# Patient Record
Sex: FEMALE | Race: WHITE | NOT HISPANIC OR LATINO | Employment: FULL TIME | URBAN - METROPOLITAN AREA
[De-identification: names, ages, dates, MRNs, and addresses within clinical notes are randomized per-mention and may not be internally consistent; named-entity substitution may affect disease eponyms.]

---

## 2017-04-20 ENCOUNTER — HOSPITAL ENCOUNTER (OUTPATIENT)
Dept: OTHER | Facility: HOSPITAL | Age: 32
Setting detail: SPECIMEN
Discharge: HOME | End: 2017-04-20
Attending: UROLOGY | Admitting: UROLOGY

## 2017-04-20 LAB — SPECIMEN SOURCE: NORMAL

## 2018-08-27 ENCOUNTER — TELEPHONE (OUTPATIENT)
Dept: OBSTETRICS AND GYNECOLOGY | Facility: CLINIC | Age: 33
End: 2018-08-27

## 2018-08-27 NOTE — TELEPHONE ENCOUNTER
Patient hasn't been seen in a while but states her Mirena is due to be replaced 11/7/18.  She has [employer] insurance.  Patient didn't know if she had to wait until November or if she can go ahead and get it replaced?  Does she need an annual first?  Please advise.

## 2018-09-19 ENCOUNTER — OFFICE VISIT (OUTPATIENT)
Dept: OBSTETRICS AND GYNECOLOGY | Facility: CLINIC | Age: 33
End: 2018-09-19

## 2018-09-19 VITALS
HEIGHT: 63 IN | SYSTOLIC BLOOD PRESSURE: 125 MMHG | BODY MASS INDEX: 36.14 KG/M2 | HEART RATE: 78 BPM | WEIGHT: 204 LBS | DIASTOLIC BLOOD PRESSURE: 83 MMHG

## 2018-09-19 DIAGNOSIS — Z01.419 ENCOUNTER FOR GYNECOLOGICAL EXAMINATION WITHOUT ABNORMAL FINDING: Primary | ICD-10-CM

## 2018-09-19 DIAGNOSIS — Z30.431 IUD CHECK UP: ICD-10-CM

## 2018-09-19 PROCEDURE — 99385 PREV VISIT NEW AGE 18-39: CPT | Performed by: OBSTETRICS & GYNECOLOGY

## 2018-09-19 RX ORDER — AMLODIPINE BESYLATE 5 MG/1
5 TABLET ORAL DAILY
Refills: 5 | COMMUNITY
Start: 2018-08-23 | End: 2021-09-24 | Stop reason: HOSPADM

## 2018-09-19 NOTE — PROGRESS NOTES
"GYN Annual Exam     CC- Here for annual exam.     Ariadna Ambrocio is a 32 y.o. female who presents for annual well woman exam. Periods are absent due to Mirena. IUD is due to  in 2018. Pt would like to exchange for a new one.     OB History      Para Term  AB Living    3 3       3    SAB TAB Ectopic Molar Multiple Live Births              3          Current contraception: IUD  History of abnormal Pap smear: no  Family history of uterine, colon or ovarian cancer: no  History of abnormal mammogram: no  Family history of breast cancer: no  Last Pap : record not available.     Past Medical History:   Diagnosis Date   • Hypertension        Past Surgical History:   Procedure Laterality Date   • KIDNEY STONE SURGERY           Current Outpatient Prescriptions:   •  amLODIPine (NORVASC) 5 MG tablet, Take 5 mg by mouth Daily., Disp: , Rfl: 5    No Known Allergies    Social History   Substance Use Topics   • Smoking status: Never Smoker   • Smokeless tobacco: Never Used   • Alcohol use No       Family History   Problem Relation Age of Onset   • Breast cancer Neg Hx    • Ovarian cancer Neg Hx    • Uterine cancer Neg Hx    • Colon cancer Neg Hx    • Deep vein thrombosis Neg Hx    • Pulmonary embolism Neg Hx        Review of Systems   Constitutional: Negative for chills and fever.   Gastrointestinal: Negative for abdominal pain.   Genitourinary: Negative for dysuria, pelvic pain, vaginal bleeding and vaginal discharge.   All other systems reviewed and are negative.      /83   Pulse 78   Ht 160 cm (63\")   Wt 92.5 kg (204 lb)   Breastfeeding? No   BMI 36.14 kg/m²     Physical Exam   Constitutional: She is oriented to person, place, and time. She appears well-developed and well-nourished. No distress.   HENT:   Head: Normocephalic and atraumatic.   Eyes: Conjunctivae are normal. Right eye exhibits no discharge. Left eye exhibits no discharge.   Neck: Normal range of motion. Neck supple. No " thyromegaly present.   Cardiovascular: Normal rate, regular rhythm and normal heart sounds.    No murmur heard.  Pulmonary/Chest: Effort normal and breath sounds normal. No respiratory distress. Right breast exhibits no inverted nipple, no mass and no nipple discharge. Left breast exhibits no inverted nipple, no mass and no nipple discharge.   Abdominal: Soft. Bowel sounds are normal. She exhibits no distension. There is no tenderness.   Genitourinary: Vagina normal. Pelvic exam was performed with patient supine. There is no lesion or Bartholin's cyst on the right labia. There is no lesion or Bartholin's cyst on the left labia. Uterus is anteverted. Uterus is not deviated, enlarged, fixed or exhibiting a mass.   Cervix is parous. Cervix exhibits visible IUD strings. Right adnexum displays no mass, no tenderness and no fullness. Left adnexum displays no mass, no tenderness and no fullness. No bleeding in the vagina. No vaginal discharge found.   Musculoskeletal: Normal range of motion. She exhibits no edema.   Lymphadenopathy:     She has no cervical adenopathy.        Right: No inguinal adenopathy present.        Left: No inguinal adenopathy present.   Neurological: She is alert and oriented to person, place, and time.   Skin: Skin is warm and dry. No rash noted.   Psychiatric: She has a normal mood and affect. Her behavior is normal. Judgment and thought content normal.            Assessment     1) GYN annual well woman exam.   2) IUD   Due to change IUD out in next few months  Aware of pros and cons.      Plan     1) Breast Health - Clinical breast exam yearly, Self breast awareness monthly  2) Pap - updated today   3) Smoking status- non-smoker   4) Seat belts and sunscreen recommended  5) Follow up prn and one year.     Addy Felder MD   9/19/2018  2:15 PM

## 2018-09-21 LAB
CYTOLOGIST CVX/VAG CYTO: NORMAL
CYTOLOGY CVX/VAG DOC THIN PREP: NORMAL
DX ICD CODE: NORMAL
HIV 1 & 2 AB SER-IMP: NORMAL
HPV I/H RISK 1 DNA CVX QL PROBE+SIG AMP: NEGATIVE
OTHER STN SPEC: NORMAL
PATH REPORT.FINAL DX SPEC: NORMAL
STAT OF ADQ CVX/VAG CYTO-IMP: NORMAL

## 2018-10-10 ENCOUNTER — PROCEDURE VISIT (OUTPATIENT)
Dept: OBSTETRICS AND GYNECOLOGY | Facility: CLINIC | Age: 33
End: 2018-10-10

## 2018-10-10 VITALS
HEIGHT: 63 IN | BODY MASS INDEX: 35.79 KG/M2 | WEIGHT: 202 LBS | DIASTOLIC BLOOD PRESSURE: 89 MMHG | SYSTOLIC BLOOD PRESSURE: 128 MMHG | HEART RATE: 92 BPM

## 2018-10-10 DIAGNOSIS — Z30.432 ENCOUNTER FOR IUD REMOVAL: ICD-10-CM

## 2018-10-10 DIAGNOSIS — Z30.430 ENCOUNTER FOR IUD INSERTION: Primary | ICD-10-CM

## 2018-10-10 LAB
B-HCG UR QL: NEGATIVE
INTERNAL NEGATIVE CONTROL: NEGATIVE
INTERNAL POSITIVE CONTROL: POSITIVE
Lab: NORMAL

## 2018-10-10 PROCEDURE — 58300 INSERT INTRAUTERINE DEVICE: CPT | Performed by: OBSTETRICS & GYNECOLOGY

## 2018-10-10 PROCEDURE — 81025 URINE PREGNANCY TEST: CPT | Performed by: OBSTETRICS & GYNECOLOGY

## 2018-10-10 PROCEDURE — 58301 REMOVE INTRAUTERINE DEVICE: CPT | Performed by: OBSTETRICS & GYNECOLOGY

## 2018-10-10 RX ORDER — PHENTERMINE HYDROCHLORIDE 37.5 MG/1
TABLET ORAL
Refills: 2 | COMMUNITY
Start: 2018-09-21 | End: 2020-07-20

## 2018-10-10 NOTE — PROGRESS NOTES
IUD Removal Procedure Note    Type of IUD:  Mirena  Date of insertion:  5 yrs ago  Reason for removal:  Device expiration  Other relevant history/information:  none    Procedure Time Out Documentation      Procedure Details  IUD strings visible:  yes  Local anesthesia:  None  Tenaculum used:  None  Removal:  IUD strings grasped and IUD removed intact with gentle traction.  The patient tolerated the procedure well.    All appropriate instructions regarding removal were reviewed.    Tolerated well  No apparent complications  Post procedure diagnosis : IUD removal     Plans for contraception:  IUD    Other follow-up needed:  none    The patient was advised to call for any fever or for prolonged or severe pain or bleeding. She was advised to use NSAID as needed for mild to moderate pain.       Procedure: Mirena Intrauterine device insertion    Pre procedure indication 1) Desires Mirena  Post procedure indication 1) Desires Mirena     The risks, benefits, and alternatives to Mirena were explained at length with the patient. All her questions were answered and consents were signed. LOT# [id number], exp 04/2021. NDC# [id number].    The patient was placed in a dorsal lithotomy position on the examining table in [facility]. A bimanual exam confirmed the uterus was normal in size, anteverted. A warmed metal speculum was inserted into the vagina and the cervix was brought into view.    The cervix was prepped with Betadine. The anterior lip was grasped with a single-tooth tenaculum. The endometrial cavity was then sounded to 7.5 cm without use of a dilator. This sealed Mirena package was opened and the Mirena was removed in a sterile fashion.    The upper edge of the depth setting the flange was set at a uterine sound measured. The  was then carefully advanced to the cervical canal into the uterus to the level of the fundus. This was then backed off about 1.5-2 cm to allow sufficient space for the arms to open. The slider  was then retracted about 1 cm and deployed the device. The device was then gently advanced to the fundus. The Mirena was then released by pulling the slider down all the way. The  was removed carefully from the uterus. The threads were then cut leaving 2-3 cm visible outside of the cervix.  The single-tooth tenaculum was removed from the anterior lip. Good hemostasis was noted.     All other instruments were removed from the vagina.   There were no complications.  The patient tolerated the procedure well with a minimal amount of discomfort.    The patient was counseled about the need to return in 2 weeks for string check.     She was counseled about the need to use a backup method of contraception such as condoms until her post insertion exam was performed. The patient verbalized understanding that the Mirena will need to be removed/replaced after 5 years. The patient is counseled to contact us if she has any significant or increasing bleeding, pain, fever, chills, or other concerns. She is instructed to see a doctor right away if she believes that she may be pregnant at any time with the Mirena in place.    Addy Felder MD  10/10/2018  2:16 PM

## 2020-07-20 ENCOUNTER — APPOINTMENT (OUTPATIENT)
Dept: CT IMAGING | Facility: HOSPITAL | Age: 35
End: 2020-07-20

## 2020-07-20 ENCOUNTER — HOSPITAL ENCOUNTER (EMERGENCY)
Facility: HOSPITAL | Age: 35
Discharge: HOME | End: 2020-07-20
Admitting: EMERGENCY MEDICINE

## 2020-07-20 VITALS
TEMPERATURE: 98.4 F | SYSTOLIC BLOOD PRESSURE: 130 MMHG | BODY MASS INDEX: 37.7 KG/M2 | HEART RATE: 93 BPM | DIASTOLIC BLOOD PRESSURE: 92 MMHG | RESPIRATION RATE: 14 BRPM | OXYGEN SATURATION: 100 % | WEIGHT: 212.74 LBS | HEIGHT: 63 IN

## 2020-07-20 DIAGNOSIS — R19.7 DIARRHEA, UNSPECIFIED TYPE: Primary | ICD-10-CM

## 2020-07-20 DIAGNOSIS — R10.9 ABDOMINAL PAIN, UNSPECIFIED ABDOMINAL LOCATION: ICD-10-CM

## 2020-07-20 DIAGNOSIS — K92.1 MELENA: ICD-10-CM

## 2020-07-20 LAB
ALBUMIN SERPL-MCNC: 4.4 G/DL (ref 3.5–5.2)
ALBUMIN/GLOB SERPL: 1.6 G/DL
ALP SERPL-CCNC: 74 U/L (ref 39–117)
ALT SERPL W P-5'-P-CCNC: 19 U/L (ref 1–33)
AMYLASE SERPL-CCNC: 50 U/L (ref 28–100)
ANION GAP SERPL CALCULATED.3IONS-SCNC: 13 MMOL/L (ref 5–15)
AST SERPL-CCNC: 18 U/L (ref 1–32)
B-HCG UR QL: NEGATIVE
BASOPHILS # BLD AUTO: 0.1 10*3/MM3 (ref 0–0.2)
BASOPHILS NFR BLD AUTO: 0.8 % (ref 0–1.5)
BILIRUB SERPL-MCNC: 0.6 MG/DL (ref 0–1.2)
BUN SERPL-MCNC: 6 MG/DL (ref 6–20)
BUN SERPL-MCNC: ABNORMAL MG/DL
BUN/CREAT SERPL: ABNORMAL
CALCIUM SPEC-SCNC: 9.1 MG/DL (ref 8.6–10.5)
CHLORIDE SERPL-SCNC: 103 MMOL/L (ref 98–107)
CO2 SERPL-SCNC: 23 MMOL/L (ref 22–29)
CREAT SERPL-MCNC: 0.86 MG/DL (ref 0.57–1)
DEPRECATED RDW RBC AUTO: 43.8 FL (ref 37–54)
EOSINOPHIL # BLD AUTO: 0.4 10*3/MM3 (ref 0–0.4)
EOSINOPHIL NFR BLD AUTO: 2.3 % (ref 0.3–6.2)
ERYTHROCYTE [DISTWIDTH] IN BLOOD BY AUTOMATED COUNT: 13.7 % (ref 12.3–15.4)
GFR SERPL CREATININE-BSD FRML MDRD: 76 ML/MIN/1.73
GLOBULIN UR ELPH-MCNC: 2.8 GM/DL
GLUCOSE SERPL-MCNC: 119 MG/DL (ref 65–99)
HCT VFR BLD AUTO: 40.4 % (ref 34–46.6)
HGB BLD-MCNC: 13.4 G/DL (ref 12–15.9)
HOLD SPECIMEN: NORMAL
LIPASE SERPL-CCNC: 26 U/L (ref 13–60)
LYMPHOCYTES # BLD AUTO: 3 10*3/MM3 (ref 0.7–3.1)
LYMPHOCYTES NFR BLD AUTO: 18.6 % (ref 19.6–45.3)
MAGNESIUM SERPL-MCNC: 1.8 MG/DL (ref 1.6–2.6)
MCH RBC QN AUTO: 30 PG (ref 26.6–33)
MCHC RBC AUTO-ENTMCNC: 33.1 G/DL (ref 31.5–35.7)
MCV RBC AUTO: 90.5 FL (ref 79–97)
MONOCYTES # BLD AUTO: 0.9 10*3/MM3 (ref 0.1–0.9)
MONOCYTES NFR BLD AUTO: 5.8 % (ref 5–12)
NEUTROPHILS NFR BLD AUTO: 11.7 10*3/MM3 (ref 1.7–7)
NEUTROPHILS NFR BLD AUTO: 72.5 % (ref 42.7–76)
NRBC BLD AUTO-RTO: 0.1 /100 WBC (ref 0–0.2)
PLATELET # BLD AUTO: 317 10*3/MM3 (ref 140–450)
PMV BLD AUTO: 7.3 FL (ref 6–12)
POTASSIUM SERPL-SCNC: 3.8 MMOL/L (ref 3.5–5.2)
PROT SERPL-MCNC: 7.2 G/DL (ref 6–8.5)
RBC # BLD AUTO: 4.46 10*6/MM3 (ref 3.77–5.28)
SARS-COV-2 RNA PNL SPEC NAA+PROBE: NOT DETECTED
SODIUM SERPL-SCNC: 139 MMOL/L (ref 136–145)
WBC # BLD AUTO: 16.2 10*3/MM3 (ref 3.4–10.8)

## 2020-07-20 PROCEDURE — 81025 URINE PREGNANCY TEST: CPT | Performed by: NURSE PRACTITIONER

## 2020-07-20 PROCEDURE — 99284 EMERGENCY DEPT VISIT MOD MDM: CPT

## 2020-07-20 PROCEDURE — 83690 ASSAY OF LIPASE: CPT | Performed by: NURSE PRACTITIONER

## 2020-07-20 PROCEDURE — 0 IOPAMIDOL PER 1 ML: Performed by: NURSE PRACTITIONER

## 2020-07-20 PROCEDURE — 82150 ASSAY OF AMYLASE: CPT | Performed by: NURSE PRACTITIONER

## 2020-07-20 PROCEDURE — 74177 CT ABD & PELVIS W/CONTRAST: CPT

## 2020-07-20 PROCEDURE — 85025 COMPLETE CBC W/AUTO DIFF WBC: CPT | Performed by: NURSE PRACTITIONER

## 2020-07-20 PROCEDURE — 83735 ASSAY OF MAGNESIUM: CPT | Performed by: NURSE PRACTITIONER

## 2020-07-20 PROCEDURE — 80053 COMPREHEN METABOLIC PANEL: CPT | Performed by: NURSE PRACTITIONER

## 2020-07-20 PROCEDURE — 96374 THER/PROPH/DIAG INJ IV PUSH: CPT

## 2020-07-20 PROCEDURE — 87635 SARS-COV-2 COVID-19 AMP PRB: CPT | Performed by: NURSE PRACTITIONER

## 2020-07-20 RX ORDER — ONDANSETRON 4 MG/1
4 TABLET, ORALLY DISINTEGRATING ORAL EVERY 8 HOURS PRN
Qty: 20 TABLET | Refills: 0 | Status: SHIPPED | OUTPATIENT
Start: 2020-07-20 | End: 2021-07-15

## 2020-07-20 RX ORDER — LANSOPRAZOLE 15 MG/1
15 CAPSULE, DELAYED RELEASE ORAL DAILY
Qty: 30 CAPSULE | Refills: 0 | Status: SHIPPED | OUTPATIENT
Start: 2020-07-20 | End: 2021-07-15

## 2020-07-20 RX ORDER — PANTOPRAZOLE SODIUM 40 MG/10ML
40 INJECTION, POWDER, LYOPHILIZED, FOR SOLUTION INTRAVENOUS ONCE
Status: COMPLETED | OUTPATIENT
Start: 2020-07-20 | End: 2020-07-20

## 2020-07-20 RX ORDER — DICYCLOMINE HCL 20 MG
20 TABLET ORAL EVERY 6 HOURS
Qty: 20 TABLET | Refills: 0 | Status: SHIPPED | OUTPATIENT
Start: 2020-07-20 | End: 2021-07-15

## 2020-07-20 RX ORDER — SODIUM CHLORIDE 0.9 % (FLUSH) 0.9 %
10 SYRINGE (ML) INJECTION AS NEEDED
Status: DISCONTINUED | OUTPATIENT
Start: 2020-07-20 | End: 2020-07-20 | Stop reason: HOSPADM

## 2020-07-20 RX ORDER — SUCRALFATE 1 G/1
1 TABLET ORAL 4 TIMES DAILY
Qty: 120 TABLET | Refills: 0 | Status: SHIPPED | OUTPATIENT
Start: 2020-07-20 | End: 2020-08-19

## 2020-07-20 RX ADMIN — SODIUM CHLORIDE 1000 ML: 900 INJECTION, SOLUTION INTRAVENOUS at 13:50

## 2020-07-20 RX ADMIN — IOPAMIDOL 100 ML: 755 INJECTION, SOLUTION INTRAVENOUS at 15:03

## 2020-07-20 RX ADMIN — PANTOPRAZOLE SODIUM 40 MG: 40 INJECTION, POWDER, FOR SOLUTION INTRAVENOUS at 13:50

## 2020-07-20 NOTE — ED PROVIDER NOTES
Subjective   Chief complaint: Abdominal pain diarrhea      Context: Patient is a 34-year-old female comes in today complaining of abdominal pain and intermittent diarrhea beginning Saturday.  She also states she had a couple episodes of vomiting when the symptoms began.  Reports she has chronic constipation and will go approximately 1 month between bowel movements.  Reports she had a normal for her bowel movement on Friday and then began having abdominal cramping and burning on Saturday evening with multiple episodes of watery black stools.  States she had been taking Tylenol and ibuprofen as needed for pain with little relief.  Patient reports she did use a suppository on Saturday.  Denies fever cough rash recent illness or exposure.  Denies chest pain shortness of breath.  Denies dysuria or vaginal discharge or abnormal bleeding.  Denies history of alcohol abuse, or excessive NSAID intake.    Duration: 2 days    Timing: Intermittent    Severity: Mild    Associated symptoms:          PCP: Kenneth      LNMP: IUD implant            Review of Systems   Constitutional: Negative for fever.   HENT: Negative.    Respiratory: Negative.    Cardiovascular: Negative.    Gastrointestinal: Positive for abdominal pain, diarrhea and vomiting.   Genitourinary: Negative for dysuria, hematuria and vaginal bleeding.   Musculoskeletal: Negative.  Gait problem: .JULIAPHYSICALEXAM.   Skin: Negative.    Allergic/Immunologic: Negative for immunocompromised state.   Neurological: Negative for weakness.   Psychiatric/Behavioral: Negative.        Past Medical History:   Diagnosis Date   • Hypertension        No Known Allergies    Past Surgical History:   Procedure Laterality Date   • KIDNEY STONE SURGERY         Family History   Problem Relation Age of Onset   • Breast cancer Neg Hx    • Ovarian cancer Neg Hx    • Uterine cancer Neg Hx    • Colon cancer Neg Hx    • Deep vein thrombosis Neg Hx    • Pulmonary embolism Neg Hx        Social  History     Socioeconomic History   • Marital status:      Spouse name: Not on file   • Number of children: Not on file   • Years of education: Not on file   • Highest education level: Not on file   Tobacco Use   • Smoking status: Never Smoker   • Smokeless tobacco: Never Used   Substance and Sexual Activity   • Alcohol use: No   • Drug use: No   • Sexual activity: Yes     Partners: Male     Birth control/protection: IUD           Objective   Physical Exam  Vital signs and triage nurse note reviewed.   Constitutional: Awake, alert; well-developed and well-nourished. No acute distress is noted nontoxic in appearance.   HEENT: Normocephalic, atraumatic; pupils are PERRL with intact EOM; oropharynx is pink and moist without exudate or erythema.   Neck: Supple, full range of motion without pain; no cervical lymphadenopathy.   Cardiovascular: Tachycardic regular rate and rhythm, normal S1-S2 no murmur rub.   Pulmonary: Respiratory effort regular nonlabored, breath sounds clear to auscultation all fields.   Abdomen: Soft, tender bilateral upper quadrants nondistended with normoactive bowel sounds; no rebound or guarding.  Hemoccult positive   Musculoskeletal: Independent range of motion of all extremities with no palpable tenderness or edema.   Neuro: Alert oriented x3, speech is clear and appropriate, GCS 15   Skin:  Fleshtone warm, dry, intact; no erythematous or petechial rash or lesion     Procedures           ED Course      Labs Reviewed   COMPREHENSIVE METABOLIC PANEL - Abnormal; Notable for the following components:       Result Value    Glucose 119 (*)     All other components within normal limits    Narrative:     GFR Normal >60  Chronic Kidney Disease <60  Kidney Failure <15     CBC WITH AUTO DIFFERENTIAL - Abnormal; Notable for the following components:    WBC 16.20 (*)     Lymphocyte % 18.6 (*)     Neutrophils, Absolute 11.70 (*)     All other components within normal limits   COVID-19 [facility]  IN-HOUSE, NASAL SWAB NO TRANSPORT MEDIA - Normal    Narrative:     Fact sheet for providers: https://www.fda.gov/media/706680/download     Fact sheet for patients: https://www.fda.gov/media/802278/download   LIPASE - Normal   AMYLASE - Normal   MAGNESIUM - Normal   PREGNANCY, URINE - Normal   BUN - Normal   CBC AND DIFFERENTIAL    Narrative:     The following orders were created for panel order CBC & Differential.  Procedure                               Abnormality         Status                     ---------                               -----------         ------                     CBC Auto Differential[[id number]]        Abnormal            Final result                 Please view results for these tests on the individual orders.   EXTRA TUBES    Narrative:     The following orders were created for panel order Extra Tubes.  Procedure                               Abnormality         Status                     ---------                               -----------         ------                     Gold Top - SST[[id number]]                                   Final result                 Please view results for these tests on the individual orders.   GOLD TOP - SST     Medications   sodium chloride 0.9 % flush 10 mL (has no administration in time range)   pantoprazole (PROTONIX) injection 40 mg (40 mg Intravenous Given 7/20/20 1350)   sodium chloride 0.9 % bolus 1,000 mL ( Intravenous Currently Infusing 7/20/20 1533)   iopamidol (ISOVUE-370) 76 % injection 100 mL (100 mL Intravenous Given 7/20/20 1503)     Ct Abdomen Pelvis With Contrast    Result Date: 7/20/2020   1. Thickening and inflammatory change of the descending colon. Correlate for infectious or inflammatory colitis. 2. Nonobstructing bilateral renal stones. Small right renal cyst. 3. IUD.    Electronically Signed By-Dr. Aurelia Brandon MD On:7/20/2020 3:09 PM This report was finalized on [id number] by Dr. Aurelia Brandon MD.                                          MDM  Number of Diagnoses or Management Options  Abdominal pain, unspecified abdominal location:   Diarrhea, unspecified type:   Melena:   Diagnosis management comments:       Comorbidities:  has a past medical history of Hypertension.  Differentials: Gastroenteritis colitis not all inclusive of differentials considered  Radiology interpretation:  X-rays reviewed by me and interpreted by radiologist,   Ct Abdomen Pelvis With Contrast    Result Date: 7/20/2020   1. Thickening and inflammatory change of the descending colon. Correlate for infectious or inflammatory colitis. 2. Nonobstructing bilateral renal stones. Small right renal cyst. 3. IUD.    Electronically Signed By-Dr. Aurelia Brandon MD On:7/20/2020 3:09 PM This report was finalized on [id number] by Dr. Aurelia Brandon MD.    Lab interpretation:  Labs viewed by me significant for, g leukocytosis    Appropriate PPE worn during exam.    i discussed findings with patient who voices understanding of discharge instructions, signs and symptoms requiring return to ED; discharged improved and in stable condition with follow up for re-evaluation.  Patient will be discharged home with Carafate Prevacid Zofran and Bentyl.  She had an outpatient order done for a gastrointestinal PCR and sent home with collection items.       Amount and/or Complexity of Data Reviewed  Clinical lab tests: reviewed        Final diagnoses:   Diarrhea, unspecified type   Abdominal pain, unspecified abdominal location   Melena            Yocasta Tay, APRN  07/20/20 8701

## 2020-07-20 NOTE — DISCHARGE INSTRUCTIONS
Void any alcohol or NSAIDs.  Follow-up with gastroenterology.  Outpatient stool studies.  Medications as directed.  Return for any new or worsening symptoms.

## 2020-07-21 ENCOUNTER — PATIENT OUTREACH (OUTPATIENT)
Dept: CASE MANAGEMENT | Facility: OTHER | Age: 35
End: 2020-07-21

## 2020-07-21 NOTE — OUTREACH NOTE
ED Potential Covid Discharge Follow-up    Pt discharged from [facility] ED on 7/20/20, seen for abdominal pain, diarrhea, covid 19 testing performed. RN-ACM outreach call made to pt. Pt reports she has not received covid test results yet. [facility] Dept number provided to pt. Pt c/o abdominal soreness, states she is not having pain, reports diarrhea has resolved. Pt denies CP, fever, SOA, or cough. Reviewed ED AVS with pt. Education provided. Pt denies food, medication, or transportation insecurities. Pt declines RN-ACM assistance with scheduling PCP follow up appt, states she will call. Pt states she has follow up appt with GI this Friday, 7/24/20. [facility] 24 hour nurse line number provided to pt, pt declines covid hotline number. Advised pt to call with any needs. Follow up outreach as needed.      Brook Gregg RN  Ambulatory     7/21/2020, 15:19

## 2020-07-21 NOTE — OUTREACH NOTE
Care Coordination Note    Called pt's PCP office, notified staff pt was seen at [facility] ED on 7/20/20, pt was tested for covid 19, pt reports she will be following up with their office. Staff thanks RN-ACM for the information.     Brook Gregg RN  Ambulatory     7/21/2020, 15:28

## 2020-07-24 ENCOUNTER — LAB (OUTPATIENT)
Dept: LAB | Facility: HOSPITAL | Age: 35
End: 2020-07-24

## 2020-07-24 ENCOUNTER — OFFICE (AMBULATORY)
Dept: URBAN - METROPOLITAN AREA CLINIC 64 | Facility: CLINIC | Age: 35
End: 2020-07-24

## 2020-07-24 VITALS
HEIGHT: 63 IN | WEIGHT: 218 LBS | SYSTOLIC BLOOD PRESSURE: 106 MMHG | HEART RATE: 74 BPM | DIASTOLIC BLOOD PRESSURE: 66 MMHG

## 2020-07-24 DIAGNOSIS — R19.7 DIARRHEA, UNSPECIFIED TYPE: ICD-10-CM

## 2020-07-24 DIAGNOSIS — K59.00 CONSTIPATION, UNSPECIFIED: ICD-10-CM

## 2020-07-24 DIAGNOSIS — K62.5 HEMORRHAGE OF ANUS AND RECTUM: ICD-10-CM

## 2020-07-24 DIAGNOSIS — R93.3 ABNORMAL FINDINGS ON DIAGNOSTIC IMAGING OF OTHER PARTS OF DI: ICD-10-CM

## 2020-07-24 LAB
ADV 40+41 DNA STL QL NAA+NON-PROBE: NOT DETECTED
ASTRO TYP 1-8 RNA STL QL NAA+NON-PROBE: NOT DETECTED
C CAYETANENSIS DNA STL QL NAA+NON-PROBE: NOT DETECTED
CAMPY SP DNA.DIARRHEA STL QL NAA+PROBE: NOT DETECTED
CRYPTOSP STL CULT: NOT DETECTED
E COLI DNA SPEC QL NAA+PROBE: NOT DETECTED
E HISTOLYT AG STL-ACNC: NOT DETECTED
EAEC PAA PLAS AGGR+AATA ST NAA+NON-PRB: NOT DETECTED
EC STX1 + STX2 GENES STL NAA+PROBE: NOT DETECTED
EPEC EAE GENE STL QL NAA+NON-PROBE: NOT DETECTED
ETEC LTA+ST1A+ST1B TOX ST NAA+NON-PROBE: NOT DETECTED
G LAMBLIA DNA SPEC QL NAA+PROBE: NOT DETECTED
NOROVIRUS GI+II RNA STL QL NAA+NON-PROBE: NOT DETECTED
P SHIGELLOIDES DNA STL QL NAA+PROBE: NOT DETECTED
RV RNA STL NAA+PROBE: NOT DETECTED
SALMONELLA DNA SPEC QL NAA+PROBE: NOT DETECTED
SAPO I+II+IV+V RNA STL QL NAA+NON-PROBE: NOT DETECTED
SHIGELLA SP+EIEC IPAH STL QL NAA+PROBE: NOT DETECTED
V CHOLERAE DNA SPEC QL NAA+PROBE: NOT DETECTED
VIBRIO DNA SPEC NAA+PROBE: NOT DETECTED
YERSINIA STL CULT: NOT DETECTED

## 2020-07-24 PROCEDURE — 99203 OFFICE O/P NEW LOW 30 MIN: CPT | Performed by: NURSE PRACTITIONER

## 2020-07-24 PROCEDURE — 0097U HC BIOFIRE FILMARRAY GI PANEL: CPT

## 2020-07-24 RX ORDER — LINACLOTIDE 145 UG/1
145 CAPSULE, GELATIN COATED ORAL
Qty: 90 | Refills: 2 | Status: ACTIVE
Start: 2020-07-24

## 2020-08-04 ENCOUNTER — ON CAMPUS - OUTPATIENT (AMBULATORY)
Dept: URBAN - METROPOLITAN AREA HOSPITAL 2 | Facility: HOSPITAL | Age: 35
End: 2020-08-04
Payer: COMMERCIAL

## 2020-08-04 VITALS
HEART RATE: 96 BPM | SYSTOLIC BLOOD PRESSURE: 119 MMHG | SYSTOLIC BLOOD PRESSURE: 104 MMHG | HEIGHT: 63 IN | HEART RATE: 110 BPM | WEIGHT: 214 LBS | SYSTOLIC BLOOD PRESSURE: 117 MMHG | HEART RATE: 101 BPM | DIASTOLIC BLOOD PRESSURE: 77 MMHG | RESPIRATION RATE: 20 BRPM | OXYGEN SATURATION: 96 % | HEART RATE: 107 BPM | DIASTOLIC BLOOD PRESSURE: 75 MMHG | HEART RATE: 114 BPM | DIASTOLIC BLOOD PRESSURE: 92 MMHG | SYSTOLIC BLOOD PRESSURE: 123 MMHG | HEART RATE: 113 BPM | DIASTOLIC BLOOD PRESSURE: 64 MMHG | OXYGEN SATURATION: 99 % | TEMPERATURE: 98 F | DIASTOLIC BLOOD PRESSURE: 80 MMHG | OXYGEN SATURATION: 98 % | SYSTOLIC BLOOD PRESSURE: 128 MMHG | HEART RATE: 100 BPM | RESPIRATION RATE: 18 BRPM | SYSTOLIC BLOOD PRESSURE: 124 MMHG | DIASTOLIC BLOOD PRESSURE: 86 MMHG | SYSTOLIC BLOOD PRESSURE: 108 MMHG | RESPIRATION RATE: 16 BRPM | HEART RATE: 102 BPM | SYSTOLIC BLOOD PRESSURE: 144 MMHG

## 2020-08-04 DIAGNOSIS — K62.5 HEMORRHAGE OF ANUS AND RECTUM: ICD-10-CM

## 2020-08-04 DIAGNOSIS — K59.00 CONSTIPATION, UNSPECIFIED: ICD-10-CM

## 2020-08-04 DIAGNOSIS — K64.8 OTHER HEMORRHOIDS: ICD-10-CM

## 2020-08-04 DIAGNOSIS — R93.3 ABNORMAL FINDINGS ON DIAGNOSTIC IMAGING OF OTHER PARTS OF DI: ICD-10-CM

## 2020-08-04 PROCEDURE — 45378 DIAGNOSTIC COLONOSCOPY: CPT | Performed by: INTERNAL MEDICINE

## 2020-09-04 ENCOUNTER — OFFICE (AMBULATORY)
Dept: URBAN - METROPOLITAN AREA CLINIC 64 | Facility: CLINIC | Age: 35
End: 2020-09-04

## 2020-09-04 VITALS
WEIGHT: 218 LBS | SYSTOLIC BLOOD PRESSURE: 122 MMHG | HEIGHT: 63 IN | HEART RATE: 100 BPM | DIASTOLIC BLOOD PRESSURE: 71 MMHG

## 2020-09-04 DIAGNOSIS — R10.84 GENERALIZED ABDOMINAL PAIN: ICD-10-CM

## 2020-09-04 DIAGNOSIS — K59.00 CONSTIPATION, UNSPECIFIED: ICD-10-CM

## 2020-09-04 PROCEDURE — 99213 OFFICE O/P EST LOW 20 MIN: CPT | Performed by: NURSE PRACTITIONER

## 2020-11-15 PROCEDURE — 87086 URINE CULTURE/COLONY COUNT: CPT | Performed by: FAMILY MEDICINE

## 2020-12-29 ENCOUNTER — APPOINTMENT (OUTPATIENT)
Dept: CT IMAGING | Facility: HOSPITAL | Age: 35
End: 2020-12-29

## 2020-12-29 ENCOUNTER — HOSPITAL ENCOUNTER (EMERGENCY)
Facility: HOSPITAL | Age: 35
Discharge: HOME | End: 2020-12-29
Admitting: EMERGENCY MEDICINE

## 2020-12-29 VITALS
SYSTOLIC BLOOD PRESSURE: 133 MMHG | HEIGHT: 63 IN | RESPIRATION RATE: 16 BRPM | WEIGHT: 214.29 LBS | TEMPERATURE: 97.9 F | DIASTOLIC BLOOD PRESSURE: 84 MMHG | OXYGEN SATURATION: 99 % | BODY MASS INDEX: 37.97 KG/M2 | HEART RATE: 77 BPM

## 2020-12-29 DIAGNOSIS — R10.9 FLANK PAIN: Primary | ICD-10-CM

## 2020-12-29 DIAGNOSIS — N20.0 RENAL STONE: ICD-10-CM

## 2020-12-29 LAB
ALBUMIN SERPL-MCNC: 4.3 G/DL (ref 3.5–5.2)
ALBUMIN/GLOB SERPL: 2 G/DL
ALP SERPL-CCNC: 78 U/L (ref 39–117)
ALT SERPL W P-5'-P-CCNC: 23 U/L (ref 1–33)
ANION GAP SERPL CALCULATED.3IONS-SCNC: 10 MMOL/L (ref 5–15)
AST SERPL-CCNC: 21 U/L (ref 1–32)
B-HCG UR QL: NEGATIVE
BACTERIA UR QL AUTO: ABNORMAL /HPF
BASOPHILS # BLD AUTO: 0.1 10*3/MM3 (ref 0–0.2)
BASOPHILS NFR BLD AUTO: 0.8 % (ref 0–1.5)
BILIRUB SERPL-MCNC: 0.4 MG/DL (ref 0–1.2)
BILIRUB UR QL STRIP: NEGATIVE
BUN SERPL-MCNC: 7 MG/DL (ref 6–20)
BUN/CREAT SERPL: 9.7 (ref 7–25)
CALCIUM SPEC-SCNC: 9.3 MG/DL (ref 8.6–10.5)
CHLORIDE SERPL-SCNC: 107 MMOL/L (ref 98–107)
CLARITY UR: CLEAR
CO2 SERPL-SCNC: 23 MMOL/L (ref 22–29)
COLOR UR: ABNORMAL
CREAT SERPL-MCNC: 0.72 MG/DL (ref 0.57–1)
DEPRECATED RDW RBC AUTO: 40.7 FL (ref 37–54)
EOSINOPHIL # BLD AUTO: 0.2 10*3/MM3 (ref 0–0.4)
EOSINOPHIL NFR BLD AUTO: 1.6 % (ref 0.3–6.2)
ERYTHROCYTE [DISTWIDTH] IN BLOOD BY AUTOMATED COUNT: 13.1 % (ref 12.3–15.4)
GFR SERPL CREATININE-BSD FRML MDRD: 92 ML/MIN/1.73
GLOBULIN UR ELPH-MCNC: 2.1 GM/DL
GLUCOSE SERPL-MCNC: 123 MG/DL (ref 65–99)
GLUCOSE UR STRIP-MCNC: NEGATIVE MG/DL
HCT VFR BLD AUTO: 38.9 % (ref 34–46.6)
HGB BLD-MCNC: 13.3 G/DL (ref 12–15.9)
HGB UR QL STRIP.AUTO: ABNORMAL
HYALINE CASTS UR QL AUTO: ABNORMAL /LPF
KETONES UR QL STRIP: NEGATIVE
LEUKOCYTE ESTERASE UR QL STRIP.AUTO: ABNORMAL
LYMPHOCYTES # BLD AUTO: 2.4 10*3/MM3 (ref 0.7–3.1)
LYMPHOCYTES NFR BLD AUTO: 24.8 % (ref 19.6–45.3)
MCH RBC QN AUTO: 30.8 PG (ref 26.6–33)
MCHC RBC AUTO-ENTMCNC: 34.2 G/DL (ref 31.5–35.7)
MCV RBC AUTO: 90.1 FL (ref 79–97)
MONOCYTES # BLD AUTO: 0.8 10*3/MM3 (ref 0.1–0.9)
MONOCYTES NFR BLD AUTO: 8 % (ref 5–12)
NEUTROPHILS NFR BLD AUTO: 6.2 10*3/MM3 (ref 1.7–7)
NEUTROPHILS NFR BLD AUTO: 64.8 % (ref 42.7–76)
NITRITE UR QL STRIP: POSITIVE
NRBC BLD AUTO-RTO: 0 /100 WBC (ref 0–0.2)
PH UR STRIP.AUTO: 6 [PH] (ref 5–8)
PLATELET # BLD AUTO: 304 10*3/MM3 (ref 140–450)
PMV BLD AUTO: 7.7 FL (ref 6–12)
POTASSIUM SERPL-SCNC: 3.9 MMOL/L (ref 3.5–5.2)
PROT SERPL-MCNC: 6.4 G/DL (ref 6–8.5)
PROT UR QL STRIP: NEGATIVE
RBC # BLD AUTO: 4.32 10*6/MM3 (ref 3.77–5.28)
RBC # UR: ABNORMAL /HPF
REF LAB TEST METHOD: ABNORMAL
SODIUM SERPL-SCNC: 140 MMOL/L (ref 136–145)
SP GR UR STRIP: 1.01 (ref 1–1.03)
SQUAMOUS #/AREA URNS HPF: ABNORMAL /HPF
UROBILINOGEN UR QL STRIP: ABNORMAL
WBC # BLD AUTO: 9.5 10*3/MM3 (ref 3.4–10.8)
WBC UR QL AUTO: ABNORMAL /HPF

## 2020-12-29 PROCEDURE — 99283 EMERGENCY DEPT VISIT LOW MDM: CPT

## 2020-12-29 PROCEDURE — 80053 COMPREHEN METABOLIC PANEL: CPT | Performed by: PHYSICIAN ASSISTANT

## 2020-12-29 PROCEDURE — 81001 URINALYSIS AUTO W/SCOPE: CPT | Performed by: PHYSICIAN ASSISTANT

## 2020-12-29 PROCEDURE — 81025 URINE PREGNANCY TEST: CPT | Performed by: PHYSICIAN ASSISTANT

## 2020-12-29 PROCEDURE — 74176 CT ABD & PELVIS W/O CONTRAST: CPT

## 2020-12-29 PROCEDURE — 87086 URINE CULTURE/COLONY COUNT: CPT | Performed by: PHYSICIAN ASSISTANT

## 2020-12-29 PROCEDURE — 85025 COMPLETE CBC W/AUTO DIFF WBC: CPT | Performed by: PHYSICIAN ASSISTANT

## 2020-12-29 RX ORDER — SODIUM CHLORIDE 0.9 % (FLUSH) 0.9 %
10 SYRINGE (ML) INJECTION AS NEEDED
Status: DISCONTINUED | OUTPATIENT
Start: 2020-12-29 | End: 2020-12-29 | Stop reason: HOSPADM

## 2020-12-29 RX ADMIN — SODIUM CHLORIDE 1000 ML: 9 INJECTION, SOLUTION INTRAVENOUS at 15:20

## 2020-12-30 LAB — BACTERIA SPEC AEROBE CULT: NO GROWTH

## 2021-07-15 ENCOUNTER — HOSPITAL ENCOUNTER (OUTPATIENT)
Facility: HOSPITAL | Age: 36
Discharge: HOME | End: 2021-07-16
Attending: EMERGENCY MEDICINE | Admitting: UROLOGY

## 2021-07-15 ENCOUNTER — APPOINTMENT (OUTPATIENT)
Dept: CT IMAGING | Facility: HOSPITAL | Age: 36
End: 2021-07-15

## 2021-07-15 DIAGNOSIS — N20.1 URETEROLITHIASIS: Primary | ICD-10-CM

## 2021-07-15 DIAGNOSIS — N13.2 HYDRONEPHROSIS WITH URINARY OBSTRUCTION DUE TO URETERAL CALCULUS: ICD-10-CM

## 2021-07-15 DIAGNOSIS — N39.0 ACUTE URINARY TRACT INFECTION: ICD-10-CM

## 2021-07-15 LAB
ANION GAP SERPL CALCULATED.3IONS-SCNC: 10 MMOL/L (ref 5–15)
BACTERIA UR QL AUTO: ABNORMAL /HPF
BASOPHILS # BLD AUTO: 0.1 10*3/MM3 (ref 0–0.2)
BASOPHILS NFR BLD AUTO: 0.6 % (ref 0–1.5)
BILIRUB UR QL STRIP: NEGATIVE
BUN SERPL-MCNC: 14 MG/DL (ref 6–20)
BUN/CREAT SERPL: 12.7 (ref 7–25)
CALCIUM SPEC-SCNC: 9.3 MG/DL (ref 8.6–10.5)
CHLORIDE SERPL-SCNC: 102 MMOL/L (ref 98–107)
CLARITY UR: CLEAR
CO2 SERPL-SCNC: 25 MMOL/L (ref 22–29)
COLOR UR: YELLOW
CREAT SERPL-MCNC: 1.1 MG/DL (ref 0.57–1)
DEPRECATED RDW RBC AUTO: 41.1 FL (ref 37–54)
EOSINOPHIL # BLD AUTO: 0.2 10*3/MM3 (ref 0–0.4)
EOSINOPHIL NFR BLD AUTO: 1.3 % (ref 0.3–6.2)
ERYTHROCYTE [DISTWIDTH] IN BLOOD BY AUTOMATED COUNT: 13.2 % (ref 12.3–15.4)
GFR SERPL CREATININE-BSD FRML MDRD: 57 ML/MIN/1.73
GLUCOSE SERPL-MCNC: 101 MG/DL (ref 65–99)
GLUCOSE UR STRIP-MCNC: NEGATIVE MG/DL
HCT VFR BLD AUTO: 38.8 % (ref 34–46.6)
HGB BLD-MCNC: 12.8 G/DL (ref 12–15.9)
HGB UR QL STRIP.AUTO: ABNORMAL
HYALINE CASTS UR QL AUTO: ABNORMAL /LPF
KETONES UR QL STRIP: NEGATIVE
LEUKOCYTE ESTERASE UR QL STRIP.AUTO: ABNORMAL
LYMPHOCYTES # BLD AUTO: 3 10*3/MM3 (ref 0.7–3.1)
LYMPHOCYTES NFR BLD AUTO: 23.3 % (ref 19.6–45.3)
MCH RBC QN AUTO: 29.6 PG (ref 26.6–33)
MCHC RBC AUTO-ENTMCNC: 33.1 G/DL (ref 31.5–35.7)
MCV RBC AUTO: 89.2 FL (ref 79–97)
MONOCYTES # BLD AUTO: 1.2 10*3/MM3 (ref 0.1–0.9)
MONOCYTES NFR BLD AUTO: 9.2 % (ref 5–12)
NEUTROPHILS NFR BLD AUTO: 65.6 % (ref 42.7–76)
NEUTROPHILS NFR BLD AUTO: 8.3 10*3/MM3 (ref 1.7–7)
NITRITE UR QL STRIP: POSITIVE
NRBC BLD AUTO-RTO: 0 /100 WBC (ref 0–0.2)
PH UR STRIP.AUTO: 5.5 [PH] (ref 5–8)
PLATELET # BLD AUTO: 361 10*3/MM3 (ref 140–450)
PMV BLD AUTO: 7.3 FL (ref 6–12)
POTASSIUM SERPL-SCNC: 3.8 MMOL/L (ref 3.5–5.2)
PROT UR QL STRIP: NEGATIVE
RBC # BLD AUTO: 4.35 10*6/MM3 (ref 3.77–5.28)
RBC # UR: ABNORMAL /HPF
REF LAB TEST METHOD: ABNORMAL
SARS-COV-2 RNA PNL SPEC NAA+PROBE: NOT DETECTED
SODIUM SERPL-SCNC: 137 MMOL/L (ref 136–145)
SP GR UR STRIP: 1.01 (ref 1–1.03)
SQUAMOUS #/AREA URNS HPF: ABNORMAL /HPF
UROBILINOGEN UR QL STRIP: ABNORMAL
WBC # BLD AUTO: 12.7 10*3/MM3 (ref 3.4–10.8)
WBC UR QL AUTO: ABNORMAL /HPF

## 2021-07-15 PROCEDURE — 25010000002 ONDANSETRON PER 1 MG: Performed by: NURSE PRACTITIONER

## 2021-07-15 PROCEDURE — 74176 CT ABD & PELVIS W/O CONTRAST: CPT

## 2021-07-15 PROCEDURE — 96375 TX/PRO/DX INJ NEW DRUG ADDON: CPT

## 2021-07-15 PROCEDURE — 96374 THER/PROPH/DIAG INJ IV PUSH: CPT

## 2021-07-15 PROCEDURE — 87635 SARS-COV-2 COVID-19 AMP PRB: CPT | Performed by: EMERGENCY MEDICINE

## 2021-07-15 PROCEDURE — C9803 HOPD COVID-19 SPEC COLLECT: HCPCS

## 2021-07-15 PROCEDURE — 99284 EMERGENCY DEPT VISIT MOD MDM: CPT

## 2021-07-15 PROCEDURE — 96376 TX/PRO/DX INJ SAME DRUG ADON: CPT

## 2021-07-15 PROCEDURE — G0378 HOSPITAL OBSERVATION PER HR: HCPCS

## 2021-07-15 PROCEDURE — 25010000002 MORPHINE PER 10 MG: Performed by: NURSE PRACTITIONER

## 2021-07-15 PROCEDURE — 85025 COMPLETE CBC W/AUTO DIFF WBC: CPT | Performed by: NURSE PRACTITIONER

## 2021-07-15 PROCEDURE — 25010000002 CEFTRIAXONE PER 250 MG: Performed by: NURSE PRACTITIONER

## 2021-07-15 PROCEDURE — 81001 URINALYSIS AUTO W/SCOPE: CPT | Performed by: NURSE PRACTITIONER

## 2021-07-15 PROCEDURE — 80048 BASIC METABOLIC PNL TOTAL CA: CPT | Performed by: NURSE PRACTITIONER

## 2021-07-15 PROCEDURE — 96361 HYDRATE IV INFUSION ADD-ON: CPT

## 2021-07-15 PROCEDURE — 25010000002 KETOROLAC TROMETHAMINE PER 15 MG: Performed by: NURSE PRACTITIONER

## 2021-07-15 PROCEDURE — 81025 URINE PREGNANCY TEST: CPT | Performed by: UROLOGY

## 2021-07-15 PROCEDURE — 25010000002 ONDANSETRON PER 1 MG: Performed by: EMERGENCY MEDICINE

## 2021-07-15 PROCEDURE — 87040 BLOOD CULTURE FOR BACTERIA: CPT | Performed by: NURSE PRACTITIONER

## 2021-07-15 RX ORDER — IBUPROFEN 800 MG/1
800 TABLET ORAL EVERY 8 HOURS PRN
COMMUNITY
End: 2021-09-26

## 2021-07-15 RX ORDER — SODIUM CHLORIDE 9 MG/ML
75 INJECTION, SOLUTION INTRAVENOUS CONTINUOUS
Status: DISCONTINUED | OUTPATIENT
Start: 2021-07-15 | End: 2021-07-17 | Stop reason: HOSPADM

## 2021-07-15 RX ORDER — SODIUM CHLORIDE 0.9 % (FLUSH) 0.9 %
10 SYRINGE (ML) INJECTION AS NEEDED
Status: DISCONTINUED | OUTPATIENT
Start: 2021-07-15 | End: 2021-07-17 | Stop reason: HOSPADM

## 2021-07-15 RX ORDER — TAMSULOSIN HYDROCHLORIDE 0.4 MG/1
0.4 CAPSULE ORAL ONCE
Status: COMPLETED | OUTPATIENT
Start: 2021-07-15 | End: 2021-07-15

## 2021-07-15 RX ORDER — AMLODIPINE BESYLATE 5 MG/1
5 TABLET ORAL DAILY
Status: DISCONTINUED | OUTPATIENT
Start: 2021-07-16 | End: 2021-07-17 | Stop reason: HOSPADM

## 2021-07-15 RX ORDER — LANOLIN ALCOHOL/MO/W.PET/CERES
1000 CREAM (GRAM) TOPICAL DAILY
COMMUNITY

## 2021-07-15 RX ORDER — ACETAMINOPHEN 500 MG
1000 TABLET ORAL ONCE
Status: COMPLETED | OUTPATIENT
Start: 2021-07-15 | End: 2021-07-15

## 2021-07-15 RX ORDER — ACETAMINOPHEN 325 MG/1
650 TABLET ORAL EVERY 4 HOURS PRN
Status: DISCONTINUED | OUTPATIENT
Start: 2021-07-15 | End: 2021-07-17 | Stop reason: HOSPADM

## 2021-07-15 RX ORDER — CHOLECALCIFEROL (VITAMIN D3) 125 MCG
5 CAPSULE ORAL NIGHTLY PRN
Status: DISCONTINUED | OUTPATIENT
Start: 2021-07-15 | End: 2021-07-17 | Stop reason: HOSPADM

## 2021-07-15 RX ORDER — PANTOPRAZOLE SODIUM 40 MG/1
40 TABLET, DELAYED RELEASE ORAL
Status: DISCONTINUED | OUTPATIENT
Start: 2021-07-16 | End: 2021-07-17 | Stop reason: HOSPADM

## 2021-07-15 RX ORDER — HYDROCHLOROTHIAZIDE 12.5 MG/1
12.5 TABLET ORAL DAILY
COMMUNITY
End: 2021-09-24 | Stop reason: HOSPADM

## 2021-07-15 RX ORDER — MORPHINE SULFATE 4 MG/ML
4 INJECTION, SOLUTION INTRAMUSCULAR; INTRAVENOUS EVERY 4 HOURS PRN
Status: DISCONTINUED | OUTPATIENT
Start: 2021-07-15 | End: 2021-07-17 | Stop reason: HOSPADM

## 2021-07-15 RX ORDER — ONDANSETRON 2 MG/ML
4 INJECTION INTRAMUSCULAR; INTRAVENOUS EVERY 6 HOURS PRN
Status: DISCONTINUED | OUTPATIENT
Start: 2021-07-15 | End: 2021-07-17 | Stop reason: HOSPADM

## 2021-07-15 RX ORDER — KETOROLAC TROMETHAMINE 15 MG/ML
15 INJECTION, SOLUTION INTRAMUSCULAR; INTRAVENOUS ONCE
Status: COMPLETED | OUTPATIENT
Start: 2021-07-15 | End: 2021-07-15

## 2021-07-15 RX ORDER — ONDANSETRON 2 MG/ML
4 INJECTION INTRAMUSCULAR; INTRAVENOUS ONCE
Status: COMPLETED | OUTPATIENT
Start: 2021-07-15 | End: 2021-07-15

## 2021-07-15 RX ORDER — SODIUM CHLORIDE 0.9 % (FLUSH) 0.9 %
10 SYRINGE (ML) INJECTION EVERY 12 HOURS SCHEDULED
Status: DISCONTINUED | OUTPATIENT
Start: 2021-07-15 | End: 2021-07-17 | Stop reason: HOSPADM

## 2021-07-15 RX ADMIN — SODIUM CHLORIDE 1000 ML: 9 INJECTION, SOLUTION INTRAVENOUS at 15:50

## 2021-07-15 RX ADMIN — Medication 10 ML: at 21:36

## 2021-07-15 RX ADMIN — ONDANSETRON 4 MG: 2 INJECTION INTRAMUSCULAR; INTRAVENOUS at 21:36

## 2021-07-15 RX ADMIN — TAMSULOSIN HYDROCHLORIDE 0.4 MG: 0.4 CAPSULE ORAL at 18:39

## 2021-07-15 RX ADMIN — KETOROLAC TROMETHAMINE 15 MG: 15 INJECTION, SOLUTION INTRAMUSCULAR; INTRAVENOUS at 16:24

## 2021-07-15 RX ADMIN — ACETAMINOPHEN 1000 MG: 500 TABLET, FILM COATED ORAL at 16:24

## 2021-07-15 RX ADMIN — MORPHINE SULFATE 4 MG: 4 INJECTION INTRAVENOUS at 21:36

## 2021-07-15 RX ADMIN — Medication 10 ML: at 16:25

## 2021-07-15 RX ADMIN — ONDANSETRON 4 MG: 2 INJECTION INTRAMUSCULAR; INTRAVENOUS at 16:24

## 2021-07-15 RX ADMIN — SODIUM CHLORIDE 75 ML/HR: 9 INJECTION, SOLUTION INTRAVENOUS at 19:18

## 2021-07-15 RX ADMIN — CEFTRIAXONE 1 G: 10 INJECTION, POWDER, FOR SOLUTION INTRAVENOUS at 17:20

## 2021-07-15 NOTE — ED NOTES
Pt c/o of bilateral flank pain that started about a week ago, pt reports dysuria and frequency, pt also reports she had nausea and vomiting over the weekend and her symptoms gradually became better, pt reports the pain started again and she wanted to come before the pain became worse, pt reports hx of kidney stones, pt denies any fever or diarrhea     Emma Vo, RN  07/15/21 [id number]

## 2021-07-15 NOTE — ED PROVIDER NOTES
Subjective   Patient is a 35-year-old obese white female with history of hypertension and prior kidney stones who presents today with complaints of flank pain dysuria frequency urgency and feeling feverish.  She states she has had intermittent flank pain for the last week.  She states that over the last couple of days she started to feel feverish and having chills as well as dysuria frequency and urgency.  She does report some foul-smelling urine.  She reports nausea but denies any vomiting or diarrhea.  She denies any vaginal bleeding or discharge.  She states she is concerned that she may have another kidney stone.  Complains of left flank pain intermittently.  She denies any chest pain shortness of breath cough congestion or other complaint.          Review of Systems   Constitutional: Positive for chills and fever.   HENT: Negative for congestion.    Respiratory: Negative for cough and shortness of breath.    Cardiovascular: Negative for chest pain.   Gastrointestinal: Positive for nausea. Negative for abdominal pain, diarrhea and vomiting.   Genitourinary: Positive for dysuria, flank pain, frequency and urgency. Negative for decreased urine volume, difficulty urinating, hematuria, pelvic pain, vaginal bleeding and vaginal discharge.   Musculoskeletal: Negative for neck pain and neck stiffness.   Skin: Negative for rash.       Past Medical History:   Diagnosis Date   • Hypertension        No Known Allergies    Past Surgical History:   Procedure Laterality Date   • COLONOSCOPY     • KIDNEY STONE SURGERY         Family History   Problem Relation Age of Onset   • Breast cancer Neg Hx    • Ovarian cancer Neg Hx    • Uterine cancer Neg Hx    • Colon cancer Neg Hx    • Deep vein thrombosis Neg Hx    • Pulmonary embolism Neg Hx        Social History     Socioeconomic History   • Marital status:      Spouse name: Not on file   • Number of children: Not on file   • Years of education: Not on file   • Highest  education level: Not on file   Tobacco Use   • Smoking status: Never Smoker   • Smokeless tobacco: Never Used   Vaping Use   • Vaping Use: Never used   • Passive vaping exposure Yes   Substance and Sexual Activity   • Alcohol use: No   • Drug use: No   • Sexual activity: Yes     Partners: Male     Birth control/protection: I.U.D.           Objective   Physical Exam  Vital signs and triage nurse note reviewed.  Constitutional: Awake, alert; well-developed and well-nourished. No acute distress is noted.  Obese.  HEENT: Normocephalic, atraumatic; pupils are PERRL with intact EOM; oropharynx is pink and moist without exudate or erythema.  No drooling or pooling of oral secretions.  Neck: Supple, full range of motion without pain; no cervical lymphadenopathy. Normal phonation.  Cardiovascular: Mildly tachycardic rate and rhythm, normal S1-S2.  No murmur noted.  Pulmonary: Respiratory effort regular nonlabored, breath sounds clear to auscultation all fields.  Abdomen: Soft, nontender, nondistended with normoactive bowel sounds; no rebound or guarding.  Left CVAT.  Musculoskeletal: Independent range of motion of all extremities with no palpable tenderness or edema.  Neuro: Alert oriented x3, speech is clear and appropriate, GCS 15.    Skin: Flesh tone, warm, dry, intact; no erythematous or petechial rash or lesion.      Procedures           ED Course      Labs Reviewed   BASIC METABOLIC PANEL - Abnormal; Notable for the following components:       Result Value    Glucose 101 (*)     Creatinine 1.10 (*)     eGFR Non  Amer 57 (*)     All other components within normal limits    Narrative:     GFR Normal >60  Chronic Kidney Disease <60  Kidney Failure <15     URINALYSIS W/ CULTURE IF INDICATED - Abnormal; Notable for the following components:    Blood, UA Small (1+) (*)     Leuk Esterase, UA Trace (*)     Nitrite, UA Positive (*)     All other components within normal limits   CBC WITH AUTO DIFFERENTIAL - Abnormal;  Notable for the following components:    WBC 12.70 (*)     Neutrophils, Absolute 8.30 (*)     Monocytes, Absolute 1.20 (*)     All other components within normal limits   URINALYSIS, MICROSCOPIC ONLY - Abnormal; Notable for the following components:    RBC, UA 6-12 (*)     WBC, UA 3-5 (*)     All other components within normal limits   BLOOD CULTURE   BLOOD CULTURE   CBC AND DIFFERENTIAL    Narrative:     The following orders were created for panel order CBC & Differential.  Procedure                               Abnormality         Status                     ---------                               -----------         ------                     CBC Auto Differential[[id number]]        Abnormal            Final result                 Please view results for these tests on the individual orders.     CT Abdomen Pelvis Without Contrast    Result Date: 7/15/2021  1. Obstructing 5 mm calculus in proximal left ureter measuring 5 mm resulting in moderate left hydronephrosis. 2. Additional bilateral nonobstructing nephrolithiasis. 3. Small hiatal hernia.  Electronically Signed By-Schuyler Elena MD On:7/15/2021 5:43 PM This report was finalized on [id number] by  Schuyler Elena MD.    Medications   sodium chloride 0.9 % flush 10 mL (10 mL Intravenous Given 7/15/21 1625)   sodium chloride 0.9 % flush 10 mL (has no administration in time range)   sodium chloride 0.9 % flush 10 mL (has no administration in time range)   acetaminophen (TYLENOL) tablet 650 mg (has no administration in time range)   ondansetron (ZOFRAN) injection 4 mg (has no administration in time range)   melatonin tablet 5 mg (has no administration in time range)   sodium chloride 0.9 % infusion (has no administration in time range)   cefTRIAXone (ROCEPHIN) in SWFI 1 gram/10ml IV PUSH syringe (has no administration in time range)   Morphine sulfate (PF) injection 4 mg (has no administration in time range)   acetaminophen (TYLENOL) tablet 1,000 mg (1,000 mg Oral  Given 7/15/21 1624)   sodium chloride 0.9 % bolus 1,000 mL (0 mL Intravenous Stopped 7/15/21 1839)   ketorolac (TORADOL) injection 15 mg (15 mg Intravenous Given 7/15/21 1624)   ondansetron (ZOFRAN) injection 4 mg (4 mg Intravenous Given 7/15/21 1624)   cefTRIAXone (ROCEPHIN) in SWFI 1 gram/10ml IV PUSH syringe (1 g Intravenous Given 7/15/21 1720)   tamsulosin (FLOMAX) 24 hr capsule 0.4 mg (0.4 mg Oral Given 7/15/21 1839)                                          MDM  Number of Diagnoses or Management Options  Acute urinary tract infection  Hydronephrosis with urinary obstruction due to ureteral calculus  Ureterolithiasis  Diagnosis management comments: Comorbidities: Hypertension, kidney stones  Differentials: Kidney stone, pyelonephritis, UTI;this list is not all inclusive and does not constitute the entirety of considered causes  Discussion with provider:  Radiology interpretation: X-rays reviewed by me and interpreted by radiologist: As above   Lab interpretation: Labs viewed by me significant for: As above    Patient had IV established.  She had labs and CT obtained.  She was given a fluid bolus as well as Toradol and Zofran for pain and nausea.  She was also given Tylenol.    Work-up: CBC significant for WBC of 12.7, no bands.  Metabolic panel significant for creatinine 1.10.  Urinalysis shows small blood, trace leukocytes, positive nitrites, 6-12 RBCs, 3-5 WBCs.  Urine culture and blood culture sent to lab.  CT shows Obstructing 5 mm calculus in proximal left ureter measuring 5 mm resulting in moderate left hydronephrosis. Additional bilateral nonobstructing nephrolithiasis. Small hiatal hernia.    Patient was given Rocephin 1 g IV.  She was also given a dose of Flomax.    She is discussed with Dr. Lyles, on-call urologist, who recommends admission to the hospitalist service with possible intervention/stent placement tomorrow.    Patient will be admitted to the observation unit.    Diagnosis and treatment  plan discussed with patient.  Patient agreeable to plan.          Amount and/or Complexity of Data Reviewed  Clinical lab tests: reviewed and ordered  Tests in the radiology section of CPT®: reviewed and ordered    Patient Progress  Patient progress: stable      Final diagnoses:   Ureterolithiasis   Hydronephrosis with urinary obstruction due to ureteral calculus   Acute urinary tract infection       ED Disposition  ED Disposition     ED Disposition Condition Comment    Decision to Admit            No follow-up provider specified.       Medication List      No changes were made to your prescriptions during this visit.          Nichol Haney, APRN  07/15/21 1907

## 2021-07-16 ENCOUNTER — ANESTHESIA (OUTPATIENT)
Dept: PERIOP | Facility: HOSPITAL | Age: 36
End: 2021-07-16

## 2021-07-16 ENCOUNTER — ANESTHESIA EVENT (OUTPATIENT)
Dept: PERIOP | Facility: HOSPITAL | Age: 36
End: 2021-07-16

## 2021-07-16 VITALS
SYSTOLIC BLOOD PRESSURE: 104 MMHG | HEIGHT: 63 IN | TEMPERATURE: 98.3 F | BODY MASS INDEX: 39.65 KG/M2 | RESPIRATION RATE: 16 BRPM | WEIGHT: 223.77 LBS | HEART RATE: 114 BPM | OXYGEN SATURATION: 97 % | DIASTOLIC BLOOD PRESSURE: 66 MMHG

## 2021-07-16 LAB
ANION GAP SERPL CALCULATED.3IONS-SCNC: 9 MMOL/L (ref 5–15)
B-HCG UR QL: NEGATIVE
BASOPHILS # BLD AUTO: 0.1 10*3/MM3 (ref 0–0.2)
BASOPHILS NFR BLD AUTO: 0.7 % (ref 0–1.5)
BUN SERPL-MCNC: 15 MG/DL (ref 6–20)
BUN/CREAT SERPL: 15 (ref 7–25)
CALCIUM SPEC-SCNC: 8.4 MG/DL (ref 8.6–10.5)
CHLORIDE SERPL-SCNC: 106 MMOL/L (ref 98–107)
CO2 SERPL-SCNC: 23 MMOL/L (ref 22–29)
CREAT SERPL-MCNC: 1 MG/DL (ref 0.57–1)
DEPRECATED RDW RBC AUTO: 40.7 FL (ref 37–54)
EOSINOPHIL # BLD AUTO: 0.3 10*3/MM3 (ref 0–0.4)
EOSINOPHIL NFR BLD AUTO: 2.8 % (ref 0.3–6.2)
ERYTHROCYTE [DISTWIDTH] IN BLOOD BY AUTOMATED COUNT: 13 % (ref 12.3–15.4)
GFR SERPL CREATININE-BSD FRML MDRD: 63 ML/MIN/1.73
GLUCOSE SERPL-MCNC: 100 MG/DL (ref 65–99)
HCT VFR BLD AUTO: 33.8 % (ref 34–46.6)
HGB BLD-MCNC: 11.4 G/DL (ref 12–15.9)
LYMPHOCYTES # BLD AUTO: 4.1 10*3/MM3 (ref 0.7–3.1)
LYMPHOCYTES NFR BLD AUTO: 35.8 % (ref 19.6–45.3)
MCH RBC QN AUTO: 29.9 PG (ref 26.6–33)
MCHC RBC AUTO-ENTMCNC: 33.8 G/DL (ref 31.5–35.7)
MCV RBC AUTO: 88.3 FL (ref 79–97)
MONOCYTES # BLD AUTO: 0.8 10*3/MM3 (ref 0.1–0.9)
MONOCYTES NFR BLD AUTO: 7.4 % (ref 5–12)
NEUTROPHILS NFR BLD AUTO: 53.3 % (ref 42.7–76)
NEUTROPHILS NFR BLD AUTO: 6.1 10*3/MM3 (ref 1.7–7)
NRBC BLD AUTO-RTO: 0.1 /100 WBC (ref 0–0.2)
PLATELET # BLD AUTO: 306 10*3/MM3 (ref 140–450)
PMV BLD AUTO: 7.2 FL (ref 6–12)
POTASSIUM SERPL-SCNC: 3.8 MMOL/L (ref 3.5–5.2)
RBC # BLD AUTO: 3.82 10*6/MM3 (ref 3.77–5.28)
SODIUM SERPL-SCNC: 138 MMOL/L (ref 136–145)
WBC # BLD AUTO: 11.5 10*3/MM3 (ref 3.4–10.8)

## 2021-07-16 PROCEDURE — 96376 TX/PRO/DX INJ SAME DRUG ADON: CPT

## 2021-07-16 PROCEDURE — 25010000002 FENTANYL CITRATE (PF) 100 MCG/2ML SOLUTION: Performed by: NURSE ANESTHETIST, CERTIFIED REGISTERED

## 2021-07-16 PROCEDURE — G0378 HOSPITAL OBSERVATION PER HR: HCPCS

## 2021-07-16 PROCEDURE — 25010000002 KETOROLAC TROMETHAMINE PER 15 MG: Performed by: NURSE ANESTHETIST, CERTIFIED REGISTERED

## 2021-07-16 PROCEDURE — 25010000002 CEFTRIAXONE PER 250 MG: Performed by: EMERGENCY MEDICINE

## 2021-07-16 PROCEDURE — 25010000002 DEXAMETHASONE PER 1 MG: Performed by: NURSE ANESTHETIST, CERTIFIED REGISTERED

## 2021-07-16 PROCEDURE — 25010000002 MORPHINE PER 10 MG: Performed by: UROLOGY

## 2021-07-16 PROCEDURE — C2617 STENT, NON-COR, TEM W/O DEL: HCPCS | Performed by: UROLOGY

## 2021-07-16 PROCEDURE — 25010000002 CEFTRIAXONE PER 250 MG: Performed by: UROLOGY

## 2021-07-16 PROCEDURE — 0 IOHEXOL 300 MG/ML SOLUTION: Performed by: UROLOGY

## 2021-07-16 PROCEDURE — 85025 COMPLETE CBC W/AUTO DIFF WBC: CPT | Performed by: EMERGENCY MEDICINE

## 2021-07-16 PROCEDURE — 25010000002 ONDANSETRON PER 1 MG: Performed by: EMERGENCY MEDICINE

## 2021-07-16 PROCEDURE — 25010000002 PROPOFOL 10 MG/ML EMULSION: Performed by: NURSE ANESTHETIST, CERTIFIED REGISTERED

## 2021-07-16 PROCEDURE — C1758 CATHETER, URETERAL: HCPCS | Performed by: UROLOGY

## 2021-07-16 PROCEDURE — 25010000002 MIDAZOLAM PER 1 MG: Performed by: NURSE ANESTHETIST, CERTIFIED REGISTERED

## 2021-07-16 PROCEDURE — 80048 BASIC METABOLIC PNL TOTAL CA: CPT | Performed by: EMERGENCY MEDICINE

## 2021-07-16 PROCEDURE — 25010000002 MORPHINE PER 10 MG: Performed by: NURSE PRACTITIONER

## 2021-07-16 PROCEDURE — 25010000002 ONDANSETRON PER 1 MG: Performed by: NURSE ANESTHETIST, CERTIFIED REGISTERED

## 2021-07-16 PROCEDURE — 36415 COLL VENOUS BLD VENIPUNCTURE: CPT | Performed by: EMERGENCY MEDICINE

## 2021-07-16 DEVICE — URETERAL STENT
Type: IMPLANTABLE DEVICE | Site: URETER | Status: FUNCTIONAL
Brand: PERCUFLEX™ PLUS

## 2021-07-16 RX ORDER — TAMSULOSIN HYDROCHLORIDE 0.4 MG/1
1 CAPSULE ORAL DAILY
Qty: 30 CAPSULE | Refills: 0 | Status: SHIPPED | OUTPATIENT
Start: 2021-07-16 | End: 2021-09-26

## 2021-07-16 RX ORDER — ONDANSETRON 2 MG/ML
4 INJECTION INTRAMUSCULAR; INTRAVENOUS ONCE AS NEEDED
Status: COMPLETED | OUTPATIENT
Start: 2021-07-16 | End: 2021-07-16

## 2021-07-16 RX ORDER — DOCUSATE SODIUM 100 MG/1
100 CAPSULE, LIQUID FILLED ORAL 2 TIMES DAILY PRN
Qty: 30 CAPSULE | Refills: 1 | Status: SHIPPED | OUTPATIENT
Start: 2021-07-16 | End: 2021-09-26

## 2021-07-16 RX ORDER — PROPOFOL 10 MG/ML
VIAL (ML) INTRAVENOUS AS NEEDED
Status: DISCONTINUED | OUTPATIENT
Start: 2021-07-16 | End: 2021-07-16 | Stop reason: SURG

## 2021-07-16 RX ORDER — ESMOLOL HYDROCHLORIDE 10 MG/ML
INJECTION INTRAVENOUS AS NEEDED
Status: DISCONTINUED | OUTPATIENT
Start: 2021-07-16 | End: 2021-07-16 | Stop reason: SURG

## 2021-07-16 RX ORDER — SODIUM CHLORIDE 9 MG/ML
INJECTION, SOLUTION INTRAVENOUS CONTINUOUS PRN
Status: DISCONTINUED | OUTPATIENT
Start: 2021-07-16 | End: 2021-07-16 | Stop reason: SURG

## 2021-07-16 RX ORDER — ONDANSETRON 2 MG/ML
INJECTION INTRAMUSCULAR; INTRAVENOUS AS NEEDED
Status: DISCONTINUED | OUTPATIENT
Start: 2021-07-16 | End: 2021-07-16 | Stop reason: SURG

## 2021-07-16 RX ORDER — MIDAZOLAM HYDROCHLORIDE 1 MG/ML
INJECTION INTRAMUSCULAR; INTRAVENOUS AS NEEDED
Status: DISCONTINUED | OUTPATIENT
Start: 2021-07-16 | End: 2021-07-16 | Stop reason: SURG

## 2021-07-16 RX ORDER — CEFDINIR 300 MG/1
300 CAPSULE ORAL 2 TIMES DAILY
Qty: 14 CAPSULE | Refills: 0 | Status: SHIPPED | OUTPATIENT
Start: 2021-07-16 | End: 2021-07-23

## 2021-07-16 RX ORDER — EPHEDRINE SULFATE 50 MG/ML
INJECTION INTRAVENOUS AS NEEDED
Status: DISCONTINUED | OUTPATIENT
Start: 2021-07-16 | End: 2021-07-16 | Stop reason: SURG

## 2021-07-16 RX ORDER — LIDOCAINE HYDROCHLORIDE 20 MG/ML
INJECTION, SOLUTION EPIDURAL; INFILTRATION; INTRACAUDAL; PERINEURAL AS NEEDED
Status: DISCONTINUED | OUTPATIENT
Start: 2021-07-16 | End: 2021-07-16 | Stop reason: SURG

## 2021-07-16 RX ORDER — ACETAMINOPHEN 650 MG/1
650 SUPPOSITORY RECTAL ONCE AS NEEDED
Status: DISCONTINUED | OUTPATIENT
Start: 2021-07-16 | End: 2021-07-16 | Stop reason: HOSPADM

## 2021-07-16 RX ORDER — FENTANYL CITRATE 50 UG/ML
25 INJECTION, SOLUTION INTRAMUSCULAR; INTRAVENOUS
Status: DISCONTINUED | OUTPATIENT
Start: 2021-07-16 | End: 2021-07-16 | Stop reason: HOSPADM

## 2021-07-16 RX ORDER — PHENAZOPYRIDINE HYDROCHLORIDE 100 MG/1
100 TABLET, FILM COATED ORAL 3 TIMES DAILY PRN
Qty: 15 TABLET | Refills: 0 | Status: SHIPPED | OUTPATIENT
Start: 2021-07-16 | End: 2021-09-26

## 2021-07-16 RX ORDER — KETOROLAC TROMETHAMINE 30 MG/ML
INJECTION, SOLUTION INTRAMUSCULAR; INTRAVENOUS AS NEEDED
Status: DISCONTINUED | OUTPATIENT
Start: 2021-07-16 | End: 2021-07-16 | Stop reason: SURG

## 2021-07-16 RX ORDER — FENTANYL CITRATE 50 UG/ML
INJECTION, SOLUTION INTRAMUSCULAR; INTRAVENOUS AS NEEDED
Status: DISCONTINUED | OUTPATIENT
Start: 2021-07-16 | End: 2021-07-16 | Stop reason: SURG

## 2021-07-16 RX ORDER — ACETAMINOPHEN 325 MG/1
650 TABLET ORAL ONCE AS NEEDED
Status: DISCONTINUED | OUTPATIENT
Start: 2021-07-16 | End: 2021-07-16 | Stop reason: HOSPADM

## 2021-07-16 RX ORDER — OXYBUTYNIN CHLORIDE 5 MG/1
5 TABLET ORAL 3 TIMES DAILY PRN
Qty: 30 TABLET | Refills: 0 | Status: SHIPPED | OUTPATIENT
Start: 2021-07-16 | End: 2021-09-26

## 2021-07-16 RX ORDER — DEXAMETHASONE SODIUM PHOSPHATE 4 MG/ML
INJECTION, SOLUTION INTRA-ARTICULAR; INTRALESIONAL; INTRAMUSCULAR; INTRAVENOUS; SOFT TISSUE AS NEEDED
Status: DISCONTINUED | OUTPATIENT
Start: 2021-07-16 | End: 2021-07-16 | Stop reason: SURG

## 2021-07-16 RX ORDER — OXYCODONE HYDROCHLORIDE AND ACETAMINOPHEN 5; 325 MG/1; MG/1
1-2 TABLET ORAL EVERY 6 HOURS PRN
Qty: 20 TABLET | Refills: 0 | Status: SHIPPED | OUTPATIENT
Start: 2021-07-16 | End: 2021-09-26

## 2021-07-16 RX ORDER — PHENYLEPHRINE HCL IN 0.9% NACL 1 MG/10 ML
SYRINGE (ML) INTRAVENOUS AS NEEDED
Status: DISCONTINUED | OUTPATIENT
Start: 2021-07-16 | End: 2021-07-16 | Stop reason: SURG

## 2021-07-16 RX ADMIN — EPHEDRINE SULFATE 5 MG: 50 INJECTION INTRAVENOUS at 14:46

## 2021-07-16 RX ADMIN — MIDAZOLAM 2 MG: 1 INJECTION INTRAMUSCULAR; INTRAVENOUS at 14:24

## 2021-07-16 RX ADMIN — GLYCOPYRROLATE 0.2 MCG: 0.2 INJECTION, SOLUTION INTRAMUSCULAR; INTRAVITREAL at 14:36

## 2021-07-16 RX ADMIN — KETOROLAC TROMETHAMINE 30 MG: 30 INJECTION, SOLUTION INTRAMUSCULAR at 15:11

## 2021-07-16 RX ADMIN — MORPHINE SULFATE 4 MG: 4 INJECTION INTRAVENOUS at 16:40

## 2021-07-16 RX ADMIN — SODIUM CHLORIDE: 0.9 INJECTION, SOLUTION INTRAVENOUS at 14:20

## 2021-07-16 RX ADMIN — SODIUM CHLORIDE 75 ML/HR: 9 INJECTION, SOLUTION INTRAVENOUS at 16:41

## 2021-07-16 RX ADMIN — ONDANSETRON 4 MG: 2 INJECTION INTRAMUSCULAR; INTRAVENOUS at 16:07

## 2021-07-16 RX ADMIN — ESMOLOL HYDROCHLORIDE 20 MG: 10 INJECTION, SOLUTION INTRAVENOUS at 15:13

## 2021-07-16 RX ADMIN — FENTANYL CITRATE 50 MCG: 50 INJECTION, SOLUTION INTRAMUSCULAR; INTRAVENOUS at 14:26

## 2021-07-16 RX ADMIN — SODIUM CHLORIDE 75 ML/HR: 9 INJECTION, SOLUTION INTRAVENOUS at 09:30

## 2021-07-16 RX ADMIN — MORPHINE SULFATE 4 MG: 4 INJECTION INTRAVENOUS at 03:27

## 2021-07-16 RX ADMIN — MORPHINE SULFATE 4 MG: 4 INJECTION INTRAVENOUS at 20:30

## 2021-07-16 RX ADMIN — LIDOCAINE HYDROCHLORIDE 100 MG: 20 INJECTION, SOLUTION EPIDURAL; INFILTRATION; INTRACAUDAL; PERINEURAL at 14:26

## 2021-07-16 RX ADMIN — PROPOFOL 200 MG: 10 INJECTION, EMULSION INTRAVENOUS at 14:26

## 2021-07-16 RX ADMIN — Medication 100 MCG: at 14:35

## 2021-07-16 RX ADMIN — DEXAMETHASONE SODIUM PHOSPHATE 10 MG: 4 INJECTION, SOLUTION INTRAMUSCULAR; INTRAVENOUS at 14:32

## 2021-07-16 RX ADMIN — ONDANSETRON 4 MG: 2 INJECTION INTRAMUSCULAR; INTRAVENOUS at 03:27

## 2021-07-16 RX ADMIN — CEFTRIAXONE SODIUM 1 G: 1 INJECTION, POWDER, FOR SOLUTION INTRAMUSCULAR; INTRAVENOUS at 20:30

## 2021-07-16 RX ADMIN — ONDANSETRON 4 MG: 2 INJECTION INTRAMUSCULAR; INTRAVENOUS at 15:10

## 2021-07-16 RX ADMIN — CEFTRIAXONE 1 G: 10 INJECTION, POWDER, FOR SOLUTION INTRAVENOUS at 17:01

## 2021-07-16 RX ADMIN — AMLODIPINE BESYLATE 5 MG: 5 TABLET ORAL at 08:09

## 2021-07-16 RX ADMIN — PANTOPRAZOLE SODIUM 40 MG: 40 TABLET, DELAYED RELEASE ORAL at 08:09

## 2021-07-16 RX ADMIN — MORPHINE SULFATE 4 MG: 4 INJECTION INTRAVENOUS at 07:32

## 2021-07-16 RX ADMIN — FENTANYL CITRATE 50 MCG: 50 INJECTION, SOLUTION INTRAMUSCULAR; INTRAVENOUS at 14:56

## 2021-07-16 NOTE — DISCHARGE SUMMARY
[facility]    Marifer Cooper MD    CHIEF COMPLAINT:     Left flank and abdomen pain    HISTORY OF PRESENT ILLNESS:    Obtained from admitting provider note on 7/15/2021:  Patient is a 35-year-old obese white female with history of hypertension and prior kidney stones who presents today with complaints of flank pain dysuria frequency urgency and feeling feverish.  She states she has had intermittent flank pain for the last week.  She states that over the last couple of days she started to feel feverish and having chills as well as dysuria frequency and urgency.  She does report some foul-smelling urine.  She reports nausea but denies any vomiting or diarrhea.  She denies any vaginal bleeding or discharge.  She states she is concerned that she may have another kidney stone.  Complains of left flank pain intermittently.  She denies any chest pain shortness of breath cough congestion or other complaint.    7/16/2021: Patient confirms HPI noted above including 1 week history of left-sided flank pain which is noted as intermittent and with a waxing and waning intensity.  Some nausea with nonbloody vomiting as well as a subjective fever and chills is also reported.  Patient also notes some dysuria but denies any dyspnea, cough, chest pain, sensation of tachycardia or palpitations, syncope or near syncope.  Patient does not smoke or drink alcohol and reports she has a history of multiple kidney stones in the past.    Past Medical History:   Diagnosis Date   • Hypertension      Past Surgical History:   Procedure Laterality Date   • COLONOSCOPY     • KIDNEY STONE SURGERY       Family History   Problem Relation Age of Onset   • Breast cancer Neg Hx    • Ovarian cancer Neg Hx    • Uterine cancer Neg Hx    • Colon cancer Neg Hx    • Deep vein thrombosis Neg Hx    • Pulmonary embolism Neg Hx      Social History     Tobacco Use   • Smoking status: Never Smoker   • Smokeless tobacco: Never Used   Vaping  Use   • Vaping Use: Never used   • Passive vaping exposure Yes   Substance Use Topics   • Alcohol use: No   • Drug use: No     Medications Prior to Admission   Medication Sig Dispense Refill Last Dose   • amLODIPine (NORVASC) 5 MG tablet Take 5 mg by mouth Daily.  5 7/15/2021 at Unknown time   • hydroCHLOROthiazide (HYDRODIURIL) 12.5 MG tablet Take 12.5 mg by mouth Daily.   7/15/2021 at 0700   • ibuprofen (ADVIL,MOTRIN) 800 MG tablet Take 800 mg by mouth Every 8 (Eight) Hours As Needed for Mild Pain .   7/15/2021 at Unknown time   • sertraline (ZOLOFT) 50 MG tablet Take 50 mg by mouth Daily.   7/15/2021 at 0700   • vitamin B-12 (CYANOCOBALAMIN) 1000 MCG tablet Take 1,000 mcg by mouth Daily.   7/15/2021 at 0700     Allergies:  Patient has no known allergies.      There is no immunization history on file for this patient.        REVIEW OF SYSTEMS:    Review of Systems   Constitutional: Positive for chills and fever.   HENT: Negative.    Eyes: Negative.    Cardiovascular: Negative.    Respiratory: Negative.    Endocrine: Negative.    Skin: Negative.    Gastrointestinal: Positive for abdominal pain, nausea and vomiting. Negative for hematemesis, hematochezia and melena.   Genitourinary: Positive for dysuria and flank pain.   Neurological: Negative.    Psychiatric/Behavioral: Negative.        Vital Signs  Temp:  [97.9 °F (36.6 °C)-98.4 °F (36.9 °C)] 98.4 °F (36.9 °C)  Heart Rate:  [] 91  Resp:  [18-20] 18  BP: (126-152)/(75-89) 130/86          Physical Exam:  Physical Exam  Vitals reviewed.   Constitutional:       General: She is not in acute distress.     Appearance: Normal appearance. She is obese. She is not ill-appearing, toxic-appearing or diaphoretic.   HENT:      Head: Normocephalic and atraumatic.      Right Ear: External ear normal.      Left Ear: External ear normal.      Nose: Nose normal.      Mouth/Throat:      Mouth: Mucous membranes are moist.   Eyes:      Extraocular Movements: Extraocular movements  intact.   Cardiovascular:      Rate and Rhythm: Normal rate and regular rhythm.      Pulses: Normal pulses.      Heart sounds: Normal heart sounds.   Pulmonary:      Effort: Pulmonary effort is normal.      Breath sounds: Normal breath sounds.   Abdominal:      General: Bowel sounds are normal. There is no distension.      Tenderness: There is no abdominal tenderness. There is no right CVA tenderness or left CVA tenderness.   Musculoskeletal:         General: Normal range of motion.      Cervical back: Normal range of motion.   Skin:     General: Skin is warm and dry.      Capillary Refill: Capillary refill takes less than 2 seconds.   Neurological:      General: No focal deficit present.      Mental Status: She is alert and oriented to person, place, and time.   Psychiatric:         Mood and Affect: Mood normal.         Behavior: Behavior normal.         Thought Content: Thought content normal.         Judgment: Judgment normal.         Emotional Behavior:   Normal   Debilities:  None  Results Review:    I reviewed the patient's new clinical results.  Lab Results (most recent)     Procedure Component Value Units Date/Time    Pregnancy, Urine - Urine, Clean Catch [[id number]]  (Normal) Collected: 07/15/21 1615    Specimen: Urine, Clean Catch Updated: 07/16/21 0924     HCG, Urine QL Negative    Basic Metabolic Panel [[id number]]  (Abnormal) Collected: 07/16/21 0236    Specimen: Blood Updated: 07/16/21 0406     Glucose 100 mg/dL      BUN 15 mg/dL      Creatinine 1.00 mg/dL      Sodium 138 mmol/L      Potassium 3.8 mmol/L      Chloride 106 mmol/L      CO2 23.0 mmol/L      Calcium 8.4 mg/dL      eGFR Non African Amer 63 mL/min/1.73      BUN/Creatinine Ratio 15.0     Anion Gap 9.0 mmol/L     Narrative:      GFR Normal >60  Chronic Kidney Disease <60  Kidney Failure <15      CBC Auto Differential [[id number]]  (Abnormal) Collected: 07/16/21 0236    Specimen: Blood Updated: 07/16/21 0354     WBC 11.50 10*3/mm3      RBC 3.82  10*6/mm3      Hemoglobin 11.4 g/dL      Hematocrit 33.8 %      MCV 88.3 fL      MCH 29.9 pg      MCHC 33.8 g/dL      RDW 13.0 %      RDW-SD 40.7 fl      MPV 7.2 fL      Platelets 306 10*3/mm3      Neutrophil % 53.3 %      Lymphocyte % 35.8 %      Monocyte % 7.4 %      Eosinophil % 2.8 %      Basophil % 0.7 %      Neutrophils, Absolute 6.10 10*3/mm3      Lymphocytes, Absolute 4.10 10*3/mm3      Monocytes, Absolute 0.80 10*3/mm3      Eosinophils, Absolute 0.30 10*3/mm3      Basophils, Absolute 0.10 10*3/mm3      nRBC 0.1 /100 WBC     COVID PRE-OP / PRE-PROCEDURE SCREENING ORDER (NO ISOLATION) - Swab, Nasopharynx [[id number]]  (Normal) Collected: 07/15/21 2018    Specimen: Swab from Nasopharynx Updated: 07/15/21 2049    Narrative:      The following orders were created for panel order COVID PRE-OP / PRE-PROCEDURE SCREENING ORDER (NO ISOLATION) - Swab, Nasopharynx.  Procedure                               Abnormality         Status                     ---------                               -----------         ------                     COVID-19,CEPHEID,COR/WILLIAM...[[id number]]  Normal              Final result                 Please view results for these tests on the individual orders.    COVID-19,CEPHEID,COR/WILLIAM/PAD/JEFFY IN-HOUSE(OR EMERGENT/ADD-ON),NP SWAB IN TRANSPORT MEDIA 3-4 HR TAT, RT-PCR - Swab, Nasopharynx [[id number]]  (Normal) Collected: 07/15/21 2018    Specimen: Swab from Nasopharynx Updated: 07/15/21 2049     COVID19 Not Detected    Narrative:      Fact sheet for providers: https://www.fda.gov/media/503490/download     Fact sheet for patients: https://www.fda.gov/media/597455/download  Fact sheet for providers: https://www.fda.gov/media/153341/download    Fact sheet for patients: https://www.fda.gov/media/045309/download    Test performed by PCR.    Basic Metabolic Panel [[id number]]  (Abnormal) Collected: 07/15/21 1605    Specimen: Blood Updated: 07/15/21 1650     Glucose 101 mg/dL      BUN 14 mg/dL       Creatinine 1.10 mg/dL      Sodium 137 mmol/L      Potassium 3.8 mmol/L      Chloride 102 mmol/L      CO2 25.0 mmol/L      Calcium 9.3 mg/dL      eGFR Non African Amer 57 mL/min/1.73      BUN/Creatinine Ratio 12.7     Anion Gap 10.0 mmol/L     Narrative:      GFR Normal >60  Chronic Kidney Disease <60  Kidney Failure <15      Urinalysis With Culture If Indicated - Urine, Clean Catch [[id number]]  (Abnormal) Collected: 07/15/21 1615    Specimen: Urine, Clean Catch Updated: 07/15/21 1629     Color, UA Yellow     Appearance, UA Clear     pH, UA 5.5     Specific Gravity, UA 1.010     Glucose, UA Negative     Ketones, UA Negative     Bilirubin, UA Negative     Blood, UA Small (1+)     Protein, UA Negative     Leuk Esterase, UA Trace     Nitrite, UA Positive     Urobilinogen, UA 0.2 E.U./dL    Urinalysis, Microscopic Only - Urine, Clean Catch [[id number]]  (Abnormal) Collected: 07/15/21 1615    Specimen: Urine, Clean Catch Updated: 07/15/21 1629     RBC, UA 6-12 /HPF      WBC, UA 3-5 /HPF      Bacteria, UA None Seen /HPF      Squamous Epithelial Cells, UA 0-2 /HPF      Hyaline Casts, UA 0-2 /LPF      Methodology Automated Microscopy    CBC & Differential [[id number]]  (Abnormal) Collected: 07/15/21 1605    Specimen: Blood Updated: 07/15/21 1625    Narrative:      The following orders were created for panel order CBC & Differential.  Procedure                               Abnormality         Status                     ---------                               -----------         ------                     CBC Auto Differential[[id number]]        Abnormal            Final result                 Please view results for these tests on the individual orders.    CBC Auto Differential [[id number]]  (Abnormal) Collected: 07/15/21 1605    Specimen: Blood Updated: 07/15/21 1625     WBC 12.70 10*3/mm3      RBC 4.35 10*6/mm3      Hemoglobin 12.8 g/dL      Hematocrit 38.8 %      MCV 89.2 fL      MCH 29.6 pg      MCHC 33.1 g/dL      RDW 13.2  %      RDW-SD 41.1 fl      MPV 7.3 fL      Platelets 361 10*3/mm3      Neutrophil % 65.6 %      Lymphocyte % 23.3 %      Monocyte % 9.2 %      Eosinophil % 1.3 %      Basophil % 0.6 %      Neutrophils, Absolute 8.30 10*3/mm3      Lymphocytes, Absolute 3.00 10*3/mm3      Monocytes, Absolute 1.20 10*3/mm3      Eosinophils, Absolute 0.20 10*3/mm3      Basophils, Absolute 0.10 10*3/mm3      nRBC 0.0 /100 WBC     Blood Culture - Blood, Arm, Right [[id number]] Collected: 07/15/21 1605    Specimen: Blood from Arm, Right Updated: 07/15/21 1620    Blood Culture - Blood, Arm, Left [[id number]] Collected: 07/15/21 1614    Specimen: Blood from Arm, Left Updated: 07/15/21 1619          Imaging Results (Most Recent)     Procedure Component Value Units Date/Time    CT Abdomen Pelvis Without Contrast [[id number]] Collected: 07/15/21 1739     Updated: 07/15/21 1746    Narrative:         DATE OF EXAM:  7/15/2021 4:59 PM     PROCEDURE:  CT ABDOMEN PELVIS WO CONTRAST-     INDICATIONS:  flank pain     COMPARISON:  CT abdomen pelvis without contrast 12/29/2020     TECHNIQUE:  Routine transaxial slices were obtained through the abdomen and pelvis  without the administration of intravenous contrast. Reconstructed  coronal and sagittal images were also obtained. Automated exposure  control and iterative construction methods were used.     FINDINGS:  Lung bases clear. Heart size is normal. There is no pericardial effusion  or pleural effusion. Small hiatal hernia noted.     Lack of contrast limits assessment of abdominal organs and vasculature.  The liver and spleen are normal in size and contour. Gallbladder  present. No pericholecystic inflammation. Normal adrenal glands.  Pancreas without pancreatitis.      There is moderate left hydronephrosis secondary to a 5 mm calculus in  the proximal left ureter. Several other punctate nonobstructing  left-sided renal calculi are noted. There is no distal left ureteral  calculus. Urinary bladder is  thin walled. There is no obstructing  ureteral calculus on the right. There are multiple small nonobstructing  right-sided renal calculi largest at the right mid kidney measuring 6  mm. Parapelvic cyst at the upper pole right kidney. There is an IUD in  the uterus. No adnexal mass.     Negative for pneumoperitoneum. No bowel obstruction. No free fluid in  the abdomen or pelvis. Moderate amount stool in colon. Normal appendix.  Mild atherosclerotic calcifications involving the infrarenal aorta. No  aggressive osseous lesion or fracture.       Impression:      1. Obstructing 5 mm calculus in proximal left ureter measuring 5 mm  resulting in moderate left hydronephrosis.  2. Additional bilateral nonobstructing nephrolithiasis.  3. Small hiatal hernia.     Electronically Signed By-Schuyler Elena MD On:7/15/2021 5:43 PM  This report was finalized on [id number] by  Schuyler Elena MD.        reviewed    ECG/EMG Results (most recent)     None        not reviewed            Microbiology Results (last 10 days)     Procedure Component Value - Date/Time    COVID PRE-OP / PRE-PROCEDURE SCREENING ORDER (NO ISOLATION) - Swab, Nasopharynx [[id number]]  (Normal) Collected: 07/15/21 2018    Lab Status: Final result Specimen: Swab from Nasopharynx Updated: 07/15/21 2049    Narrative:      The following orders were created for panel order COVID PRE-OP / PRE-PROCEDURE SCREENING ORDER (NO ISOLATION) - Swab, Nasopharynx.  Procedure                               Abnormality         Status                     ---------                               -----------         ------                     COVID-19,CEPHEID,COR/WILLIAM...[[id number]]  Normal              Final result                 Please view results for these tests on the individual orders.    COVID-19,CEPHEID,COR/WILLIAM/PAD/JEFFY IN-HOUSE(OR EMERGENT/ADD-ON),NP SWAB IN TRANSPORT MEDIA 3-4 HR TAT, RT-PCR - Swab, Nasopharynx [[id number]]  (Normal) Collected: 07/15/21 2018    Lab Status: Final  result Specimen: Swab from Nasopharynx Updated: 07/15/21 2049     COVID19 Not Detected    Narrative:      Fact sheet for providers: https://www.fda.gov/media/129370/download     Fact sheet for patients: https://www.fda.gov/media/550333/download  Fact sheet for providers: https://www.fda.gov/media/893391/download    Fact sheet for patients: https://www.fda.gov/media/908890/download    Test performed by PCR.          Assessment/Plan     Ureterolithiasis        -CT of abdomen and pelvis shows an obstructing 5 mm calculus in the proximal left ureter measuring 5 mm resulting in moderate left hydronephrosis with additional bilateral nonobstructing nephrolithiasis present as well as a small hiatal hernia.  -WBCs 12.70 with an increased absolute neutrophil monocyte count noted on differential  -Rocephin started in the ED, continue  -N.p.o.  -Morphine as needed for pain  -Urology consulted who recommends cystoscopy with left ureteroscopic stone extraction and laser lithotripsy with stent placement which was performed on 7/16/2021 without complications and with minimal blood loss noted  -Cefdinir, Colace, oxybutynin, oxycodone, Pyridium and Flomax prescribed by urology at discharge  -Follow-up with urology in 1 to 2 weeks as instructed    I discussed the patients findings and my recommendations with patient and nursing staff.     Discharge Diagnosis:      Ureterolithiasis      Hospital Course  Patient is a 35 y.o. female presented with left-sided flank and abdominal pain and HPI noted above.  Patient was noted with a mild leukocytosis and CT of abdomen showed an obstructing 5 mm calculus in the left proximal ureter with moderate left hydronephrosis and some nonobstructing nephrolithiasis present bilaterally.  Patient was given antibiotics and analgesics and urology was consulted who recommended left ureteroscopic stone extraction and laser lithotripsy with stent placement which was performed on 7/16/2021 without complication.   Her testing/results and plan were discussed with patient along with concerning/alarm symptoms for which to call 911/return to the ED and she verbalizes her understanding and agreement.  She has been giving discharge instructions to follow-up with her PCP as well as urology.    Past Medical History:     Past Medical History:   Diagnosis Date   • Hypertension        Past Surgical History:     Past Surgical History:   Procedure Laterality Date   • COLONOSCOPY     • KIDNEY STONE SURGERY         Social History:   Social History     Socioeconomic History   • Marital status:      Spouse name: Not on file   • Number of children: Not on file   • Years of education: Not on file   • Highest education level: Not on file   Tobacco Use   • Smoking status: Never Smoker   • Smokeless tobacco: Never Used   Vaping Use   • Vaping Use: Never used   • Passive vaping exposure Yes   Substance and Sexual Activity   • Alcohol use: No   • Drug use: No   • Sexual activity: Yes     Partners: Male     Birth control/protection: I.U.D.       Procedures Performed    Procedure(s):  CYSTOSCOPY URETEROSCOPY RETROGRADE PYELOGRAM HOLMIUM LASER STENT INSERTION  -------------------       Consults:   Consults     Date and Time Order Name Status Description    7/15/2021  6:24 PM Urology (on-call MD unless specified) Completed           Condition on Discharge:     Stable    Discharge Disposition      Discharge Medications     Discharge Medications      ASK your doctor about these medications      Instructions Start Date   amLODIPine 5 MG tablet  Commonly known as: NORVASC   5 mg, Oral, Daily      hydroCHLOROthiazide 12.5 MG tablet  Commonly known as: HYDRODIURIL   12.5 mg, Oral, Daily      ibuprofen 800 MG tablet  Commonly known as: ADVIL,MOTRIN   800 mg, Oral, Every 8 Hours PRN      sertraline 50 MG tablet  Commonly known as: ZOLOFT   50 mg, Oral, Daily      vitamin B-12 1000 MCG tablet  Commonly known as: CYANOCOBALAMIN   1,000 mcg, Oral, Daily              Discharge Diet:     Activity at Discharge:     Follow-up Appointments  No future appointments.      Test Results Pending at Discharge  Pending Labs     Order Current Status    Blood Culture - Blood, Arm, Left In process    Blood Culture - Blood, Arm, Right In process           Risk for Readmission (LACE) Score: 1 (7/16/2021  6:01 AM)          Justin Masterson PA-C  07/16/21  09:53 EDT

## 2021-07-16 NOTE — CASE MANAGEMENT/SOCIAL WORK
Discharge Planning Assessment   [facility]     Patient Name: Ariadna Ambrocio  MRN: [id number]  Today's Date: 7/16/2021    Admit Date: 7/15/2021    Discharge Needs Assessment     Row Name 07/16/21 1427       Living Environment    Lives With  spouse;child(tyrone), dependent    Current Living Arrangements  home/apartment/condo    Primary Care Provided by  self    Able to Return to Prior Arrangements  yes       Resource/Environmental Concerns    Resource/Environmental Concerns  none    Transportation Concerns  car, none       Transition Planning    Patient/Family Anticipates Transition to  home with family    Patient/Family Anticipated Services at Transition  none    Transportation Anticipated  car, drives self;family or friend will provide Spouse will transport to home at [place]       Discharge Needs Assessment    Equipment Currently Used at Home  none    Concerns to be Addressed  no discharge needs identified    Anticipated Changes Related to Illness  none    Equipment Needed After Discharge  none        Discharge Plan     Row Name 07/16/21 9193       Plan    Plan  Routine d/c to home      Plan Comments  Spoke to patient in room with mask and goggles maintaining 6 ft for less than 15 min. Patient is I with ADL's and drives. She is able to afford meds and food .  PCP is Kenneth and pharmacy is [facility].[place] Barriers - Plan for Cystoscopy with stone extraction and stent placement.        Continued Care and Services - Admitted Since 7/15/2021          Demographic Summary     Row Name 07/16/21 1428       General Information    Admission Type  observation    Arrived From  emergency department    Referral Source  admission list    Reason for Consult  discharge planning    Preferred Language  English        Functional Status     Row Name 07/16/21 1424       Functional Status, IADL    Medications  independent    Meal Preparation  independent    Housekeeping  independent    Laundry  independent    Shopping  independent        Mental Status    General Appearance WDL  WDL       Mental Status Summary    Recent Changes in Mental Status/Cognitive Functioning  no changes        Deyanira Strong RN, CM  Office Phone [phone]  Cell [phone]

## 2021-07-16 NOTE — PLAN OF CARE
Goal Outcome Evaluation:  Plan of Care Reviewed With: patient        Progress: improving  Outcome Summary: PT SCHEDULED FOR CYSTOSCOPY AT 1400.  CONSENT SIGN. PATIENT EDUCATED ON PROCEDURE.  PAIN MANAGED

## 2021-07-16 NOTE — PLAN OF CARE
Problem: Adult Inpatient Plan of Care  Goal: Plan of Care Review  Outcome: Ongoing, Not Progressing  Flowsheets (Taken 7/16/2021 0400)  Progress: no change  Plan of Care Reviewed With: patient  Outcome Summary: New admit for a kidney stone, urology consulted, NPO now, VSS, non monitored, PRN pain and nausea meds, on continuous pulse ox due to PRN pain meds, will monitor  Goal: Patient-Specific Goal (Individualized)  Outcome: Ongoing, Not Progressing  Goal: Absence of Hospital-Acquired Illness or Injury  Outcome: Ongoing, Not Progressing  Intervention: Identify and Manage Fall Risk  Recent Flowsheet Documentation  Taken 7/16/2021 0327 by Lien Higgins, RN  Safety Promotion/Fall Prevention: safety round/check completed  Taken 7/16/2021 0100 by Lien Higgins, RN  Safety Promotion/Fall Prevention: safety round/check completed  Taken 7/15/2021 2307 by Lien Higgins RN  Safety Promotion/Fall Prevention: safety round/check completed  Goal: Optimal Comfort and Wellbeing  Outcome: Ongoing, Not Progressing  Goal: Readiness for Transition of Care  Outcome: Ongoing, Not Progressing     Problem: Pain Acute  Goal: Optimal Pain Control  Outcome: Ongoing, Not Progressing  Intervention: Develop Pain Management Plan  Recent Flowsheet Documentation  Taken 7/16/2021 0327 by Lien Higgins, RN  Pain Management Interventions: see MAR  Taken 7/15/2021 2136 by Lien Higgins, RN  Pain Management Interventions: see MAR     Problem: UTI (Urinary Tract Infection)  Goal: Improved Infection Symptoms  Outcome: Ongoing, Not Progressing   Goal Outcome Evaluation:  Plan of Care Reviewed With: patient        Progress: no change  Outcome Summary: New admit for a kidney stone, urology consulted, NPO now, VSS, non monitored, PRN pain and nausea meds, on continuous pulse ox due to PRN pain meds, will monitor

## 2021-07-16 NOTE — ANESTHESIA PREPROCEDURE EVALUATION
Anesthesia Evaluation     Patient summary reviewed and Nursing notes reviewed   NPO Solid Status: > 8 hours  NPO Liquid Status: > 8 hours           Airway   Mallampati: II  TM distance: >3 FB  Neck ROM: full  No difficulty expected  Dental - normal exam     Pulmonary    Cardiovascular     (+) hypertension,       Neuro/Psych  GI/Hepatic/Renal/Endo    (+) morbid obesity,      Musculoskeletal     Abdominal    Substance History      OB/GYN          Other                        Anesthesia Plan    ASA 3     general     intravenous induction     Anesthetic plan, all risks, benefits, and alternatives have been provided, discussed and informed consent has been obtained with: patient.    Plan discussed with CRNA and CAA.

## 2021-07-16 NOTE — CONSULTS
Urology Consult Note    Patient:Ariadna Ambrocio :1985  Room:[facility]  Admit Date7/15/2021  Age:35 y.o.     SEX:female     DOS:2021     MR:[id number]     Visit:[id number]       Attending: Derrek Muller MD  Referring Provider: Dr. Muller  Reason for Consultation: Left ureteral calculus    Patient Care Team:  Marifer Cooper MD as PCP - General (Internal Medicine)    Chief complaint left flank pain    Subjective .     History of present illness: 35-year-old woman with 1 week history of left flank pain.  Pain has been intermittent but at times is 8 or 9 out of 10.  Yesterday patient started feeling significant chills and subjective fever though she never took reading.  She was in bed all day and so she came to the emergency room.  Patient does have some associated nausea and vomiting intermittently.  She also has some mild dysuria and frequency.  Patient does have a history of kidney stones.  She passed one last December.  She states it has been 3 or 4 years since she has had to have one removed.  She has required procedures in the past.    CT scan reveals a 5 mm obstructing stone in the left proximal ureter.  There are also 2 or 3 smaller stones in each kidney.    Urinalysis does show a trace of leukocytes and positive nitrites.  There is 6-12 red cells and 3-5 white cells but no bacteria was seen.  Serum creatinine is 1.00.  White count is mildly elevated to 11.5.    Review of Systems  10 point review of systems were reviewed and are negative except for:  Constitution:  positive for See HPI    History  Past Medical History:   Diagnosis Date   • Hypertension      Past Surgical History:   Procedure Laterality Date   • COLONOSCOPY     • KIDNEY STONE SURGERY       Social History     Socioeconomic History   • Marital status:      Spouse name: Not on file   • Number of children: Not on file   • Years of education: Not on file   • Highest education level: Not on file   Tobacco Use   • Smoking status:  Never Smoker   • Smokeless tobacco: Never Used   Vaping Use   • Vaping Use: Never used   • Passive vaping exposure Yes   Substance and Sexual Activity   • Alcohol use: No   • Drug use: No   • Sexual activity: Yes     Partners: Male     Birth control/protection: I.U.D.     Family History   Problem Relation Age of Onset   • Breast cancer Neg Hx    • Ovarian cancer Neg Hx    • Uterine cancer Neg Hx    • Colon cancer Neg Hx    • Deep vein thrombosis Neg Hx    • Pulmonary embolism Neg Hx      Allergy  No Known Allergies  Prior to Admission medications    Medication Sig Start Date End Date Taking? Authorizing Provider   amLODIPine (NORVASC) 5 MG tablet Take 5 mg by mouth Daily. 18  Yes Ailyn Deluca MD   hydroCHLOROthiazide (HYDRODIURIL) 12.5 MG tablet Take 12.5 mg by mouth Daily.   Yes Ailyn Deluca MD   ibuprofen (ADVIL,MOTRIN) 800 MG tablet Take 800 mg by mouth Every 8 (Eight) Hours As Needed for Mild Pain .   Yes Ailyn Deluca MD   sertraline (ZOLOFT) 50 MG tablet Take 50 mg by mouth Daily.   Yes Ailyn Deluca MD   vitamin B-12 (CYANOCOBALAMIN) 1000 MCG tablet Take 1,000 mcg by mouth Daily.   Yes Ailyn Deluca MD         Objective     tMax 24 hours:  Temp (24hrs), Av.2 °F (36.8 °C), Min:97.9 °F (36.6 °C), Max:98.4 °F (36.9 °C)    Vital Sign Ranges:  Temp:  [97.9 °F (36.6 °C)-98.4 °F (36.9 °C)] 98.4 °F (36.9 °C)  Heart Rate:  [] 91  Resp:  [18-20] 18  BP: (126-152)/(75-89) 130/86  Intake and Output Last 3 Shifts:  I/O last 3 completed shifts:  In: 1000 [IV Piggyback:1000]  Out: 650 [Urine:650]      Physical Exam:   General Appearance: alert, appears stated age and cooperative  Head: normocephalic, without obvious abnormality and atraumatic  Eyes: lids and lashes normal, conjunctivae and sclerae normal, no icterus, no pallor and corneas clear  Lungs: respirations regular, respirations even and respirations unlabored  Heart: regular rhythm & normal rate  Abdomen:  soft non-tender, no guarding and no rebound tenderness  Extremities: moves extremities well, no edema, no cyanosis and no redness  Skin: no bleeding, bruising or rash  Neurologic: Mental Status orientated to person, place, time and situation    Results Review:     Lab Results (last 24 hours)     Procedure Component Value Units Date/Time    Basic Metabolic Panel [[id number]]  (Abnormal) Collected: 07/16/21 0236    Specimen: Blood Updated: 07/16/21 0406     Glucose 100 mg/dL      BUN 15 mg/dL      Creatinine 1.00 mg/dL      Sodium 138 mmol/L      Potassium 3.8 mmol/L      Chloride 106 mmol/L      CO2 23.0 mmol/L      Calcium 8.4 mg/dL      eGFR Non African Amer 63 mL/min/1.73      BUN/Creatinine Ratio 15.0     Anion Gap 9.0 mmol/L     Narrative:      GFR Normal >60  Chronic Kidney Disease <60  Kidney Failure <15      CBC Auto Differential [[id number]]  (Abnormal) Collected: 07/16/21 0236    Specimen: Blood Updated: 07/16/21 0354     WBC 11.50 10*3/mm3      RBC 3.82 10*6/mm3      Hemoglobin 11.4 g/dL      Hematocrit 33.8 %      MCV 88.3 fL      MCH 29.9 pg      MCHC 33.8 g/dL      RDW 13.0 %      RDW-SD 40.7 fl      MPV 7.2 fL      Platelets 306 10*3/mm3      Neutrophil % 53.3 %      Lymphocyte % 35.8 %      Monocyte % 7.4 %      Eosinophil % 2.8 %      Basophil % 0.7 %      Neutrophils, Absolute 6.10 10*3/mm3      Lymphocytes, Absolute 4.10 10*3/mm3      Monocytes, Absolute 0.80 10*3/mm3      Eosinophils, Absolute 0.30 10*3/mm3      Basophils, Absolute 0.10 10*3/mm3      nRBC 0.1 /100 WBC     COVID PRE-OP / PRE-PROCEDURE SCREENING ORDER (NO ISOLATION) - Swab, Nasopharynx [[id number]]  (Normal) Collected: 07/15/21 2018    Specimen: Swab from Nasopharynx Updated: 07/15/21 2049    Narrative:      The following orders were created for panel order COVID PRE-OP / PRE-PROCEDURE SCREENING ORDER (NO ISOLATION) - Swab, Nasopharynx.  Procedure                               Abnormality         Status                     ---------                                -----------         ------                     COVID-19,CEPHEID,COR/WILLIAM...[[id number]]  Normal              Final result                 Please view results for these tests on the individual orders.    COVID-19,CEPHEID,COR/WILLIAM/PAD/JEFFY IN-HOUSE(OR EMERGENT/ADD-ON),NP SWAB IN TRANSPORT MEDIA 3-4 HR TAT, RT-PCR - Swab, Nasopharynx [[id number]]  (Normal) Collected: 07/15/21 2018    Specimen: Swab from Nasopharynx Updated: 07/15/21 2049     COVID19 Not Detected    Narrative:      Fact sheet for providers: https://www.fda.gov/media/716308/download     Fact sheet for patients: https://www.fda.gov/media/600607/download  Fact sheet for providers: https://www.fda.gov/media/955446/download    Fact sheet for patients: https://www.fda.gov/media/758805/download    Test performed by PCR.    Basic Metabolic Panel [[id number]]  (Abnormal) Collected: 07/15/21 1605    Specimen: Blood Updated: 07/15/21 1650     Glucose 101 mg/dL      BUN 14 mg/dL      Creatinine 1.10 mg/dL      Sodium 137 mmol/L      Potassium 3.8 mmol/L      Chloride 102 mmol/L      CO2 25.0 mmol/L      Calcium 9.3 mg/dL      eGFR Non African Amer 57 mL/min/1.73      BUN/Creatinine Ratio 12.7     Anion Gap 10.0 mmol/L     Narrative:      GFR Normal >60  Chronic Kidney Disease <60  Kidney Failure <15      Urinalysis With Culture If Indicated - Urine, Clean Catch [[id number]]  (Abnormal) Collected: 07/15/21 1615    Specimen: Urine, Clean Catch Updated: 07/15/21 1629     Color, UA Yellow     Appearance, UA Clear     pH, UA 5.5     Specific Gravity, UA 1.010     Glucose, UA Negative     Ketones, UA Negative     Bilirubin, UA Negative     Blood, UA Small (1+)     Protein, UA Negative     Leuk Esterase, UA Trace     Nitrite, UA Positive     Urobilinogen, UA 0.2 E.U./dL    Urinalysis, Microscopic Only - Urine, Clean Catch [[id number]]  (Abnormal) Collected: 07/15/21 1615    Specimen: Urine, Clean Catch Updated: 07/15/21 1629     RBC, UA 6-12 /HPF       WBC, UA 3-5 /HPF      Bacteria, UA None Seen /HPF      Squamous Epithelial Cells, UA 0-2 /HPF      Hyaline Casts, UA 0-2 /LPF      Methodology Automated Microscopy    CBC & Differential [[id number]]  (Abnormal) Collected: 07/15/21 1605    Specimen: Blood Updated: 07/15/21 1625    Narrative:      The following orders were created for panel order CBC & Differential.  Procedure                               Abnormality         Status                     ---------                               -----------         ------                     CBC Auto Differential[[id number]]        Abnormal            Final result                 Please view results for these tests on the individual orders.    CBC Auto Differential [[id number]]  (Abnormal) Collected: 07/15/21 1605    Specimen: Blood Updated: 07/15/21 1625     WBC 12.70 10*3/mm3      RBC 4.35 10*6/mm3      Hemoglobin 12.8 g/dL      Hematocrit 38.8 %      MCV 89.2 fL      MCH 29.6 pg      MCHC 33.1 g/dL      RDW 13.2 %      RDW-SD 41.1 fl      MPV 7.3 fL      Platelets 361 10*3/mm3      Neutrophil % 65.6 %      Lymphocyte % 23.3 %      Monocyte % 9.2 %      Eosinophil % 1.3 %      Basophil % 0.6 %      Neutrophils, Absolute 8.30 10*3/mm3      Lymphocytes, Absolute 3.00 10*3/mm3      Monocytes, Absolute 1.20 10*3/mm3      Eosinophils, Absolute 0.20 10*3/mm3      Basophils, Absolute 0.10 10*3/mm3      nRBC 0.0 /100 WBC     Blood Culture - Blood, Arm, Right [[id number]] Collected: 07/15/21 1605    Specimen: Blood from Arm, Right Updated: 07/15/21 1620    Blood Culture - Blood, Arm, Left [[id number]] Collected: 07/15/21 1614    Specimen: Blood from Arm, Left Updated: 07/15/21 1619         No results found for: URINECX     Imaging Results (Last 7 Days)     Procedure Component Value Units Date/Time    CT Abdomen Pelvis Without Contrast [[id number]] Collected: 07/15/21 1739     Updated: 07/15/21 1746    Narrative:         DATE OF EXAM:  7/15/2021 4:59 PM     PROCEDURE:  CT ABDOMEN  PELVIS WO CONTRAST-     INDICATIONS:  flank pain     COMPARISON:  CT abdomen pelvis without contrast 12/29/2020     TECHNIQUE:  Routine transaxial slices were obtained through the abdomen and pelvis  without the administration of intravenous contrast. Reconstructed  coronal and sagittal images were also obtained. Automated exposure  control and iterative construction methods were used.     FINDINGS:  Lung bases clear. Heart size is normal. There is no pericardial effusion  or pleural effusion. Small hiatal hernia noted.     Lack of contrast limits assessment of abdominal organs and vasculature.  The liver and spleen are normal in size and contour. Gallbladder  present. No pericholecystic inflammation. Normal adrenal glands.  Pancreas without pancreatitis.      There is moderate left hydronephrosis secondary to a 5 mm calculus in  the proximal left ureter. Several other punctate nonobstructing  left-sided renal calculi are noted. There is no distal left ureteral  calculus. Urinary bladder is thin walled. There is no obstructing  ureteral calculus on the right. There are multiple small nonobstructing  right-sided renal calculi largest at the right mid kidney measuring 6  mm. Parapelvic cyst at the upper pole right kidney. There is an IUD in  the uterus. No adnexal mass.     Negative for pneumoperitoneum. No bowel obstruction. No free fluid in  the abdomen or pelvis. Moderate amount stool in colon. Normal appendix.  Mild atherosclerotic calcifications involving the infrarenal aorta. No  aggressive osseous lesion or fracture.       Impression:      1. Obstructing 5 mm calculus in proximal left ureter measuring 5 mm  resulting in moderate left hydronephrosis.  2. Additional bilateral nonobstructing nephrolithiasis.  3. Small hiatal hernia.     Electronically Signed By-Schuyler Elena MD On:7/15/2021 5:43 PM  This report was finalized on [id number] by  Schuyler Elena MD.          Inpatient Meds:   Scheduled  Meds:amLODIPine, 5 mg, Oral, Daily  cefTRIAXone, 1 g, Intravenous, Q24H  pantoprazole, 40 mg, Oral, QAM AC  sodium chloride, 10 mL, Intravenous, Q12H       Continuous Infusions:sodium chloride, 75 mL/hr, Last Rate: 75 mL/hr (07/16/21 0534)       PRN Meds:.•  acetaminophen  •  melatonin  •  Morphine  •  ondansetron  •  [COMPLETED] Insert peripheral IV **AND** sodium chloride  •  sodium chloride      Assessment/Plan     Active Problems:    Ureterolithiasis    Obstructing proximal left ureteral calculus  Smaller bilateral renal calculi  UTI    Plan  Discussed treatment options.  Patient wishes to proceed with cystoscopy, left ureteroscopic stone extraction, left ureteroscopic laser lithotripsy, placement of a left ureteral stent.  Risks, benefits, and alternatives were discussed with the patient and she wishes to proceed.      I discussed the patients findings and my recommendations with patient    Thank you for this  consult    Kannan Patel MD  07/16/21  07:35 EDT

## 2021-07-16 NOTE — OP NOTE
Urology Operative Note    7/16/2021    Ariadna Ambrocio  35 y.o.  1985  female  [id number]      Surgeon(s) and Role:  Carmelo Nguyễn MD - Primary     Pre-operative Diagnosis: 5 mm left proximal ureteral calculus, bilateral renal calculi    Post-operative Diagnosis: Same    Complications: None    Procedures:  1. Cystoscopy  2. Retrograde pyelogram  3. Left ureteroscopy  4. Laser Lithotripsy of left ureteral calculus  5. Basket-extraction of left renal stone   6. Left ureteral stent placement  7. Fluoroscopy with Interpretation     Indications   Ariadna Ambrocio is a 35 y.o. female who was found to have 5 mm obstructing left proximal ureteral calculus as well as bilateral renal calculi.  She has a long history of stones and was admitted to the hospital for pain control.    During the informed consent process, the procedure was discussed in detail including the risks of bleeding, infection, ureteral injury, failure to access the stone, need for staged procedure, retained stone fragments, and stent migration.     Findings   Left proximal stone impacted, lasered and dusted into tiny pieces.   Left nephroscopy was performed and small renal calculus was basket extracted    Fluoroscopy with interpretation: Left retrograde pyelogram showing moderate hydronephrosis with wire in the upper pole.  Stent placed with curl in the renal pelvis.    Description of procedure:  The patient was properly identified in the preoperative holding area and taken to the operating room where general anesthesia was induced. The patient was placed in the cystolithotomy position and prepped and draped in a sterile fashion. The patient was given antibiotics intravenously before the start of the surgery. After ensuring that all of the required equipment was ready and available a surgical timeout was performed.     A 21 Icelandic rigid cystoscope was inserted into the bladder under direct visualization.   A Sensor wire was passed up the ureter under  fluoroscopic guidance.  It was difficult to pass due to impacted stone.  Dual-lumen catheter was passed over the wire advanced to the mid ureter and retrograde pyelogram was performed.  Semirigid ureteroscope was passed into the ureter. The stone was visualized and impacted. The stone was lasered into tiny fragments on dusting settings.   Basket was used to clear any remaining fragments.   No more stones were seen within the ureter under direct visualization.   Flexible ureteroscope was then advanced alongside the wire up into the renal pelvis.  There was no significant stone burden, the largest stone was basket extracted.  The cystoscope was then replaced over the wire and a 6 Kinyarwanda x 26 cm stent was placed under direct and fluoroscopic visualization.  The bladder was then emptied and scope removed.    There were no apparent complications. The patient woke up in the operating room and was taken to the recovery room in stable condition.     I was present and scrubbed for the entire procedure.     Specimens: None, stone dusted    Estimated Blood Loss: minimal      Plan   -Follow up with Dr. Nguyễn 1-2 weeks for stent removal  -We will plan right ESWL for 6 mm right mid renal stone         Carmelo Nguyễn MD  [facility]  [facility], [address]  [place] [address] [phone]

## 2021-07-16 NOTE — ANESTHESIA PROCEDURE NOTES
Airway  Date/Time: 7/16/2021 2:27 PM  End Time:7/16/2021 2:27 PM  Airway not difficult    General Information and Staff    Anesthesiologist: Chris Jaimes MD  CRNA: Bijal Stevens CRNA    Indications and Patient Condition  Indications for airway management: airway protection    Preoxygenated: yes  MILS not maintained throughout  Mask difficulty assessment: 0 - not attempted    Final Airway Details  Final airway type: supraglottic airway      Successful airway: flexible  Size 4  Airway Seal Pressure (cm H2O): 8    Number of attempts at approach: 1  Assessment: lips, teeth, and gum same as pre-op

## 2021-07-17 NOTE — PLAN OF CARE
Goal Outcome Evaluation:              Outcome Summary: Pt had cysto with stent.  Will be discharged on PO pain meds and antibiotics. Vital signs stable

## 2021-07-19 NOTE — CASE MANAGEMENT/SOCIAL WORK
Case Management Discharge Note                Selected Continued Care - Discharged on 7/16/2021 Admission date: 7/15/2021 - Discharge disposition: Home or Self Care

## 2021-07-20 LAB
BACTERIA SPEC AEROBE CULT: NORMAL
BACTERIA SPEC AEROBE CULT: NORMAL

## 2021-07-26 ENCOUNTER — TRANSCRIBE ORDERS (OUTPATIENT)
Dept: ADMINISTRATIVE | Facility: HOSPITAL | Age: 36
End: 2021-07-26

## 2021-07-26 ENCOUNTER — HOSPITAL ENCOUNTER (OUTPATIENT)
Dept: CARDIOLOGY | Facility: HOSPITAL | Age: 36
Discharge: HOME | End: 2021-07-26
Admitting: ANESTHESIOLOGY

## 2021-07-26 DIAGNOSIS — N20.0 KIDNEY STONE: Primary | ICD-10-CM

## 2021-07-26 DIAGNOSIS — N20.0 KIDNEY STONE: ICD-10-CM

## 2021-07-26 PROCEDURE — 93010 ELECTROCARDIOGRAM REPORT: CPT | Performed by: INTERNAL MEDICINE

## 2021-07-26 PROCEDURE — 93005 ELECTROCARDIOGRAM TRACING: CPT | Performed by: ANESTHESIOLOGY

## 2021-08-04 LAB — QT INTERVAL: 373 MS

## 2021-09-23 ENCOUNTER — HOSPITAL ENCOUNTER (OUTPATIENT)
Facility: HOSPITAL | Age: 36
Setting detail: OBSERVATION
Discharge: HOME | End: 2021-09-24
Attending: EMERGENCY MEDICINE | Admitting: EMERGENCY MEDICINE

## 2021-09-23 ENCOUNTER — APPOINTMENT (OUTPATIENT)
Dept: MRI IMAGING | Facility: HOSPITAL | Age: 36
End: 2021-09-23

## 2021-09-23 ENCOUNTER — APPOINTMENT (OUTPATIENT)
Dept: CT IMAGING | Facility: HOSPITAL | Age: 36
End: 2021-09-23

## 2021-09-23 ENCOUNTER — APPOINTMENT (OUTPATIENT)
Dept: GENERAL RADIOLOGY | Facility: HOSPITAL | Age: 36
End: 2021-09-23

## 2021-09-23 DIAGNOSIS — R07.9 CHEST PAIN, UNSPECIFIED TYPE: Primary | ICD-10-CM

## 2021-09-23 DIAGNOSIS — R06.09 DYSPNEA ON EXERTION: ICD-10-CM

## 2021-09-23 DIAGNOSIS — R20.2 PARESTHESIA: ICD-10-CM

## 2021-09-23 LAB
ALBUMIN SERPL-MCNC: 4.8 G/DL (ref 3.5–5.2)
ALBUMIN/GLOB SERPL: 1.5 G/DL
ALP SERPL-CCNC: 93 U/L (ref 39–117)
ALT SERPL W P-5'-P-CCNC: 27 U/L (ref 1–33)
ANION GAP SERPL CALCULATED.3IONS-SCNC: 18 MMOL/L (ref 5–15)
APTT PPP: 25.9 SECONDS (ref 24–31)
AST SERPL-CCNC: 26 U/L (ref 1–32)
B PARAPERT DNA SPEC QL NAA+PROBE: NOT DETECTED
B PERT DNA SPEC QL NAA+PROBE: NOT DETECTED
B-HCG UR QL: NEGATIVE
BACTERIA UR QL AUTO: ABNORMAL /HPF
BASOPHILS # BLD AUTO: 0.1 10*3/MM3 (ref 0–0.2)
BASOPHILS NFR BLD AUTO: 1.2 % (ref 0–1.5)
BILIRUB SERPL-MCNC: 0.7 MG/DL (ref 0–1.2)
BILIRUB UR QL STRIP: NEGATIVE
BUN SERPL-MCNC: 10 MG/DL (ref 6–20)
BUN/CREAT SERPL: 9.8 (ref 7–25)
C PNEUM DNA NPH QL NAA+NON-PROBE: NOT DETECTED
CALCIUM SPEC-SCNC: 9.9 MG/DL (ref 8.6–10.5)
CHLORIDE SERPL-SCNC: 100 MMOL/L (ref 98–107)
CLARITY UR: CLEAR
CO2 SERPL-SCNC: 19 MMOL/L (ref 22–29)
COLOR UR: YELLOW
CREAT SERPL-MCNC: 1.02 MG/DL (ref 0.57–1)
D DIMER PPP FEU-MCNC: 0.23 MG/L (FEU) (ref 0–0.59)
DEPRECATED RDW RBC AUTO: 39.8 FL (ref 37–54)
EOSINOPHIL # BLD AUTO: 0.1 10*3/MM3 (ref 0–0.4)
EOSINOPHIL NFR BLD AUTO: 0.9 % (ref 0.3–6.2)
ERYTHROCYTE [DISTWIDTH] IN BLOOD BY AUTOMATED COUNT: 13.1 % (ref 12.3–15.4)
FLUAV SUBTYP SPEC NAA+PROBE: NOT DETECTED
FLUBV RNA ISLT QL NAA+PROBE: NOT DETECTED
GFR SERPL CREATININE-BSD FRML MDRD: 62 ML/MIN/1.73
GLOBULIN UR ELPH-MCNC: 3.3 GM/DL
GLUCOSE SERPL-MCNC: 102 MG/DL (ref 65–99)
GLUCOSE UR STRIP-MCNC: NEGATIVE MG/DL
HADV DNA SPEC NAA+PROBE: NOT DETECTED
HCOV 229E RNA SPEC QL NAA+PROBE: NOT DETECTED
HCOV HKU1 RNA SPEC QL NAA+PROBE: NOT DETECTED
HCOV NL63 RNA SPEC QL NAA+PROBE: NOT DETECTED
HCOV OC43 RNA SPEC QL NAA+PROBE: NOT DETECTED
HCT VFR BLD AUTO: 43.9 % (ref 34–46.6)
HGB BLD-MCNC: 15.5 G/DL (ref 12–15.9)
HGB UR QL STRIP.AUTO: ABNORMAL
HMPV RNA NPH QL NAA+NON-PROBE: NOT DETECTED
HOLD SPECIMEN: NORMAL
HPIV1 RNA SPEC QL NAA+PROBE: NOT DETECTED
HPIV2 RNA SPEC QL NAA+PROBE: NOT DETECTED
HPIV3 RNA NPH QL NAA+PROBE: NOT DETECTED
HPIV4 P GENE NPH QL NAA+PROBE: NOT DETECTED
HYALINE CASTS UR QL AUTO: ABNORMAL /LPF
INR PPP: 1.02 (ref 0.93–1.1)
KETONES UR QL STRIP: NEGATIVE
LEUKOCYTE ESTERASE UR QL STRIP.AUTO: ABNORMAL
LYMPHOCYTES # BLD AUTO: 3.2 10*3/MM3 (ref 0.7–3.1)
LYMPHOCYTES NFR BLD AUTO: 30.6 % (ref 19.6–45.3)
M PNEUMO IGG SER IA-ACNC: NOT DETECTED
MAGNESIUM SERPL-MCNC: 1.7 MG/DL (ref 1.6–2.6)
MCH RBC QN AUTO: 30.5 PG (ref 26.6–33)
MCHC RBC AUTO-ENTMCNC: 35.3 G/DL (ref 31.5–35.7)
MCV RBC AUTO: 86.4 FL (ref 79–97)
MONOCYTES # BLD AUTO: 0.9 10*3/MM3 (ref 0.1–0.9)
MONOCYTES NFR BLD AUTO: 8.8 % (ref 5–12)
NEUTROPHILS NFR BLD AUTO: 58.5 % (ref 42.7–76)
NEUTROPHILS NFR BLD AUTO: 6.1 10*3/MM3 (ref 1.7–7)
NITRITE UR QL STRIP: NEGATIVE
NRBC BLD AUTO-RTO: 0 /100 WBC (ref 0–0.2)
NT-PROBNP SERPL-MCNC: 8.5 PG/ML (ref 0–450)
PH UR STRIP.AUTO: 7 [PH] (ref 5–8)
PLATELET # BLD AUTO: 373 10*3/MM3 (ref 140–450)
PMV BLD AUTO: 7.6 FL (ref 6–12)
POTASSIUM SERPL-SCNC: 3.4 MMOL/L (ref 3.5–5.2)
PROT SERPL-MCNC: 8.1 G/DL (ref 6–8.5)
PROT UR QL STRIP: NEGATIVE
PROTHROMBIN TIME: 11.3 SECONDS (ref 9.6–11.7)
RBC # BLD AUTO: 5.08 10*6/MM3 (ref 3.77–5.28)
RBC # UR: ABNORMAL /HPF
REF LAB TEST METHOD: ABNORMAL
RHINOVIRUS RNA SPEC NAA+PROBE: NOT DETECTED
RSV RNA NPH QL NAA+NON-PROBE: NOT DETECTED
SARS-COV-2 RNA NPH QL NAA+NON-PROBE: NOT DETECTED
SODIUM SERPL-SCNC: 137 MMOL/L (ref 136–145)
SP GR UR STRIP: <=1.005 (ref 1–1.03)
SQUAMOUS #/AREA URNS HPF: ABNORMAL /HPF
TROPONIN T SERPL-MCNC: <0.01 NG/ML (ref 0–0.03)
TROPONIN T SERPL-MCNC: <0.01 NG/ML (ref 0–0.03)
UROBILINOGEN UR QL STRIP: ABNORMAL
WBC # BLD AUTO: 10.4 10*3/MM3 (ref 3.4–10.8)
WBC UR QL AUTO: ABNORMAL /HPF

## 2021-09-23 PROCEDURE — 84484 ASSAY OF TROPONIN QUANT: CPT | Performed by: EMERGENCY MEDICINE

## 2021-09-23 PROCEDURE — 94640 AIRWAY INHALATION TREATMENT: CPT

## 2021-09-23 PROCEDURE — 93005 ELECTROCARDIOGRAM TRACING: CPT | Performed by: EMERGENCY MEDICINE

## 2021-09-23 PROCEDURE — G0378 HOSPITAL OBSERVATION PER HR: HCPCS

## 2021-09-23 PROCEDURE — 85730 THROMBOPLASTIN TIME PARTIAL: CPT | Performed by: EMERGENCY MEDICINE

## 2021-09-23 PROCEDURE — 81025 URINE PREGNANCY TEST: CPT | Performed by: EMERGENCY MEDICINE

## 2021-09-23 PROCEDURE — 71045 X-RAY EXAM CHEST 1 VIEW: CPT

## 2021-09-23 PROCEDURE — 80053 COMPREHEN METABOLIC PANEL: CPT | Performed by: EMERGENCY MEDICINE

## 2021-09-23 PROCEDURE — 81001 URINALYSIS AUTO W/SCOPE: CPT | Performed by: EMERGENCY MEDICINE

## 2021-09-23 PROCEDURE — 83735 ASSAY OF MAGNESIUM: CPT | Performed by: EMERGENCY MEDICINE

## 2021-09-23 PROCEDURE — 0202U NFCT DS 22 TRGT SARS-COV-2: CPT | Performed by: EMERGENCY MEDICINE

## 2021-09-23 PROCEDURE — 70450 CT HEAD/BRAIN W/O DYE: CPT

## 2021-09-23 PROCEDURE — 85025 COMPLETE CBC W/AUTO DIFF WBC: CPT | Performed by: EMERGENCY MEDICINE

## 2021-09-23 PROCEDURE — 70551 MRI BRAIN STEM W/O DYE: CPT

## 2021-09-23 PROCEDURE — 94799 UNLISTED PULMONARY SVC/PX: CPT

## 2021-09-23 PROCEDURE — 63710000001 PREDNISONE PER 1 MG: Performed by: EMERGENCY MEDICINE

## 2021-09-23 PROCEDURE — 85379 FIBRIN DEGRADATION QUANT: CPT | Performed by: EMERGENCY MEDICINE

## 2021-09-23 PROCEDURE — 83880 ASSAY OF NATRIURETIC PEPTIDE: CPT | Performed by: EMERGENCY MEDICINE

## 2021-09-23 PROCEDURE — 85610 PROTHROMBIN TIME: CPT | Performed by: EMERGENCY MEDICINE

## 2021-09-23 PROCEDURE — 99284 EMERGENCY DEPT VISIT MOD MDM: CPT

## 2021-09-23 RX ORDER — CHOLECALCIFEROL (VITAMIN D3) 125 MCG
5 CAPSULE ORAL NIGHTLY PRN
Status: DISCONTINUED | OUTPATIENT
Start: 2021-09-23 | End: 2021-09-24 | Stop reason: HOSPADM

## 2021-09-23 RX ORDER — ONDANSETRON 2 MG/ML
4 INJECTION INTRAMUSCULAR; INTRAVENOUS EVERY 6 HOURS PRN
Status: DISCONTINUED | OUTPATIENT
Start: 2021-09-23 | End: 2021-09-24 | Stop reason: HOSPADM

## 2021-09-23 RX ORDER — SODIUM CHLORIDE 0.9 % (FLUSH) 0.9 %
10 SYRINGE (ML) INJECTION AS NEEDED
Status: DISCONTINUED | OUTPATIENT
Start: 2021-09-23 | End: 2021-09-24 | Stop reason: HOSPADM

## 2021-09-23 RX ORDER — PHENTERMINE HYDROCHLORIDE 37.5 MG/1
37.5 CAPSULE ORAL EVERY MORNING
COMMUNITY
End: 2021-09-24 | Stop reason: HOSPADM

## 2021-09-23 RX ORDER — IPRATROPIUM BROMIDE AND ALBUTEROL SULFATE 2.5; .5 MG/3ML; MG/3ML
3 SOLUTION RESPIRATORY (INHALATION) ONCE
Status: COMPLETED | OUTPATIENT
Start: 2021-09-23 | End: 2021-09-23

## 2021-09-23 RX ORDER — LORAZEPAM 0.5 MG/1
0.5 TABLET ORAL EVERY 6 HOURS PRN
Status: DISCONTINUED | OUTPATIENT
Start: 2021-09-23 | End: 2021-09-23

## 2021-09-23 RX ORDER — ACETAMINOPHEN 325 MG/1
650 TABLET ORAL EVERY 4 HOURS PRN
Status: DISCONTINUED | OUTPATIENT
Start: 2021-09-23 | End: 2021-09-24 | Stop reason: HOSPADM

## 2021-09-23 RX ORDER — SODIUM CHLORIDE 0.9 % (FLUSH) 0.9 %
10 SYRINGE (ML) INJECTION EVERY 12 HOURS SCHEDULED
Status: DISCONTINUED | OUTPATIENT
Start: 2021-09-23 | End: 2021-09-24 | Stop reason: HOSPADM

## 2021-09-23 RX ORDER — PREDNISONE 20 MG/1
40 TABLET ORAL ONCE
Status: COMPLETED | OUTPATIENT
Start: 2021-09-23 | End: 2021-09-23

## 2021-09-23 RX ORDER — ASPIRIN 325 MG
325 TABLET ORAL ONCE
Status: COMPLETED | OUTPATIENT
Start: 2021-09-23 | End: 2021-09-23

## 2021-09-23 RX ORDER — NITROGLYCERIN 0.4 MG/1
0.4 TABLET SUBLINGUAL
Status: DISCONTINUED | OUTPATIENT
Start: 2021-09-23 | End: 2021-09-24 | Stop reason: HOSPADM

## 2021-09-23 RX ORDER — LORAZEPAM 0.5 MG/1
0.5 TABLET ORAL ONCE
Status: COMPLETED | OUTPATIENT
Start: 2021-09-23 | End: 2021-09-23

## 2021-09-23 RX ADMIN — LORAZEPAM 0.5 MG: 0.5 TABLET ORAL at 14:21

## 2021-09-23 RX ADMIN — ASPIRIN 325 MG ORAL TABLET 325 MG: 325 PILL ORAL at 17:35

## 2021-09-23 RX ADMIN — LORAZEPAM 0.5 MG: 0.5 TABLET ORAL at 21:38

## 2021-09-23 RX ADMIN — Medication 10 ML: at 20:30

## 2021-09-23 RX ADMIN — IPRATROPIUM BROMIDE AND ALBUTEROL SULFATE 3 ML: 2.5; .5 SOLUTION RESPIRATORY (INHALATION) at 21:44

## 2021-09-23 RX ADMIN — ACETAMINOPHEN 650 MG: 325 TABLET, FILM COATED ORAL at 20:30

## 2021-09-23 RX ADMIN — Medication 5 MG: at 21:38

## 2021-09-23 RX ADMIN — PREDNISONE 40 MG: 20 TABLET ORAL at 21:38

## 2021-09-23 NOTE — ED PROVIDER NOTES
Subjective   Chief complaint: Patient is a pleasant 35-year-old female.  She has been under a lot of stress lately.  She takes sertraline for anxiety.  She states that she has had chest tightness and shortness of breath since Sunday.  She states that she has had numbness in her alternating arms but in both feet.  Today across the top of her lip and that her left eye started twitching.  But with this persistent tightness and shortness of breath she presented for evaluation.  She does have an IUD in good she has never had a blood clot before.  There is not an early onset heart disease in the family but there is heart disease present.  She has not had a stress test in the past.  The numbness has been there for 4 days in her feet.  Alternates in her hands and then across the lip today.  She is not sure if this could be anxiety or not.  She has not had cough or fever.  She has had 1 Covid vaccination.  She is due for her second on Saturday.    Context: As above    Duration: 4 days    Timing: Progressive    Severity: No severe pain.    Associated Symptoms: Negative except as noted above.  Appropriate PPE was used.        PCP:  LMP: IUD          Review of Systems   Constitutional: Negative for fever.   HENT: Negative.    Respiratory: Positive for chest tightness and shortness of breath.    Neurological: Positive for numbness.   All other systems reviewed and are negative.      Past Medical History:   Diagnosis Date   • Hypertension        No Known Allergies    Past Surgical History:   Procedure Laterality Date   • COLONOSCOPY     • CYSTOSCOPY, URETEROSCOPY, RETROGRADE PYELOGRAM, STENT INSERTION Left 7/16/2021    Procedure: CYSTOSCOPY URETEROSCOPY RETROGRADE PYELOGRAM HOLMIUM LASER BASKET STONE EXTRACTION STENT INSERTION;  Surgeon: Carmelo Nguyễn MD;  Location: [facility] MAIN OR;  Service: Urology;  Laterality: Left;   • KIDNEY STONE SURGERY         Family History   Problem Relation Age of Onset   • Breast cancer Neg Hx    •  Ovarian cancer Neg Hx    • Uterine cancer Neg Hx    • Colon cancer Neg Hx    • Deep vein thrombosis Neg Hx    • Pulmonary embolism Neg Hx        Social History     Socioeconomic History   • Marital status:      Spouse name: Not on file   • Number of children: Not on file   • Years of education: Not on file   • Highest education level: Not on file   Tobacco Use   • Smoking status: Never Smoker   • Smokeless tobacco: Never Used   Vaping Use   • Vaping Use: Never used   • Passive vaping exposure Yes   Substance and Sexual Activity   • Alcohol use: No   • Drug use: No   • Sexual activity: Yes     Partners: Male     Birth control/protection: I.U.D.           Objective   Physical Exam  Vitals and nursing note reviewed.   Constitutional:       Appearance: She is well-developed. She is obese.   HENT:      Head: Normocephalic and atraumatic.   Eyes:      Extraocular Movements: Extraocular movements intact.      Pupils: Pupils are equal, round, and reactive to light.   Cardiovascular:      Rate and Rhythm: Regular rhythm. Tachycardia present.   Pulmonary:      Effort: Pulmonary effort is normal.      Breath sounds: Normal breath sounds.   Abdominal:      Tenderness: There is no abdominal tenderness.   Musculoskeletal:      Cervical back: Neck supple.      Right lower leg: No edema.      Left lower leg: No edema.   Skin:     General: Skin is warm and dry.   Neurological:      General: No focal deficit present.      Mental Status: She is alert and oriented to person, place, and time.   Psychiatric:         Mood and Affect: Mood is anxious.         Behavior: Behavior normal.         Procedures           ED Course      Results for orders placed or performed during the hospital encounter of 09/23/21   Respiratory Panel PCR w/COVID-19(SARS-CoV-2) LEONARDO/ROSCOE/WILLIAM/PAD/COR/MAD/MISA In-House, NP Swab in UTM/VTM, 3-4 HR TAT - Swab, Nasopharynx    Specimen: Nasopharynx; Swab   Result Value Ref Range    ADENOVIRUS, PCR Not Detected Not  Detected    Coronavirus 229E Not Detected Not Detected    Coronavirus HKU1 Not Detected Not Detected    Coronavirus NL63 Not Detected Not Detected    Coronavirus OC43 Not Detected Not Detected    COVID19 Not Detected Not Detected - Ref. Range    Human Metapneumovirus Not Detected Not Detected    Human Rhinovirus/Enterovirus Not Detected Not Detected    Influenza A PCR Not Detected Not Detected    Influenza B PCR Not Detected Not Detected    Parainfluenza Virus 1 Not Detected Not Detected    Parainfluenza Virus 2 Not Detected Not Detected    Parainfluenza Virus 3 Not Detected Not Detected    Parainfluenza Virus 4 Not Detected Not Detected    RSV, PCR Not Detected Not Detected    Bordetella pertussis pcr Not Detected Not Detected    Bordetella parapertussis PCR Not Detected Not Detected    Chlamydophila pneumoniae PCR Not Detected Not Detected    Mycoplasma pneumo by PCR Not Detected Not Detected   Comprehensive Metabolic Panel    Specimen: Blood   Result Value Ref Range    Glucose 102 (H) 65 - 99 mg/dL    BUN 10 6 - 20 mg/dL    Creatinine 1.02 (H) 0.57 - 1.00 mg/dL    Sodium 137 136 - 145 mmol/L    Potassium 3.4 (L) 3.5 - 5.2 mmol/L    Chloride 100 98 - 107 mmol/L    CO2 19.0 (L) 22.0 - 29.0 mmol/L    Calcium 9.9 8.6 - 10.5 mg/dL    Total Protein 8.1 6.0 - 8.5 g/dL    Albumin 4.80 3.50 - 5.20 g/dL    ALT (SGPT) 27 1 - 33 U/L    AST (SGOT) 26 1 - 32 U/L    Alkaline Phosphatase 93 39 - 117 U/L    Total Bilirubin 0.7 0.0 - 1.2 mg/dL    eGFR Non African Amer 62 >60 mL/min/1.73    Globulin 3.3 gm/dL    A/G Ratio 1.5 g/dL    BUN/Creatinine Ratio 9.8 7.0 - 25.0    Anion Gap 18.0 (H) 5.0 - 15.0 mmol/L   Protime-INR    Specimen: Blood   Result Value Ref Range    Protime 11.3 9.6 - 11.7 Seconds    INR 1.02 0.93 - 1.10   aPTT    Specimen: Blood   Result Value Ref Range    PTT 25.9 24.0 - 31.0 seconds   Urinalysis With Microscopic If Indicated (No Culture) - Urine, Clean Catch    Specimen: Urine, Clean Catch   Result Value Ref  Range    Color, UA Yellow Yellow, Straw    Appearance, UA Clear Clear    pH, UA 7.0 5.0 - 8.0    Specific Gravity, UA <=1.005 1.005 - 1.030    Glucose, UA Negative Negative    Ketones, UA Negative Negative    Bilirubin, UA Negative Negative    Blood, UA Small (1+) (A) Negative    Protein, UA Negative Negative    Leuk Esterase, UA Trace (A) Negative    Nitrite, UA Negative Negative    Urobilinogen, UA 0.2 E.U./dL 0.2 - 1.0 E.U./dL   Pregnancy, Urine - Urine, Clean Catch    Specimen: Urine, Clean Catch   Result Value Ref Range    HCG, Urine QL Negative Negative   Troponin    Specimen: Blood   Result Value Ref Range    Troponin T <0.010 0.000 - 0.030 ng/mL   BNP    Specimen: Blood   Result Value Ref Range    proBNP 8.5 0.0 - 450.0 pg/mL   D-dimer, Quantitative    Specimen: Blood   Result Value Ref Range    D-Dimer, Quantitative 0.23 0.00 - 0.59 mg/L (FEU)   Magnesium    Specimen: Blood   Result Value Ref Range    Magnesium 1.7 1.6 - 2.6 mg/dL   CBC Auto Differential    Specimen: Blood   Result Value Ref Range    WBC 10.40 3.40 - 10.80 10*3/mm3    RBC 5.08 3.77 - 5.28 10*6/mm3    Hemoglobin 15.5 12.0 - 15.9 g/dL    Hematocrit 43.9 34.0 - 46.6 %    MCV 86.4 79.0 - 97.0 fL    MCH 30.5 26.6 - 33.0 pg    MCHC 35.3 31.5 - 35.7 g/dL    RDW 13.1 12.3 - 15.4 %    RDW-SD 39.8 37.0 - 54.0 fl    MPV 7.6 6.0 - 12.0 fL    Platelets 373 140 - 450 10*3/mm3    Neutrophil % 58.5 42.7 - 76.0 %    Lymphocyte % 30.6 19.6 - 45.3 %    Monocyte % 8.8 5.0 - 12.0 %    Eosinophil % 0.9 0.3 - 6.2 %    Basophil % 1.2 0.0 - 1.5 %    Neutrophils, Absolute 6.10 1.70 - 7.00 10*3/mm3    Lymphocytes, Absolute 3.20 (H) 0.70 - 3.10 10*3/mm3    Monocytes, Absolute 0.90 0.10 - 0.90 10*3/mm3    Eosinophils, Absolute 0.10 0.00 - 0.40 10*3/mm3    Basophils, Absolute 0.10 0.00 - 0.20 10*3/mm3    nRBC 0.0 0.0 - 0.2 /100 WBC   Urinalysis, Microscopic Only - Urine, Clean Catch    Specimen: Urine, Clean Catch   Result Value Ref Range    RBC, UA 0-2 (A) None Seen  /HPF    WBC, UA 6-12 (A) None Seen /HPF    Bacteria, UA None Seen None Seen /HPF    Squamous Epithelial Cells, UA 3-6 (A) None Seen, 0-2 /HPF    Hyaline Casts, UA 3-6 None Seen /LPF    Methodology Automated Microscopy    ECG 12 Lead   Result Value Ref Range    QT Interval 294 ms   Gold Top - SST   Result Value Ref Range    Extra Tube Hold for add-ons.           CT Head Without Contrast    Result Date: 9/23/2021  No acute intracranial abnormality. Brain MRI is more sensitive to evaluate for acute or subacute infarcts and to evaluate for intracranial metastatic disease.  Electronically Signed By-Melinda Lovett MD On:9/23/2021 4:26 PM This report was finalized on [id number] by  Melinda Lovett MD.    XR Chest 1 View    Result Date: 9/23/2021  No acute cardiopulmonary abnormality.  Electronically Signed By-Melinda Lovett MD On:9/23/2021 2:46 PM This report was finalized on [id number] by  Melinda Lovett MD.       EKG sinus tachycardia LVH with secondary repull abnormality rate of 115                             MDM  Number of Diagnoses or Management Options  Diagnosis management comments: Patient has EKG changes with secondary repull and LVH.  This new onset dyspnea and chest tightness.  Could be anxiety as she has areas of paresthesias as well.  She has both feet left face and midface and hands.  However she is persistently tachycardic.  Her D-dimer is negative.  Chest x-ray is clear.  I do not think PE.  However I do think she needs to be ruled out from a cardiac standpoint.  And potentially may end up involving anxiety.  She does not have focal exam.  No signs of facial weakness.  Her numbness is subjective.       Amount and/or Complexity of Data Reviewed  Clinical lab tests: reviewed  Tests in the radiology section of CPT®: reviewed  Tests in the medicine section of CPT®: reviewed  Discussion of test results with the performing providers: yes  Discuss the patient with other providers: yes  Independent visualization of  images, tracings, or specimens: yes    Patient Progress  Patient progress: stable      Final diagnoses:   None     Chest pain  Dyspnea  Paresthesias  ED Disposition  ED Disposition     None          No follow-up provider specified.       Medication List      No changes were made to your prescriptions during this visit.          Lito Gann DO  09/23/21 1701

## 2021-09-24 ENCOUNTER — APPOINTMENT (OUTPATIENT)
Dept: NUCLEAR MEDICINE | Facility: HOSPITAL | Age: 36
End: 2021-09-24

## 2021-09-24 VITALS
OXYGEN SATURATION: 99 % | SYSTOLIC BLOOD PRESSURE: 121 MMHG | RESPIRATION RATE: 14 BRPM | BODY MASS INDEX: 37.85 KG/M2 | HEART RATE: 82 BPM | WEIGHT: 213.63 LBS | HEIGHT: 63 IN | DIASTOLIC BLOOD PRESSURE: 73 MMHG | TEMPERATURE: 98.2 F

## 2021-09-24 LAB
ANION GAP SERPL CALCULATED.3IONS-SCNC: 16 MMOL/L (ref 5–15)
BASOPHILS # BLD AUTO: 0 10*3/MM3 (ref 0–0.2)
BASOPHILS NFR BLD AUTO: 0.2 % (ref 0–1.5)
BH CV REST NUCLEAR ISOTOPE DOSE: 7.8 MCI
BH CV STRESS BP STAGE 1: NORMAL
BH CV STRESS DOSE REGADENOSON STAGE 1: 0.4
BH CV STRESS DURATION MIN STAGE 1: 2
BH CV STRESS DURATION MIN STAGE 2: 1
BH CV STRESS DURATION SEC STAGE 1: 57
BH CV STRESS DURATION SEC STAGE 2: 0
BH CV STRESS GRADE STAGE 1: 10
BH CV STRESS HR STAGE 1: 145
BH CV STRESS METS STAGE 1: 5
BH CV STRESS NUCLEAR ISOTOPE DOSE: 21.9 MCI
BH CV STRESS PROTOCOL 1: NORMAL
BH CV STRESS PROTOCOL 2 BP STAGE 1: NORMAL
BH CV STRESS PROTOCOL 2 BP STAGE 2: NORMAL
BH CV STRESS PROTOCOL 2 BP STAGE 3: 139
BH CV STRESS PROTOCOL 2 BP STAGE 4: 145
BH CV STRESS PROTOCOL 2 COMMENTS STAGE 1: NORMAL
BH CV STRESS PROTOCOL 2 DOSE REGADENOSON STAGE 1: 0.4
BH CV STRESS PROTOCOL 2 DURATION MIN STAGE 1: 1
BH CV STRESS PROTOCOL 2 DURATION MIN STAGE 2: 1
BH CV STRESS PROTOCOL 2 DURATION MIN STAGE 3: 1
BH CV STRESS PROTOCOL 2 DURATION MIN STAGE 4: 1
BH CV STRESS PROTOCOL 2 DURATION SEC STAGE 2: 0
BH CV STRESS PROTOCOL 2 HR STAGE 1: 145
BH CV STRESS PROTOCOL 2 HR STAGE 2: 141
BH CV STRESS PROTOCOL 2 HR STAGE 3: NORMAL
BH CV STRESS PROTOCOL 2 HR STAGE 4: NORMAL
BH CV STRESS PROTOCOL 2 STAGE 1: 1
BH CV STRESS PROTOCOL 2 STAGE 2: 2
BH CV STRESS PROTOCOL 2 STAGE 3: 3
BH CV STRESS PROTOCOL 2 STAGE 4: 4
BH CV STRESS PROTOCOL 2: NORMAL
BH CV STRESS RECOVERY BP: NORMAL MMHG
BH CV STRESS RECOVERY HR: 130 BPM
BH CV STRESS SPEED STAGE 1: 1.7
BH CV STRESS STAGE 1: 1
BUN SERPL-MCNC: 15 MG/DL (ref 6–20)
BUN/CREAT SERPL: 19 (ref 7–25)
CALCIUM SPEC-SCNC: 9.6 MG/DL (ref 8.6–10.5)
CHLORIDE SERPL-SCNC: 102 MMOL/L (ref 98–107)
CO2 SERPL-SCNC: 19 MMOL/L (ref 22–29)
CREAT SERPL-MCNC: 0.79 MG/DL (ref 0.57–1)
DEPRECATED RDW RBC AUTO: 41.1 FL (ref 37–54)
EOSINOPHIL # BLD AUTO: 0 10*3/MM3 (ref 0–0.4)
EOSINOPHIL NFR BLD AUTO: 0.1 % (ref 0.3–6.2)
ERYTHROCYTE [DISTWIDTH] IN BLOOD BY AUTOMATED COUNT: 13.3 % (ref 12.3–15.4)
GFR SERPL CREATININE-BSD FRML MDRD: 83 ML/MIN/1.73
GLUCOSE SERPL-MCNC: 181 MG/DL (ref 65–99)
HCT VFR BLD AUTO: 41.1 % (ref 34–46.6)
HGB BLD-MCNC: 14.3 G/DL (ref 12–15.9)
LV EF NUC BP: 70 %
LYMPHOCYTES # BLD AUTO: 1 10*3/MM3 (ref 0.7–3.1)
LYMPHOCYTES NFR BLD AUTO: 7.6 % (ref 19.6–45.3)
MAGNESIUM SERPL-MCNC: 1.8 MG/DL (ref 1.6–2.6)
MAXIMAL PREDICTED HEART RATE: 185 BPM
MCH RBC QN AUTO: 30.8 PG (ref 26.6–33)
MCHC RBC AUTO-ENTMCNC: 34.7 G/DL (ref 31.5–35.7)
MCV RBC AUTO: 88.8 FL (ref 79–97)
MONOCYTES # BLD AUTO: 0.2 10*3/MM3 (ref 0.1–0.9)
MONOCYTES NFR BLD AUTO: 1.5 % (ref 5–12)
NEUTROPHILS NFR BLD AUTO: 12.5 10*3/MM3 (ref 1.7–7)
NEUTROPHILS NFR BLD AUTO: 90.6 % (ref 42.7–76)
NRBC BLD AUTO-RTO: 0 /100 WBC (ref 0–0.2)
PERCENT MAX PREDICTED HR: 90.27 %
PLATELET # BLD AUTO: 345 10*3/MM3 (ref 140–450)
PMV BLD AUTO: 8 FL (ref 6–12)
POTASSIUM SERPL-SCNC: 3.8 MMOL/L (ref 3.5–5.2)
RBC # BLD AUTO: 4.63 10*6/MM3 (ref 3.77–5.28)
SODIUM SERPL-SCNC: 137 MMOL/L (ref 136–145)
STRESS BASELINE BP: NORMAL MMHG
STRESS BASELINE HR: 150 BPM
STRESS PERCENT HR: 106 %
STRESS POST PEAK BP: NORMAL MMHG
STRESS POST PEAK HR: 167 BPM
STRESS TARGET HR: 157 BPM
TROPONIN T SERPL-MCNC: <0.01 NG/ML (ref 0–0.03)
TSH SERPL DL<=0.05 MIU/L-ACNC: 0.87 UIU/ML (ref 0.27–4.2)
WBC # BLD AUTO: 13.8 10*3/MM3 (ref 3.4–10.8)

## 2021-09-24 PROCEDURE — 93017 CV STRESS TEST TRACING ONLY: CPT

## 2021-09-24 PROCEDURE — 63710000001 PREDNISONE PER 1 MG: Performed by: PHYSICIAN ASSISTANT

## 2021-09-24 PROCEDURE — 99204 OFFICE O/P NEW MOD 45 MIN: CPT | Performed by: NURSE PRACTITIONER

## 2021-09-24 PROCEDURE — 85025 COMPLETE CBC W/AUTO DIFF WBC: CPT | Performed by: EMERGENCY MEDICINE

## 2021-09-24 PROCEDURE — G0378 HOSPITAL OBSERVATION PER HR: HCPCS

## 2021-09-24 PROCEDURE — 0 TECHNETIUM TETROFOSMIN KIT: Performed by: EMERGENCY MEDICINE

## 2021-09-24 PROCEDURE — 25010000002 REGADENOSON 0.4 MG/5ML SOLUTION: Performed by: EMERGENCY MEDICINE

## 2021-09-24 PROCEDURE — 93018 CV STRESS TEST I&R ONLY: CPT | Performed by: INTERNAL MEDICINE

## 2021-09-24 PROCEDURE — A9502 TC99M TETROFOSMIN: HCPCS | Performed by: EMERGENCY MEDICINE

## 2021-09-24 PROCEDURE — 78452 HT MUSCLE IMAGE SPECT MULT: CPT

## 2021-09-24 PROCEDURE — 80048 BASIC METABOLIC PNL TOTAL CA: CPT | Performed by: EMERGENCY MEDICINE

## 2021-09-24 PROCEDURE — 78452 HT MUSCLE IMAGE SPECT MULT: CPT | Performed by: INTERNAL MEDICINE

## 2021-09-24 PROCEDURE — 83735 ASSAY OF MAGNESIUM: CPT | Performed by: EMERGENCY MEDICINE

## 2021-09-24 PROCEDURE — 84484 ASSAY OF TROPONIN QUANT: CPT | Performed by: EMERGENCY MEDICINE

## 2021-09-24 PROCEDURE — 84443 ASSAY THYROID STIM HORMONE: CPT | Performed by: EMERGENCY MEDICINE

## 2021-09-24 PROCEDURE — 93016 CV STRESS TEST SUPVJ ONLY: CPT | Performed by: NURSE PRACTITIONER

## 2021-09-24 RX ORDER — PREDNISONE 20 MG/1
40 TABLET ORAL
Qty: 8 TABLET | Refills: 0 | Status: SHIPPED | OUTPATIENT
Start: 2021-09-25 | End: 2021-09-29

## 2021-09-24 RX ORDER — HYDROXYZINE HYDROCHLORIDE 25 MG/1
50 TABLET, FILM COATED ORAL 3 TIMES DAILY PRN
Status: DISCONTINUED | OUTPATIENT
Start: 2021-09-24 | End: 2021-09-24 | Stop reason: HOSPADM

## 2021-09-24 RX ORDER — AMLODIPINE BESYLATE 5 MG/1
5 TABLET ORAL DAILY
Status: DISCONTINUED | OUTPATIENT
Start: 2021-09-24 | End: 2021-09-24

## 2021-09-24 RX ORDER — METOPROLOL TARTRATE 50 MG/1
50 TABLET, FILM COATED ORAL EVERY 12 HOURS SCHEDULED
Status: DISCONTINUED | OUTPATIENT
Start: 2021-09-24 | End: 2021-09-24 | Stop reason: HOSPADM

## 2021-09-24 RX ORDER — ALBUTEROL SULFATE 2.5 MG/3ML
2.5 SOLUTION RESPIRATORY (INHALATION) EVERY 6 HOURS PRN
Status: DISCONTINUED | OUTPATIENT
Start: 2021-09-24 | End: 2021-09-24 | Stop reason: HOSPADM

## 2021-09-24 RX ORDER — HYDROXYZINE 50 MG/1
50 TABLET, FILM COATED ORAL 3 TIMES DAILY PRN
Qty: 30 TABLET | Refills: 0 | Status: SHIPPED | OUTPATIENT
Start: 2021-09-24 | End: 2021-10-04

## 2021-09-24 RX ORDER — LANOLIN ALCOHOL/MO/W.PET/CERES
1000 CREAM (GRAM) TOPICAL DAILY
Status: DISCONTINUED | OUTPATIENT
Start: 2021-09-24 | End: 2021-09-24 | Stop reason: HOSPADM

## 2021-09-24 RX ORDER — ALBUTEROL SULFATE 90 UG/1
2 AEROSOL, METERED RESPIRATORY (INHALATION) EVERY 4 HOURS PRN
Qty: 8.5 G | Refills: 0 | Status: SHIPPED | OUTPATIENT
Start: 2021-09-24

## 2021-09-24 RX ORDER — METOPROLOL TARTRATE 50 MG/1
50 TABLET, FILM COATED ORAL EVERY 12 HOURS SCHEDULED
Qty: 60 TABLET | Refills: 0 | Status: SHIPPED | OUTPATIENT
Start: 2021-09-24 | End: 2021-09-27 | Stop reason: HOSPADM

## 2021-09-24 RX ORDER — PREDNISONE 20 MG/1
40 TABLET ORAL
Status: DISCONTINUED | OUTPATIENT
Start: 2021-09-24 | End: 2021-09-24 | Stop reason: HOSPADM

## 2021-09-24 RX ADMIN — ACETAMINOPHEN 650 MG: 325 TABLET, FILM COATED ORAL at 11:52

## 2021-09-24 RX ADMIN — REGADENOSON 0.4 MG: 0.08 INJECTION, SOLUTION INTRAVENOUS at 09:15

## 2021-09-24 RX ADMIN — SERTRALINE 50 MG: 50 TABLET, FILM COATED ORAL at 10:13

## 2021-09-24 RX ADMIN — CYANOCOBALAMIN TAB 1000 MCG 1000 MCG: 1000 TAB at 10:13

## 2021-09-24 RX ADMIN — METOPROLOL TARTRATE 50 MG: 50 TABLET, FILM COATED ORAL at 10:13

## 2021-09-24 RX ADMIN — TETROFOSMIN 1 DOSE: 1.38 INJECTION, POWDER, LYOPHILIZED, FOR SOLUTION INTRAVENOUS at 09:15

## 2021-09-24 RX ADMIN — TETROFOSMIN 1 DOSE: 1.38 INJECTION, POWDER, LYOPHILIZED, FOR SOLUTION INTRAVENOUS at 06:45

## 2021-09-24 RX ADMIN — PREDNISONE 40 MG: 20 TABLET ORAL at 16:24

## 2021-09-24 NOTE — CONSULTS
[facility]: [facility]         CONSULT NOTE    Patient Identification:  Ariadna Ambrocio  35 y.o.  female  1985  [id number]            Requesting physician: Attending physician    Reason for Consultation: Dyspnea      History of Present Illness:    Ariadna Ambrocio is a 35-year-old female.  She dates that she has been under a lot of stress lately and takes sertraline for anxiety. Sshe has had chest tightness and shortness of breath since Sunday.  She states that she has had numbness in her alternating arms but in both feet.  With this persistent tightness and shortness of breath she presented for evaluation. There is not an early onset heart disease in the family but there is heart disease present.  The bilateral feet numbness has been present for 4 days, donating in her hands and then across the lip today.  She is not sure if this could be anxiety or not.  She has not had cough or fever.  She has had 1 Covid vaccination.  She is due for her second on Saturday.       Assessment:  Chest pain  Dyspnea    Hypertension    Recommendations:    Patient currently on room air    Patient scheduled for cardiac stress test, cardiology following    Bronchodilator, switch to inhaler at discharge  Prednisone  Aspirin    Patient would benefit from pulmonary function test and sleep apnea work-up as an outpatient.        Review of Sytems:  Review of Systems   Respiratory: Positive for cough and shortness of breath.        Past Medical History:  Past Medical History:   Diagnosis Date   • Anxiety    • Depression    • Hypertension    • Kidney stones        Past Surgical History:  Past Surgical History:   Procedure Laterality Date   • COLONOSCOPY     • CYSTOSCOPY, URETEROSCOPY, RETROGRADE PYELOGRAM, STENT INSERTION Left 7/16/2021    Procedure: CYSTOSCOPY URETEROSCOPY RETROGRADE PYELOGRAM HOLMIUM LASER BASKET STONE EXTRACTION STENT INSERTION;  Surgeon: Carmelo Nguyễn MD;  Location: [facility] MAIN OR;  Service: Urology;  Laterality:  Left;   • EAR TUBES     • KIDNEY STONE SURGERY          Home Meds:  Medications Prior to Admission   Medication Sig Dispense Refill Last Dose   • ibuprofen (ADVIL,MOTRIN) 800 MG tablet Take 800 mg by mouth Every 8 (Eight) Hours As Needed for Mild Pain .   Past Week at Unknown time   • phentermine 37.5 MG capsule Take 37.5 mg by mouth Every Morning.   9/23/2021 at 0900   • amLODIPine (NORVASC) 5 MG tablet Take 5 mg by mouth Daily.  5 9/23/2021 at 0900   • docusate sodium (Colace) 100 MG capsule Take 1 capsule by mouth 2 (Two) Times a Day As Needed (post op and while taking narcotics). 30 capsule 1 9/22/2021   • hydroCHLOROthiazide (HYDRODIURIL) 12.5 MG tablet Take 12.5 mg by mouth Daily.   9/23/2021 at 0900   • oxybutynin (DITROPAN) 5 MG tablet Take 1 tablet by mouth 3 (Three) Times a Day As Needed (bladder spasms or urinary frequency). 30 tablet 0    • oxyCODONE-acetaminophen (PERCOCET) 5-325 MG per tablet Take 1-2 tablets by mouth Every 6 (Six) Hours As Needed for Severe Pain . 20 tablet 0    • phenazopyridine (Pyridium) 100 MG tablet Take 1 tablet by mouth 3 (Three) Times a Day As Needed (dysuria). 15 tablet 0    • sertraline (ZOLOFT) 50 MG tablet Take 50 mg by mouth Daily.   9/23/2021 at 0900   • tamsulosin (FLOMAX) 0.4 MG capsule 24 hr capsule Take 1 capsule by mouth Daily. 30 capsule 0    • vitamin B-12 (CYANOCOBALAMIN) 1000 MCG tablet Take 1,000 mcg by mouth Daily.   9/23/2021 at 0900       Allergies:  No Known Allergies    Social History:   Social History     Socioeconomic History   • Marital status:      Spouse name: Not on file   • Number of children: Not on file   • Years of education: Not on file   • Highest education level: Not on file   Tobacco Use   • Smoking status: Never Smoker   • Smokeless tobacco: Never Used   Vaping Use   • Vaping Use: Never used   • Passive vaping exposure Yes   Substance and Sexual Activity   • Alcohol use: No   • Drug use: Never   • Sexual activity: Yes     Partners:  "Male     Birth control/protection: I.U.D.       Family History:  Family History   Problem Relation Age of Onset   • Breast cancer Neg Hx    • Ovarian cancer Neg Hx    • Uterine cancer Neg Hx    • Colon cancer Neg Hx    • Deep vein thrombosis Neg Hx    • Pulmonary embolism Neg Hx        Physical Exam:  /74 (BP Location: Right arm, Patient Position: Lying)   Pulse 115   Temp 97.8 °F (36.6 °C) (Oral)   Resp 18   Ht 160 cm (63\")   Wt 96.9 kg (213 lb 10 oz)   SpO2 98%   BMI 37.84 kg/m²  Body mass index is 37.84 kg/m². 98% 96.9 kg (213 lb 10 oz)  Physical Exam  Vitals reviewed.   Pulmonary:      Breath sounds: Rhonchi present.   Skin:     General: Skin is warm and dry.   Neurological:      Mental Status: She is alert and oriented to person, place, and time.         LABS:  Lab Results   Component Value Date    CALCIUM 9.9 09/23/2021     Results from last 7 days   Lab Units 09/23/21  1428   MAGNESIUM mg/dL 1.7   SODIUM mmol/L 137   POTASSIUM mmol/L 3.4*   CHLORIDE mmol/L 100   CO2 mmol/L 19.0*   BUN mg/dL 10   CREATININE mg/dL 1.02*   GLUCOSE mg/dL 102*   CALCIUM mg/dL 9.9   WBC 10*3/mm3 10.40   HEMOGLOBIN g/dL 15.5   PLATELETS 10*3/mm3 373   ALT (SGPT) U/L 27   AST (SGOT) U/L 26   PROBNP pg/mL 8.5     Lab Results   Component Value Date    TROPONINT <0.010 09/23/2021     Results from last 7 days   Lab Units 09/23/21  2213 09/23/21  1428   TROPONIN T ng/mL <0.010 <0.010                 Results from last 7 days   Lab Units 09/23/21  1429   ADENOVIRUS DETECTION BY PCR  Not Detected   CORONAVIRUS 229E  Not Detected   CORONAVIRUS HKU1  Not Detected   CORONAVIRUS NL63  Not Detected   CORONAVIRUS OC43  Not Detected   HUMAN METAPNEUMOVIRUS  Not Detected   HUMAN RHINOVIRUS/ENTEROVIRUS  Not Detected   INFLUENZA B PCR  Not Detected   PARAINFLUENZA 1  Not Detected   PARAINFLUENZA VIRUS 2  Not Detected   PARAINFLUENZA VIRUS 3  Not Detected   PARAINFLUENZA VIRUS 4  Not Detected   BORDETELLA PERTUSSIS PCR  Not Detected "   CHLAMYDOPHILA PNEUMONIAE PCR  Not Detected   MYCOPLAMA PNEUMO PCR  Not Detected   INFLUENZA A PCR  Not Detected   RSV, PCR  Not Detected     Results from last 7 days   Lab Units 09/23/21  1428   INR  1.02         Lab Results   Component Value Date    TSH 1.500 04/22/2021     Estimated Creatinine Clearance: 85.3 mL/min (A) (by C-G formula based on SCr of 1.02 mg/dL (H)).  Results from last 7 days   Lab Units 09/23/21  1429   NITRITE UA  Negative   WBC UA /HPF 6-12*   BACTERIA UA /HPF None Seen   SQUAM EPITHEL UA /HPF 3-6*        Imaging:  Imaging Results (Last 24 Hours)     Procedure Component Value Units Date/Time    MRI Brain Without Contrast [[id number]] Collected: 09/23/21 1848     Updated: 09/23/21 1853    Narrative:         DATE OF EXAM:   9/23/2021 6:20 PM     PROCEDURE:   MRI BRAIN WO CONTRAST-     INDICATIONS:   numbness; R07.9-Chest hypertension, headache, numbness, numbness in body  pain, unspecified; R06.00-Dyspnea, unspecified; R20.2-Paresthesia of  skin     COMPARISON:  CT head 09/23/2021     TECHNIQUE:   Routine magnetic resonance imaging of the brain was performed without  administration of contrast.     FINDINGS:   Signal from the brain appears normal. There is no diffusion restriction.  The ventricles and cisterns are unremarkable. There is no evidence of  extra-axial pathology or mass effect. There is no gross orbital  abnormality. Major vessels show flow void. Pituitary appears normal in  size. There is no susceptibility artifact. There is no tonsillar  herniation. There is no evidence of intracranial hemorrhage. No sinus  fluid is seen.        Impression:         1. Normal MRI of the brain.     Electronically Signed By-Jasmine Avendano MD On:9/23/2021 6:51 PM  This report was finalized on [id number] by  Jasmine Avendano MD.    CT Head Without Contrast [[id number]] Collected: 09/23/21 1625     Updated: 09/23/21 1628    Narrative:      CT HEAD WO CONTRAST-     Date of Exam: 9/23/2021 4:21 PM      Indication: numbness.     Comparison: None available.     Technique:  Without contrast, contiguous axial CT images of the head  were obtained from skull base to vertex.  Coronal and sagittal  reconstructions were performed.  Automated exposure control and  iterative reconstruction methods were used.     FINDINGS  No evidence of intracranial hemorrhage, mass or midline shift. Sulci and  ventricles are symmetric. Brain volume is appropriate for patient's age.  The gray-white matter differentiation is intact. The mastoid air cells  and paranasal sinuses are well aerated. Globes and extraocular muscles  are normal. No displaced or depressed skull fractures. No suspicious  lytic or sclerotic osseous lesions.       Impression:      No acute intracranial abnormality.  Brain MRI is more sensitive to evaluate for acute or subacute infarcts  and to evaluate for intracranial metastatic disease.     Electronically Signed By-Melinda Lovett MD On:9/23/2021 4:26 PM  This report was finalized on [id number] by  Melinda Lovett MD.    XR Chest 1 View [[id number]] Collected: 09/23/21 1446     Updated: 09/23/21 1448    Narrative:      DATE OF EXAM:  9/23/2021 2:37 PM     PROCEDURE:  XR CHEST 1 VW-     INDICATIONS:  soa  hypertension.     COMPARISON:  No Comparisons Available     TECHNIQUE:   Single radiographic view of the chest was obtained.     FINDINGS:  Lungs are normally expanded. Heart size is normal. No pneumothorax or  pleural effusion or focal pulmonary parenchymal opacity. Pulmonary  vessels are distinct. Bones and soft tissues are normal.       Impression:      No acute cardiopulmonary abnormality.     Electronically Signed By-Melinda Lovett MD On:9/23/2021 2:46 PM  This report was finalized on [id number] by  Melinda Lovett MD.            Current Meds:   SCHEDULE  sodium chloride, 10 mL, Intravenous, Q12H      Infusions     PRNs  •  acetaminophen  •  melatonin  •  nitroglycerin  •  ondansetron  •  [COMPLETED] Insert peripheral  IV **AND** sodium chloride  •  sodium chloride        SAM Mckee  9/24/2021  06:04 EDT    Reviewed the patient's new clinical results    Electronically signed by SAM Mckee

## 2021-09-24 NOTE — PLAN OF CARE
Goal Outcome Evaluation:  Plan of Care Reviewed With: patient        Progress: improving  Outcome Summary: Pt had myoview performed today. VSS. Pt is resting in bed with call light in reach. Will continue to monitor.

## 2021-09-24 NOTE — DISCHARGE SUMMARY
[facility]    Marifer Cooper MD    CHIEF COMPLAINT:     Chest pain, tachycardia and paresthesias    HISTORY OF PRESENT ILLNESS:    Obtained from admitting provider HPI on 9/23/2021:  Patient is a pleasant 35-year-old female.  She has been under a lot of stress lately.  She takes sertraline for anxiety.  She states that she has had chest tightness and shortness of breath since Sunday.  She states that she has had numbness in her alternating arms but in both feet.  Today across the top of her lip and that her left eye started twitching.  But with this persistent tightness and shortness of breath she presented for evaluation.  She does have an IUD in good she has never had a blood clot before.  There is not an early onset heart disease in the family but there is heart disease present.  She has not had a stress test in the past.  The numbness has been there for 4 days in her feet.  Alternates in her hands and then across the lip today.  She is not sure if this could be anxiety or not.  She has not had cough or fever.  She has had 1 Covid vaccination.  She is due for her second on Saturday.    9/24/2021: Patient emergency HPI including dyspnea, chest tightness, peripheral paresthesias and increased anxiety which have been present since 9/19/2021.  Pain in the chest as described as a heaviness and patient notes it was intermittent until 9/23/2021 when her symptoms became constant.  Some sensation of tachycardia and palpitations have been present as well as nausea without vomiting.  She notes that since her presentation she feels her symptoms have improved following administration of breathing treatments.      Past Medical History:   Diagnosis Date   • Anxiety    • Depression    • Hypertension    • Kidney stones      Past Surgical History:   Procedure Laterality Date   • COLONOSCOPY     • CYSTOSCOPY, URETEROSCOPY, RETROGRADE PYELOGRAM, STENT INSERTION Left 7/16/2021    Procedure: CYSTOSCOPY  URETEROSCOPY RETROGRADE PYELOGRAM HOLMIUM LASER BASKET STONE EXTRACTION STENT INSERTION;  Surgeon: Carmelo Nguyễn MD;  Location: [facility] MAIN OR;  Service: Urology;  Laterality: Left;   • EAR TUBES     • KIDNEY STONE SURGERY       Family History   Problem Relation Age of Onset   • Breast cancer Neg Hx    • Ovarian cancer Neg Hx    • Uterine cancer Neg Hx    • Colon cancer Neg Hx    • Deep vein thrombosis Neg Hx    • Pulmonary embolism Neg Hx      Social History     Tobacco Use   • Smoking status: Never Smoker   • Smokeless tobacco: Never Used   Vaping Use   • Vaping Use: Never used   • Passive vaping exposure Yes   Substance Use Topics   • Alcohol use: No   • Drug use: Never     Medications Prior to Admission   Medication Sig Dispense Refill Last Dose   • ibuprofen (ADVIL,MOTRIN) 800 MG tablet Take 800 mg by mouth Every 8 (Eight) Hours As Needed for Mild Pain .   Past Week at Unknown time   • phentermine 37.5 MG capsule Take 37.5 mg by mouth Every Morning.   9/23/2021 at 0900   • amLODIPine (NORVASC) 5 MG tablet Take 5 mg by mouth Daily.  5 9/23/2021 at 0900   • docusate sodium (Colace) 100 MG capsule Take 1 capsule by mouth 2 (Two) Times a Day As Needed (post op and while taking narcotics). 30 capsule 1 9/22/2021   • hydroCHLOROthiazide (HYDRODIURIL) 12.5 MG tablet Take 12.5 mg by mouth Daily.   9/23/2021 at 0900   • oxybutynin (DITROPAN) 5 MG tablet Take 1 tablet by mouth 3 (Three) Times a Day As Needed (bladder spasms or urinary frequency). 30 tablet 0    • oxyCODONE-acetaminophen (PERCOCET) 5-325 MG per tablet Take 1-2 tablets by mouth Every 6 (Six) Hours As Needed for Severe Pain . 20 tablet 0    • phenazopyridine (Pyridium) 100 MG tablet Take 1 tablet by mouth 3 (Three) Times a Day As Needed (dysuria). 15 tablet 0    • sertraline (ZOLOFT) 50 MG tablet Take 50 mg by mouth Daily.   9/23/2021 at 0900   • tamsulosin (FLOMAX) 0.4 MG capsule 24 hr capsule Take 1 capsule by mouth Daily. 30 capsule 0    • vitamin B-12  (CYANOCOBALAMIN) 1000 MCG tablet Take 1,000 mcg by mouth Daily.   9/23/2021 at 0900     Allergies:  Patient has no known allergies.      There is no immunization history on file for this patient.        REVIEW OF SYSTEMS:    Review of Systems   Constitutional: Negative.   HENT: Negative.    Eyes: Negative.    Cardiovascular: Positive for chest pain and palpitations. Negative for leg swelling, near-syncope and syncope.   Respiratory: Positive for shortness of breath. Negative for cough and sputum production.    Endocrine: Negative.    Skin: Negative.    Musculoskeletal: Negative.    Gastrointestinal: Positive for nausea. Negative for abdominal pain, constipation, diarrhea, hematemesis, hematochezia, melena and vomiting.   Genitourinary: Negative.    Neurological: Positive for paresthesias.   Psychiatric/Behavioral: Negative.        Vital Signs  Temp:  [97.6 °F (36.4 °C)-98 °F (36.7 °C)] 97.8 °F (36.6 °C)  Heart Rate:  [] 115  Resp:  [16-20] 18  BP: (107-138)/(71-90) 107/74          Physical Exam:  Physical Exam  Vitals reviewed.   Constitutional:       General: She is not in acute distress.     Appearance: Normal appearance. She is obese. She is not ill-appearing, toxic-appearing or diaphoretic.   HENT:      Head: Normocephalic and atraumatic.      Right Ear: External ear normal.      Left Ear: External ear normal.      Nose: Nose normal.      Mouth/Throat:      Mouth: Mucous membranes are moist.   Eyes:      Extraocular Movements: Extraocular movements intact.   Cardiovascular:      Rate and Rhythm: Regular rhythm. Tachycardia present.      Pulses: Normal pulses.      Heart sounds: Normal heart sounds.   Pulmonary:      Effort: Pulmonary effort is normal.      Breath sounds: Normal breath sounds.   Abdominal:      General: Bowel sounds are normal. There is no distension.      Tenderness: There is no abdominal tenderness.   Musculoskeletal:         General: Normal range of motion.      Cervical back: Normal  range of motion.   Skin:     General: Skin is warm and dry.      Capillary Refill: Capillary refill takes less than 2 seconds.   Neurological:      General: No focal deficit present.      Mental Status: She is alert and oriented to person, place, and time.   Psychiatric:         Mood and Affect: Mood normal.         Behavior: Behavior normal.         Thought Content: Thought content normal.         Judgment: Judgment normal.         Emotional Behavior:   Anxious   Debilities:  None  Results Review:    I reviewed the patient's new clinical results.  Lab Results (most recent)     Procedure Component Value Units Date/Time    Basic Metabolic Panel [[id number]]  (Abnormal) Collected: 09/24/21 0634    Specimen: Blood Updated: 09/24/21 0738     Glucose 181 mg/dL      BUN 15 mg/dL      Creatinine 0.79 mg/dL      Sodium 137 mmol/L      Potassium 3.8 mmol/L      Chloride 102 mmol/L      CO2 19.0 mmol/L      Calcium 9.6 mg/dL      eGFR Non African Amer 83 mL/min/1.73      BUN/Creatinine Ratio 19.0     Anion Gap 16.0 mmol/L     Narrative:      GFR Normal >60  Chronic Kidney Disease <60  Kidney Failure <15      Magnesium [[id number]]  (Normal) Collected: 09/24/21 0634    Specimen: Blood Updated: 09/24/21 0738     Magnesium 1.8 mg/dL     TSH [[id number]]  (Normal) Collected: 09/24/21 0634    Specimen: Blood Updated: 09/24/21 0735     TSH 0.868 uIU/mL     Troponin [[id number]]  (Normal) Collected: 09/24/21 0634    Specimen: Blood Updated: 09/24/21 0735     Troponin T <0.010 ng/mL     Narrative:      Troponin T Reference Range:  <= 0.03 ng/mL-   Negative for AMI  >0.03 ng/mL-     Abnormal for myocardial necrosis.  Clinicians would have to utilize clinical acumen, EKG, Troponin and serial changes to determine if it is an Acute Myocardial Infarction or myocardial injury due to an underlying chronic condition.       Results may be falsely decreased if patient taking Biotin.      CBC Auto Differential [[id number]]  (Abnormal) Collected:  09/24/21 0634    Specimen: Blood Updated: 09/24/21 0712     WBC 13.80 10*3/mm3      RBC 4.63 10*6/mm3      Hemoglobin 14.3 g/dL      Hematocrit 41.1 %      MCV 88.8 fL      MCH 30.8 pg      MCHC 34.7 g/dL      RDW 13.3 %      RDW-SD 41.1 fl      MPV 8.0 fL      Platelets 345 10*3/mm3      Neutrophil % 90.6 %      Lymphocyte % 7.6 %      Monocyte % 1.5 %      Eosinophil % 0.1 %      Basophil % 0.2 %      Neutrophils, Absolute 12.50 10*3/mm3      Lymphocytes, Absolute 1.00 10*3/mm3      Monocytes, Absolute 0.20 10*3/mm3      Eosinophils, Absolute 0.00 10*3/mm3      Basophils, Absolute 0.00 10*3/mm3      nRBC 0.0 /100 WBC     Troponin [[id number]]  (Normal) Collected: 09/23/21 2213    Specimen: Blood Updated: 09/23/21 2316     Troponin T <0.010 ng/mL     Narrative:      Troponin T Reference Range:  <= 0.03 ng/mL-   Negative for AMI  >0.03 ng/mL-     Abnormal for myocardial necrosis.  Clinicians would have to utilize clinical acumen, EKG, Troponin and serial changes to determine if it is an Acute Myocardial Infarction or myocardial injury due to an underlying chronic condition.       Results may be falsely decreased if patient taking Biotin.      Extra Tubes [[id number]] Collected: 09/23/21 1428    Specimen: Blood, Venous Line Updated: 09/23/21 1530    Narrative:      The following orders were created for panel order Extra Tubes.  Procedure                               Abnormality         Status                     ---------                               -----------         ------                     Gold Top - SST[[id number]]                                   Final result                 Please view results for these tests on the individual orders.    Gold Top - SST [[id number]] Collected: 09/23/21 1428    Specimen: Blood Updated: 09/23/21 1530     Extra Tube Hold for add-ons.     Comment: Auto resulted.       Respiratory Panel PCR w/COVID-19(SARS-CoV-2) [facility]O/[facility]COE/WILLIAM/[facility]/MAD/MISA In-House, NP Swab in UTM/VTM, 3-4 HR TAT  - Swab, Nasopharynx [[id number]]  (Normal) Collected: 09/23/21 1429    Specimen: Swab from Nasopharynx Updated: 09/23/21 1525     ADENOVIRUS, PCR Not Detected     Coronavirus 229E Not Detected     Coronavirus HKU1 Not Detected     Coronavirus NL63 Not Detected     Coronavirus OC43 Not Detected     COVID19 Not Detected     Human Metapneumovirus Not Detected     Human Rhinovirus/Enterovirus Not Detected     Influenza A PCR Not Detected     Influenza B PCR Not Detected     Parainfluenza Virus 1 Not Detected     Parainfluenza Virus 2 Not Detected     Parainfluenza Virus 3 Not Detected     Parainfluenza Virus 4 Not Detected     RSV, PCR Not Detected     Bordetella pertussis pcr Not Detected     Bordetella parapertussis PCR Not Detected     Chlamydophila pneumoniae PCR Not Detected     Mycoplasma pneumo by PCR Not Detected    Narrative:      In the setting of a positive respiratory panel with a viral infection PLUS a negative procalcitonin without other underlying concern for bacterial infection, consider observing off antibiotics or discontinuation of antibiotics and continue supportive care. If the respiratory panel is positive for atypical bacterial infection (Bordetella pertussis, Chlamydophila pneumoniae, or Mycoplasma pneumoniae), consider antibiotic de-escalation to target atypical bacterial infection.    Comprehensive Metabolic Panel [[id number]]  (Abnormal) Collected: 09/23/21 1428    Specimen: Blood Updated: 09/23/21 1524     Glucose 102 mg/dL      BUN 10 mg/dL      Creatinine 1.02 mg/dL      Sodium 137 mmol/L      Potassium 3.4 mmol/L      Chloride 100 mmol/L      CO2 19.0 mmol/L      Calcium 9.9 mg/dL      Total Protein 8.1 g/dL      Albumin 4.80 g/dL      ALT (SGPT) 27 U/L      AST (SGOT) 26 U/L      Alkaline Phosphatase 93 U/L      Total Bilirubin 0.7 mg/dL      eGFR Non African Amer 62 mL/min/1.73      Globulin 3.3 gm/dL      A/G Ratio 1.5 g/dL      BUN/Creatinine Ratio 9.8     Anion Gap 18.0 mmol/L      Narrative:      GFR Normal >60  Chronic Kidney Disease <60  Kidney Failure <15      Urinalysis, Microscopic Only - Urine, Clean Catch [[id number]]  (Abnormal) Collected: 09/23/21 1429    Specimen: Urine, Clean Catch Updated: 09/23/21 1507     RBC, UA 0-2 /HPF      WBC, UA 6-12 /HPF      Bacteria, UA None Seen /HPF      Squamous Epithelial Cells, UA 3-6 /HPF      Hyaline Casts, UA 3-6 /LPF      Methodology Automated Microscopy    Urinalysis With Microscopic If Indicated (No Culture) - Urine, Clean Catch [[id number]]  (Abnormal) Collected: 09/23/21 1429    Specimen: Urine, Clean Catch Updated: 09/23/21 1507     Color, UA Yellow     Appearance, UA Clear     pH, UA 7.0     Specific Gravity, UA <=1.005     Glucose, UA Negative     Ketones, UA Negative     Bilirubin, UA Negative     Blood, UA Small (1+)     Protein, UA Negative     Leuk Esterase, UA Trace     Nitrite, UA Negative     Urobilinogen, UA 0.2 E.U./dL    Magnesium [[id number]]  (Normal) Collected: 09/23/21 1428    Specimen: Blood Updated: 09/23/21 1506     Magnesium 1.7 mg/dL     BNP [[id number]]  (Normal) Collected: 09/23/21 1428    Specimen: Blood Updated: 09/23/21 1505     proBNP 8.5 pg/mL     Narrative:      Among patients with dyspnea, NT-proBNP is highly sensitive for the detection of acute congestive heart failure. In addition NT-proBNP of <300 pg/ml effectively rules out acute congestive heart failure with 99% negative predictive value.    Results may be falsely decreased if patient taking Biotin.      Protime-INR [[id number]]  (Normal) Collected: 09/23/21 1428    Specimen: Blood Updated: 09/23/21 1447     Protime 11.3 Seconds      INR 1.02    aPTT [[id number]]  (Normal) Collected: 09/23/21 1428    Specimen: Blood Updated: 09/23/21 1447     PTT 25.9 seconds     D-dimer, Quantitative [[id number]]  (Normal) Collected: 09/23/21 1428    Specimen: Blood Updated: 09/23/21 1447     D-Dimer, Quantitative 0.23 mg/L (FEU)     Narrative:      Reference  Range  --------------------------------------------------------------------     < 0.50   Negative Predictive Value  0.50-0.59   Indeterminate    >= 0.60   Probable VTE             A very low percentage of patients with DVT may yield D-Dimer results   below the cut-off of 0.50 mg/L FEU.  This is known to be more   prevalent in patients with distal DVT.             Results of this test should always be interpreted in conjunction with   the patient's medical history, clinical presentation and other   findings.  Clinical diagnosis should not be based on the result of   INNOVANCE D-Dimer alone.    Pregnancy, Urine - Urine, Clean Catch [[id number]]  (Normal) Collected: 09/23/21 1429    Specimen: Urine, Clean Catch Updated: 09/23/21 1441     HCG, Urine QL Negative    CBC & Differential [[id number]]  (Abnormal) Collected: 09/23/21 1428    Specimen: Blood Updated: 09/23/21 1436    Narrative:      The following orders were created for panel order CBC & Differential.  Procedure                               Abnormality         Status                     ---------                               -----------         ------                     CBC Auto Differential[[id number]]        Abnormal            Final result                 Please view results for these tests on the individual orders.    CBC Auto Differential [[id number]]  (Abnormal) Collected: 09/23/21 1428    Specimen: Blood Updated: 09/23/21 1436     WBC 10.40 10*3/mm3      RBC 5.08 10*6/mm3      Hemoglobin 15.5 g/dL      Hematocrit 43.9 %      MCV 86.4 fL      MCH 30.5 pg      MCHC 35.3 g/dL      RDW 13.1 %      RDW-SD 39.8 fl      MPV 7.6 fL      Platelets 373 10*3/mm3      Neutrophil % 58.5 %      Lymphocyte % 30.6 %      Monocyte % 8.8 %      Eosinophil % 0.9 %      Basophil % 1.2 %      Neutrophils, Absolute 6.10 10*3/mm3      Lymphocytes, Absolute 3.20 10*3/mm3      Monocytes, Absolute 0.90 10*3/mm3      Eosinophils, Absolute 0.10 10*3/mm3      Basophils, Absolute 0.10  10*3/mm3      nRBC 0.0 /100 WBC           Imaging Results (Most Recent)     Procedure Component Value Units Date/Time    MRI Brain Without Contrast [[id number]] Collected: 09/23/21 1848     Updated: 09/23/21 1853    Narrative:         DATE OF EXAM:   9/23/2021 6:20 PM     PROCEDURE:   MRI BRAIN WO CONTRAST-     INDICATIONS:   numbness; R07.9-Chest hypertension, headache, numbness, numbness in body  pain, unspecified; R06.00-Dyspnea, unspecified; R20.2-Paresthesia of  skin     COMPARISON:  CT head 09/23/2021     TECHNIQUE:   Routine magnetic resonance imaging of the brain was performed without  administration of contrast.     FINDINGS:   Signal from the brain appears normal. There is no diffusion restriction.  The ventricles and cisterns are unremarkable. There is no evidence of  extra-axial pathology or mass effect. There is no gross orbital  abnormality. Major vessels show flow void. Pituitary appears normal in  size. There is no susceptibility artifact. There is no tonsillar  herniation. There is no evidence of intracranial hemorrhage. No sinus  fluid is seen.        Impression:         1. Normal MRI of the brain.     Electronically Signed By-Jasmine Avendano MD On:9/23/2021 6:51 PM  This report was finalized on [id number] by  Jasmine Avendano MD.    CT Head Without Contrast [[id number]] Collected: 09/23/21 1625     Updated: 09/23/21 1628    Narrative:      CT HEAD WO CONTRAST-     Date of Exam: 9/23/2021 4:21 PM     Indication: numbness.     Comparison: None available.     Technique:  Without contrast, contiguous axial CT images of the head  were obtained from skull base to vertex.  Coronal and sagittal  reconstructions were performed.  Automated exposure control and  iterative reconstruction methods were used.     FINDINGS  No evidence of intracranial hemorrhage, mass or midline shift. Sulci and  ventricles are symmetric. Brain volume is appropriate for patient's age.  The gray-white matter differentiation is  intact. The mastoid air cells  and paranasal sinuses are well aerated. Globes and extraocular muscles  are normal. No displaced or depressed skull fractures. No suspicious  lytic or sclerotic osseous lesions.       Impression:      No acute intracranial abnormality.  Brain MRI is more sensitive to evaluate for acute or subacute infarcts  and to evaluate for intracranial metastatic disease.     Electronically Signed By-Melinda Lovett MD On:9/23/2021 4:26 PM  This report was finalized on [id number] by  Melinda Lovett MD.    XR Chest 1 View [[id number]] Collected: 09/23/21 1446     Updated: 09/23/21 1448    Narrative:      DATE OF EXAM:  9/23/2021 2:37 PM     PROCEDURE:  XR CHEST 1 VW-     INDICATIONS:  soa  hypertension.     COMPARISON:  No Comparisons Available     TECHNIQUE:   Single radiographic view of the chest was obtained.     FINDINGS:  Lungs are normally expanded. Heart size is normal. No pneumothorax or  pleural effusion or focal pulmonary parenchymal opacity. Pulmonary  vessels are distinct. Bones and soft tissues are normal.       Impression:      No acute cardiopulmonary abnormality.     Electronically Signed By-Melinda Lovett MD On:9/23/2021 2:46 PM  This report was finalized on [id number] by  Melinda Lovett MD.        reviewed    ECG/EMG Results (most recent)     Procedure Component Value Units Date/Time    ECG 12 Lead [[id number]] Collected: 09/23/21 1417     Updated: 09/23/21 1418     QT Interval 294 ms     Narrative:      HEART RATE= 115  bpm  RR Interval= 520  ms  WI Interval= 122  ms  P Horizontal Axis= 7  deg  P Front Axis= 40  deg  QRSD Interval= 88  ms  QT Interval= 294  ms  QRS Axis= -10  deg  T Wave Axis= 167  deg  - ABNORMAL ECG -  Sinus tachycardia  Probable LVH with secondary repol abnrm  Electronically Signed By:   Date and Time of Study: 2021-09-23 14:17:33        reviewed            Microbiology Results (last 10 days)     Procedure Component Value - Date/Time    Respiratory Panel PCR  w/COVID-19(SARS-CoV-2) [facility]/ROSCOE/WILLIAM/PAD/[facility]AD/[facility]ISA In-House, NP Swab in UTM/VTM, 3-4 HR TAT - Swab, Nasopharynx [[id number]]  (Normal) Collected: 09/23/21 1429    Lab Status: Final result Specimen: Swab from Nasopharynx Updated: 09/23/21 152     ADENOVIRUS, PCR Not Detected     Coronavirus 229E Not Detected     Coronavirus HKU1 Not Detected     Coronavirus NL63 Not Detected     Coronavirus OC43 Not Detected     COVID19 Not Detected     Human Metapneumovirus Not Detected     Human Rhinovirus/Enterovirus Not Detected     Influenza A PCR Not Detected     Influenza B PCR Not Detected     Parainfluenza Virus 1 Not Detected     Parainfluenza Virus 2 Not Detected     Parainfluenza Virus 3 Not Detected     Parainfluenza Virus 4 Not Detected     RSV, PCR Not Detected     Bordetella pertussis pcr Not Detected     Bordetella parapertussis PCR Not Detected     Chlamydophila pneumoniae PCR Not Detected     Mycoplasma pneumo by PCR Not Detected    Narrative:      In the setting of a positive respiratory panel with a viral infection PLUS a negative procalcitonin without other underlying concern for bacterial infection, consider observing off antibiotics or discontinuation of antibiotics and continue supportive care. If the respiratory panel is positive for atypical bacterial infection (Bordetella pertussis, Chlamydophila pneumoniae, or Mycoplasma pneumoniae), consider antibiotic de-escalation to target atypical bacterial infection.          Assessment/Plan     Chest pain    Dyspnea on exertion    Paresthesia       Chest pain with ARMENDARIZ and paresthesias  -Serial troponins less than 0.010  -TSH: 0.868  -Chest x-ray shows no acute cardiopulmonary abnormalities  -CT head and MRI of brain show no acute abnormalities  -Continue telemetry  -Stress test reported as low risk  -Cardiology recommending echocardiography but note this can be performed on an outpatient basis  -Pulmonology recommending short course of prednisone and bronchodilator  therapy at discharge  -Hold phentermine  -Anxiety addressed as below    Leukocytosis  -Mild leukocytosis with WBCs of 13.80 with an increased absolute neutrophil count noted and no obvious signs of infection  -Patient did receive 1 dose of prednisone in the ED  -Monitor while admitted    Hypertension  -Well controlled with most recent blood pressure of 115/71  -Continue amlodipine and hydrochlorothiazide  -Monitor while admitted    Anxiety/depression  -Continue Zoloft  -As needed Atarax    Chronic pain  -Oxycodone    Obesity (BMI: 37.84)  -Encourage diet lifestyle modifications      I discussed the patients findings and my recommendations with patient and nursing staff.     Discharge Diagnosis:      Chest pain    Dyspnea on exertion    Paresthesia      Hospital Course  Patient is a 35 y.o. female presented with chest pain, dyspnea and paresthesias in HPI noted above.  Serial troponins remain less than 0.010.  TSH was assessed and found to be 0.868.  Chest x-ray, CT of head and MRI of brain all obtained without significant findings noted.  She was maintained on telemetry without any significant events reported.  Heart rate (which is ranged as high as 168) has improved somewhat to the most recent finding of 115 bpm.  Patient also reports that she feels some relief of symptoms following administration of breathing treatments.  Stress testing was performed on 9/24/2021 and noted as low risk by interpreting provider.  Cardiology was consulted who recommended further work-up including echocardiogram be performed on an outpatient basis.  Pulmonology was consulted who recommended prednisone as well as initiation of bronchodilator therapy which will be prescribed at discharge.  At this time patient felt to be in good condition for discharge.  Her phentermine will be discontinued.  She is instructed to follow-up closely with her primary care provider as well as cardiology and pulmonology as instructed.  Her testing/results  and plan were discussed with patient along with concerning/alarm symptoms for which to call 911/return to the ED..  All questions were answered and she verbalizes her understanding and agreement.    Past Medical History:     Past Medical History:   Diagnosis Date   • Anxiety    • Depression    • Hypertension    • Kidney stones        Past Surgical History:     Past Surgical History:   Procedure Laterality Date   • COLONOSCOPY     • CYSTOSCOPY, URETEROSCOPY, RETROGRADE PYELOGRAM, STENT INSERTION Left 7/16/2021    Procedure: CYSTOSCOPY URETEROSCOPY RETROGRADE PYELOGRAM HOLMIUM LASER BASKET STONE EXTRACTION STENT INSERTION;  Surgeon: Carmelo Nguyễn MD;  Location: [facility] OR;  Service: Urology;  Laterality: Left;   • EAR TUBES     • KIDNEY STONE SURGERY         Social History:   Social History     Socioeconomic History   • Marital status:      Spouse name: Not on file   • Number of children: Not on file   • Years of education: Not on file   • Highest education level: Not on file   Tobacco Use   • Smoking status: Never Smoker   • Smokeless tobacco: Never Used   Vaping Use   • Vaping Use: Never used   • Passive vaping exposure Yes   Substance and Sexual Activity   • Alcohol use: No   • Drug use: Never   • Sexual activity: Yes     Partners: Male     Birth control/protection: I.U.D.       Procedures Performed         Consults:   Consults     Date and Time Order Name Status Description    9/23/2021  7:34 PM Inpatient Pulmonology Consult      9/23/2021  7:34 PM Inpatient Cardiology Consult      9/23/2021  4:58 PM Hospitalist (on-call MD unless specified) Completed           Condition on Discharge:     Stable    Discharge Disposition      Discharge Medications     Discharge Medications      ASK your doctor about these medications      Instructions Start Date   amLODIPine 5 MG tablet  Commonly known as: NORVASC   5 mg, Oral, Daily      docusate sodium 100 MG capsule  Commonly known as: Colace   100 mg, Oral, 2 Times  Daily PRN      hydroCHLOROthiazide 12.5 MG tablet  Commonly known as: HYDRODIURIL   12.5 mg, Oral, Daily      ibuprofen 800 MG tablet  Commonly known as: ADVIL,MOTRIN   800 mg, Oral, Every 8 Hours PRN      oxybutynin 5 MG tablet  Commonly known as: DITROPAN   5 mg, Oral, 3 Times Daily PRN      oxyCODONE-acetaminophen 5-325 MG per tablet  Commonly known as: PERCOCET   1-2 tablets, Oral, Every 6 Hours PRN      phenazopyridine 100 MG tablet  Commonly known as: Pyridium   100 mg, Oral, 3 Times Daily PRN      phentermine 37.5 MG capsule   37.5 mg, Oral, Every Morning      sertraline 50 MG tablet  Commonly known as: ZOLOFT   50 mg, Oral, Daily      tamsulosin 0.4 MG capsule 24 hr capsule  Commonly known as: FLOMAX   0.4 mg, Oral, Daily      vitamin B-12 1000 MCG tablet  Commonly known as: CYANOCOBALAMIN   1,000 mcg, Oral, Daily             Discharge Diet:     Activity at Discharge:     Follow-up Appointments  Future Appointments   Date Time Provider Department Center   9/24/2021 10:10 AM [facility]         Test Results Pending at Discharge       Risk for Readmission (LACE) Score: 2 (9/24/2021  6:01 AM)          Justin Masterson PA-C  09/24/21  09:18 EDT

## 2021-09-24 NOTE — PLAN OF CARE
Problem: Adult Inpatient Plan of Care  Goal: Plan of Care Review  Outcome: Ongoing, Not Progressing  Flowsheets (Taken 9/24/2021 0502)  Progress: no change  Plan of Care Reviewed With: patient  Outcome Summary: new admit, will have a cardiology and pulmonology consult, VSS except for getting ST as high as the 140s but has now come back down to the 90s-100s range. Pt very anxious at the start of the shift, better after PRN ativan, pt also said she has taken steroids in the past and it has helped-Dr. Gann ordered one time dose of ativan/prednisone/duo-neb. ST/ST BBB/PVCs on the monitor, will monitor  Goal: Patient-Specific Goal (Individualized)  Outcome: Ongoing, Not Progressing  Goal: Absence of Hospital-Acquired Illness or Injury  Outcome: Ongoing, Not Progressing  Intervention: Identify and Manage Fall Risk  Recent Flowsheet Documentation  Taken 9/24/2021 0502 by Lien Higgins, RN  Safety Promotion/Fall Prevention: safety round/check completed  Taken 9/24/2021 0300 by Lien Higgins RN  Safety Promotion/Fall Prevention: safety round/check completed  Taken 9/24/2021 0100 by Lien Higgins RN  Safety Promotion/Fall Prevention: safety round/check completed  Taken 9/23/2021 2300 by Lien Higgins, RN  Safety Promotion/Fall Prevention: safety round/check completed  Taken 9/23/2021 2130 by Lien Higgins RN  Safety Promotion/Fall Prevention: safety round/check completed  Taken 9/23/2021 1920 by Lien Higgins, RN  Safety Promotion/Fall Prevention:   safety round/check completed   assistive device/personal items within reach   clutter free environment maintained   lighting adjusted   nonskid shoes/slippers when out of bed   room organization consistent  Intervention: Prevent Skin Injury  Recent Flowsheet Documentation  Taken 9/23/2021 1920 by Lien Higgins, RN  Body Position:   supine   sitting up in bed   position changed independently  Goal: Optimal Comfort and Wellbeing  Outcome: Ongoing, Not  Progressing  Intervention: Provide Person-Centered Care  Recent Flowsheet Documentation  Taken 9/23/2021 1920 by Lien Higgins RN  Trust Relationship/Rapport:   care explained   choices provided   emotional support provided   empathic listening provided   questions answered   questions encouraged   reassurance provided   thoughts/feelings acknowledged  Goal: Readiness for Transition of Care  Outcome: Ongoing, Not Progressing  Intervention: Mutually Develop Transition Plan  Recent Flowsheet Documentation  Taken 9/23/2021 2007 by Lien Higgins RN  Equipment Currently Used at Home: none  Transportation Anticipated: family or friend will provide  Patient/Family Anticipated Services at Transition: none  Patient/Family Anticipates Transition to: home with family     Problem: Hypertension Comorbidity  Goal: Blood Pressure in Desired Range  Outcome: Ongoing, Not Progressing  Intervention: Maintain Hypertension-Management Strategies  Recent Flowsheet Documentation  Taken 9/23/2021 1920 by Lien Higgins RN  Medication Review/Management: medications reviewed     Problem: Chest Pain  Goal: Resolution of Chest Pain Symptoms  Outcome: Ongoing, Not Progressing     Problem: Breathing Pattern Ineffective  Goal: Effective Breathing Pattern  Outcome: Ongoing, Not Progressing  Intervention: Promote Improved Breathing Pattern  Recent Flowsheet Documentation  Taken 9/23/2021 1920 by Lien Higgins RN  Supportive Measures:   active listening utilized   decision-making supported   positive reinforcement provided   relaxation techniques promoted   self-care encouraged   verbalization of feelings encouraged  Head of Bed (HOB): HOB at 30-45 degrees  Airway/Ventilation Management: airway patency maintained  Breathing Techniques/Airway Clearance: deep/controlled cough encouraged   Goal Outcome Evaluation:  Plan of Care Reviewed With: patient        Progress: no change  Outcome Summary: new admit, will have a cardiology and  pulmonology consult, VSS except for getting ST as high as the 140s but has now come back down to the 90s-100s range. Pt very anxious at the start of the shift, better after PRN ativan, pt also said she has taken steroids in the past and it has helped-Dr. Gann ordered one time dose of ativan/prednisone/duo-neb. ST/ST BBB/PVCs on the monitor, will monitor

## 2021-09-24 NOTE — CONSULTS
[facility]   CONSULT NOTE    Referring Provider: Dr. Gann, ED   Reason for Consultation: Chest pain, tachycardia     Patient Care Team:  Marifer Cooper MD as PCP - General (Internal Medicine)    Chief complaint - Chest pain, palpitations         History of present illness:  Ariadna Ambrocio is a 35 y.o. female with past medical history of  Anxiety, depression hypertension  presented to the ED with palpitations and chest pain with associated shortness of breath.  Patient's EKG in the ED showed sinus tachycardia with LVH.  Patient was seen during stress myoview in which her resting heart rate was 130s and 160s with Lexiscan with EKG changes showing ST depression.    Patient denies any symptoms during this time except anxiety.      Review of Systems   Constitutional: Positive for malaise/fatigue. Negative for fever.   Cardiovascular: Positive for chest pain, irregular heartbeat and palpitations. Negative for leg swelling and near-syncope.   Respiratory: Positive for shortness of breath. Negative for cough.    Gastrointestinal: Negative for nausea and vomiting.   Neurological: Positive for dizziness, headaches, light-headedness and paresthesias.   Psychiatric/Behavioral: The patient is nervous/anxious.    All other systems reviewed and are negative.      History  Past Medical History:   Diagnosis Date    Anxiety     Depression     Hypertension     Kidney stones        Past Surgical History:   Procedure Laterality Date    COLONOSCOPY      CYSTOSCOPY, URETEROSCOPY, RETROGRADE PYELOGRAM, STENT INSERTION Left 7/16/2021    Procedure: CYSTOSCOPY URETEROSCOPY RETROGRADE PYELOGRAM HOLMIUM LASER BASKET STONE EXTRACTION STENT INSERTION;  Surgeon: Carmelo Nguyễn MD;  Location: [facility] MAIN OR;  Service: Urology;  Laterality: Left;    EAR TUBES      KIDNEY STONE SURGERY         Family History   Problem Relation Age of Onset    Breast cancer Neg Hx     Ovarian cancer Neg Hx     Uterine cancer  Neg Hx     Colon cancer Neg Hx     Deep vein thrombosis Neg Hx     Pulmonary embolism Neg Hx        Social History     Tobacco Use    Smoking status: Never Smoker    Smokeless tobacco: Never Used   Vaping Use    Vaping Use: Never used    Passive vaping exposure Yes   Substance Use Topics    Alcohol use: No    Drug use: Never        Medications Prior to Admission   Medication Sig Dispense Refill Last Dose    ibuprofen (ADVIL,MOTRIN) 800 MG tablet Take 800 mg by mouth Every 8 (Eight) Hours As Needed for Mild Pain .   Past Week at Unknown time    phentermine 37.5 MG capsule Take 37.5 mg by mouth Every Morning.   9/23/2021 at 0900    amLODIPine (NORVASC) 5 MG tablet Take 5 mg by mouth Daily.  5 9/23/2021 at 0900    docusate sodium (Colace) 100 MG capsule Take 1 capsule by mouth 2 (Two) Times a Day As Needed (post op and while taking narcotics). 30 capsule 1 9/22/2021    hydroCHLOROthiazide (HYDRODIURIL) 12.5 MG tablet Take 12.5 mg by mouth Daily.   9/23/2021 at 0900    oxybutynin (DITROPAN) 5 MG tablet Take 1 tablet by mouth 3 (Three) Times a Day As Needed (bladder spasms or urinary frequency). 30 tablet 0     oxyCODONE-acetaminophen (PERCOCET) 5-325 MG per tablet Take 1-2 tablets by mouth Every 6 (Six) Hours As Needed for Severe Pain . 20 tablet 0     phenazopyridine (Pyridium) 100 MG tablet Take 1 tablet by mouth 3 (Three) Times a Day As Needed (dysuria). 15 tablet 0     sertraline (ZOLOFT) 50 MG tablet Take 50 mg by mouth Daily.   9/23/2021 at 0900    tamsulosin (FLOMAX) 0.4 MG capsule 24 hr capsule Take 1 capsule by mouth Daily. 30 capsule 0     vitamin B-12 (CYANOCOBALAMIN) 1000 MCG tablet Take 1,000 mcg by mouth Daily.   9/23/2021 at 0900         Patient has no known allergies.    Scheduled Meds:metoprolol tartrate, 50 mg, Oral, Q12H  sertraline, 50 mg, Oral, Daily  sodium chloride, 10 mL, Intravenous, Q12H  vitamin B-12, 1,000 mcg, Oral, Daily      Continuous Infusions:   PRN Meds:.  acetaminophen     "hydrOXYzine    melatonin    nitroglycerin    ondansetron    [COMPLETED] Insert peripheral IV **AND** sodium chloride    sodium chloride        VITAL SIGNS  Vitals:    09/24/21 0547 09/24/21 0600 09/24/21 1032 09/24/21 1409   BP:  107/74 127/79 115/71   BP Location:  Right arm Right arm Right arm   Patient Position:  Lying Lying Lying   Pulse: 115 115 (!) 121 84   Resp:  18 18 16   Temp:  97.8 °F (36.6 °C) 98.1 °F (36.7 °C) 98.1 °F (36.7 °C)   TempSrc:  Oral Oral Oral   SpO2:  98% 98% 99%   Weight:       Height:           Flowsheet Rows        First Filed Value   Admission Height  160 cm (63\") Documented at 09/23/2021 1340   Admission Weight  97.4 kg (214 lb 11.7 oz) Documented at 09/23/2021 1340             TELEMETRY: sinus tachycardia     Physical Exam:  Vitals and nursing note reviewed.   Constitutional:       Appearance: Healthy appearance. Not in distress.   Neck:      Vascular: No JVR. JVD normal.   Pulmonary:      Effort: Pulmonary effort is normal.      Breath sounds: Normal breath sounds. No wheezing. No rhonchi. No rales.   Chest:      Chest wall: Not tender to palpatation.   Cardiovascular:      PMI at left midclavicular line. Tachycardia present. Regular rhythm. Normal S1. Normal S2.      Murmurs: There is no murmur.      No gallop. No click. No rub.   Pulses:     Intact distal pulses.   Edema:     Peripheral edema absent.   Abdominal:      General: Bowel sounds are normal.      Palpations: Abdomen is soft.      Tenderness: There is no abdominal tenderness.   Musculoskeletal: Normal range of motion.         General: No tenderness. Skin:     General: Skin is warm and dry.   Neurological:      General: No focal deficit present.      Mental Status: Alert and oriented to person, place and time.                  LAB RESULTS (LAST 7 DAYS)    CBC  Results from last 7 days   Lab Units 09/24/21  0634 09/23/21  1428   WBC 10*3/mm3 13.80* 10.40   RBC 10*6/mm3 4.63 5.08   HEMOGLOBIN g/dL 14.3 15.5   HEMATOCRIT % 41.1 " 43.9   MCV fL 88.8 86.4   PLATELETS 10*3/mm3 345 373       BMP  Results from last 7 days   Lab Units 09/24/21  0634 09/23/21  1428   SODIUM mmol/L 137 137   POTASSIUM mmol/L 3.8 3.4*   CHLORIDE mmol/L 102 100   CO2 mmol/L 19.0* 19.0*   BUN mg/dL 15 10   CREATININE mg/dL 0.79 1.02*   GLUCOSE mg/dL 181* 102*   MAGNESIUM mg/dL 1.8 1.7       CMP   Results from last 7 days   Lab Units 09/24/21  0634 09/23/21  1428   SODIUM mmol/L 137 137   POTASSIUM mmol/L 3.8 3.4*   CHLORIDE mmol/L 102 100   CO2 mmol/L 19.0* 19.0*   BUN mg/dL 15 10   CREATININE mg/dL 0.79 1.02*   GLUCOSE mg/dL 181* 102*   ALBUMIN g/dL  --  4.80   BILIRUBIN mg/dL  --  0.7   ALK PHOS U/L  --  93   AST (SGOT) U/L  --  26   ALT (SGPT) U/L  --  27         BNP        TROPONIN  Results from last 7 days   Lab Units 09/24/21  0634   TROPONIN T ng/mL <0.010       CoAg  Results from last 7 days   Lab Units 09/23/21  1428   INR  1.02   APTT seconds 25.9       Creatinine Clearance  Estimated Creatinine Clearance: 110.2 mL/min (by C-G formula based on SCr of 0.79 mg/dL).    ABG        Radiology  CT Head Without Contrast    Result Date: 9/23/2021  No acute intracranial abnormality. Brain MRI is more sensitive to evaluate for acute or subacute infarcts and to evaluate for intracranial metastatic disease.  Electronically Signed By-Melinda Lovett MD On:9/23/2021 4:26 PM This report was finalized on [id number] by  Melinda Lovett MD.    MRI Brain Without Contrast    Result Date: 9/23/2021   1. Normal MRI of the brain.  Electronically Signed By-Jasmine Avendano MD On:9/23/2021 6:51 PM This report was finalized on [id number] by  Jasmine Avendano MD.    XR Chest 1 View    Result Date: 9/23/2021  No acute cardiopulmonary abnormality.  Electronically Signed By-Melinda Lovett MD On:9/23/2021 2:46 PM This report was finalized on [id number] by  Melinda Lovett MD.          EKG          I personally viewed and interpreted the patient's EKG/Telemetry data:    ECHOCARDIOGRAM:         STRESS  MYOVIEW:    CARDIAC CATHETERIZATION:    OTHER:         Assessment/Plan       Chest pain    Dyspnea on exertion    Paresthesia  Palpitations  Tachycardia   Obesity- bmi 37    PLAN:  Patient presented with chest pain, shortness of breath palpitations   Patient is hypertensive --- on amlodipine and HCTZ at home   EKG showed sinus tachycardia LVH  Stress myoview EKG with changes  SPECT images pending   Stop home amlodipine and HCTZ  Started metoprolol 50mg BID  Get echocardiogram to assess LV Function and valve abnormalities       Review of medications shows patient is on phentermine at home---- would not recommend restarting this on discharge  Discussed with patient to decrease caffeine intake         I discussed the patients findings and my recommendations with patient and bedside nurse     SAM Knox  09/24/21  15:41 EDT   Electronically signed by SAM Knox, 09/24/21, 3:41 PM EDT.

## 2021-09-24 NOTE — H&P
[facility] H&P    Patient Name: Ariadna Ambrocio  : 1985  MRN: [id number]  Primary Care Physician: Marifer Cooper MD  Date of admission: 2021     Patient Care Team:  Marifer Cooper MD as PCP - General (Internal Medicine)          Subjective   History Present Illness     Chief Complaint:   Chief Complaint   Patient presents with   • Shortness of Breath     soa, feels like a weight on her chest for 3-4 days.     Obtained from admitting provider HPI on 2021:  Patient is a pleasant 35-year-old female.  She has been under a lot of stress lately.  She takes sertraline for anxiety.  She states that she has had chest tightness and shortness of breath since .  She states that she has had numbness in her alternating arms but in both feet.  Today across the top of her lip and that her left eye started twitching.  But with this persistent tightness and shortness of breath she presented for evaluation.  She does have an IUD in good she has never had a blood clot before.  There is not an early onset heart disease in the family but there is heart disease present.  She has not had a stress test in the past.  The numbness has been there for 4 days in her feet.  Alternates in her hands and then across the lip today.  She is not sure if this could be anxiety or not.  She has not had cough or fever.  She has had 1 Covid vaccination.  She is due for her second on Saturday.     2021: Patient emergency HPI including dyspnea, chest tightness, peripheral paresthesias and increased anxiety which have been present since 2021.  Pain in the chest as described as a heaviness and patient notes it was intermittent until 2021 when her symptoms became constant.  Some sensation of tachycardia and palpitations have been present as well as nausea without vomiting.  She notes that since her presentation she feels her symptoms have improved following administration of breathing  treatments.      History of Present Illness    ROS   Review of Systems   Constitutional: Negative.   HENT: Negative.    Eyes: Negative.    Cardiovascular: Positive for chest pain and palpitations. Negative for leg swelling, near-syncope and syncope.   Respiratory: Positive for shortness of breath. Negative for cough and sputum production.    Endocrine: Negative.    Skin: Negative.    Musculoskeletal: Negative.    Gastrointestinal: Positive for nausea. Negative for abdominal pain, constipation, diarrhea, hematemesis, hematochezia, melena and vomiting.   Genitourinary: Negative.    Neurological: Positive for paresthesias.   Psychiatric/Behavioral: Negative.          Personal History     Past Medical History:   Past Medical History:   Diagnosis Date   • Anxiety    • Depression    • Hypertension    • Kidney stones        Surgical History:      Past Surgical History:   Procedure Laterality Date   • COLONOSCOPY     • CYSTOSCOPY, URETEROSCOPY, RETROGRADE PYELOGRAM, STENT INSERTION Left 7/16/2021    Procedure: CYSTOSCOPY URETEROSCOPY RETROGRADE PYELOGRAM HOLMIUM LASER BASKET STONE EXTRACTION STENT INSERTION;  Surgeon: Carmelo Nguyễn MD;  Location: [facility];  Service: Urology;  Laterality: Left;   • EAR TUBES     • KIDNEY STONE SURGERY             Family History: family history is not on file. Otherwise pertinent FHx was reviewed and unremarkable.     Social History:  reports that she has never smoked. She has never used smokeless tobacco. She reports that she does not drink alcohol and does not use drugs.      Medications:  Prior to Admission medications    Medication Sig Start Date End Date Taking? Authorizing Provider   ibuprofen (ADVIL,MOTRIN) 800 MG tablet Take 800 mg by mouth Every 8 (Eight) Hours As Needed for Mild Pain .   Yes Ailyn Deluca MD   phentermine 37.5 MG capsule Take 37.5 mg by mouth Every Morning.   Yes ProviderAilyn MD   amLODIPine (NORVASC) 5 MG tablet Take 5 mg by mouth Daily.  8/23/18   Ailyn Deluca MD   docusate sodium (Colace) 100 MG capsule Take 1 capsule by mouth 2 (Two) Times a Day As Needed (post op and while taking narcotics). 7/16/21   Carmelo Nguyễn MD   hydroCHLOROthiazide (HYDRODIURIL) 12.5 MG tablet Take 12.5 mg by mouth Daily.    Ailyn Deluca MD   oxybutynin (DITROPAN) 5 MG tablet Take 1 tablet by mouth 3 (Three) Times a Day As Needed (bladder spasms or urinary frequency). 7/16/21   Carmelo Nguyễn MD   oxyCODONE-acetaminophen (PERCOCET) 5-325 MG per tablet Take 1-2 tablets by mouth Every 6 (Six) Hours As Needed for Severe Pain . 7/16/21   Carmelo Nguyễn MD   phenazopyridine (Pyridium) 100 MG tablet Take 1 tablet by mouth 3 (Three) Times a Day As Needed (dysuria). 7/16/21   Carmelo Nguyễn MD   sertraline (ZOLOFT) 50 MG tablet Take 50 mg by mouth Daily.    Ailyn Deluca MD   tamsulosin (FLOMAX) 0.4 MG capsule 24 hr capsule Take 1 capsule by mouth Daily. 7/16/21   Carmelo Nguyễn MD   vitamin B-12 (CYANOCOBALAMIN) 1000 MCG tablet Take 1,000 mcg by mouth Daily.    Ailyn Deluca MD       Allergies:  No Known Allergies    Objective   Objective     Vital Signs  Temp:  [97.8 °F (36.6 °C)-98.1 °F (36.7 °C)] 98.1 °F (36.7 °C)  Heart Rate:  [] 84  Resp:  [16-20] 16  BP: (107-127)/(71-90) 115/71  SpO2:  [98 %-100 %] 99 %  on   ;   Device (Oxygen Therapy): room air  Body mass index is 37.84 kg/m².    Physical Exam  Physical Exam  Vitals reviewed.   Constitutional:       General: She is not in acute distress.     Appearance: Normal appearance. She is obese. She is not ill-appearing, toxic-appearing or diaphoretic.   HENT:      Head: Normocephalic and atraumatic.      Right Ear: External ear normal.      Left Ear: External ear normal.      Nose: Nose normal.      Mouth/Throat:      Mouth: Mucous membranes are moist.   Eyes:      Extraocular Movements: Extraocular movements intact.   Cardiovascular:      Rate and Rhythm: Regular rhythm. Tachycardia  present.      Pulses: Normal pulses.      Heart sounds: Normal heart sounds.   Pulmonary:      Effort: Pulmonary effort is normal.      Breath sounds: Normal breath sounds.   Abdominal:      General: Bowel sounds are normal. There is no distension.      Tenderness: There is no abdominal tenderness.   Musculoskeletal:         General: Normal range of motion.      Cervical back: Normal range of motion.   Skin:     General: Skin is warm and dry.      Capillary Refill: Capillary refill takes less than 2 seconds.   Neurological:      General: No focal deficit present.      Mental Status: She is alert and oriented to person, place, and time.   Psychiatric:         Mood and Affect: Mood normal.         Behavior: Behavior normal.         Thought Content: Thought content normal.         Judgment: Judgment normal.     Results Review:  I have personally reviewed most recent cardiac tracings, lab results and radiology images and interpretations and agree with findings, most notably: Troponin, CBC, CMP, TSH, magnesium, chest x-ray, CT of head, MRI brain.    Results from last 7 days   Lab Units 09/24/21  0634 09/23/21  1428 09/23/21  1428   WBC 10*3/mm3 13.80*   < > 10.40   HEMOGLOBIN g/dL 14.3   < > 15.5   HEMATOCRIT % 41.1   < > 43.9   PLATELETS 10*3/mm3 345   < > 373   INR   --   --  1.02    < > = values in this interval not displayed.     Results from last 7 days   Lab Units 09/24/21  0634 09/23/21  2213 09/23/21  1428 09/23/21  1428   SODIUM mmol/L 137  --    < > 137   POTASSIUM mmol/L 3.8  --    < > 3.4*   CHLORIDE mmol/L 102  --    < > 100   CO2 mmol/L 19.0*  --    < > 19.0*   BUN mg/dL 15  --    < > 10   CREATININE mg/dL 0.79  --    < > 1.02*   GLUCOSE mg/dL 181*  --    < > 102*   CALCIUM mg/dL 9.6  --    < > 9.9   ALT (SGPT) U/L  --   --   --  27   AST (SGOT) U/L  --   --   --  26   TROPONIN T ng/mL <0.010 <0.010  --  <0.010   PROBNP pg/mL  --   --   --  8.5    < > = values in this interval not displayed.     Estimated  Creatinine Clearance: 110.2 mL/min (by C-G formula based on SCr of 0.79 mg/dL).  Brief Urine Lab Results  (Last result in the past 365 days)      Color   Clarity   Blood   Leuk Est   Nitrite   Protein   CREAT   Urine HCG        09/23/21 1429 Yellow Clear Small (1+) Trace Negative Negative         09/23/21 1429               Negative           Microbiology Results (last 10 days)     Procedure Component Value - Date/Time    Respiratory Panel PCR w/COVID-19(SARS-CoV-2) LEONARDO/ROSCOE/WILLIAM/PAD/[facility]/MAD/MISA In-House, NP Swab in UTM/VTM, 3-4 HR TAT - Swab, Nasopharynx [[id number]]  (Normal) Collected: 09/23/21 1429    Lab Status: Final result Specimen: Swab from Nasopharynx Updated: 09/23/21 1525     ADENOVIRUS, PCR Not Detected     Coronavirus 229E Not Detected     Coronavirus HKU1 Not Detected     Coronavirus NL63 Not Detected     Coronavirus OC43 Not Detected     COVID19 Not Detected     Human Metapneumovirus Not Detected     Human Rhinovirus/Enterovirus Not Detected     Influenza A PCR Not Detected     Influenza B PCR Not Detected     Parainfluenza Virus 1 Not Detected     Parainfluenza Virus 2 Not Detected     Parainfluenza Virus 3 Not Detected     Parainfluenza Virus 4 Not Detected     RSV, PCR Not Detected     Bordetella pertussis pcr Not Detected     Bordetella parapertussis PCR Not Detected     Chlamydophila pneumoniae PCR Not Detected     Mycoplasma pneumo by PCR Not Detected    Narrative:      In the setting of a positive respiratory panel with a viral infection PLUS a negative procalcitonin without other underlying concern for bacterial infection, consider observing off antibiotics or discontinuation of antibiotics and continue supportive care. If the respiratory panel is positive for atypical bacterial infection (Bordetella pertussis, Chlamydophila pneumoniae, or Mycoplasma pneumoniae), consider antibiotic de-escalation to target atypical bacterial infection.          ECG/EMG Results (most recent)     Procedure  Component Value Units Date/Time    ECG 12 Lead [[id number]] Collected: 09/23/21 1417     Updated: 09/23/21 1418     QT Interval 294 ms     Narrative:      HEART RATE= 115  bpm  RR Interval= 520  ms  WA Interval= 122  ms  P Horizontal Axis= 7  deg  P Front Axis= 40  deg  QRSD Interval= 88  ms  QT Interval= 294  ms  QRS Axis= -10  deg  T Wave Axis= 167  deg  - ABNORMAL ECG -  Sinus tachycardia  Probable LVH with secondary repol abnrm  Electronically Signed By:   Date and Time of Study: 2021-09-23 14:17:33                  CT Head Without Contrast    Result Date: 9/23/2021  No acute intracranial abnormality. Brain MRI is more sensitive to evaluate for acute or subacute infarcts and to evaluate for intracranial metastatic disease.  Electronically Signed By-Melinda Lovett MD On:9/23/2021 4:26 PM This report was finalized on [id number] by  Melinda Lovett MD.    MRI Brain Without Contrast    Result Date: 9/23/2021   1. Normal MRI of the brain.  Electronically Signed By-Jasmine Avendano MD On:9/23/2021 6:51 PM This report was finalized on [id number] by  Jasmine Avendano MD.    XR Chest 1 View    Result Date: 9/23/2021  No acute cardiopulmonary abnormality.  Electronically Signed By-Melinda Lovett MD On:9/23/2021 2:46 PM This report was finalized on [id number] by  Melinda Lovett MD.        Estimated Creatinine Clearance: 110.2 mL/min (by C-G formula based on SCr of 0.79 mg/dL).    Assessment/Plan   Assessment/Plan       Active Hospital Problems    Diagnosis  POA   • Chest pain [R07.9]  Yes   • Dyspnea on exertion [R06.00]  Yes   • Paresthesia [R20.2]  Yes      Resolved Hospital Problems   No resolved problems to display.       Chest pain with ARMENDARIZ and paresthesias  -Serial troponins less than 0.010  -TSH: 0.868  -Chest x-ray shows no acute cardiopulmonary abnormalities  -CT head and MRI of brain show no acute abnormalities  -Continue telemetry  -Stress test reported as low risk study  -Cardiology consulted who initiated Lopressor  and ordered echocardiography for a.m.  -Pulmonology recommended continuing prednisone therapy as well as bronchodilator which will be prescribed as an inhaler at discharge  -Hold phentermine  -Anxiety addressed as below     Leukocytosis  -Mild leukocytosis with WBCs of 13.80 with an increased absolute neutrophil count noted and no obvious signs of infection  -Patient did receive 1 dose of prednisone in the ED  -Monitor while admitted     Hypertension  -Well controlled with most recent blood pressure of 115/71  -Continue amlodipine and hydrochlorothiazide  -Monitor while admitted     Anxiety/depression  -Continue Zoloft  -As needed Atarax     Chronic pain  -Oxycodone     Obesity (BMI: 37.84)  -Encourage diet lifestyle modifications          VTE Prophylaxis -   Mechanical Order History:      Ordered        09/23/21 1934  Place Sequential Compression Device  Once         09/23/21 1934  Maintain Sequential Compression Device  Continuous                 Pharmalogical Order History:     None          CODE STATUS:    Code Status and Medical Interventions:   Ordered at: 09/24/21 0932     Code Status:    CPR     Medical Interventions (Level of Support Prior to Arrest):    Full       This patient has been examined wearing personal protective equipment.     I discussed the patient's findings and my recommendations with patient and nursing staff.      Signature:Electronically signed by Justin Masterson PA-C, 09/24/21, 4:10 PM EDT.

## 2021-09-25 ENCOUNTER — HOSPITAL ENCOUNTER (OUTPATIENT)
Facility: HOSPITAL | Age: 36
Setting detail: OBSERVATION
Discharge: HOME | End: 2021-09-27
Attending: EMERGENCY MEDICINE | Admitting: EMERGENCY MEDICINE

## 2021-09-25 ENCOUNTER — APPOINTMENT (OUTPATIENT)
Dept: CT IMAGING | Facility: HOSPITAL | Age: 36
End: 2021-09-25

## 2021-09-25 DIAGNOSIS — R06.02 SHORTNESS OF BREATH: Primary | ICD-10-CM

## 2021-09-25 LAB
ANION GAP SERPL CALCULATED.3IONS-SCNC: 16 MMOL/L (ref 5–15)
BASOPHILS # BLD AUTO: 0.1 10*3/MM3 (ref 0–0.2)
BASOPHILS NFR BLD AUTO: 0.5 % (ref 0–1.5)
BILIRUB UR QL STRIP: NEGATIVE
BUN SERPL-MCNC: 19 MG/DL (ref 6–20)
BUN/CREAT SERPL: 25 (ref 7–25)
CALCIUM SPEC-SCNC: 9.5 MG/DL (ref 8.6–10.5)
CHLORIDE SERPL-SCNC: 104 MMOL/L (ref 98–107)
CLARITY UR: CLEAR
CO2 SERPL-SCNC: 18 MMOL/L (ref 22–29)
COLOR UR: YELLOW
CREAT SERPL-MCNC: 0.76 MG/DL (ref 0.57–1)
DEPRECATED RDW RBC AUTO: 40.7 FL (ref 37–54)
EOSINOPHIL # BLD AUTO: 0.1 10*3/MM3 (ref 0–0.4)
EOSINOPHIL NFR BLD AUTO: 0.4 % (ref 0.3–6.2)
ERYTHROCYTE [DISTWIDTH] IN BLOOD BY AUTOMATED COUNT: 13.2 % (ref 12.3–15.4)
GFR SERPL CREATININE-BSD FRML MDRD: 87 ML/MIN/1.73
GLUCOSE SERPL-MCNC: 124 MG/DL (ref 65–99)
GLUCOSE UR STRIP-MCNC: NEGATIVE MG/DL
HCT VFR BLD AUTO: 39 % (ref 34–46.6)
HGB BLD-MCNC: 13.4 G/DL (ref 12–15.9)
HGB UR QL STRIP.AUTO: ABNORMAL
HOLD SPECIMEN: NORMAL
KETONES UR QL STRIP: NEGATIVE
LEUKOCYTE ESTERASE UR QL STRIP.AUTO: NEGATIVE
LYMPHOCYTES # BLD AUTO: 2.4 10*3/MM3 (ref 0.7–3.1)
LYMPHOCYTES NFR BLD AUTO: 12.4 % (ref 19.6–45.3)
MCH RBC QN AUTO: 30.1 PG (ref 26.6–33)
MCHC RBC AUTO-ENTMCNC: 34.3 G/DL (ref 31.5–35.7)
MCV RBC AUTO: 87.7 FL (ref 79–97)
MONOCYTES # BLD AUTO: 0.9 10*3/MM3 (ref 0.1–0.9)
MONOCYTES NFR BLD AUTO: 4.8 % (ref 5–12)
NEUTROPHILS NFR BLD AUTO: 15.8 10*3/MM3 (ref 1.7–7)
NEUTROPHILS NFR BLD AUTO: 81.9 % (ref 42.7–76)
NITRITE UR QL STRIP: NEGATIVE
NRBC BLD AUTO-RTO: 0.1 /100 WBC (ref 0–0.2)
NT-PROBNP SERPL-MCNC: 47.3 PG/ML (ref 0–450)
PH UR STRIP.AUTO: 6 [PH] (ref 5–8)
PLATELET # BLD AUTO: 355 10*3/MM3 (ref 140–450)
PMV BLD AUTO: 8.3 FL (ref 6–12)
POTASSIUM SERPL-SCNC: 3.9 MMOL/L (ref 3.5–5.2)
PROT UR QL STRIP: NEGATIVE
QT INTERVAL: 294 MS
RBC # BLD AUTO: 4.45 10*6/MM3 (ref 3.77–5.28)
SODIUM SERPL-SCNC: 138 MMOL/L (ref 136–145)
SP GR UR STRIP: 1.03 (ref 1–1.03)
TROPONIN T SERPL-MCNC: <0.01 NG/ML (ref 0–0.03)
UROBILINOGEN UR QL STRIP: ABNORMAL
WBC # BLD AUTO: 19.3 10*3/MM3 (ref 3.4–10.8)
WHOLE BLOOD HOLD SPECIMEN: NORMAL

## 2021-09-25 PROCEDURE — 93005 ELECTROCARDIOGRAM TRACING: CPT

## 2021-09-25 PROCEDURE — 71250 CT THORAX DX C-: CPT

## 2021-09-25 PROCEDURE — 94799 UNLISTED PULMONARY SVC/PX: CPT

## 2021-09-25 PROCEDURE — 99284 EMERGENCY DEPT VISIT MOD MDM: CPT

## 2021-09-25 PROCEDURE — 83880 ASSAY OF NATRIURETIC PEPTIDE: CPT | Performed by: PHYSICIAN ASSISTANT

## 2021-09-25 PROCEDURE — 94642 AEROSOL INHALATION TREATMENT: CPT

## 2021-09-25 PROCEDURE — 81001 URINALYSIS AUTO W/SCOPE: CPT | Performed by: PHYSICIAN ASSISTANT

## 2021-09-25 PROCEDURE — 85025 COMPLETE CBC W/AUTO DIFF WBC: CPT | Performed by: PHYSICIAN ASSISTANT

## 2021-09-25 PROCEDURE — 93005 ELECTROCARDIOGRAM TRACING: CPT | Performed by: EMERGENCY MEDICINE

## 2021-09-25 PROCEDURE — 84484 ASSAY OF TROPONIN QUANT: CPT | Performed by: PHYSICIAN ASSISTANT

## 2021-09-25 PROCEDURE — 80048 BASIC METABOLIC PNL TOTAL CA: CPT | Performed by: PHYSICIAN ASSISTANT

## 2021-09-25 RX ORDER — IPRATROPIUM BROMIDE AND ALBUTEROL SULFATE 2.5; .5 MG/3ML; MG/3ML
3 SOLUTION RESPIRATORY (INHALATION) ONCE
Status: COMPLETED | OUTPATIENT
Start: 2021-09-25 | End: 2021-09-25

## 2021-09-25 RX ADMIN — SODIUM CHLORIDE 500 ML: 9 INJECTION, SOLUTION INTRAVENOUS at 22:46

## 2021-09-25 RX ADMIN — IPRATROPIUM BROMIDE AND ALBUTEROL SULFATE 3 ML: 2.5; .5 SOLUTION RESPIRATORY (INHALATION) at 23:00

## 2021-09-26 ENCOUNTER — APPOINTMENT (OUTPATIENT)
Dept: CARDIOLOGY | Facility: HOSPITAL | Age: 36
End: 2021-09-26

## 2021-09-26 PROBLEM — R06.00 DYSPNEA: Status: ACTIVE | Noted: 2021-09-26

## 2021-09-26 LAB
BACTERIA UR QL AUTO: ABNORMAL /HPF
BACTERIA UR QL AUTO: ABNORMAL /HPF
BH CV ECHO MEAS - AO MAX PG (FULL): 2.9 MMHG
BH CV ECHO MEAS - AO MAX PG: 6.2 MMHG
BH CV ECHO MEAS - AO MEAN PG (FULL): 1.6 MMHG
BH CV ECHO MEAS - AO MEAN PG: 3.5 MMHG
BH CV ECHO MEAS - AO ROOT AREA (BSA CORRECTED): 1.7
BH CV ECHO MEAS - AO ROOT AREA: 9 CM^2
BH CV ECHO MEAS - AO ROOT DIAM: 3.4 CM
BH CV ECHO MEAS - AO V2 MAX: 124.6 CM/SEC
BH CV ECHO MEAS - AO V2 MEAN: 88.5 CM/SEC
BH CV ECHO MEAS - AO V2 VTI: 30 CM
BH CV ECHO MEAS - AORTIC HR: 46.4 BPM
BH CV ECHO MEAS - AORTIC R-R: 1.3 SEC
BH CV ECHO MEAS - AVA(I,A): 2.9 CM^2
BH CV ECHO MEAS - AVA(I,D): 2.9 CM^2
BH CV ECHO MEAS - AVA(V,A): 2.9 CM^2
BH CV ECHO MEAS - AVA(V,D): 2.9 CM^2
BH CV ECHO MEAS - BSA(HAYCOCK): 2.1 M^2
BH CV ECHO MEAS - BSA: 2 M^2
BH CV ECHO MEAS - BZI_BMI: 37.7 KILOGRAMS/M^2
BH CV ECHO MEAS - BZI_METRIC_HEIGHT: 160 CM
BH CV ECHO MEAS - BZI_METRIC_WEIGHT: 96.6 KG
BH CV ECHO MEAS - CI(AO): 6.3 L/MIN/M^2
BH CV ECHO MEAS - CI(LVOT): 2 L/MIN/M^2
BH CV ECHO MEAS - CO(AO): 12.5 L/MIN
BH CV ECHO MEAS - CO(LVOT): 4 L/MIN
BH CV ECHO MEAS - EDV(CUBED): 130.5 ML
BH CV ECHO MEAS - EDV(MOD-SP4): 110.1 ML
BH CV ECHO MEAS - EDV(TEICH): 122.3 ML
BH CV ECHO MEAS - EF(CUBED): 64.8 %
BH CV ECHO MEAS - EF(MOD-SP4): 50.1 %
BH CV ECHO MEAS - EF(TEICH): 56 %
BH CV ECHO MEAS - ESV(CUBED): 45.9 ML
BH CV ECHO MEAS - ESV(MOD-SP4): 54.9 ML
BH CV ECHO MEAS - ESV(TEICH): 53.8 ML
BH CV ECHO MEAS - FS: 29.4 %
BH CV ECHO MEAS - IVS/LVPW: 0.92
BH CV ECHO MEAS - IVSD: 1 CM
BH CV ECHO MEAS - LA DIMENSION(2D): 3.7 CM
BH CV ECHO MEAS - LV DIASTOLIC VOL/BSA (35-75): 55.4 ML/M^2
BH CV ECHO MEAS - LV MASS(C)D: 200.9 GRAMS
BH CV ECHO MEAS - LV MASS(C)DI: 101.2 GRAMS/M^2
BH CV ECHO MEAS - LV MAX PG: 3.3 MMHG
BH CV ECHO MEAS - LV MEAN PG: 1.8 MMHG
BH CV ECHO MEAS - LV SYSTOLIC VOL/BSA (12-30): 27.7 ML/M^2
BH CV ECHO MEAS - LV V1 MAX: 91.1 CM/SEC
BH CV ECHO MEAS - LV V1 MEAN: 63.7 CM/SEC
BH CV ECHO MEAS - LV V1 VTI: 21.6 CM
BH CV ECHO MEAS - LVIDD: 5.1 CM
BH CV ECHO MEAS - LVIDS: 3.6 CM
BH CV ECHO MEAS - LVOT AREA: 4 CM^2
BH CV ECHO MEAS - LVOT DIAM: 2.3 CM
BH CV ECHO MEAS - LVPWD: 1.1 CM
BH CV ECHO MEAS - MV A MAX VEL: 50.4 CM/SEC
BH CV ECHO MEAS - MV DEC SLOPE: 498.9 CM/SEC^2
BH CV ECHO MEAS - MV DEC TIME: 0.24 SEC
BH CV ECHO MEAS - MV E MAX VEL: 118.4 CM/SEC
BH CV ECHO MEAS - MV E/A: 2.3
BH CV ECHO MEAS - MV MAX PG: 6.3 MMHG
BH CV ECHO MEAS - MV MEAN PG: 1.3 MMHG
BH CV ECHO MEAS - MV V2 MAX: 125.8 CM/SEC
BH CV ECHO MEAS - MV V2 MEAN: 45.3 CM/SEC
BH CV ECHO MEAS - MV V2 VTI: 36.1 CM
BH CV ECHO MEAS - MVA(VTI): 2.4 CM^2
BH CV ECHO MEAS - PA MAX PG (FULL): 1.6 MMHG
BH CV ECHO MEAS - PA MAX PG: 3.3 MMHG
BH CV ECHO MEAS - PA V2 MAX: 91.4 CM/SEC
BH CV ECHO MEAS - PULM A REVS DUR: 0.06 SEC
BH CV ECHO MEAS - PULM A REVS VEL: 20.1 CM/SEC
BH CV ECHO MEAS - PULM DIAS VEL: 54.8 CM/SEC
BH CV ECHO MEAS - PULM S/D: 0.98
BH CV ECHO MEAS - PULM SYS VEL: 53.8 CM/SEC
BH CV ECHO MEAS - RAP SYSTOLE: 3 MMHG
BH CV ECHO MEAS - RV MAX PG: 1.8 MMHG
BH CV ECHO MEAS - RV MEAN PG: 1 MMHG
BH CV ECHO MEAS - RV V1 MAX: 66.5 CM/SEC
BH CV ECHO MEAS - RV V1 MEAN: 48.1 CM/SEC
BH CV ECHO MEAS - RV V1 VTI: 17.9 CM
BH CV ECHO MEAS - RVDD: 3 CM
BH CV ECHO MEAS - RVSP: 22.3 MMHG
BH CV ECHO MEAS - SI(AO): 135.2 ML/M^2
BH CV ECHO MEAS - SI(CUBED): 42.6 ML/M^2
BH CV ECHO MEAS - SI(LVOT): 43.5 ML/M^2
BH CV ECHO MEAS - SI(MOD-SP4): 27.8 ML/M^2
BH CV ECHO MEAS - SI(TEICH): 34.5 ML/M^2
BH CV ECHO MEAS - SV(AO): 268.6 ML
BH CV ECHO MEAS - SV(CUBED): 84.6 ML
BH CV ECHO MEAS - SV(LVOT): 86.4 ML
BH CV ECHO MEAS - SV(MOD-SP4): 55.2 ML
BH CV ECHO MEAS - SV(TEICH): 68.5 ML
BH CV ECHO MEAS - TR MAX VEL: 219.4 CM/SEC
BILIRUB UR QL STRIP: NEGATIVE
CLARITY UR: CLEAR
COLOR UR: YELLOW
GLUCOSE UR STRIP-MCNC: NEGATIVE MG/DL
HGB UR QL STRIP.AUTO: ABNORMAL
HYALINE CASTS UR QL AUTO: ABNORMAL /LPF
HYALINE CASTS UR QL AUTO: ABNORMAL /LPF
KETONES UR QL STRIP: NEGATIVE
LEUKOCYTE ESTERASE UR QL STRIP.AUTO: NEGATIVE
MUCOUS THREADS URNS QL MICRO: ABNORMAL /HPF
NITRITE UR QL STRIP: NEGATIVE
PH UR STRIP.AUTO: 6 [PH] (ref 5–8)
PROCALCITONIN SERPL-MCNC: 0.05 NG/ML (ref 0–0.25)
PROT UR QL STRIP: NEGATIVE
QT INTERVAL: 371 MS
RBC # UR: ABNORMAL /HPF
RBC # UR: ABNORMAL /HPF
REF LAB TEST METHOD: ABNORMAL
REF LAB TEST METHOD: ABNORMAL
SARS-COV-2 RNA PNL SPEC NAA+PROBE: NOT DETECTED
SP GR UR STRIP: 1.03 (ref 1–1.03)
SQUAMOUS #/AREA URNS HPF: ABNORMAL /HPF
SQUAMOUS #/AREA URNS HPF: ABNORMAL /HPF
TROPONIN T SERPL-MCNC: <0.01 NG/ML (ref 0–0.03)
UROBILINOGEN UR QL STRIP: ABNORMAL
WBC UR QL AUTO: ABNORMAL /HPF
WBC UR QL AUTO: ABNORMAL /HPF

## 2021-09-26 PROCEDURE — G0378 HOSPITAL OBSERVATION PER HR: HCPCS

## 2021-09-26 PROCEDURE — 99213 OFFICE O/P EST LOW 20 MIN: CPT | Performed by: INTERNAL MEDICINE

## 2021-09-26 PROCEDURE — 96365 THER/PROPH/DIAG IV INF INIT: CPT

## 2021-09-26 PROCEDURE — 25010000002 CEFTRIAXONE PER 250 MG: Performed by: PHYSICIAN ASSISTANT

## 2021-09-26 PROCEDURE — 84145 PROCALCITONIN (PCT): CPT | Performed by: PHYSICIAN ASSISTANT

## 2021-09-26 PROCEDURE — 81001 URINALYSIS AUTO W/SCOPE: CPT | Performed by: PHYSICIAN ASSISTANT

## 2021-09-26 PROCEDURE — 87635 SARS-COV-2 COVID-19 AMP PRB: CPT | Performed by: EMERGENCY MEDICINE

## 2021-09-26 PROCEDURE — C9803 HOPD COVID-19 SPEC COLLECT: HCPCS

## 2021-09-26 PROCEDURE — 84484 ASSAY OF TROPONIN QUANT: CPT | Performed by: EMERGENCY MEDICINE

## 2021-09-26 PROCEDURE — 93306 TTE W/DOPPLER COMPLETE: CPT

## 2021-09-26 PROCEDURE — 93306 TTE W/DOPPLER COMPLETE: CPT | Performed by: INTERNAL MEDICINE

## 2021-09-26 RX ORDER — LANOLIN ALCOHOL/MO/W.PET/CERES
1000 CREAM (GRAM) TOPICAL DAILY
Status: DISCONTINUED | OUTPATIENT
Start: 2021-09-26 | End: 2021-09-27 | Stop reason: HOSPADM

## 2021-09-26 RX ORDER — SODIUM CHLORIDE 0.9 % (FLUSH) 0.9 %
10 SYRINGE (ML) INJECTION AS NEEDED
Status: DISCONTINUED | OUTPATIENT
Start: 2021-09-26 | End: 2021-09-27 | Stop reason: HOSPADM

## 2021-09-26 RX ORDER — HYDROXYZINE HYDROCHLORIDE 25 MG/1
50 TABLET, FILM COATED ORAL 3 TIMES DAILY PRN
Status: DISCONTINUED | OUTPATIENT
Start: 2021-09-26 | End: 2021-09-27 | Stop reason: HOSPADM

## 2021-09-26 RX ORDER — NITROGLYCERIN 0.4 MG/1
0.4 TABLET SUBLINGUAL
Status: DISCONTINUED | OUTPATIENT
Start: 2021-09-26 | End: 2021-09-27 | Stop reason: HOSPADM

## 2021-09-26 RX ORDER — SODIUM CHLORIDE 0.9 % (FLUSH) 0.9 %
10 SYRINGE (ML) INJECTION EVERY 12 HOURS SCHEDULED
Status: DISCONTINUED | OUTPATIENT
Start: 2021-09-26 | End: 2021-09-27 | Stop reason: HOSPADM

## 2021-09-26 RX ADMIN — SERTRALINE 50 MG: 50 TABLET, FILM COATED ORAL at 17:52

## 2021-09-26 RX ADMIN — CEFTRIAXONE SODIUM 1 G: 1 INJECTION, POWDER, FOR SOLUTION INTRAMUSCULAR; INTRAVENOUS at 17:52

## 2021-09-26 RX ADMIN — CYANOCOBALAMIN TAB 1000 MCG 1000 MCG: 1000 TAB at 17:52

## 2021-09-26 RX ADMIN — Medication 10 ML: at 08:50

## 2021-09-26 NOTE — H&P
"[facility] H&P    Patient Name: Ariadna Ambrocio  : 1985  MRN: [id number]  Primary Care Physician: Marifer Cooper MD  Date of admission: 2021     Patient Care Team:  Marifer Cooper MD as PCP - General (Internal Medicine)          Subjective   History Present Illness     Chief Complaint:   Chief Complaint   Patient presents with   • Chest Pain     Obtained from ED provider note on 2021:  Patient is a 35-year-old female presents with worsening dyspnea.  Patient was just discharged yesterday for similar.  She had fairly thorough work-up but reports that she feels like she cannot catch her breath.  No fevers chills nausea or vomiting.  Also concern for bilateral flank pain and history of kidney stones and hematuria.  She reports she is not been the same since she had a stent placement and surgery this summer.     2021 (post observation admission): Patient Barriga HPI noted above including a sudden onset of dyspnea that occurred at approximately 1700 hrs. on 2021 described as \"just needing to take deep breaths\".  She reports she was with her children at a restaurant but denies any obvious stress or anxiety at the time of onset.  She did note some peripheral paresthesias similar to her previous incidents at the time of this event.  Some nausea without vomiting was also present.  Patient is still a somewhat vague historian and has concerns about whether her symptoms may be related to allergies or asthma.  She does report a history of anxiety and notes that she takes sertraline which she has been prescribed for approximately the past 4 months.  She also confirms compliance with her recently prescribed medications including metoprolol, prednisone and Atarax stating she did take an Atarax after her symptoms began without significant relief noted.     Following initial interview patient seemed generally asymptomatic.  Echocardiogram was ordered and during this testing the technician " reported to staff that patient's heart rate had dropped to 33.  I evaluated patient following this report and she denied any obvious symptoms associated with this change in heart rate.  Monitor tech note patient was sinus bradycardia on the monitor but heart rate had returned to the mid to high 50s      History of Present Illness    ROS   Review of Systems   Constitutional: Negative.   HENT: Negative.    Eyes: Negative.    Cardiovascular: Negative.    Respiratory: Positive for shortness of breath. Negative for cough and sputum production.    Endocrine: Negative.    Skin: Negative.    Musculoskeletal: Negative.    Gastrointestinal: Negative.    Genitourinary: Negative.    Neurological: Positive for paresthesias.   Psychiatric/Behavioral: Negative        Personal History     Past Medical History:   Past Medical History:   Diagnosis Date   • Anxiety    • Depression    • Hypertension    • Kidney stones        Surgical History:      Past Surgical History:   Procedure Laterality Date   • COLONOSCOPY     • CYSTOSCOPY, URETEROSCOPY, RETROGRADE PYELOGRAM, STENT INSERTION Left 7/16/2021    Procedure: CYSTOSCOPY URETEROSCOPY RETROGRADE PYELOGRAM HOLMIUM LASER BASKET STONE EXTRACTION STENT INSERTION;  Surgeon: Carmelo Nguyễn MD;  Location: [facility];  Service: Urology;  Laterality: Left;   • EAR TUBES     • KIDNEY STONE SURGERY             Family History: family history is not on file. Otherwise pertinent FHx was reviewed and unremarkable.     Social History:  reports that she has never smoked. She has never used smokeless tobacco. She reports that she does not drink alcohol and does not use drugs.      Medications:  Prior to Admission medications    Medication Sig Start Date End Date Taking? Authorizing Provider   albuterol sulfate  (90 Base) MCG/ACT inhaler Inhale 2 puffs Every 4 (Four) Hours As Needed for Wheezing. 9/24/21  Yes Justin Masterson PA-C   hydrOXYzine (ATARAX) 50 MG tablet Take 1 tablet by mouth 3  (Three) Times a Day As Needed for Anxiety for up to 10 days. 9/24/21 10/4/21 Yes Justin Masterson PA-C   metoprolol tartrate (LOPRESSOR) 50 MG tablet Take 1 tablet by mouth Every 12 (Twelve) Hours for 30 days. 9/24/21 10/24/21 Yes Justin Masterson PA-C   predniSONE (DELTASONE) 20 MG tablet Take 2 tablets by mouth Daily With Breakfast for 4 days. 9/25/21 9/29/21 Yes Justin Masterson PA-C   sertraline (ZOLOFT) 50 MG tablet Take 50 mg by mouth Daily.   Yes ProviderAilyn MD   vitamin B-12 (CYANOCOBALAMIN) 1000 MCG tablet Take 1,000 mcg by mouth Daily.   Yes ProviderAilyn MD   docusate sodium (Colace) 100 MG capsule Take 1 capsule by mouth 2 (Two) Times a Day As Needed (post op and while taking narcotics). 7/16/21 9/26/21  Carmelo Nguyễn MD   ibuprofen (ADVIL,MOTRIN) 800 MG tablet Take 800 mg by mouth Every 8 (Eight) Hours As Needed for Mild Pain .  9/26/21  ProviderAilyn MD   oxybutynin (DITROPAN) 5 MG tablet Take 1 tablet by mouth 3 (Three) Times a Day As Needed (bladder spasms or urinary frequency). 7/16/21 9/26/21  Carmelo Nguyễn MD   oxyCODONE-acetaminophen (PERCOCET) 5-325 MG per tablet Take 1-2 tablets by mouth Every 6 (Six) Hours As Needed for Severe Pain . 7/16/21 9/26/21  Carmelo Nguyễn MD   phenazopyridine (Pyridium) 100 MG tablet Take 1 tablet by mouth 3 (Three) Times a Day As Needed (dysuria). 7/16/21 9/26/21  Carmelo Nguyễn MD   tamsulosin (FLOMAX) 0.4 MG capsule 24 hr capsule Take 1 capsule by mouth Daily. 7/16/21 9/26/21  Carmelo Nguyễn MD       Allergies:  No Known Allergies    Objective   Objective     Vital Signs  Temp:  [97.6 °F (36.4 °C)-98.9 °F (37.2 °C)] 97.9 °F (36.6 °C)  Heart Rate:  [51-79] 60  Resp:  [14-20] 16  BP: (100-128)/(62-85) 110/74  SpO2:  [96 %-100 %] 96 %  on   ;   Device (Oxygen Therapy): room air  Body mass index is 37.73 kg/m².    Physical Exam  Physical Exam  Vitals reviewed.   Constitutional:       General: She is not in acute distress.      Appearance: Normal appearance. She is obese. She is not ill-appearing, toxic-appearing or diaphoretic.   HENT:      Head: Normocephalic and atraumatic.      Right Ear: External ear normal.      Left Ear: External ear normal.      Nose: Nose normal.      Mouth/Throat:      Mouth: Mucous membranes are moist.   Eyes:      Extraocular Movements: Extraocular movements intact.   Cardiovascular:      Rate and Rhythm: Regular rhythm. Bradycardia present.      Pulses: Normal pulses.      Heart sounds: Normal heart sounds.   Pulmonary:      Effort: Pulmonary effort is normal.      Breath sounds: Normal breath sounds.   Abdominal:      General: Bowel sounds are normal. There is no distension.      Tenderness: There is no abdominal tenderness.   Musculoskeletal:         General: Normal range of motion.      Cervical back: Normal range of motion.   Skin:     General: Skin is warm and dry.      Capillary Refill: Capillary refill takes less than 2 seconds.   Neurological:      General: No focal deficit present.      Mental Status: She is alert and oriented to person, place, and time.   Psychiatric:         Mood and Affect: Mood normal.         Behavior: Behavior normal.         Thought Content: Thought content normal.         Judgment: Judgment normal.     Results Review:  I have personally reviewed most recent cardiac tracings, lab results and radiology images and interpretations and agree with findings, most notably: Troponin, CBC, CMP, chest x-ray, EKG, recent stress test, echocardiogram and UA.    Results from last 7 days   Lab Units 09/25/21 2241 09/24/21  0634 09/23/21  1428   WBC 10*3/mm3 19.30*   < > 10.40   HEMOGLOBIN g/dL 13.4   < > 15.5   HEMATOCRIT % 39.0   < > 43.9   PLATELETS 10*3/mm3 355   < > 373   INR   --   --  1.02    < > = values in this interval not displayed.     Results from last 7 days   Lab Units 09/26/21  0531 09/25/21 2241 09/24/21  0634 09/23/21  2213 09/23/21  1428 09/23/21  1428   SODIUM mmol/L  --   138 137  --    < > 137   POTASSIUM mmol/L  --  3.9 3.8  --    < > 3.4*   CHLORIDE mmol/L  --  104 102  --    < > 100   CO2 mmol/L  --  18.0* 19.0*  --    < > 19.0*   BUN mg/dL  --  19 15  --    < > 10   CREATININE mg/dL  --  0.76 0.79  --    < > 1.02*   GLUCOSE mg/dL  --  124* 181*  --    < > 102*   CALCIUM mg/dL  --  9.5 9.6  --    < > 9.9   ALT (SGPT) U/L  --   --   --   --   --  27   AST (SGOT) U/L  --   --   --   --   --  26   TROPONIN T ng/mL <0.010 <0.010 <0.010   < >  --  <0.010   PROBNP pg/mL  --  47.3  --   --    < > 8.5   PROCALCITONIN ng/mL 0.05  --   --   --   --   --     < > = values in this interval not displayed.     Estimated Creatinine Clearance: 114.3 mL/min (by C-G formula based on SCr of 0.76 mg/dL).  Brief Urine Lab Results  (Last result in the past 365 days)      Color   Clarity   Blood   Leuk Est   Nitrite   Protein   CREAT   Urine HCG        09/26/21 1417 Yellow Clear Trace Negative Negative Negative               Microbiology Results (last 10 days)     Procedure Component Value - Date/Time    COVID PRE-OP / PRE-PROCEDURE SCREENING ORDER (NO ISOLATION) - Swab, Nasopharynx [[id number]]  (Normal) Collected: 09/26/21 0049    Lab Status: Final result Specimen: Swab from Nasopharynx Updated: 09/26/21 0114    Narrative:      The following orders were created for panel order COVID PRE-OP / PRE-PROCEDURE SCREENING ORDER (NO ISOLATION) - Swab, Nasopharynx.  Procedure                               Abnormality         Status                     ---------                               -----------         ------                     COVID-19,CEPHEID/EVI/BD...[[id number]]  Normal              Final result                 Please view results for these tests on the individual orders.    COVID-19,CEPHEID/EVI/BDMAX,COR/WILLIAM/PAD/JEFFY IN-HOUSE(OR EMERGENT/ADD-ON),NP SWAB IN TRANSPORT MEDIA 3-4 HR TAT, RT-PCR - Swab, Nasopharynx [[id number]]  (Normal) Collected: 09/26/21 0049    Lab Status: Final result Specimen:  Swab from Nasopharynx Updated: 09/26/21 0114     COVID19 Not Detected    Narrative:      Fact sheet for providers: https://www.fda.gov/media/537638/download     Fact sheet for patients: https://www.fda.gov/media/977834/download  Fact sheet for providers: https://www.fda.gov/media/969490/download    Fact sheet for patients: https://www.fda.gov/media/130818/download    Test performed by PCR.    Respiratory Panel PCR w/COVID-19(SARS-CoV-2) LEONARDO/ROSCOE/WILLIAM/PAD/COR/MAD/MISA In-House, NP Swab in UTM/VTM, 3-4 HR TAT - Swab, Nasopharynx [[id number]]  (Normal) Collected: 09/23/21 1429    Lab Status: Final result Specimen: Swab from Nasopharynx Updated: 09/23/21 1525     ADENOVIRUS, PCR Not Detected     Coronavirus 229E Not Detected     Coronavirus HKU1 Not Detected     Coronavirus NL63 Not Detected     Coronavirus OC43 Not Detected     COVID19 Not Detected     Human Metapneumovirus Not Detected     Human Rhinovirus/Enterovirus Not Detected     Influenza A PCR Not Detected     Influenza B PCR Not Detected     Parainfluenza Virus 1 Not Detected     Parainfluenza Virus 2 Not Detected     Parainfluenza Virus 3 Not Detected     Parainfluenza Virus 4 Not Detected     RSV, PCR Not Detected     Bordetella pertussis pcr Not Detected     Bordetella parapertussis PCR Not Detected     Chlamydophila pneumoniae PCR Not Detected     Mycoplasma pneumo by PCR Not Detected    Narrative:      In the setting of a positive respiratory panel with a viral infection PLUS a negative procalcitonin without other underlying concern for bacterial infection, consider observing off antibiotics or discontinuation of antibiotics and continue supportive care. If the respiratory panel is positive for atypical bacterial infection (Bordetella pertussis, Chlamydophila pneumoniae, or Mycoplasma pneumoniae), consider antibiotic de-escalation to target atypical bacterial infection.          ECG/EMG Results (most recent)     Procedure Component Value Units Date/Time     ECG 12 Lead [[id number]] Collected: 09/25/21 2100     Updated: 09/26/21 0627     QT Interval 371 ms     Narrative:      HEART RATE= 65  bpm  RR Interval= 924  ms  IA Interval= 114  ms  P Horizontal Axis= 1  deg  P Front Axis= 42  deg  QRSD Interval= 95  ms  QT Interval= 371  ms  QRS Axis= 2  deg  T Wave Axis= 37  deg  - NORMAL ECG -  Sinus rhythm  When compared with ECG of 23-Sep-2021 14:17:33,  Significant rate decrease  Significant repolarization change  Significant axis, voltage or hypertrophy change  Electronically Signed By: Lito Gann (WILLIAM) 26-Sep-2021 06:26:55  Date and Time of Study: 2021-09-25 21:00:53    Adult Transthoracic Echo Complete w/ Color, Spectral and Contrast if necessary per protocol [[id number]] Collected: 09/26/21 0801     Updated: 09/26/21 1527     BSA 2.0 m^2      RVIDd 3.0 cm      IVSd 1.0 cm      LVIDd 5.1 cm      LVIDs 3.6 cm      LVPWd 1.1 cm      IVS/LVPW 0.92     FS 29.4 %      EDV(Teich) 122.3 ml      ESV(Teich) 53.8 ml      EF(Teich) 56.0 %      EDV(cubed) 130.5 ml      ESV(cubed) 45.9 ml      EF(cubed) 64.8 %      LV mass(C)d 200.9 grams      LV mass(C)dI 101.2 grams/m^2      SV(Teich) 68.5 ml      SI(Teich) 34.5 ml/m^2      SV(cubed) 84.6 ml      SI(cubed) 42.6 ml/m^2      Ao root diam 3.4 cm      Ao root area 9.0 cm^2      LVOT diam 2.3 cm      LVOT area 4.0 cm^2      EDV(MOD-sp4) 110.1 ml      ESV(MOD-sp4) 54.9 ml      EF(MOD-sp4) 50.1 %      SV(MOD-sp4) 55.2 ml      SI(MOD-sp4) 27.8 ml/m^2      Ao root area (BSA corrected) 1.7     LV Lees Vol (BSA corrected) 55.4 ml/m^2      LV Sys Vol (BSA corrected) 27.7 ml/m^2      Aortic R-R 1.3 sec      Aortic HR 46.4 BPM      MV E max leila 118.4 cm/sec      MV A max leila 50.4 cm/sec      MV E/A 2.3     MV V2 max 125.8 cm/sec      MV max PG 6.3 mmHg      MV V2 mean 45.3 cm/sec      MV mean PG 1.3 mmHg      MV V2 VTI 36.1 cm      MVA(VTI) 2.4 cm^2      MV dec slope 498.9 cm/sec^2      MV dec time 0.24 sec      Ao pk leila 124.6 cm/sec      Ao  max PG 6.2 mmHg      Ao max PG (full) 2.9 mmHg      Ao V2 mean 88.5 cm/sec      Ao mean PG 3.5 mmHg      Ao mean PG (full) 1.6 mmHg      Ao V2 VTI 30.0 cm      DRISS(I,A) 2.9 cm^2      DRISS(I,D) 2.9 cm^2      DRISS(V,A) 2.9 cm^2      DRISS(V,D) 2.9 cm^2      LV V1 max PG 3.3 mmHg      LV V1 mean PG 1.8 mmHg      LV V1 max 91.1 cm/sec      LV V1 mean 63.7 cm/sec      LV V1 VTI 21.6 cm      CO(Ao) 12.5 l/min      CI(Ao) 6.3 l/min/m^2      SV(Ao) 268.6 ml      SI(Ao) 135.2 ml/m^2      CO(LVOT) 4.0 l/min      CI(LVOT) 2.0 l/min/m^2      SV(LVOT) 86.4 ml      SI(LVOT) 43.5 ml/m^2      PA V2 max 91.4 cm/sec      PA max PG 3.3 mmHg      PA max PG (full) 1.6 mmHg      RV V1 max PG 1.8 mmHg      RV V1 mean PG 1.0 mmHg      RV V1 max 66.5 cm/sec      RV V1 mean 48.1 cm/sec      RV V1 VTI 17.9 cm      TR max missael 219.4 cm/sec      RVSP(TR) 22.3 mmHg      RAP systole 3.0 mmHg      Pulm Sys Missael 53.8 cm/sec      Pulm Lees Missael 54.8 cm/sec      Pulm S/D 0.98     Pulm A Revs Dur 0.06 sec      Pulm A Revs Missael 20.1 cm/sec       CV ECHO KEN - BZI_BMI 37.7 kilograms/m^2       CV ECHO KEN - BSA(HAYCOCK) 2.1 m^2       CV ECHO KEN - BZI_METRIC_WEIGHT 96.6 kg       CV ECHO KEN - BZI_METRIC_HEIGHT 160.0 cm      LA dimension(2D) 3.7 cm     Narrative:      · Left ventricular systolic function is normal.  · Left ventricle ejection fraction is 55%  · Left ventricular diastolic function was normal.                 Results for orders placed during the hospital encounter of 09/25/21    Adult Transthoracic Echo Complete w/ Color, Spectral and Contrast if necessary per protocol    Interpretation Summary  · Left ventricular systolic function is normal.  · Left ventricle ejection fraction is 55%  · Left ventricular diastolic function was normal.      CT Head Without Contrast    Result Date: 9/23/2021  No acute intracranial abnormality. Brain MRI is more sensitive to evaluate for acute or subacute infarcts and to evaluate for intracranial metastatic  disease.  Electronically Signed By-Melinda Lovett MD On:9/23/2021 4:26 PM This report was finalized on [id number] by  Melinda Lovett MD.    CT Chest Without Contrast Diagnostic    Result Date: 9/25/2021  1.  No acute abnormality within limitations of unenhanced exam. 2.  Small sliding hiatal hernia. Electronically signed by:  Umberto Coughlin M.D.  9/25/2021 9:51 PM    MRI Brain Without Contrast    Result Date: 9/23/2021   1. Normal MRI of the brain.  Electronically Signed By-Jasmine Avendano MD On:9/23/2021 6:51 PM This report was finalized on [id number] by  Jasmine Avendano MD.    XR Chest 1 View    Result Date: 9/23/2021  No acute cardiopulmonary abnormality.  Electronically Signed By-Melinda Lovett MD On:9/23/2021 2:46 PM This report was finalized on [id number] by  Melinda Lovett MD.        Estimated Creatinine Clearance: 114.3 mL/min (by C-G formula based on SCr of 0.76 mg/dL).    Assessment/Plan   Assessment/Plan       Active Hospital Problems    Diagnosis  POA   • Dyspnea [R06.00]  Yes   • Hypertension [I10]  Unknown   • Bradycardia, sinus [R00.1]  Unknown   • Dyspnea on exertion [R06.00]  Yes      Resolved Hospital Problems   No resolved problems to display.       Dyspnea  -Patient recent cardiac work-up with negative serial troponins and a stress test which was reported by interpreting provider is low risk.  She was noted to have episodes of tachycardia and her phentermine was discontinued.  She was started on a short course of prednisone as well as albuterol, metoprolol and her amlodipine and hydrochlorothiazide were discontinued  -EKG at this admission shows sinus rhythm at 65 without obvious acute ST changes or ectopy and a QTC of 386 ms  -Echocardiogram showed normal left ventricular systolic function with an EF of 55% and normal diastolic function  -During echocardiogram patient reported by technician is having a heart rate of 33.  She denies significant symptoms with this change in rate  -Cardiology  consulted, will see previously established cardiologist in a.m. for discussion about possible cath  -Continue cardiac monitoring  -Possible cath in a.m. versus discharge with outpatient procedure  -Metoprolol held     Leukocytosis/UTI  -WBCs: 19.30 with an increased absolute neutrophil count noted on differential  -Patient currently receiving prednisone therapy which was started at recent discharge  -Check procalcitonin  -UA did show 2+ bacteria with some signs of infection but specimen was contaminated, repeat showed 1+ bacteria with 3-5 WBCs, 3-5 RBCs and trace blood  -Rocephin started  -Monitor while admitted     Hypertension  -Well controlled with most recent blood pressure of 115/71  -Continue amlodipine and hydrochlorothiazide  -Monitor while admitted     Anxiety/depression  -Continue Zoloft  -As needed Atarax     Chronic pain  -Oxycodone     Obesity (BMI: 37.84)  -Encourage diet lifestyle modifications          VTE Prophylaxis -   Mechanical Order History:      Ordered        09/26/21 0043  Place Sequential Compression Device  Once         09/26/21 0043  Maintain Sequential Compression Device  Continuous                 Pharmalogical Order History:     None          CODE STATUS:    Code Status and Medical Interventions:   Ordered at: 09/26/21 0043     Code Status:    CPR     Medical Interventions (Level of Support Prior to Arrest):    Full       This patient has been examined wearing personal protective equipment.     I discussed the patient's findings and my recommendations with patient and nursing staff.      Signature:Electronically signed by Justin Masterson PA-C, 09/26/21, 5:08 PM EDT.

## 2021-09-26 NOTE — PROGRESS NOTES
[facility] [place] CARDIOLOGY PROGRESS NOTE    Reason for follow-up:   Chest pain, dyspnea   Tachycardia  NEW PROBLEM BRADYCARDIA          Patient Care Team:  Marifer Cooper MD as PCP - General (Internal Medicine)      INTERVAL HISTORY:  Patient was seen 09/24/2021 during nuclear stress test, patient had abnormal EKG with ST depression  with nuclear medication, images did not show ischemia.  Patient was started on metoprolol 50mg BID.    09/26/21 patient presents back to the ED with continued dyspnea, flank pain.  Bradycardia noted during echocardiogram.     Subjective .   Patient was seen and examined.  Chart and Labs reviewed Discussed with patient and bedside nurse.   Continues to have dyspnea.  Some chest pressure feeling like she needs to take a deep breath.        Review of Systems   Constitutional: Positive for malaise/fatigue. Negative for fever.   Cardiovascular: Positive for dyspnea on exertion and leg swelling. Negative for irregular heartbeat and palpitations.        Chest pressure    Respiratory: Positive for shortness of breath. Negative for cough.    Gastrointestinal: Negative for nausea and vomiting.   Genitourinary: Positive for flank pain.   Neurological: Negative for weakness.   Psychiatric/Behavioral: The patient is nervous/anxious.    All other systems reviewed and are negative.      Allergies:  Patient has no known allergies.    Scheduled Meds:sodium chloride, 10 mL, Intravenous, Q12H      Continuous Infusions:   PRN Meds:.•  nitroglycerin  •  sodium chloride    Objective    Lying in bed no distress noted     VITAL SIGNS  Vitals:    09/26/21 0150 09/26/21 0640 09/26/21 0759 09/26/21 1041   BP: 118/78 100/62 100/62 102/67   BP Location: Right arm Right arm  Right arm   Patient Position: Lying Lying  Lying   Pulse: 58 51  68   Resp: 16 16  14   Temp: 97.6 °F (36.4 °C) 98.9 °F (37.2 °C)  98 °F (36.7 °C)   TempSrc: Oral Oral  Oral   SpO2: 99% 97%  99%   Weight: 96.8 kg (213 lb 6.5 oz)  96.6 kg (213 lb)   "  Height: 160 cm (62.99\")  160 cm (63\")        Flowsheet Rows      First Filed Value   Admission Height  160 cm (63\") Documented at 09/25/2021 2053   Admission Weight  97.5 kg (215 lb) Documented at 09/25/2021 2053           TELEMETRY: bradycardia rate 50s    Physical Exam:  Vitals and nursing note reviewed.   Constitutional:       Appearance: Healthy appearance. Not in distress. Obese.   Neck:      Vascular: No JVR. JVD normal.   Pulmonary:      Effort: Pulmonary effort is normal.      Breath sounds: Normal breath sounds. No wheezing. No rhonchi. No rales.   Chest:      Chest wall: Not tender to palpatation.   Cardiovascular:      PMI at left midclavicular line. Bradycardia present. Regular rhythm. Normal S1. Normal S2.      Murmurs: There is no murmur.      No gallop. No click. No rub.   Pulses:     Intact distal pulses.   Edema:     Peripheral edema absent.   Abdominal:      General: Bowel sounds are normal.      Palpations: Abdomen is soft.      Tenderness: There is no abdominal tenderness.   Musculoskeletal: Normal range of motion.         General: No tenderness. Skin:     General: Skin is warm and dry.   Neurological:      General: No focal deficit present.      Mental Status: Alert and oriented to person, place and time.                  LAB RESULTS (LAST 7 DAYS)    CBC  Results from last 7 days   Lab Units 09/25/21 2241 09/24/21  0634 09/23/21  1428   WBC 10*3/mm3 19.30* 13.80* 10.40   RBC 10*6/mm3 4.45 4.63 5.08   HEMOGLOBIN g/dL 13.4 14.3 15.5   HEMATOCRIT % 39.0 41.1 43.9   MCV fL 87.7 88.8 86.4   PLATELETS 10*3/mm3 355 345 373       BMP  Results from last 7 days   Lab Units 09/25/21 2241 09/24/21  0634 09/23/21  1428   SODIUM mmol/L 138 137 137   POTASSIUM mmol/L 3.9 3.8 3.4*   CHLORIDE mmol/L 104 102 100   CO2 mmol/L 18.0* 19.0* 19.0*   BUN mg/dL 19 15 10   CREATININE mg/dL 0.76 0.79 1.02*   GLUCOSE mg/dL 124* 181* 102*   MAGNESIUM mg/dL  --  1.8 1.7       CMP   Results from last 7 days   Lab Units " 09/25/21  2241 09/24/21  0634 09/23/21  1428   SODIUM mmol/L 138 137 137   POTASSIUM mmol/L 3.9 3.8 3.4*   CHLORIDE mmol/L 104 102 100   CO2 mmol/L 18.0* 19.0* 19.0*   BUN mg/dL 19 15 10   CREATININE mg/dL 0.76 0.79 1.02*   GLUCOSE mg/dL 124* 181* 102*   ALBUMIN g/dL  --   --  4.80   BILIRUBIN mg/dL  --   --  0.7   ALK PHOS U/L  --   --  93   AST (SGOT) U/L  --   --  26   ALT (SGPT) U/L  --   --  27         BNP        TROPONIN  Results from last 7 days   Lab Units 09/26/21  0531   TROPONIN T ng/mL <0.010       CoAg  Results from last 7 days   Lab Units 09/23/21  1428   INR  1.02   APTT seconds 25.9       Creatinine Clearance  Estimated Creatinine Clearance: 114.3 mL/min (by C-G formula based on SCr of 0.76 mg/dL).    ABG        Radiology  CT Chest Without Contrast Diagnostic    Result Date: 9/25/2021  1.  No acute abnormality within limitations of unenhanced exam. 2.  Small sliding hiatal hernia. Electronically signed by:  Umberto Coughlin M.D.  9/25/2021 9:51 PM            EKG        I personally viewed and interpreted the patient's EKG/Telemetry data:    ECHOCARDIOGRAM:      STRESS MYOVIEW:    CARDIAC CATHETERIZATION:    OTHER:         Assessment/Plan       Dyspnea on exertion    Dyspnea    Hypertension    Bradycardia, sinus  Chest pressure       PLAN:  Patient presented with chest pain, shortness of breath palpitations on 09/24/2021  Patient was hypertensive --- on amlodipine and HCTZ at home   EKG showed sinus tachycardia LVH  Stress myoview EKG with changes noted  SPECT images negative for ischemia     Stop home amlodipine and HCTZ  Started metoprolol 50mg BID on 09/24/2021    Patient presented back to the ED 09/25/2021 continued dyspnea   Bradycardia now noted HR 30s-60s on telemetry and mildly hypotensive   Electrolytes and TSH normal     Discontinue metoprolol for now         Echocardiogram to assess LV Function and valve abnormalities     CT chest negative for PE-- shows small hiatal hernia      Review of  medications shows patient is on phentermine at home---- would not recommend restarting this on discharge  Discussed with patient to decrease caffeine intake        Consideration for cardiac mobile telemetry       Further recommendations per DR. Polo    Patient is seen and examined and findings are verified.  Patient is presented with chest pain and shortness of breath.    Hemodynamics are stable.  Patient was noted to be bradycardic.  At this stage I would recommend no further cardiac work-up however I would talk with primary cardiologist for possibility of cardiac catheterization if she continues to have chest pain.  Patient has significant stress at home this could be due to anxiety.  She has fight with her .    I discussed the patients findings and my recommendations with patient and bedside nurse     Arvin Polo MD  09/26/21  13:22 EDT   Electronically signed by SAM Knox, 09/26/21, 11:04 AM EDT.

## 2021-09-26 NOTE — ED PROVIDER NOTES
Subjective   History:  Patient is a 35-year-old female presents with worsening dyspnea.  Patient was just discharged yesterday for similar.  She had fairly thorough work-up but reports that she feels like she cannot catch her breath.  No fevers chills nausea or vomiting.  Also concern for bilateral flank pain and history of kidney stones and hematuria.  She reports she is not been the same since she had a stent placement and surgery this summer.    Onset: Several days  Location: Chest  Duration: Constant  Character: Dyspnea  Aggravating/Alleviating factors: None  Radiation none  Severity: Moderate            Review of Systems   Constitutional: Negative for chills, diaphoresis, fatigue and fever.   Respiratory: Positive for shortness of breath. Negative for cough and choking.    Cardiovascular: Negative for chest pain.   Gastrointestinal: Negative for abdominal distention and abdominal pain.   Musculoskeletal: Negative.    Skin: Negative.    Neurological: Negative.    Psychiatric/Behavioral: Negative.        Past Medical History:   Diagnosis Date   • Anxiety    • Depression    • Hypertension    • Kidney stones        No Known Allergies    Past Surgical History:   Procedure Laterality Date   • COLONOSCOPY     • CYSTOSCOPY, URETEROSCOPY, RETROGRADE PYELOGRAM, STENT INSERTION Left 7/16/2021    Procedure: CYSTOSCOPY URETEROSCOPY RETROGRADE PYELOGRAM HOLMIUM LASER BASKET STONE EXTRACTION STENT INSERTION;  Surgeon: Carmelo Nguyễn MD;  Location: [facility] MAIN OR;  Service: Urology;  Laterality: Left;   • EAR TUBES     • KIDNEY STONE SURGERY         Family History   Problem Relation Age of Onset   • Breast cancer Neg Hx    • Ovarian cancer Neg Hx    • Uterine cancer Neg Hx    • Colon cancer Neg Hx    • Deep vein thrombosis Neg Hx    • Pulmonary embolism Neg Hx        Social History     Socioeconomic History   • Marital status:      Spouse name: Not on file   • Number of children: Not on file   • Years of education: Not  on file   • Highest education level: Not on file   Tobacco Use   • Smoking status: Never Smoker   • Smokeless tobacco: Never Used   Vaping Use   • Vaping Use: Never used   • Passive vaping exposure Yes   Substance and Sexual Activity   • Alcohol use: No   • Drug use: Never   • Sexual activity: Yes     Partners: Male     Birth control/protection: I.U.D.           Objective   Physical Exam  Vitals and nursing note reviewed.   Constitutional:       Appearance: She is well-developed.   HENT:      Head: Normocephalic and atraumatic.   Eyes:      Pupils: Pupils are equal, round, and reactive to light.   Cardiovascular:      Rate and Rhythm: Normal rate and regular rhythm.   Pulmonary:      Effort: Pulmonary effort is normal.      Breath sounds: Normal breath sounds.   Musculoskeletal:         General: Normal range of motion.      Cervical back: Normal range of motion.   Skin:     General: Skin is warm and dry.   Neurological:      Mental Status: She is alert and oriented to person, place, and time.   Psychiatric:         Behavior: Behavior normal.         Procedures           ED Course  ED Course as of Sep 26 0046   Sun Sep 26, 2021   0045 Discussed this case at length with Dr. Gann.  Patient's oxygen saturations remain 100%.  Dr. Gann would like this patient evaluated with an echo and admitted to [facility].  I did discuss with the patient concern that this may related to anxiety.  We will admit the patient though to rule out any cardiac or pulmonary abnormalities.    [MG]      ED Course User Index  [MG] Jordyn Sevilla, NEIL           No radiology results for the last day  Labs Reviewed   BASIC METABOLIC PANEL - Abnormal; Notable for the following components:       Result Value    Glucose 124 (*)     CO2 18.0 (*)     Anion Gap 16.0 (*)     All other components within normal limits    Narrative:     GFR Normal >60  Chronic Kidney Disease <60  Kidney Failure <15     URINALYSIS W/ MICROSCOPIC IF INDICATED (NO CULTURE) -  Abnormal; Notable for the following components:    Blood, UA Moderate (2+) (*)     All other components within normal limits   CBC WITH AUTO DIFFERENTIAL - Abnormal; Notable for the following components:    WBC 19.30 (*)     Neutrophil % 81.9 (*)     Lymphocyte % 12.4 (*)     Monocyte % 4.8 (*)     Neutrophils, Absolute 15.80 (*)     All other components within normal limits   URINALYSIS, MICROSCOPIC ONLY - Abnormal; Notable for the following components:    RBC, UA 0-2 (*)     WBC, UA 0-2 (*)     Bacteria, UA 2+ (*)     Squamous Epithelial Cells, UA 3-6 (*)     All other components within normal limits   TROPONIN (IN-HOUSE) - Normal    Narrative:     Troponin T Reference Range:  <= 0.03 ng/mL-   Negative for AMI  >0.03 ng/mL-     Abnormal for myocardial necrosis.  Clinicians would have to utilize clinical acumen, EKG, Troponin and serial changes to determine if it is an Acute Myocardial Infarction or myocardial injury due to an underlying chronic condition.       Results may be falsely decreased if patient taking Biotin.     BNP (IN-HOUSE) - Normal    Narrative:     Among patients with dyspnea, NT-proBNP is highly sensitive for the detection of acute congestive heart failure. In addition NT-proBNP of <300 pg/ml effectively rules out acute congestive heart failure with 99% negative predictive value.    Results may be falsely decreased if patient taking Biotin.     COVID PRE-OP / PRE-PROCEDURE SCREENING ORDER (NO ISOLATION)    Narrative:     The following orders were created for panel order COVID PRE-OP / PRE-PROCEDURE SCREENING ORDER (NO ISOLATION) - Swab, Nasopharynx.  Procedure                               Abnormality         Status                     ---------                               -----------         ------                     COVID-19,CEPHEID/EVI/BD...[[id number]]                                                   Please view results for these tests on the individual orders.    COVID-19,CEPHEID/EVI/BDMAX,COR/WILLIAM/PAD/JEFFY IN-HOUSE,NP SWAB IN TRANSPORT MEDIA 3-4 HR TAT, RT-PCR   CBC AND DIFFERENTIAL    Narrative:     The following orders were created for panel order CBC & Differential.  Procedure                               Abnormality         Status                     ---------                               -----------         ------                     CBC Auto Differential[[id number]]        Abnormal            Final result                 Please view results for these tests on the individual orders.   EXTRA TUBES    Narrative:     The following orders were created for panel order Extra Tubes.  Procedure                               Abnormality         Status                     ---------                               -----------         ------                     Gold Top - SST[[id number]]                                   Final result               Light Blue Top[[id number]]                                   Final result                 Please view results for these tests on the individual orders.   GOLD TOP - SST   LIGHT BLUE TOP     Medications   sodium chloride 0.9 % flush 10 mL (has no administration in time range)   sodium chloride 0.9 % flush 10 mL (has no administration in time range)   nitroglycerin (NITROSTAT) SL tablet 0.4 mg (has no administration in time range)   sodium chloride 0.9 % bolus 500 mL (500 mL Intravenous New Bag 9/25/21 2246)   ipratropium-albuterol (DUO-NEB) nebulizer solution 3 mL (3 mL Nebulization Given 9/25/21 2300)                                     MDM  Number of Diagnoses or Management Options  Shortness of breath  Diagnosis management comments: I examined the patient using the appropriate personal protective equipment.      DISPOSITION:   Chart Review:  Comorbidity:  has a past medical history of Anxiety, Depression, Hypertension, and Kidney stones.    Imaging: Was interpreted by physician and reviewed by myself:  No radiology results for the last  day    Disposition/Treatment:  Patient is a 35-year-old female presents to the ER with increasing shortness of breath.  Is intermittent she feels like she cannot get a full breath in.  Patient does have 2+ bacteria and an elevation in her white blood cell count but she has been on steroids overnight.  She has no dysuria she has bilateral lower back pain but she reports this is ongoing for several months.  Dr. Gann evaluated this patient would like her admitted for ops with echo.  Patient was stable and in agreement with this plan.         Amount and/or Complexity of Data Reviewed  Clinical lab tests: reviewed  Tests in the radiology section of CPT®: reviewed  Tests in the medicine section of CPT®: reviewed  Decide to obtain previous medical records or to obtain history from someone other than the patient: yes    Patient Progress  Patient progress: stable      Final diagnoses:   Shortness of breath       ED Disposition  ED Disposition     ED Disposition Condition Comment    Decision to Admit            No follow-up provider specified.       Medication List      No changes were made to your prescriptions during this visit.          Jordyn Sevilla PA-C  09/26/21 0047

## 2021-09-26 NOTE — PLAN OF CARE
Problem: Adult Inpatient Plan of Care  Goal: Plan of Care Review  Outcome: Ongoing, Progressing  Goal: Patient-Specific Goal (Individualized)  Outcome: Ongoing, Progressing  Goal: Absence of Hospital-Acquired Illness or Injury  Outcome: Ongoing, Progressing  Goal: Optimal Comfort and Wellbeing  Outcome: Ongoing, Progressing  Goal: Readiness for Transition of Care  Outcome: Ongoing, Progressing     Problem: Chest Pain  Goal: Resolution of Chest Pain Symptoms  Outcome: Ongoing, Progressing   Goal Outcome Evaluation:

## 2021-09-26 NOTE — ED NOTES
Patient reports that she was discharged yesterday and was sent home with inhaler not working been out all day at party and became increased SOA with chest pressure.      Trish Muller, DIETER  09/25/21 1188

## 2021-09-27 ENCOUNTER — APPOINTMENT (OUTPATIENT)
Dept: RESPIRATORY THERAPY | Facility: HOSPITAL | Age: 36
End: 2021-09-27

## 2021-09-27 VITALS
HEART RATE: 78 BPM | SYSTOLIC BLOOD PRESSURE: 107 MMHG | WEIGHT: 213 LBS | DIASTOLIC BLOOD PRESSURE: 70 MMHG | BODY MASS INDEX: 37.74 KG/M2 | TEMPERATURE: 97.7 F | OXYGEN SATURATION: 96 % | RESPIRATION RATE: 14 BRPM | HEIGHT: 63 IN

## 2021-09-27 PROBLEM — R06.00 DYSPNEA: Status: RESOLVED | Noted: 2021-09-26 | Resolved: 2021-09-27

## 2021-09-27 LAB
ANION GAP SERPL CALCULATED.3IONS-SCNC: 11 MMOL/L (ref 5–15)
BASOPHILS # BLD AUTO: 0.1 10*3/MM3 (ref 0–0.2)
BASOPHILS NFR BLD AUTO: 0.6 % (ref 0–1.5)
BUN SERPL-MCNC: 18 MG/DL (ref 6–20)
BUN/CREAT SERPL: 19.4 (ref 7–25)
CALCIUM SPEC-SCNC: 8.8 MG/DL (ref 8.6–10.5)
CHLORIDE SERPL-SCNC: 107 MMOL/L (ref 98–107)
CO2 SERPL-SCNC: 24 MMOL/L (ref 22–29)
CREAT SERPL-MCNC: 0.93 MG/DL (ref 0.57–1)
DEPRECATED RDW RBC AUTO: 42 FL (ref 37–54)
EOSINOPHIL # BLD AUTO: 0.1 10*3/MM3 (ref 0–0.4)
EOSINOPHIL NFR BLD AUTO: 0.9 % (ref 0.3–6.2)
ERYTHROCYTE [DISTWIDTH] IN BLOOD BY AUTOMATED COUNT: 13.2 % (ref 12.3–15.4)
GFR SERPL CREATININE-BSD FRML MDRD: 69 ML/MIN/1.73
GLUCOSE SERPL-MCNC: 102 MG/DL (ref 65–99)
HCT VFR BLD AUTO: 37.8 % (ref 34–46.6)
HGB BLD-MCNC: 12.6 G/DL (ref 12–15.9)
LYMPHOCYTES # BLD AUTO: 5.4 10*3/MM3 (ref 0.7–3.1)
LYMPHOCYTES NFR BLD AUTO: 47.5 % (ref 19.6–45.3)
MCH RBC QN AUTO: 30 PG (ref 26.6–33)
MCHC RBC AUTO-ENTMCNC: 33.4 G/DL (ref 31.5–35.7)
MCV RBC AUTO: 89.9 FL (ref 79–97)
MONOCYTES # BLD AUTO: 0.9 10*3/MM3 (ref 0.1–0.9)
MONOCYTES NFR BLD AUTO: 8 % (ref 5–12)
NEUTROPHILS NFR BLD AUTO: 4.9 10*3/MM3 (ref 1.7–7)
NEUTROPHILS NFR BLD AUTO: 43 % (ref 42.7–76)
NRBC BLD AUTO-RTO: 0.1 /100 WBC (ref 0–0.2)
PLATELET # BLD AUTO: 289 10*3/MM3 (ref 140–450)
PMV BLD AUTO: 8 FL (ref 6–12)
POTASSIUM SERPL-SCNC: 3.9 MMOL/L (ref 3.5–5.2)
RBC # BLD AUTO: 4.2 10*6/MM3 (ref 3.77–5.28)
SODIUM SERPL-SCNC: 142 MMOL/L (ref 136–145)
WBC # BLD AUTO: 11.3 10*3/MM3 (ref 3.4–10.8)

## 2021-09-27 PROCEDURE — 85025 COMPLETE CBC W/AUTO DIFF WBC: CPT | Performed by: PHYSICIAN ASSISTANT

## 2021-09-27 PROCEDURE — 99214 OFFICE O/P EST MOD 30 MIN: CPT | Performed by: INTERNAL MEDICINE

## 2021-09-27 PROCEDURE — G0378 HOSPITAL OBSERVATION PER HR: HCPCS

## 2021-09-27 PROCEDURE — 80048 BASIC METABOLIC PNL TOTAL CA: CPT | Performed by: PHYSICIAN ASSISTANT

## 2021-09-27 RX ORDER — NITROFURANTOIN 25; 75 MG/1; MG/1
100 CAPSULE ORAL 2 TIMES DAILY
Qty: 14 CAPSULE | Refills: 0 | Status: SHIPPED | OUTPATIENT
Start: 2021-09-27 | End: 2021-10-04

## 2021-09-27 RX ADMIN — METOPROLOL TARTRATE 25 MG: 25 TABLET, FILM COATED ORAL at 10:33

## 2021-09-27 RX ADMIN — CYANOCOBALAMIN TAB 1000 MCG 1000 MCG: 1000 TAB at 08:24

## 2021-09-27 RX ADMIN — SERTRALINE 50 MG: 50 TABLET, FILM COATED ORAL at 08:24

## 2021-09-27 RX ADMIN — Medication 10 ML: at 08:46

## 2021-09-27 NOTE — PROGRESS NOTES
Reason for follow-up:   Chest pain, dyspnea   Tachycardia  NEW PROBLEM BRADYCARDIA          Patient Care Team:  Marifer Cooper MD as PCP - General (Internal Medicine)      INTERVAL HISTORY:  Patient was seen 09/24/2021 during nuclear stress test, patient had abnormal EKG with ST depression  with nuclear medication, images did not show ischemia.  Patient was started on metoprolol 50mg BID.    09/26/21 patient presents back to the ED with continued dyspnea, flank pain.  Bradycardia noted during echocardiogram.     Subjective .   Patient was seen and examined.  Chart and Labs reviewed Discussed with patient and bedside nurse.   Continues to have dyspnea.  Some chest pressure feeling like she needs to take a deep breath.        Review of Systems   Constitutional: Positive for malaise/fatigue. Negative for fever.   Cardiovascular: Positive for dyspnea on exertion and leg swelling. Negative for irregular heartbeat and palpitations.        Chest pressure    Respiratory: Positive for shortness of breath. Negative for cough.    Gastrointestinal: Negative for nausea and vomiting.   Genitourinary: Positive for flank pain.   Neurological: Negative for weakness.   Psychiatric/Behavioral: The patient is nervous/anxious.    All other systems reviewed and are negative.      Allergies:  Patient has no known allergies.    Scheduled Meds:cefTRIAXone, 1 g, Intravenous, Q24H  metoprolol tartrate, 25 mg, Oral, BID  sertraline, 50 mg, Oral, Daily  sodium chloride, 10 mL, Intravenous, Q12H  vitamin B-12, 1,000 mcg, Oral, Daily      Continuous Infusions:   PRN Meds:.hydrOXYzine  •  nitroglycerin  •  sodium chloride    Objective    Lying in bed no distress noted     VITAL SIGNS  Vitals:    09/26/21 1801 09/26/21 2100 09/26/21 2330 09/27/21 0702   BP: 112/76 117/81 105/72 107/70   BP Location: Right arm  Right arm Left arm   Patient Position: Lying  Lying Lying   Pulse: 62  78    Resp: 18 18 15 14   Temp: 97.9 °F (36.6 °C) 97.9  "°F (36.6 °C) 98.6 °F (37 °C) 97.7 °F (36.5 °C)   TempSrc: Oral Oral Oral Axillary   SpO2: 97% 97% 95% 96%   Weight:       Height:           Flowsheet Rows      First Filed Value   Admission Height  160 cm (63\") Documented at 09/25/2021 2053   Admission Weight  97.5 kg (215 lb) Documented at 09/25/2021 2053           TELEMETRY: bradycardia rate 50s    Physical Exam:  Vitals and nursing note reviewed.   Constitutional:       Appearance: Healthy appearance. Not in distress. Obese.   Neck:      Vascular: No JVR. JVD normal.   Pulmonary:      Effort: Pulmonary effort is normal.      Breath sounds: Normal breath sounds. No wheezing. No rhonchi. No rales.   Chest:      Chest wall: Not tender to palpatation.   Cardiovascular:      PMI at left midclavicular line. Bradycardia present. Regular rhythm. Normal S1. Normal S2.      Murmurs: There is no murmur.      No gallop. No click. No rub.   Pulses:     Intact distal pulses.   Edema:     Peripheral edema absent.   Abdominal:      General: Bowel sounds are normal.      Palpations: Abdomen is soft.      Tenderness: There is no abdominal tenderness.   Musculoskeletal: Normal range of motion.         General: No tenderness. Skin:     General: Skin is warm and dry.   Neurological:      General: No focal deficit present.      Mental Status: Alert and oriented to person, place and time.                  LAB RESULTS (LAST 7 DAYS)    CBC  Results from last 7 days   Lab Units 09/27/21 0524 09/25/21 2241 09/24/21  0634 09/23/21  1428   WBC 10*3/mm3 11.30* 19.30* 13.80* 10.40   RBC 10*6/mm3 4.20 4.45 4.63 5.08   HEMOGLOBIN g/dL 12.6 13.4 14.3 15.5   HEMATOCRIT % 37.8 39.0 41.1 43.9   MCV fL 89.9 87.7 88.8 86.4   PLATELETS 10*3/mm3 289 355 345 373       BMP  Results from last 7 days   Lab Units 09/27/21  0524 09/25/21 2241 09/24/21  0634 09/23/21  1428   SODIUM mmol/L 142 138 137 137   POTASSIUM mmol/L 3.9 3.9 3.8 3.4*   CHLORIDE mmol/L 107 104 102 100   CO2 mmol/L 24.0 18.0* 19.0* " 19.0*   BUN mg/dL 18 19 15 10   CREATININE mg/dL 0.93 0.76 0.79 1.02*   GLUCOSE mg/dL 102* 124* 181* 102*   MAGNESIUM mg/dL  --   --  1.8 1.7       CMP   Results from last 7 days   Lab Units 09/27/21  0524 09/25/21  2241 09/24/21  0634 09/23/21  1428   SODIUM mmol/L 142 138 137 137   POTASSIUM mmol/L 3.9 3.9 3.8 3.4*   CHLORIDE mmol/L 107 104 102 100   CO2 mmol/L 24.0 18.0* 19.0* 19.0*   BUN mg/dL 18 19 15 10   CREATININE mg/dL 0.93 0.76 0.79 1.02*   GLUCOSE mg/dL 102* 124* 181* 102*   ALBUMIN g/dL  --   --   --  4.80   BILIRUBIN mg/dL  --   --   --  0.7   ALK PHOS U/L  --   --   --  93   AST (SGOT) U/L  --   --   --  26   ALT (SGPT) U/L  --   --   --  27         BNP        TROPONIN  Results from last 7 days   Lab Units 09/26/21  0531   TROPONIN T ng/mL <0.010       CoAg  Results from last 7 days   Lab Units 09/23/21  1428   INR  1.02   APTT seconds 25.9       Creatinine Clearance  Estimated Creatinine Clearance: 93.4 mL/min (by C-G formula based on SCr of 0.93 mg/dL).    ABG        Radiology  CT Chest Without Contrast Diagnostic    Result Date: 9/25/2021  1.  No acute abnormality within limitations of unenhanced exam. 2.  Small sliding hiatal hernia. Electronically signed by:  Umberto Coughlin M.D.  9/25/2021 9:51 PM            EKG        I personally viewed and interpreted the patient's EKG/Telemetry data:    ECHOCARDIOGRAM:    Results for orders placed during the hospital encounter of 09/25/21    Adult Transthoracic Echo Complete w/ Color, Spectral and Contrast if necessary per protocol    Interpretation Summary  · Left ventricular systolic function is normal.  · Left ventricle ejection fraction is 55%  · Left ventricular diastolic function was normal.    STRESS MYOVIEW:    CARDIAC CATHETERIZATION:    OTHER:         Assessment/Plan       Dyspnea on exertion    Dyspnea    Hypertension    Bradycardia, sinus  Chest pressure       PLAN:  Patient initially presented with chest pain shortness of breath and palpitations  and was ruled out for MI by EKG and enzymes  Patient had a stress Myoview did not show any ischemia  Patient had an echocardiogram showed normal LV systolic function  Patient was placed on beta-blockers and discharged home but she presents with shortness of breath and had some bradycardia with PVCs which is probably secondary to pseudobradycardia from the PVCs  Patient's heart is better when she is exerting or walking.  We will decrease the metoprolol to 25 mg twice daily and place a Holter monitor for 72 hours to watch her rhythm  Patient is under a lot of stress with family situation.  We will stop her amlodipine and hydrochlorothiazide from home.  Discussed with her and the nurse about the treatment plan.    Electronically signed by Austen Abarca MD, 09/27/21, 11:20 AM EDT.

## 2021-09-27 NOTE — PLAN OF CARE
Problem: Adult Inpatient Plan of Care  Goal: Plan of Care Review  Outcome: Ongoing, Progressing  Flowsheets (Taken 9/27/2021 0658)  Progress: improving  Plan of Care Reviewed With: patient  Outcome Summary: Pt's VSS on room air, no complaints through the night, will continue to monitor.  Goal: Patient-Specific Goal (Individualized)  Outcome: Ongoing, Progressing  Goal: Absence of Hospital-Acquired Illness or Injury  Outcome: Ongoing, Progressing  Intervention: Identify and Manage Fall Risk  Recent Flowsheet Documentation  Taken 9/27/2021 0500 by Ranke, Megan, RN  Safety Promotion/Fall Prevention:   safety round/check completed   room organization consistent   nonskid shoes/slippers when out of bed   clutter free environment maintained   assistive device/personal items within reach  Taken 9/27/2021 0300 by Ranke, Megan, RN  Safety Promotion/Fall Prevention:   safety round/check completed   room organization consistent   clutter free environment maintained   assistive device/personal items within reach   nonskid shoes/slippers when out of bed  Taken 9/27/2021 0100 by Ranke, Megan, RN  Safety Promotion/Fall Prevention:   safety round/check completed   room organization consistent   clutter free environment maintained   assistive device/personal items within reach   nonskid shoes/slippers when out of bed  Taken 9/26/2021 2300 by Ranke, Megan, RN  Safety Promotion/Fall Prevention:   safety round/check completed   room organization consistent   nonskid shoes/slippers when out of bed   assistive device/personal items within reach   clutter free environment maintained  Intervention: Prevent Infection  Recent Flowsheet Documentation  Taken 9/27/2021 0500 by Ranke, Megan, RN  Infection Prevention:   single patient room provided   rest/sleep promoted  Taken 9/27/2021 0300 by Ranke, Megan, RN  Infection Prevention:   single patient room provided   rest/sleep promoted  Taken 9/27/2021 0100 by Ranke, Megan, RN  Infection  Prevention:   single patient room provided   rest/sleep promoted  Taken 9/26/2021 2300 by Ranke, Megan, RN  Infection Prevention:   single patient room provided   rest/sleep promoted  Goal: Optimal Comfort and Wellbeing  Outcome: Ongoing, Progressing  Goal: Readiness for Transition of Care  Outcome: Ongoing, Progressing     Problem: Chest Pain  Goal: Resolution of Chest Pain Symptoms  Outcome: Ongoing, Progressing   Goal Outcome Evaluation:  Plan of Care Reviewed With: patient        Progress: improving  Outcome Summary: Pt's VSS on room air, no complaints through the night, will continue to monitor.

## 2021-09-27 NOTE — PLAN OF CARE
Goal Outcome Evaluation:  Plan of Care Reviewed With: patient        Progress: improving  Outcome Summary: Pt has no complaints. Holter monitor is placed. Will make appt to follow up with Dr. Abarca. Discharging home.

## 2021-09-27 NOTE — DISCHARGE SUMMARY
[facility]  DISCHARGE SUMMARY        Prepared For PCP:  Marifer Cooper MD    Patient Name: Ariadna Ambrocio  : 1985  MRN: [id number]      Date of Admission:   2021    Date of Discharge:  2021    Length of stay:  LOS: 0 days     Hospital Course     Presenting Problem:   Shortness of breath [R06.02]  Dyspnea [R06.00]      Active Hospital Problems    Diagnosis  POA   • Hypertension [I10]  Yes   • Bradycardia, sinus [R00.1]  Yes   • Dyspnea on exertion [R06.00]  Yes      Resolved Hospital Problems    Diagnosis Date Resolved POA   • Dyspnea [R06.00] 2021 Yes           Hospital Course:  Ariadna Ambrocio is a 35 y.o. female presented to the ER with a chief complaint of difficulty taking a deep breath without overt chest pain or shortness of breath.  The patient had recent evaluation with a stress Myoview which was negative for cardiac ischemia.  She was seen by cardiology who recommended decreasing Toprol all as patient had a short self-limiting episode of episode of bigeminy of PVCs with low heart rate.  Holter monitor x72 hours was ordered with plan follow-up with cardiology in 2 weeks.  Patient also describes urinary symptoms and was noted to have bacteriuria.  She received 1 dose IV Rocephin and will be discharged on nitrofurantoin 100 mg twice daily x7 days.  The patient was agreeable with the plan of discharge with continuation of her recently prescribed rescue inhaler, prednisone steroid burst, and follow-up with cardiology.      Recommendation for Outpatient Providers:             Reasons For Change In Medications and Indications for New Medications:        Day of Discharge     HPI:   Obtained from ED provider note on 2021:  Patient is a 35-year-old female presents with worsening dyspnea.  Patient was just discharged yesterday for similar.  She had fairly thorough work-up but reports that she feels like she cannot catch her breath.  No fevers chills nausea or vomiting.  " Also concern for bilateral flank pain and history of kidney stones and hematuria.  She reports she is not been the same since she had a stent placement and surgery this summer.     9/26/2021 (post observation admission): Patient Linus HPI noted above including a sudden onset of dyspnea that occurred at approximately 1700 hrs. on 9/25/2021 described as \"just needing to take deep breaths\".  She reports she was with her children at a restaurant but denies any obvious stress or anxiety at the time of onset.  She did note some peripheral paresthesias similar to her previous incidents at the time of this event.  Some nausea without vomiting was also present.  Patient is still a somewhat vague historian and has concerns about whether her symptoms may be related to allergies or asthma.  She does report a history of anxiety and notes that she takes sertraline which she has been prescribed for approximately the past 4 months.  She also confirms compliance with her recently prescribed medications including metoprolol, prednisone and Atarax stating she did take an Atarax after her symptoms began without significant relief noted.     Following initial interview patient seemed generally asymptomatic.  Echocardiogram was ordered and during this testing the technician reported to staff that patient's heart rate had dropped to 33.  I evaluated patient following this report and she denied any obvious symptoms associated with this change in heart rate.  Monitor tech note patient was sinus bradycardia on the monitor but heart rate had returned to the mid to high 50s    Vital Signs:   Temp:  [97.7 °F (36.5 °C)-98.6 °F (37 °C)] 97.7 °F (36.5 °C)  Heart Rate:  [60-78] 78  Resp:  [14-18] 14  BP: (105-117)/(70-81) 107/70     ROS:  Review of Systems   Constitutional: Negative for chills and fever.   Cardiovascular: Negative for chest pain and dyspnea on exertion.   Respiratory: Negative for cough and shortness of breath.    All other " systems reviewed and are negative.      Physical Exam:  Physical Exam  Vitals and nursing note reviewed.   Constitutional:       Appearance: She is obese. She is not ill-appearing.   HENT:      Head: Normocephalic and atraumatic.      Right Ear: External ear normal.      Left Ear: External ear normal.      Nose: Nose normal. No congestion or rhinorrhea.      Mouth/Throat:      Mouth: Mucous membranes are moist.   Eyes:      General: No scleral icterus.        Right eye: No discharge.         Left eye: No discharge.      Extraocular Movements: Extraocular movements intact.      Conjunctiva/sclera: Conjunctivae normal.      Pupils: Pupils are equal, round, and reactive to light.   Cardiovascular:      Rate and Rhythm: Normal rate and regular rhythm.      Pulses: Normal pulses.      Heart sounds: Normal heart sounds.   Pulmonary:      Effort: Pulmonary effort is normal.      Breath sounds: Normal breath sounds.   Chest:      Chest wall: No tenderness.   Abdominal:      General: Bowel sounds are normal.      Palpations: Abdomen is soft.   Musculoskeletal:         General: Normal range of motion.      Cervical back: Normal range of motion and neck supple.      Right lower leg: No edema.      Left lower leg: No edema.   Skin:     General: Skin is warm and dry.      Capillary Refill: Capillary refill takes less than 2 seconds.   Neurological:      General: No focal deficit present.      Mental Status: She is alert and oriented to person, place, and time.   Psychiatric:         Mood and Affect: Mood normal.         Behavior: Behavior normal.         Thought Content: Thought content normal.         Judgment: Judgment normal.         Pertinent  and/or Most Recent Results     Results from last 7 days   Lab Units 09/27/21  0524 09/25/21  2241 09/24/21  0634 09/23/21  1428   WBC 10*3/mm3 11.30* 19.30* 13.80* 10.40   HEMOGLOBIN g/dL 12.6 13.4 14.3 15.5   HEMATOCRIT % 37.8 39.0 41.1 43.9   PLATELETS 10*3/mm3 289 355 345 373    SODIUM mmol/L 142 138 137 137   POTASSIUM mmol/L 3.9 3.9 3.8 3.4*   CHLORIDE mmol/L 107 104 102 100   CO2 mmol/L 24.0 18.0* 19.0* 19.0*   BUN mg/dL 18 19 15 10   CREATININE mg/dL 0.93 0.76 0.79 1.02*   GLUCOSE mg/dL 102* 124* 181* 102*   CALCIUM mg/dL 8.8 9.5 9.6 9.9     Results from last 7 days   Lab Units 09/23/21  1428   BILIRUBIN mg/dL 0.7   ALK PHOS U/L 93   ALT (SGPT) U/L 27   AST (SGOT) U/L 26   PROTIME Seconds 11.3   INR  1.02   APTT seconds 25.9           Invalid input(s): TG, LDLCALC, LDLREALC  Results from last 7 days   Lab Units 09/26/21  0531 09/25/21  2241 09/24/21  0634 09/23/21  2213 09/23/21  1428   TSH uIU/mL  --   --  0.868  --   --    PROBNP pg/mL  --  47.3  --   --  8.5   TROPONIN T ng/mL <0.010 <0.010 <0.010 <0.010 <0.010   PROCALCITONIN ng/mL 0.05  --   --   --   --        Brief Urine Lab Results  (Last result in the past 365 days)      Color   Clarity   Blood   Leuk Est   Nitrite   Protein   CREAT   Urine HCG        09/26/21 1417 Yellow Clear Trace Negative Negative Negative               Microbiology Results Abnormal     Procedure Component Value - Date/Time    COVID PRE-OP / PRE-PROCEDURE SCREENING ORDER (NO ISOLATION) - Swab, Nasopharynx [[id number]]  (Normal) Collected: 09/26/21 0049    Lab Status: Final result Specimen: Swab from Nasopharynx Updated: 09/26/21 0114    Narrative:      The following orders were created for panel order COVID PRE-OP / PRE-PROCEDURE SCREENING ORDER (NO ISOLATION) - Swab, Nasopharynx.  Procedure                               Abnormality         Status                     ---------                               -----------         ------                     COVID-19,CEPHEID/EVI/BD...[[id number]]  Normal              Final result                 Please view results for these tests on the individual orders.    COVID-19,CEPHEID/EVI/BDMAX,COR/WILLIAM/PAD/JEFFY IN-HOUSE(OR EMERGENT/ADD-ON),NP SWAB IN TRANSPORT MEDIA 3-4 HR TAT, RT-PCR - Swab, Nasopharynx [[id number]]   (Normal) Collected: 09/26/21 0049    Lab Status: Final result Specimen: Swab from Nasopharynx Updated: 09/26/21 0114     COVID19 Not Detected    Narrative:      Fact sheet for providers: https://www.fda.gov/media/207764/download     Fact sheet for patients: https://www.fda.gov/media/887797/download  Fact sheet for providers: https://www.fda.gov/media/540483/download    Fact sheet for patients: https://www.fda.gov/media/898506/download    Test performed by PCR.          CT Head Without Contrast    Result Date: 9/23/2021  Impression: No acute intracranial abnormality. Brain MRI is more sensitive to evaluate for acute or subacute infarcts and to evaluate for intracranial metastatic disease.  Electronically Signed By-Melinda Lovett MD On:9/23/2021 4:26 PM This report was finalized on [id number] by  Melinda Lovett MD.    CT Chest Without Contrast Diagnostic    Result Date: 9/25/2021  Impression: 1.  No acute abnormality within limitations of unenhanced exam. 2.  Small sliding hiatal hernia. Electronically signed by:  Umberto Coughlin M.D.  9/25/2021 9:51 PM    MRI Brain Without Contrast    Result Date: 9/23/2021  Impression:  1. Normal MRI of the brain.  Electronically Signed By-Jasmine Avendano MD On:9/23/2021 6:51 PM This report was finalized on [id number] by  Jasmine Avendano MD.    XR Chest 1 View    Result Date: 9/23/2021  Impression: No acute cardiopulmonary abnormality.  Electronically Signed By-Melinda Lovett MD On:9/23/2021 2:46 PM This report was finalized on [id number] by  Melinda Lovett MD.              Results for orders placed during the hospital encounter of 09/25/21    Adult Transthoracic Echo Complete w/ Color, Spectral and Contrast if necessary per protocol    Interpretation Summary  · Left ventricular systolic function is normal.  · Left ventricle ejection fraction is 55%  · Left ventricular diastolic function was normal.              Test Results Pending at Discharge        Procedures Performed            Consults:   Consults     Date and Time Order Name Status Description    9/26/2021  8:30 AM Inpatient Cardiology Consult      9/23/2021  7:34 PM Inpatient Pulmonology Consult Completed     9/23/2021  7:34 PM Inpatient Cardiology Consult Completed     9/23/2021  4:58 PM Hospitalist (on-call MD unless specified) Completed             Discharge Details        Discharge Medications      New Medications      Instructions Start Date   nitrofurantoin (macrocrystal-monohydrate) 100 MG capsule  Commonly known as: Macrobid   100 mg, Oral, 2 Times Daily         Changes to Medications      Instructions Start Date   metoprolol tartrate 25 MG tablet  Commonly known as: LOPRESSOR  What changed:   · medication strength  · how much to take  · when to take this   25 mg, Oral, 2 Times Daily         Continue These Medications      Instructions Start Date   albuterol sulfate  (90 Base) MCG/ACT inhaler  Commonly known as: PROVENTIL HFA;VENTOLIN HFA;PROAIR HFA   2 puffs, Inhalation, Every 4 Hours PRN      hydrOXYzine 50 MG tablet  Commonly known as: ATARAX   50 mg, Oral, 3 Times Daily PRN      predniSONE 20 MG tablet  Commonly known as: DELTASONE   40 mg, Oral, Daily With Breakfast      sertraline 50 MG tablet  Commonly known as: ZOLOFT   50 mg, Oral, Daily      vitamin B-12 1000 MCG tablet  Commonly known as: CYANOCOBALAMIN   1,000 mcg, Oral, Daily             No Known Allergies      Discharge Disposition:  Home or Self Care    Diet:  Hospital:  Diet Order   Procedures   • Diet Regular         Discharge Activity: as tolerated         CODE STATUS:    Code Status and Medical Interventions:   Ordered at: 09/26/21 0043     Code Status:    CPR     Medical Interventions (Level of Support Prior to Arrest):    Full         Follow-up Appointments  No future appointments.          Condition on Discharge:      Stable      This patient has been examined wearing appropriate Personal Protective Equipment. 09/27/21      Electronically  signed by SAM Simms, 09/27/21, 12:11 PM EDT.      Time: I spent  60  minutes on this discharge activity which included face-to-face encounter with the patient/reviewing the data in the system/coordination of the care with the nursing staff as well as consultants/documentation/entering orders.

## 2021-09-28 NOTE — CASE MANAGEMENT/SOCIAL WORK
Case Management Discharge Note                Selected Continued Care - Discharged on 9/27/2021 Admission date: 9/25/2021 - Discharge disposition: Home or Self Care

## 2021-09-28 NOTE — CASE MANAGEMENT/SOCIAL WORK
Case Management Discharge Note                Selected Continued Care - Discharged on 9/24/2021 Admission date: 9/23/2021 - Discharge disposition: Home or Self Care

## 2021-11-01 ENCOUNTER — OFFICE VISIT (OUTPATIENT)
Dept: CARDIOLOGY | Facility: CLINIC | Age: 36
End: 2021-11-01

## 2021-11-01 VITALS
SYSTOLIC BLOOD PRESSURE: 114 MMHG | WEIGHT: 224 LBS | DIASTOLIC BLOOD PRESSURE: 75 MMHG | BODY MASS INDEX: 39.69 KG/M2 | HEART RATE: 62 BPM | OXYGEN SATURATION: 99 % | HEIGHT: 63 IN

## 2021-11-01 DIAGNOSIS — I10 PRIMARY HYPERTENSION: ICD-10-CM

## 2021-11-01 DIAGNOSIS — I47.1 PSVT (PAROXYSMAL SUPRAVENTRICULAR TACHYCARDIA) (HCC): Primary | ICD-10-CM

## 2021-11-01 PROCEDURE — 99212 OFFICE O/P EST SF 10 MIN: CPT | Performed by: INTERNAL MEDICINE

## 2021-11-01 NOTE — PROGRESS NOTES
"    Subjective:     Encounter Date:11/01/2021      Patient ID: Ariadna Ambrocio is a 35 y.o. female.    Chief Complaint:  History of Present Illness 34-year-old white female with history of paroxysmal tachycardia along with hypertension presents to my office for follow-up.  Patient is currently stable without any symptoms of chest pain or shortness of breath at rest on exertion.  No clinical PND orthopnea.  No palpitation dizziness syncope or swelling of the feet.  Patient has been taking all the medicines regularly.  Patient does not smoke.  Patient is  Recommend patient follows a good diet.    The following portions of the patient's history were reviewed and updated as appropriate: allergies, current medications, past family history, past medical history, past social history, past surgical history and problem list.  Past Medical History:   Diagnosis Date   • Anxiety    • Depression    • Hypertension    • Kidney stones      Past Surgical History:   Procedure Laterality Date   • COLONOSCOPY     • CYSTOSCOPY, URETEROSCOPY, RETROGRADE PYELOGRAM, STENT INSERTION Left 7/16/2021    Procedure: CYSTOSCOPY URETEROSCOPY RETROGRADE PYELOGRAM HOLMIUM LASER BASKET STONE EXTRACTION STENT INSERTION;  Surgeon: Carmelo Nguyễn MD;  Location: [facility] OR;  Service: Urology;  Laterality: Left;   • EAR TUBES     • KIDNEY STONE SURGERY       /75 (BP Location: Left arm, Patient Position: Sitting)   Pulse 62   Ht 160 cm (63\")   Wt 102 kg (224 lb)   SpO2 99%   BMI 39.68 kg/m²   Family History   Problem Relation Age of Onset   • Breast cancer Neg Hx    • Ovarian cancer Neg Hx    • Uterine cancer Neg Hx    • Colon cancer Neg Hx    • Deep vein thrombosis Neg Hx    • Pulmonary embolism Neg Hx        Current Outpatient Medications:   •  albuterol sulfate  (90 Base) MCG/ACT inhaler, Inhale 2 puffs Every 4 (Four) Hours As Needed for Wheezing., Disp: 8.5 g, Rfl: 0  •  metoprolol tartrate (LOPRESSOR) 25 MG tablet, Take 1 tablet by " mouth 2 (Two) Times a Day., Disp: 60 tablet, Rfl: 2  •  sertraline (ZOLOFT) 50 MG tablet, Take 50 mg by mouth Daily., Disp: , Rfl:   •  vitamin B-12 (CYANOCOBALAMIN) 1000 MCG tablet, Take 1,000 mcg by mouth Daily., Disp: , Rfl:   No Known Allergies  Social History     Socioeconomic History   • Marital status:    Tobacco Use   • Smoking status: Never Smoker   • Smokeless tobacco: Never Used   Vaping Use   • Vaping Use: Never used   • Passive vaping exposure: Yes   Substance and Sexual Activity   • Alcohol use: No   • Drug use: Never   • Sexual activity: Yes     Partners: Male     Birth control/protection: I.U.D.     Review of Systems   Constitutional: Negative for fever and malaise/fatigue.   Cardiovascular: Negative for chest pain, dyspnea on exertion and palpitations.   Respiratory: Negative for cough and shortness of breath.    Skin: Negative for rash.   Gastrointestinal: Negative for abdominal pain, nausea and vomiting.   Neurological: Negative for focal weakness and headaches.   All other systems reviewed and are negative.             Objective:     Constitutional:       Appearance: Well-developed.   Eyes:      General: No scleral icterus.     Conjunctiva/sclera: Conjunctivae normal.   HENT:      Head: Normocephalic and atraumatic.   Neck:      Vascular: No carotid bruit or JVD.   Pulmonary:      Effort: Pulmonary effort is normal.      Breath sounds: Normal breath sounds. No wheezing. No rales.   Cardiovascular:      Normal rate. Regular rhythm.   Pulses:     Intact distal pulses.   Abdominal:      General: Bowel sounds are normal.      Palpations: Abdomen is soft.   Musculoskeletal:      Cervical back: Normal range of motion and neck supple. Skin:     General: Skin is warm and dry.      Findings: No rash.   Neurological:      Mental Status: Alert.       Procedures    Lab Review:         MDM  1.  Tachycardia with hypotension  Patient blood pressure currently stable on metoprolol 25 g twice daily  Patient  also had a history of paroxysmal supraventricular tachycardia as well as sinus tachycardia and is currently stable on metoprolol 25 mg twice daily    Patient's previous medical records, labs, and EKG were reviewed and discussed with the patient at today's visit.

## 2021-12-07 ENCOUNTER — TELEPHONE (OUTPATIENT)
Dept: BARIATRICS/WEIGHT MGMT | Facility: CLINIC | Age: 36
End: 2021-12-07

## 2022-08-16 ENCOUNTER — HOSPITAL ENCOUNTER (EMERGENCY)
Facility: HOSPITAL | Age: 37
Discharge: HOME | End: 2022-08-16
Attending: EMERGENCY MEDICINE | Admitting: EMERGENCY MEDICINE

## 2022-08-16 ENCOUNTER — APPOINTMENT (OUTPATIENT)
Dept: CT IMAGING | Facility: HOSPITAL | Age: 37
End: 2022-08-16

## 2022-08-16 VITALS
WEIGHT: 236.55 LBS | SYSTOLIC BLOOD PRESSURE: 139 MMHG | OXYGEN SATURATION: 97 % | BODY MASS INDEX: 41.91 KG/M2 | RESPIRATION RATE: 16 BRPM | TEMPERATURE: 98.3 F | HEART RATE: 58 BPM | HEIGHT: 63 IN | DIASTOLIC BLOOD PRESSURE: 93 MMHG

## 2022-08-16 DIAGNOSIS — R10.9 ABDOMINAL PAIN, UNSPECIFIED ABDOMINAL LOCATION: Primary | ICD-10-CM

## 2022-08-16 LAB
ALBUMIN SERPL-MCNC: 4.2 G/DL (ref 3.5–5.2)
ALBUMIN/GLOB SERPL: 1.6 G/DL
ALP SERPL-CCNC: 77 U/L (ref 39–117)
ALT SERPL W P-5'-P-CCNC: 22 U/L (ref 1–33)
ANION GAP SERPL CALCULATED.3IONS-SCNC: 11 MMOL/L (ref 5–15)
AST SERPL-CCNC: 21 U/L (ref 1–32)
B-HCG UR QL: NEGATIVE
BACTERIA UR QL AUTO: ABNORMAL /HPF
BASOPHILS # BLD AUTO: 0.1 10*3/MM3 (ref 0–0.2)
BASOPHILS NFR BLD AUTO: 0.8 % (ref 0–1.5)
BILIRUB SERPL-MCNC: 0.5 MG/DL (ref 0–1.2)
BILIRUB UR QL STRIP: NEGATIVE
BUN SERPL-MCNC: 8 MG/DL (ref 6–20)
BUN/CREAT SERPL: 10.4 (ref 7–25)
C TRACH RRNA CVX QL NAA+PROBE: NOT DETECTED
CALCIUM SPEC-SCNC: 9.3 MG/DL (ref 8.6–10.5)
CHLORIDE SERPL-SCNC: 103 MMOL/L (ref 98–107)
CLARITY UR: CLEAR
CO2 SERPL-SCNC: 25 MMOL/L (ref 22–29)
COLOR UR: YELLOW
CREAT SERPL-MCNC: 0.77 MG/DL (ref 0.57–1)
DEPRECATED RDW RBC AUTO: 41.1 FL (ref 37–54)
EGFRCR SERPLBLD CKD-EPI 2021: 102.7 ML/MIN/1.73
EOSINOPHIL # BLD AUTO: 0.2 10*3/MM3 (ref 0–0.4)
EOSINOPHIL NFR BLD AUTO: 1.4 % (ref 0.3–6.2)
ERYTHROCYTE [DISTWIDTH] IN BLOOD BY AUTOMATED COUNT: 13.1 % (ref 12.3–15.4)
GLOBULIN UR ELPH-MCNC: 2.7 GM/DL
GLUCOSE SERPL-MCNC: 116 MG/DL (ref 65–99)
GLUCOSE UR STRIP-MCNC: NEGATIVE MG/DL
HCT VFR BLD AUTO: 36.8 % (ref 34–46.6)
HGB BLD-MCNC: 12.3 G/DL (ref 12–15.9)
HGB UR QL STRIP.AUTO: ABNORMAL
HYALINE CASTS UR QL AUTO: ABNORMAL /LPF
KETONES UR QL STRIP: NEGATIVE
LEUKOCYTE ESTERASE UR QL STRIP.AUTO: NEGATIVE
LYMPHOCYTES # BLD AUTO: 3.2 10*3/MM3 (ref 0.7–3.1)
LYMPHOCYTES NFR BLD AUTO: 27.7 % (ref 19.6–45.3)
MCH RBC QN AUTO: 29.7 PG (ref 26.6–33)
MCHC RBC AUTO-ENTMCNC: 33.3 G/DL (ref 31.5–35.7)
MCV RBC AUTO: 89 FL (ref 79–97)
MONOCYTES # BLD AUTO: 0.9 10*3/MM3 (ref 0.1–0.9)
MONOCYTES NFR BLD AUTO: 7.8 % (ref 5–12)
N GONORRHOEA RRNA SPEC QL NAA+PROBE: NOT DETECTED
NEUTROPHILS NFR BLD AUTO: 62.3 % (ref 42.7–76)
NEUTROPHILS NFR BLD AUTO: 7.2 10*3/MM3 (ref 1.7–7)
NITRITE UR QL STRIP: NEGATIVE
NRBC BLD AUTO-RTO: 0 /100 WBC (ref 0–0.2)
PH UR STRIP.AUTO: 5.5 [PH] (ref 5–8)
PLATELET # BLD AUTO: 420 10*3/MM3 (ref 140–450)
PMV BLD AUTO: 7.1 FL (ref 6–12)
POTASSIUM SERPL-SCNC: 3.8 MMOL/L (ref 3.5–5.2)
PROT SERPL-MCNC: 6.9 G/DL (ref 6–8.5)
PROT UR QL STRIP: NEGATIVE
RBC # BLD AUTO: 4.13 10*6/MM3 (ref 3.77–5.28)
RBC # UR STRIP: ABNORMAL /HPF
REF LAB TEST METHOD: ABNORMAL
SODIUM SERPL-SCNC: 139 MMOL/L (ref 136–145)
SP GR UR STRIP: 1.01 (ref 1–1.03)
SQUAMOUS #/AREA URNS HPF: ABNORMAL /HPF
UROBILINOGEN UR QL STRIP: ABNORMAL
WBC # UR STRIP: ABNORMAL /HPF
WBC NRBC COR # BLD: 11.5 10*3/MM3 (ref 3.4–10.8)
WHOLE BLOOD HOLD COAG: NORMAL

## 2022-08-16 PROCEDURE — 0 IOPAMIDOL PER 1 ML: Performed by: EMERGENCY MEDICINE

## 2022-08-16 PROCEDURE — 74177 CT ABD & PELVIS W/CONTRAST: CPT

## 2022-08-16 PROCEDURE — 87591 N.GONORRHOEAE DNA AMP PROB: CPT | Performed by: EMERGENCY MEDICINE

## 2022-08-16 PROCEDURE — 36415 COLL VENOUS BLD VENIPUNCTURE: CPT | Performed by: EMERGENCY MEDICINE

## 2022-08-16 PROCEDURE — 81001 URINALYSIS AUTO W/SCOPE: CPT | Performed by: EMERGENCY MEDICINE

## 2022-08-16 PROCEDURE — 87491 CHLMYD TRACH DNA AMP PROBE: CPT | Performed by: EMERGENCY MEDICINE

## 2022-08-16 PROCEDURE — 99283 EMERGENCY DEPT VISIT LOW MDM: CPT

## 2022-08-16 PROCEDURE — 36415 COLL VENOUS BLD VENIPUNCTURE: CPT

## 2022-08-16 PROCEDURE — 81025 URINE PREGNANCY TEST: CPT | Performed by: EMERGENCY MEDICINE

## 2022-08-16 PROCEDURE — 80053 COMPREHEN METABOLIC PANEL: CPT | Performed by: EMERGENCY MEDICINE

## 2022-08-16 PROCEDURE — 85025 COMPLETE CBC W/AUTO DIFF WBC: CPT | Performed by: EMERGENCY MEDICINE

## 2022-08-16 RX ORDER — SODIUM CHLORIDE 0.9 % (FLUSH) 0.9 %
10 SYRINGE (ML) INJECTION AS NEEDED
Status: DISCONTINUED | OUTPATIENT
Start: 2022-08-16 | End: 2022-08-16 | Stop reason: HOSPADM

## 2022-08-16 RX ORDER — DICYCLOMINE HYDROCHLORIDE 10 MG/1
10 CAPSULE ORAL EVERY 8 HOURS PRN
Qty: 20 CAPSULE | Refills: 0 | Status: SHIPPED | OUTPATIENT
Start: 2022-08-16 | End: 2023-01-23

## 2022-08-16 RX ADMIN — SODIUM CHLORIDE 1000 ML: 9 INJECTION, SOLUTION INTRAVENOUS at 13:34

## 2022-08-16 RX ADMIN — IOPAMIDOL 100 ML: 755 INJECTION, SOLUTION INTRAVENOUS at 14:12

## 2022-08-16 NOTE — ED PROVIDER NOTES
Subjective   History of Present Illness  Abdominal pain  36-year-old female describes upper and lower abdominal pain bilateral off-and-on over the last 2 weeks.  She reports no relieving or exacerbating factors and describes a variable intensity and variable duration discomfort.  She states it was associated with some loose stools a few days ago and now has not had a bowel movement last couple days.  States she has had some nausea vomiting at times.  She reports no known ill contacts or any unusual ingestions.  Review of Systems   Constitutional: Negative.    HENT: Negative.    Eyes: Negative.    Respiratory: Negative.    Cardiovascular: Negative.    Gastrointestinal: Positive for abdominal pain, constipation, diarrhea, nausea and vomiting. Negative for blood in stool.   Genitourinary: Negative.    Musculoskeletal: Negative.    Skin: Negative.    Neurological: Negative.    Psychiatric/Behavioral: Negative.        Past Medical History:   Diagnosis Date   • Anxiety    • Depression    • Hypertension    • Kidney stones        No Known Allergies    Past Surgical History:   Procedure Laterality Date   • COLONOSCOPY     • CYSTOSCOPY, URETEROSCOPY, RETROGRADE PYELOGRAM, STENT INSERTION Left 7/16/2021    Procedure: CYSTOSCOPY URETEROSCOPY RETROGRADE PYELOGRAM HOLMIUM LASER BASKET STONE EXTRACTION STENT INSERTION;  Surgeon: Carmelo Nguyễn MD;  Location: [facility];  Service: Urology;  Laterality: Left;   • EAR TUBES     • KIDNEY STONE SURGERY         Family History   Problem Relation Age of Onset   • Breast cancer Neg Hx    • Ovarian cancer Neg Hx    • Uterine cancer Neg Hx    • Colon cancer Neg Hx    • Deep vein thrombosis Neg Hx    • Pulmonary embolism Neg Hx        Social History     Socioeconomic History   • Marital status:    Tobacco Use   • Smoking status: Never Smoker   • Smokeless tobacco: Never Used   Vaping Use   • Vaping Use: Never used   • Passive vaping exposure: Yes   Substance and Sexual Activity   •  "Alcohol use: No   • Drug use: Never   • Sexual activity: Yes     Partners: Male     Birth control/protection: I.U.D.       Prior to Admission medications    Medication Sig Start Date End Date Taking? Authorizing Provider   albuterol sulfate  (90 Base) MCG/ACT inhaler Inhale 2 puffs Every 4 (Four) Hours As Needed for Wheezing. 9/24/21   Justin Masterson PA-C   metoprolol tartrate (LOPRESSOR) 25 MG tablet Take 1 tablet by mouth 2 (Two) Times a Day. 9/27/21   Anum Mitchell APRN   sertraline (ZOLOFT) 50 MG tablet Take 50 mg by mouth Daily.    Provider, MD Ailyn   vitamin B-12 (CYANOCOBALAMIN) 1000 MCG tablet Take 1,000 mcg by mouth Daily.    Provider, MD Ailyn     /93   Pulse 58   Temp 98.3 °F (36.8 °C) (Oral)   Resp 16   Ht 160 cm (63\")   Wt 107 kg (236 lb 8.9 oz)   SpO2 97%   BMI 41.90 kg/m²   I examined the patient using the appropriate personal protective equipment.        Objective   Physical Exam  General: Well-developed well-appearing, no acute distress, alert and appropriate  Eyes:  sclera nonicteric  HEENT: Mucous membranes moist, no mucosal swelling  Neck: Supple, no nuchal rigidity,  Respirations: Respirations nonlabored, equal breath sounds bilaterally, clear lungs  Heart regular rate and rhythm, no murmurs rubs or gallops,   Abdomen soft, mildly tender palpation in all 4 quadrants, no rebound or guarding, nondistended, no hepatosplenomegaly, no hernia, no mass, normal bowel sounds, no CVA tenderness  Extremities no clubbing cyanosis or edema,   Neuro cranial nerves grossly intact, no focal limb deficits  Psych oriented, pleasant affect  Skin no rash, brisk cap refill  Procedures           ED Course      Results for orders placed or performed during the hospital encounter of 08/16/22   Chlamydia trachomatis, Neisseria gonorrhoeae, PCR - Urine, Urine, Clean Catch    Specimen: Urine, Clean Catch   Result Value Ref Range    Chlamydia DNA by PCR Not Detected Not Detected, " Invalid    Neisseria gonorrhoeae by PCR Not Detected Not Detected   Urinalysis With Culture If Indicated - Urine, Clean Catch    Specimen: Urine, Clean Catch   Result Value Ref Range    Color, UA Yellow Yellow, Straw    Appearance, UA Clear Clear    pH, UA 5.5 5.0 - 8.0    Specific Gravity, UA 1.011 1.005 - 1.030    Glucose, UA Negative Negative    Ketones, UA Negative Negative    Bilirubin, UA Negative Negative    Blood, UA Trace (A) Negative    Protein, UA Negative Negative    Leuk Esterase, UA Negative Negative    Nitrite, UA Negative Negative    Urobilinogen, UA 0.2 E.U./dL 0.2 - 1.0 E.U./dL   Comprehensive Metabolic Panel    Specimen: Arm, Right; Blood   Result Value Ref Range    Glucose 116 (H) 65 - 99 mg/dL    BUN 8 6 - 20 mg/dL    Creatinine 0.77 0.57 - 1.00 mg/dL    Sodium 139 136 - 145 mmol/L    Potassium 3.8 3.5 - 5.2 mmol/L    Chloride 103 98 - 107 mmol/L    CO2 25.0 22.0 - 29.0 mmol/L    Calcium 9.3 8.6 - 10.5 mg/dL    Total Protein 6.9 6.0 - 8.5 g/dL    Albumin 4.20 3.50 - 5.20 g/dL    ALT (SGPT) 22 1 - 33 U/L    AST (SGOT) 21 1 - 32 U/L    Alkaline Phosphatase 77 39 - 117 U/L    Total Bilirubin 0.5 0.0 - 1.2 mg/dL    Globulin 2.7 gm/dL    A/G Ratio 1.6 g/dL    BUN/Creatinine Ratio 10.4 7.0 - 25.0    Anion Gap 11.0 5.0 - 15.0 mmol/L    eGFR 102.7 >60.0 mL/min/1.73   Pregnancy, Urine - Urine, Clean Catch    Specimen: Urine, Clean Catch   Result Value Ref Range    HCG, Urine QL Negative Negative   CBC Auto Differential    Specimen: Arm, Right; Blood   Result Value Ref Range    WBC 11.50 (H) 3.40 - 10.80 10*3/mm3    RBC 4.13 3.77 - 5.28 10*6/mm3    Hemoglobin 12.3 12.0 - 15.9 g/dL    Hematocrit 36.8 34.0 - 46.6 %    MCV 89.0 79.0 - 97.0 fL    MCH 29.7 26.6 - 33.0 pg    MCHC 33.3 31.5 - 35.7 g/dL    RDW 13.1 12.3 - 15.4 %    RDW-SD 41.1 37.0 - 54.0 fl    MPV 7.1 6.0 - 12.0 fL    Platelets 420 140 - 450 10*3/mm3    Neutrophil % 62.3 42.7 - 76.0 %    Lymphocyte % 27.7 19.6 - 45.3 %    Monocyte % 7.8 5.0 -  12.0 %    Eosinophil % 1.4 0.3 - 6.2 %    Basophil % 0.8 0.0 - 1.5 %    Neutrophils, Absolute 7.20 (H) 1.70 - 7.00 10*3/mm3    Lymphocytes, Absolute 3.20 (H) 0.70 - 3.10 10*3/mm3    Monocytes, Absolute 0.90 0.10 - 0.90 10*3/mm3    Eosinophils, Absolute 0.20 0.00 - 0.40 10*3/mm3    Basophils, Absolute 0.10 0.00 - 0.20 10*3/mm3    nRBC 0.0 0.0 - 0.2 /100 WBC   Urinalysis, Microscopic Only - Urine, Clean Catch    Specimen: Urine, Clean Catch   Result Value Ref Range    RBC, UA 0-2 (A) None Seen /HPF    WBC, UA 0-2 (A) None Seen /HPF    Bacteria, UA None Seen None Seen /HPF    Squamous Epithelial Cells, UA 0-2 None Seen, 0-2 /HPF    Hyaline Casts, UA 0-2 None Seen /LPF    Methodology Automated Microscopy    Light Blue Top   Result Value Ref Range    Extra Tube Hold for add-ons.      CT Abdomen Pelvis With Contrast    Result Date: 8/16/2022   1. Hepatic steatosis. 2. Punctate nonobstructing renal stones. 3. No acute findings. 4. Additional incidental findings as noted above.  Electronically Signed By-Jasen Weiner MD On:8/16/2022 2:26 PM This report was finalized on [id number] by  Jasen Weiner MD.                                         MDM  Patient presented with some nonspecific abdominal pain.  Differential diagnosis including small bowel obstruction, peritonitis, pancreatitis, pyelonephritis, appendicitis, PID, ischemic bowel  Her abdominal exam was benign with no signs of peritonitis or acute abdomen.  CT scan shows no acute abnormalities.  She had a borderline leukocytosis on lab evaluation.  Urinalysis negative for infection.  Patient was advised the findings.  She does report a long history of GI problems and states she has been on treatment for irritable bowel syndrome in the past with gastroenterology.  I suspect this may be a flare.  She was prescribed Bentyl.  She is encouraged to follow-up with her gastroenterologist and was given warning signs for return.  Final diagnoses:   Abdominal pain, unspecified  abdominal location       ED Disposition  ED Disposition     ED Disposition   Discharge    Condition   Stable    Comment   --             [facility]  [address] [facility] [address] [phone]  Schedule an appointment as soon as possible for a visit in 1 week           Medication List      New Prescriptions    dicyclomine 10 MG capsule  Commonly known as: BENTYL  Take 1 capsule by mouth Every 8 (Eight) Hours As Needed (abdominal spasms).           Where to Get Your Medications      These medications were sent to [facility]/pharmacy #[id number] - [facility] [place] - [address] [place] [address] [phone] [place] [phone] [address] [place] [address]    Hours: 24-hours Phone: [phone]   · dicyclomine 10 MG capsule          Mina Solis MD  08/16/22 4706

## 2022-08-16 NOTE — DISCHARGE INSTRUCTIONS
Rest, drink plenty of fluids, start Bentyl.  Follow-up with your gastroenterologist, return for increased pain, fever, persistent vomiting or any other concerns

## 2022-08-16 NOTE — ED NOTES
Pt stating she has a hx of kidney stones. Pt states she has been having generalized abd pain for a couple weeks. Pt states she did see blood in her urine recently. Pt denies diarrhea, vomiting or fever.

## 2022-12-09 ENCOUNTER — HOSPITAL ENCOUNTER (EMERGENCY)
Facility: HOSPITAL | Age: 37
Discharge: HOME | End: 2022-12-09
Attending: EMERGENCY MEDICINE | Admitting: EMERGENCY MEDICINE

## 2022-12-09 VITALS
BODY MASS INDEX: 42.23 KG/M2 | DIASTOLIC BLOOD PRESSURE: 90 MMHG | SYSTOLIC BLOOD PRESSURE: 130 MMHG | RESPIRATION RATE: 16 BRPM | HEIGHT: 63 IN | WEIGHT: 238.32 LBS | TEMPERATURE: 97.5 F | OXYGEN SATURATION: 95 % | HEART RATE: 76 BPM

## 2022-12-09 DIAGNOSIS — R11.2 NAUSEA AND VOMITING, UNSPECIFIED VOMITING TYPE: Primary | ICD-10-CM

## 2022-12-09 DIAGNOSIS — R19.7 DIARRHEA, UNSPECIFIED TYPE: ICD-10-CM

## 2022-12-09 LAB
ANION GAP SERPL CALCULATED.3IONS-SCNC: 11 MMOL/L (ref 5–15)
BACTERIA UR QL AUTO: ABNORMAL /HPF
BASOPHILS # BLD AUTO: 0.2 10*3/MM3 (ref 0–0.2)
BASOPHILS NFR BLD AUTO: 1.3 % (ref 0–1.5)
BILIRUB UR QL STRIP: NEGATIVE
BUN SERPL-MCNC: 9 MG/DL (ref 6–20)
BUN/CREAT SERPL: 10.7 (ref 7–25)
CALCIUM SPEC-SCNC: 9.7 MG/DL (ref 8.6–10.5)
CHLORIDE SERPL-SCNC: 103 MMOL/L (ref 98–107)
CLARITY UR: CLEAR
CO2 SERPL-SCNC: 22 MMOL/L (ref 22–29)
COLOR UR: YELLOW
CREAT SERPL-MCNC: 0.84 MG/DL (ref 0.57–1)
DEPRECATED RDW RBC AUTO: 42.9 FL (ref 37–54)
EGFRCR SERPLBLD CKD-EPI 2021: 92.5 ML/MIN/1.73
EOSINOPHIL # BLD AUTO: 0.2 10*3/MM3 (ref 0–0.4)
EOSINOPHIL NFR BLD AUTO: 1.6 % (ref 0.3–6.2)
ERYTHROCYTE [DISTWIDTH] IN BLOOD BY AUTOMATED COUNT: 13.6 % (ref 12.3–15.4)
GLUCOSE SERPL-MCNC: 128 MG/DL (ref 65–99)
GLUCOSE UR STRIP-MCNC: NEGATIVE MG/DL
HCT VFR BLD AUTO: 39.8 % (ref 34–46.6)
HGB BLD-MCNC: 13 G/DL (ref 12–15.9)
HGB UR QL STRIP.AUTO: ABNORMAL
HYALINE CASTS UR QL AUTO: ABNORMAL /LPF
KETONES UR QL STRIP: ABNORMAL
LEUKOCYTE ESTERASE UR QL STRIP.AUTO: NEGATIVE
LYMPHOCYTES # BLD AUTO: 3.2 10*3/MM3 (ref 0.7–3.1)
LYMPHOCYTES NFR BLD AUTO: 27.5 % (ref 19.6–45.3)
MCH RBC QN AUTO: 29.7 PG (ref 26.6–33)
MCHC RBC AUTO-ENTMCNC: 32.7 G/DL (ref 31.5–35.7)
MCV RBC AUTO: 90.7 FL (ref 79–97)
MONOCYTES # BLD AUTO: 0.9 10*3/MM3 (ref 0.1–0.9)
MONOCYTES NFR BLD AUTO: 7.7 % (ref 5–12)
NEUTROPHILS NFR BLD AUTO: 61.9 % (ref 42.7–76)
NEUTROPHILS NFR BLD AUTO: 7.3 10*3/MM3 (ref 1.7–7)
NITRITE UR QL STRIP: NEGATIVE
NRBC BLD AUTO-RTO: 0.1 /100 WBC (ref 0–0.2)
PH UR STRIP.AUTO: <=5 [PH] (ref 5–8)
PLATELET # BLD AUTO: 311 10*3/MM3 (ref 140–450)
PMV BLD AUTO: 8.4 FL (ref 6–12)
POTASSIUM SERPL-SCNC: 4.1 MMOL/L (ref 3.5–5.2)
PROT UR QL STRIP: NEGATIVE
RBC # BLD AUTO: 4.39 10*6/MM3 (ref 3.77–5.28)
RBC # UR STRIP: ABNORMAL /HPF
RBC MORPH BLD: NORMAL
REF LAB TEST METHOD: ABNORMAL
SMALL PLATELETS BLD QL SMEAR: ADEQUATE
SODIUM SERPL-SCNC: 136 MMOL/L (ref 136–145)
SP GR UR STRIP: 1.03 (ref 1–1.03)
SQUAMOUS #/AREA URNS HPF: ABNORMAL /HPF
UROBILINOGEN UR QL STRIP: ABNORMAL
WBC # UR STRIP: ABNORMAL /HPF
WBC MORPH BLD: NORMAL
WBC NRBC COR # BLD: 11.8 10*3/MM3 (ref 3.4–10.8)

## 2022-12-09 PROCEDURE — 85025 COMPLETE CBC W/AUTO DIFF WBC: CPT | Performed by: NURSE PRACTITIONER

## 2022-12-09 PROCEDURE — 85007 BL SMEAR W/DIFF WBC COUNT: CPT | Performed by: NURSE PRACTITIONER

## 2022-12-09 PROCEDURE — 80048 BASIC METABOLIC PNL TOTAL CA: CPT | Performed by: NURSE PRACTITIONER

## 2022-12-09 PROCEDURE — 96374 THER/PROPH/DIAG INJ IV PUSH: CPT

## 2022-12-09 PROCEDURE — 25010000002 ONDANSETRON PER 1 MG: Performed by: NURSE PRACTITIONER

## 2022-12-09 PROCEDURE — 81001 URINALYSIS AUTO W/SCOPE: CPT | Performed by: NURSE PRACTITIONER

## 2022-12-09 PROCEDURE — 99283 EMERGENCY DEPT VISIT LOW MDM: CPT

## 2022-12-09 RX ORDER — ONDANSETRON 2 MG/ML
4 INJECTION INTRAMUSCULAR; INTRAVENOUS ONCE
Status: COMPLETED | OUTPATIENT
Start: 2022-12-09 | End: 2022-12-09

## 2022-12-09 RX ORDER — SODIUM CHLORIDE 0.9 % (FLUSH) 0.9 %
10 SYRINGE (ML) INJECTION AS NEEDED
Status: DISCONTINUED | OUTPATIENT
Start: 2022-12-09 | End: 2022-12-09 | Stop reason: HOSPADM

## 2022-12-09 RX ORDER — PROMETHAZINE HYDROCHLORIDE 25 MG/1
25 SUPPOSITORY RECTAL EVERY 6 HOURS PRN
Qty: 10 SUPPOSITORY | Refills: 0 | Status: SHIPPED | OUTPATIENT
Start: 2022-12-09 | End: 2023-01-23

## 2022-12-09 RX ADMIN — ONDANSETRON 4 MG: 2 INJECTION INTRAMUSCULAR; INTRAVENOUS at 17:10

## 2022-12-09 RX ADMIN — SODIUM CHLORIDE 1000 ML: 0.9 INJECTION, SOLUTION INTRAVENOUS at 17:09

## 2022-12-09 NOTE — ED PROVIDER NOTES
Subjective      Provider in Triage Note  Patient is a 36 year old white female who presents with complaints of nausea vomiting and diarrhea.  She states she has had symptoms for 3 days.  She states she was diagnosed with flu last week and her respiratory symptoms seemed to have improved.  She feels dehydrated.  She complains of dysuria and malodorous urine.  Denies abd or flank pain.  No fever or chills. Daughter ill with similar symptoms.      History of Present Illness   Agree with provider in triage note unless otherwise noted.  36-year-old female with history of hypertension recently diagnosed with flu a on December 1 presents for nausea vomiting diarrhea.  Been going on for 3 days.  Was given prescription for Zofran but she has not filled it yet as she did not feel well enough to go to the pharmacy to pick it up.  Had already been started on fluids and received Zofran with the time I evaluated patient and states she is beginning to feel improved.  Having some mild diffuse abdominal pain.  States that she is having some dysuria.  Feels like she is dehydrated.  Patient drinking some blue Gatorade upon my evaluation.  Review of Systems   Constitutional: Positive for chills.   Respiratory: Negative for shortness of breath.    Cardiovascular: Negative for chest pain.   Gastrointestinal: Positive for diarrhea, nausea and vomiting.   All other systems reviewed and are negative.      Past Medical History:   Diagnosis Date   • Anxiety    • Depression    • Hypertension    • Kidney stones        No Known Allergies    Past Surgical History:   Procedure Laterality Date   • COLONOSCOPY     • CYSTOSCOPY, URETEROSCOPY, RETROGRADE PYELOGRAM, STENT INSERTION Left 07/16/2021    Procedure: CYSTOSCOPY URETEROSCOPY RETROGRADE PYELOGRAM HOLMIUM LASER BASKET STONE EXTRACTION STENT INSERTION;  Surgeon: Carmelo Nguyễn MD;  Location: [facility];  Service: Urology;  Laterality: Left;   • EAR TUBES     • KIDNEY STONE SURGERY    "      Family History   Problem Relation Age of Onset   • Asthma Mother    • Hypertension Mother    • Hypertension Maternal Grandmother    • Heart attack Maternal Grandfather    • Breast cancer Neg Hx    • Ovarian cancer Neg Hx    • Uterine cancer Neg Hx    • Colon cancer Neg Hx    • Deep vein thrombosis Neg Hx    • Pulmonary embolism Neg Hx        Social History     Socioeconomic History   • Marital status:    Tobacco Use   • Smoking status: Never   • Smokeless tobacco: Never   Vaping Use   • Vaping Use: Never used   • Passive vaping exposure: Yes   Substance and Sexual Activity   • Alcohol use: No   • Drug use: Never   • Sexual activity: Yes     Partners: Male     Birth control/protection: I.U.D.           Objective   Physical Exam  Constitutional:  No acute distress.  Head:  Atraumatic.  Normocephalic.   Eyes:  No scleral icterus. Normal conjunctiva  ENT:  Moist mucosa.  No nasal discharge present.  Cardiovascular:  Well perfused.  Equal pulses.  Regular rate.  Normal capillary refill.    Pulmonary/Chest:  No respiratory distress.  Airway patent.  No tachypnea.  No accessory muscle usage.    Abdominal:  Non-distended. Non-tender.  No rebound or guarding.  Extremities:  No peripheral edema.  No Deformity  Skin:  Warm, dry  Neurological:  Alert, awake, and appropriate.  Normal speech.      Procedures           ED Course      /90   Pulse 76   Temp 97.5 °F (36.4 °C) (Temporal)   Resp 16   Ht 160 cm (63\")   Wt 108 kg (238 lb 5.1 oz)   LMP  (Exact Date)   SpO2 95%   BMI 42.22 kg/m²   Labs Reviewed   BASIC METABOLIC PANEL - Abnormal; Notable for the following components:       Result Value    Glucose 128 (*)     All other components within normal limits    Narrative:     GFR Normal >60  Chronic Kidney Disease <60  Kidney Failure <15     URINALYSIS W/ CULTURE IF INDICATED - Abnormal; Notable for the following components:    Ketones, UA Trace (*)     Blood, UA Moderate (2+) (*)     All other components " within normal limits    Narrative:     In absence of clinical symptoms, the presence of pyuria, bacteria, and/or nitrites on the urinalysis result does not correlate with infection.   CBC WITH AUTO DIFFERENTIAL - Abnormal; Notable for the following components:    WBC 11.80 (*)     Neutrophils, Absolute 7.30 (*)     Lymphocytes, Absolute 3.20 (*)     All other components within normal limits    Narrative:     Appended report. These results have been appended to a previously verified report.   URINALYSIS, MICROSCOPIC ONLY - Abnormal; Notable for the following components:    RBC, UA 0-2 (*)     WBC, UA 3-5 (*)     Bacteria, UA Trace (*)     Squamous Epithelial Cells, UA 3-6 (*)     All other components within normal limits   SCAN SLIDE    Narrative:     Slide Reviewed     CBC AND DIFFERENTIAL    Narrative:     The following orders were created for panel order CBC & Differential.  Procedure                               Abnormality         Status                     ---------                               -----------         ------                     CBC Auto Differential[[id number]]        Abnormal            Final result               Scan Slide[[id number]]                                       Final result                 Please view results for these tests on the individual orders.     Medications   sodium chloride 0.9 % flush 10 mL (has no administration in time range)   sodium chloride 0.9 % bolus 1,000 mL (1,000 mL Intravenous New Bag 12/9/22 1709)   ondansetron (ZOFRAN) injection 4 mg (4 mg Intravenous Given 12/9/22 1710)     No radiology results for the last day                                       MDM  Patient nontoxic-appearing.  Feels improved.  Will DC.  Will provide prescription for Phenergan suppositories.  Patient agreeable plan.  Final diagnoses:   Nausea and vomiting, unspecified vomiting type   Diarrhea, unspecified type       ED Disposition  ED Disposition     ED Disposition   Discharge    Condition    Stable    Comment   --             Marifer Cooper MD  [address]  [place] [id number]  [phone]    In 3 days           Medication List      New Prescriptions    promethazine 25 MG suppository  Commonly known as: PHENERGAN  Insert 1 suppository into the rectum Every 6 (Six) Hours As Needed for Nausea or Vomiting.           Where to Get Your Medications      These medications were sent to [facility]/pharmacy #[id number] - [facility] [place] - [address] [place] [address] [phone] [place] [phone] [address] [place] [address]    Hours: 24-hours Phone: [phone]   · promethazine 25 MG suppository          Nj Holder MD  12/09/22 4615

## 2022-12-20 ENCOUNTER — TELEPHONE (OUTPATIENT)
Dept: BARIATRICS/WEIGHT MGMT | Facility: CLINIC | Age: 37
End: 2022-12-20

## 2023-01-11 ENCOUNTER — TELEPHONE (OUTPATIENT)
Dept: CARDIOLOGY | Facility: CLINIC | Age: 38
End: 2023-01-11
Payer: COMMERCIAL

## 2023-01-11 NOTE — TELEPHONE ENCOUNTER
Called patient and she states she has been having issues with her HR dropping to lower 50's and when she was taking her metoprolol and then she felt normal again. Patient wants to know if she needs to be seen? She was last seen in November and has an appointment on 02/09/23.

## 2023-01-11 NOTE — TELEPHONE ENCOUNTER
Caller: Cristóbal Ariadna NEENA    Relationship: Self    Best call back number: [phone]    What is the best time to reach you: ANYTIME    What was the call regarding: PATIENT CALLED IN AND REPORTED THAT SHE JUST LEFT HER OBGYN DR AND THAT SHE HAS GAINED 11LBS IN 1 WEEK, HER OBGYN ADVISED HER TO FOLLOW UP WITH CARDIOLOGY SO SHE WAS CALLING IN TO SEE IF SHE NEEDED TO BE SEEN OR IF DR. MERRILL COULD ADVISE HER ON THIS. ALSO WANTED TO LET HIM KNOW THAT THE MEDICATION METOPROLOL IS LOWERING HER HEART RATE TO LOW IN THE 50s SO SHE STOPPED TAKING IT FOR THE DAY AND HER HEART RATE HAS BEEN NORMAL ALL DAY. SHE IS ALSO REPORTING SWELLING IN HER HANDS AND FEET TO WHERE SHE WAKES UP SHE CAN NOT TAKE HER RINGS OFF HER FINGERS. UNABLE TO WARM TRANSFER.     Do you require a callback: YES

## 2023-01-11 NOTE — TELEPHONE ENCOUNTER
I can see patient sooner than February in the office for swelling and bradycardia. Please out her on my schedule for the next available appointment. Thank you.

## 2023-01-12 ENCOUNTER — TELEPHONE (OUTPATIENT)
Dept: CARDIOLOGY | Facility: CLINIC | Age: 38
End: 2023-01-12
Payer: COMMERCIAL

## 2023-01-12 NOTE — TELEPHONE ENCOUNTER
Caller: Ariadna Ambrocio    Relationship: Self    Best call back number: [phone]    What is the best time to reach you: ANY     Who are you requesting to speak with (clinical staff, provider,  specific staff member): CLINICAL     Do you know the name of the person who called:     What was the call regarding: PT STOPPED TAKING ONE OF HER MEDICATIONS DUE TO LOW HR. HR IS NORMAL BUT SHE WANTED TO CHECK WITH SOMEONE CLINICAL THAT THIS WAS AN OK DECISION    Do you require a callback: YES

## 2023-01-12 NOTE — TELEPHONE ENCOUNTER
Put her on for 1/23, she is not complaining of anything that needs to be addressed stat, we can just make it so she is seen before 2/9 to address swelling and bradycardia with beta blocker therapy. Thank you.

## 2023-01-12 NOTE — TELEPHONE ENCOUNTER
Called patient yesterday and did say that will need to see Yaneli first in office. Did call patient today and she wants to know if she can stop taking her metoprolol as of right now due to making her feel better.

## 2023-01-12 NOTE — TELEPHONE ENCOUNTER
Yes, she can stop taking it and we can discuss when I see her in the office. Please have her monitor heart rate and record to bring to visit so I can see what her rate is off the medication. Thank you.

## 2023-01-19 ENCOUNTER — PREP FOR SURGERY (OUTPATIENT)
Dept: OTHER | Facility: HOSPITAL | Age: 38
End: 2023-01-19
Payer: COMMERCIAL

## 2023-01-19 ENCOUNTER — CONSULT (OUTPATIENT)
Dept: BARIATRICS/WEIGHT MGMT | Facility: CLINIC | Age: 38
End: 2023-01-19
Payer: COMMERCIAL

## 2023-01-19 VITALS
BODY MASS INDEX: 42.95 KG/M2 | OXYGEN SATURATION: 96 % | HEIGHT: 63 IN | DIASTOLIC BLOOD PRESSURE: 92 MMHG | HEART RATE: 56 BPM | SYSTOLIC BLOOD PRESSURE: 170 MMHG | WEIGHT: 242.4 LBS

## 2023-01-19 DIAGNOSIS — R00.0 TACHYCARDIA: ICD-10-CM

## 2023-01-19 DIAGNOSIS — K21.9 GASTROESOPHAGEAL REFLUX DISEASE, UNSPECIFIED WHETHER ESOPHAGITIS PRESENT: ICD-10-CM

## 2023-01-19 DIAGNOSIS — Z01.818 PRE-OP EXAM: ICD-10-CM

## 2023-01-19 DIAGNOSIS — E66.01 OBESITY, CLASS III, BMI 40-49.9 (MORBID OBESITY): Primary | ICD-10-CM

## 2023-01-19 DIAGNOSIS — E66.01 OBESITY, CLASS III, BMI 40-49.9 (MORBID OBESITY): ICD-10-CM

## 2023-01-19 DIAGNOSIS — Z71.3 NUTRITIONAL COUNSELING: ICD-10-CM

## 2023-01-19 DIAGNOSIS — Z01.818 PRE-OPERATIVE CLEARANCE: ICD-10-CM

## 2023-01-19 DIAGNOSIS — K21.9 GASTROESOPHAGEAL REFLUX DISEASE, UNSPECIFIED WHETHER ESOPHAGITIS PRESENT: Primary | ICD-10-CM

## 2023-01-19 PROBLEM — I47.10 PAROXYSMAL SVT (SUPRAVENTRICULAR TACHYCARDIA): Status: ACTIVE | Noted: 2023-01-19

## 2023-01-19 PROBLEM — I47.1 PAROXYSMAL SVT (SUPRAVENTRICULAR TACHYCARDIA) (HCC): Status: ACTIVE | Noted: 2023-01-19

## 2023-01-19 PROCEDURE — 99215 OFFICE O/P EST HI 40 MIN: CPT | Performed by: NURSE PRACTITIONER

## 2023-01-19 RX ORDER — SODIUM CHLORIDE 9 MG/ML
30 INJECTION, SOLUTION INTRAVENOUS CONTINUOUS PRN
Status: CANCELLED | OUTPATIENT
Start: 2023-01-19

## 2023-01-19 RX ORDER — SODIUM CHLORIDE 0.9 % (FLUSH) 0.9 %
10 SYRINGE (ML) INJECTION AS NEEDED
Status: CANCELLED | OUTPATIENT
Start: 2023-01-19

## 2023-01-19 RX ORDER — ALBUTEROL SULFATE 90 UG/1
2 AEROSOL, METERED RESPIRATORY (INHALATION)
COMMUNITY
Start: 2022-09-29 | End: 2023-02-21 | Stop reason: SDUPTHER

## 2023-01-19 NOTE — PROGRESS NOTES
Subjective:     Encounter Date:01/23/2023      Patient ID: Ariadna Ambrocio is a 37 y.o. female.    Chief Complaint:  History of Present Illness  Ariadna Ambrocio is a 37-year-old female with a medical history to include hypertension, obesity, paroxysmal SVT, irritable bowel syndrome, depression, prediabetes.  Patient presents to office today for evaluation of bilateral hands and feet swelling.  Patient was recently seen by OB/GYN and had an 11 pound weight gain accompanied by bilateral swelling.  Patient also states that she recently stopped taking her metoprolol due to bradycardia with rates as low as 50 bpm. Patient states she is feeling much better today.  She states her blood pressure has been high at about 170s/100 and she has been symptomatic with headaches when blood pressure is high.  She has been noncompliant with medical therapy including her hydrochlorothiazide.  She started taking it again this weekend and blood pressure and symptoms have improved.  She also noted that her swelling has improved since restarting medications.  12-lead EKG today in office shows sinus rhythm at 64 bpm, the patient states via her apple watch her heart rate drops into the 50s consistently.  She started taking her Lopressor again this weekend due to high blood pressure and heart rate has been stable.  When trialing off her Lopressor she started to have palpitations again.  Education provided on medical compliance and to call office before stopping any medication.  We will plan to increase hydrochlorothiazide for better blood pressure control and decrease Lopressor to prevent tachycardia but treat palpitations.  Patient does not smoke.  Patient is obese and is actively trying to lose weight she is currently on a diet plan for the next 6 months and is hopeful to have bariatric surgery this summer.  Patient is also concerned she may have obstructive sleep apnea as she notes she startles awake at night and has daytime sleepiness.   Patient would like to trial weight loss to see if symptoms improve before undergoing sleep study.  Patient currently denies chest pain, shortness of breath, fatigue, weakness, numbness and tingling.     The following portions of the patient's history were reviewed and updated as appropriate: allergies, current medications, past family history, past medical history, past social history, past surgical history and problem list.       Past Medical History:   Diagnosis Date   • Anxiety    • Constipation    • Depression    • Diarrhea    • Elevated triglycerides with high cholesterol    • Heartburn    • Hiatal hernia    • Hypertension    • IBS (irritable bowel syndrome)    • Kidney stones    • Nausea    • Pre-diabetes    • Rapid heart beat      Past Surgical History:   Procedure Laterality Date   • COLONOSCOPY     • CYSTOSCOPY, URETEROSCOPY, RETROGRADE PYELOGRAM, STENT INSERTION Left 07/16/2021    Procedure: CYSTOSCOPY URETEROSCOPY RETROGRADE PYELOGRAM HOLMIUM LASER BASKET STONE EXTRACTION STENT INSERTION;  Surgeon: Carmelo Nguyễn MD;  Location: [facility];  Service: Urology;  Laterality: Left;   • EAR TUBES     • KIDNEY STONE SURGERY     • WISDOM TOOTH EXTRACTION       There were no vitals taken for this visit.  Family History   Problem Relation Age of Onset   • Asthma Mother    • Hypertension Mother    • Hypertension Maternal Grandmother    • Heart attack Maternal Grandfather    • Cancer Paternal Grandmother    • Cancer Paternal Grandfather    • Breast cancer Neg Hx    • Ovarian cancer Neg Hx    • Uterine cancer Neg Hx    • Colon cancer Neg Hx    • Deep vein thrombosis Neg Hx    • Pulmonary embolism Neg Hx        Current Outpatient Medications:   •  albuterol sulfate  (90 Base) MCG/ACT inhaler, Inhale 2 puffs Every 4 (Four) Hours As Needed for Wheezing., Disp: 8.5 g, Rfl: 0  •  albuterol sulfate  (90 Base) MCG/ACT inhaler, Inhale 2 puffs., Disp: , Rfl:   •  dicyclomine (BENTYL) 10 MG capsule, Take 1  capsule by mouth Every 8 (Eight) Hours As Needed (abdominal spasms)., Disp: 20 capsule, Rfl: 0  •  metoprolol tartrate (LOPRESSOR) 25 MG tablet, Take 1 tablet by mouth 2 (Two) Times a Day., Disp: 60 tablet, Rfl: 2  •  promethazine (PHENERGAN) 25 MG suppository, Insert 1 suppository into the rectum Every 6 (Six) Hours As Needed for Nausea or Vomiting., Disp: 10 suppository, Rfl: 0  •  sertraline (ZOLOFT) 50 MG tablet, Take 50 mg by mouth Daily., Disp: , Rfl:   •  vitamin B-12 (CYANOCOBALAMIN) 1000 MCG tablet, Take 1,000 mcg by mouth Daily., Disp: , Rfl:   No Known Allergies  Social History     Socioeconomic History   • Marital status:    Tobacco Use   • Smoking status: Never     Passive exposure: Current   • Smokeless tobacco: Never   Vaping Use   • Vaping Use: Never used   • Passive vaping exposure: Yes   Substance and Sexual Activity   • Alcohol use: No   • Drug use: Never   • Sexual activity: Yes     Partners: Male     Birth control/protection: I.U.D.     Review of Systems   Constitutional: Negative for malaise/fatigue.   Eyes: Negative.    Cardiovascular: Positive for leg swelling and palpitations. Negative for chest pain and dyspnea on exertion.   Respiratory: Negative for cough and shortness of breath.    Skin: Negative.    Gastrointestinal: Negative for abdominal pain, nausea and vomiting.   Neurological: Positive for headaches. Negative for dizziness, focal weakness, light-headedness and numbness.   Psychiatric/Behavioral: Negative.    All other systems reviewed and are negative.             Objective:     Vitals reviewed.   Constitutional:       Appearance: Healthy appearance. Not in distress. Morbidly obese.   Eyes:      Pupils: Pupils are equal, round, and reactive to light.   HENT:      Nose: Nose normal.    Mouth/Throat:      Pharynx: Oropharynx is clear.   Pulmonary:      Effort: Pulmonary effort is normal.      Breath sounds: Normal breath sounds.   Cardiovascular:      Normal rate. Regular  rhythm.      Murmurs: There is no murmur.   Pulses:     Intact distal pulses.   Edema:     Peripheral edema present.     Ankle: bilateral trace edema of the ankle.     Feet: bilateral trace edema of the feet.  Abdominal:      General: Bowel sounds are normal.   Musculoskeletal:      Cervical back: Normal range of motion. Skin:     General: Skin is warm.   Neurological:      Mental Status: Alert and oriented to person, place and time.         ECG 12 Lead    Date/Time: 1/23/2023 10:29 AM  Performed by: Yaneli Jacobson APRN  Authorized by: Yaneli Jacobson APRN   Comparison: not compared with previous ECG   Rhythm: sinus rhythm  Rate: normal  QRS axis: normal    Clinical impression: normal ECG            Lab Review:       Plan:  Bilateral edema  Patient reports 11 pound weight gain over the last few months  Last echocardiogram from September 2021 shows normal LVEF with structurally normal valves  Swelling has improved with restarting of HCTZ  We will obtain echocardiogram if swelling and palpitations continue with adjustment of medications    Sinus tachycardia/paroxysmal SVT  12-lead EKG today shows normal sinus rhythm with rate of 64 bpm  Patient is on Lopressor 25 mg twice a day   Has recently experienced bradycardia with heart rates in the 50s  Patient's most recent Holter monitor in 2021 was a normal study  Patient states she has intermittent palpitations  Will decrease Lopressor to 12.5 twice a day to prevent bradycardia    Hypertension  Patient on hydrochlorothiazide 12.5 mg daily and Lopressor 25 mg twice a day  BP today 124/87  Patient states she has not been taking HCTZ or Lopressor and blood pressure was consistently 170s/100s  Will increase HCTZ to 25 mg daily and decrease Lopressor to 12.5 to prevent bradycardia and achieve better blood pressure control  Patient to monitor blood pressure over the next week to make sure she is not hypotensive with medication change    Obesity  Patient current BMI 42.16  Patient  was recently seen for bariatric surgery she is interested in a gastric sleeve  Patient has an active plan to diet for 6 months with bariatric surgery to follow this summer  Patient needs surgical clearance and will fax over form for Dr. Abarca to sign  Diet and lifestyle modifications discussed    Possible sleep apnea  Patient states she wakes up startled at night and has daytime sleepiness  She is interested in a sleep study if weight loss plans do not help improve symptoms  Patient advised to follow with primary care provider for future sleep study if needed    Patient's previous medical records, labs, and EKG were reviewed and discussed with the patient at today's visit.     Patient to follow-up as needed or with provider in 1 year    Electronically signed by SAM So, 01/23/23, 10:29 AM EST.

## 2023-01-19 NOTE — PROGRESS NOTES
[facility] [place] [facility]  [address] [place] [address]  [address] [place] [address]  Dept: [phone]  1/19/2023      Ariadna Ambrocio.  [id number]  [id number]  1985  female      Chief Complaint of weight gain; unable to maintain weight loss    History of Present Illness:   Ariadna is a 37 y.o. female who presents today for evaluation, education and consultation regarding weight loss surgery. The patient is interested in the sleeve gastrectomy.      Diet History:See dietician/RN/MA documentation for complete history of weight and diet.     Bariatric Surgery Evaluation: The patient is being seen for an initial visit for bariatric surgery evaluation.     Breakfast: cereal, banana   Lunch: none usually   Dinner: fast food in the past, cooking at home more now- air fried foods- ,   Snacks: peanuts , popcorn, chips,   Drinks: water , coke 2 a day   Exercise: working at home, minimal exercise , wanting to get a gym membership , cousin doing program as well , has an elliptical but doesn't use it         Patient Active Problem List   Diagnosis   • Ureterolithiasis   • Chest pain   • Dyspnea on exertion   • Paresthesia   • Hypertension   • Bradycardia, sinus       Past Medical History:   Diagnosis Date   • Anxiety    • Constipation    • Depression    • Diarrhea    • Elevated triglycerides with high cholesterol    • Heartburn    • Hiatal hernia    • Hypertension    • IBS (irritable bowel syndrome)    • Kidney stones    • Nausea    • Pre-diabetes    • Rapid heart beat        Past Surgical History:   Procedure Laterality Date   • COLONOSCOPY     • CYSTOSCOPY, URETEROSCOPY, RETROGRADE PYELOGRAM, STENT INSERTION Left 07/16/2021    Procedure: CYSTOSCOPY URETEROSCOPY RETROGRADE PYELOGRAM HOLMIUM LASER BASKET STONE EXTRACTION STENT INSERTION;  Surgeon: Carmelo Nguyễn MD;  Location: [facility] MAIN OR;  Service: Urology;  Laterality: Left;    • EAR TUBES     • KIDNEY STONE SURGERY     • WISDOM TOOTH EXTRACTION         No Known Allergies      Current Outpatient Medications:   •  albuterol sulfate  (90 Base) MCG/ACT inhaler, Inhale 2 puffs Every 4 (Four) Hours As Needed for Wheezing., Disp: 8.5 g, Rfl: 0  •  albuterol sulfate  (90 Base) MCG/ACT inhaler, Inhale 2 puffs., Disp: , Rfl:   •  metoprolol tartrate (LOPRESSOR) 25 MG tablet, Take 1 tablet by mouth 2 (Two) Times a Day., Disp: 60 tablet, Rfl: 2  •  sertraline (ZOLOFT) 50 MG tablet, Take 50 mg by mouth Daily., Disp: , Rfl:   •  vitamin B-12 (CYANOCOBALAMIN) 1000 MCG tablet, Take 1,000 mcg by mouth Daily., Disp: , Rfl:   •  dicyclomine (BENTYL) 10 MG capsule, Take 1 capsule by mouth Every 8 (Eight) Hours As Needed (abdominal spasms)., Disp: 20 capsule, Rfl: 0  •  promethazine (PHENERGAN) 25 MG suppository, Insert 1 suppository into the rectum Every 6 (Six) Hours As Needed for Nausea or Vomiting., Disp: 10 suppository, Rfl: 0    Social History     Socioeconomic History   • Marital status:    Tobacco Use   • Smoking status: Never     Passive exposure: Current   • Smokeless tobacco: Never   Vaping Use   • Vaping Use: Never used   • Passive vaping exposure: Yes   Substance and Sexual Activity   • Alcohol use: No   • Drug use: Never   • Sexual activity: Yes     Partners: Male     Birth control/protection: I.U.D.       Family History   Problem Relation Age of Onset   • Asthma Mother    • Hypertension Mother    • Hypertension Maternal Grandmother    • Heart attack Maternal Grandfather    • Cancer Paternal Grandmother    • Cancer Paternal Grandfather    • Breast cancer Neg Hx    • Ovarian cancer Neg Hx    • Uterine cancer Neg Hx    • Colon cancer Neg Hx    • Deep vein thrombosis Neg Hx    • Pulmonary embolism Neg Hx          Review of Systems:  Review of Systems   Constitutional:        Weight gain, insomnia, fatigue , night sweats    HENT:        Wears contacts/ glasses, vision  "problems, allergies   Respiratory:        Bronchitis in past, snoring    Cardiovascular:        Chest pain, HTN, tachycardia, HLD, rapid heart beat, ankle swelling, rhythm changes    Gastrointestinal: Positive for abdominal pain, constipation, diarrhea, nausea and vomiting.        GERD, IBS, hiatal hernia    Endocrine:        \"pre diabetes\"    Genitourinary:        Kidney stones   Musculoskeletal: Positive for back pain.   Skin: Negative.    Neurological: Positive for dizziness.   Hematological: Bruises/bleeds easily.   Psychiatric/Behavioral:        Anxiety and depression        Physical Exam:  Vital Signs:  Weight: 110 kg (242 lb 6.4 oz)   Body mass index is 42.94 kg/m².      Heart Rate: 56   BP: 170/92 (Appt w/PCP for BP)     Physical Exam  Constitutional:       Appearance: Normal appearance. She is obese.   Cardiovascular:      Pulses: Normal pulses.   Pulmonary:      Effort: Pulmonary effort is normal.      Breath sounds: Normal breath sounds.   Abdominal:      General: Abdomen is flat. Bowel sounds are normal.      Palpations: Abdomen is soft.   Skin:     General: Skin is warm and dry.   Neurological:      General: No focal deficit present.      Mental Status: She is alert and oriented to person, place, and time.   Psychiatric:         Mood and Affect: Mood normal.         Behavior: Behavior normal.         Thought Content: Thought content normal.         Judgment: Judgment normal.            Assessment:         New goals:   Eating and drinking separately with 1 meal a day  Eating slowly over 30 minutes , chew food 10-15 times per bite  Food logs  Eating something high protein 3 times a day       Ariadna Ambrocio is a 37 y.o. year old female with medically complicated severe obesity. Weight: 110 kg (242 lb 6.4 oz), Body mass index is 42.94 kg/m². and weight related problems.    I explained in detail the procedures that we are performing.  All of those procedures can be performed laparoscopically but there is a " chance to convert to open if any technical challenges or complications do occur.  Bariatric surgery is not cosmetic surgery but rather a tool to help a patient make a life-long commitment lifestyle changes including diet, exercise, behavior changes, and taking supplemental vitamins and minerals.    Due to the patient's BMI and co-morbidities they are at a high risk for surgery and will obtain the following:  The patient has been advised that a letter of medical support and a history and physical must be obtained from her primary care physician. A psychological evaluation will be arranged for this patient. CBC, CMP,TSH and HgbA1C will be drawn. Ariadna Ambrocio will obtain a pre-operative CXR and EKG. Ariadna Ambrocio will obtain clearance from a cardiologist prior to surgery.     Ariadna Ambrocio will be set up for a pre-operative diagnostic esophagogastroduodenoscopy with biopsy for evaluation. The risks and benefits of the procedure were discussed with the patient in detail and all questions were answered.  Possibility of perforation, bleeding, aspiration, anoxic brain injury, respiratory and/or cardiac arrest and death were discussed.   She received handouts regarding, all questions were answered and informed consent was obtained.     The risks, benefits, alternatives, and potential complications of all of the procedures were explained in detail including, but not limited to death, anesthesia and medication adverse effect/DVT, pulmonary embolism, trocar site/incisional hernia, wound infection, abdominal infection, bleeding, failure to lose weight or gain weight and change in body image, metabolic complications with calcium, thiamine, vitamin B12, folate, iron, and anemia.    The patient was advised to start a high protein, low fat and low carbohydrate diet. The patient was given individualized information  along with general group information and handouts.     The patient was encouraged to start routine exercise  including but not limited to 150 minutes per week.     The consultation plan was reviewed with the patient.    The patient understands the surgical procedures and the different surgical options that are available.  She understands the lifestyle changes that would be required after surgery and has agreed to participate in a pre-operative and postoperative weight management program.  She also expressed understanding of possible risks, had several questions answered and desires to proceed.    I think she is a good candidate for this surgery, and is interested in a sleeve gastrectomy.    Encounter Diagnoses   Name Primary?   • Obesity, Class III, BMI 40-49.9 (morbid obesity) (HCC) Yes   • Pre-operative clearance        Plan:    Patient will have evaluations and follow up with bariatric dieticians and a psychologist before undergoing a multidisciplinary review of her candidacy.  We also discussed the weight loss requirement and rationale, and other program requirements.    Pt will need an EGD prior to bariatric surgery as well as cardiac clearance. Plan to follow up in 1 month for next SWL visit virtually. Pt to do SWL here.     Total time spent with pt 60 minutes of which 45 minutes were spent on  Education.       Angie Carmona, APRN  1/19/2023

## 2023-01-20 ENCOUNTER — PATIENT ROUNDING (BHMG ONLY) (OUTPATIENT)
Dept: BARIATRICS/WEIGHT MGMT | Facility: CLINIC | Age: 38
End: 2023-01-20
Payer: COMMERCIAL

## 2023-01-20 NOTE — PROGRESS NOTES
January 20, 2023    Hello, may I speak with Ariadna Ambrocio?    My name is CARMELO    I am  with [facility]  [address] [place] [address].    Before we get started may I verify your date of birth? 1985    I am calling to officially welcome you to our practice and ask about your recent visit. Is this a good time to talk? no    Tell me about your visit with us. What things went well?  Everything went great       We're always looking for ways to make our patients' experiences even better. Do you have recommendations on ways we may improve?  no    Overall were you satisfied with your first visit to our practice? yes     I appreciate you taking the time to speak with me today. Is there anything else I can do for you? no      Thank you, and have a great day.

## 2023-01-23 ENCOUNTER — OFFICE VISIT (OUTPATIENT)
Dept: CARDIOLOGY | Facility: CLINIC | Age: 38
End: 2023-01-23
Payer: COMMERCIAL

## 2023-01-23 VITALS
DIASTOLIC BLOOD PRESSURE: 87 MMHG | WEIGHT: 238 LBS | SYSTOLIC BLOOD PRESSURE: 124 MMHG | BODY MASS INDEX: 42.17 KG/M2 | HEART RATE: 70 BPM | HEIGHT: 63 IN | OXYGEN SATURATION: 98 %

## 2023-01-23 DIAGNOSIS — I47.1 PAROXYSMAL SVT (SUPRAVENTRICULAR TACHYCARDIA): ICD-10-CM

## 2023-01-23 DIAGNOSIS — R00.2 PALPITATIONS: ICD-10-CM

## 2023-01-23 DIAGNOSIS — E66.01 MORBID OBESITY WITH BMI OF 40.0-44.9, ADULT: ICD-10-CM

## 2023-01-23 DIAGNOSIS — R00.1 BRADYCARDIA, SINUS: ICD-10-CM

## 2023-01-23 DIAGNOSIS — M79.89 BILATERAL SWELLING OF FEET: Primary | ICD-10-CM

## 2023-01-23 DIAGNOSIS — I10 PRIMARY HYPERTENSION: ICD-10-CM

## 2023-01-23 PROCEDURE — 93000 ELECTROCARDIOGRAM COMPLETE: CPT

## 2023-01-23 PROCEDURE — 99213 OFFICE O/P EST LOW 20 MIN: CPT

## 2023-01-23 RX ORDER — HYDROCHLOROTHIAZIDE 12.5 MG/1
25 CAPSULE, GELATIN COATED ORAL DAILY
Qty: 90 CAPSULE | Refills: 7 | Status: SHIPPED | OUTPATIENT
Start: 2023-01-23 | End: 2023-03-06 | Stop reason: SDUPTHER

## 2023-01-23 RX ORDER — HYDROCHLOROTHIAZIDE 12.5 MG/1
12.5 CAPSULE, GELATIN COATED ORAL DAILY
COMMUNITY
End: 2023-01-23 | Stop reason: SDUPTHER

## 2023-01-25 ENCOUNTER — TELEPHONE (OUTPATIENT)
Dept: CARDIOLOGY | Facility: CLINIC | Age: 38
End: 2023-01-25
Payer: COMMERCIAL

## 2023-01-25 ENCOUNTER — LAB (OUTPATIENT)
Dept: LAB | Facility: HOSPITAL | Age: 38
End: 2023-01-25
Payer: COMMERCIAL

## 2023-01-25 DIAGNOSIS — E66.01 OBESITY, CLASS III, BMI 40-49.9 (MORBID OBESITY): ICD-10-CM

## 2023-01-25 LAB — HBA1C MFR BLD: 6 % (ref 3.5–5.6)

## 2023-01-25 PROCEDURE — 83036 HEMOGLOBIN GLYCOSYLATED A1C: CPT

## 2023-01-25 PROCEDURE — 36415 COLL VENOUS BLD VENIPUNCTURE: CPT

## 2023-01-25 PROCEDURE — 84443 ASSAY THYROID STIM HORMONE: CPT

## 2023-01-25 PROCEDURE — 85025 COMPLETE CBC W/AUTO DIFF WBC: CPT

## 2023-01-25 PROCEDURE — 82306 VITAMIN D 25 HYDROXY: CPT

## 2023-01-25 PROCEDURE — 80053 COMPREHEN METABOLIC PANEL: CPT

## 2023-01-25 PROCEDURE — 80305 DRUG TEST PRSMV DIR OPT OBS: CPT

## 2023-01-25 NOTE — TELEPHONE ENCOUNTER
DR. KATY RUSHING  BARIATRIC SURGERY  SURGERY TBD  PHONE [phone]  [phone]    PLACED ON DR. DESIRAE HERNANDEZ DESK

## 2023-01-26 LAB
25(OH)D3 SERPL-MCNC: 28 NG/ML (ref 30–100)
ALBUMIN SERPL-MCNC: 4.5 G/DL (ref 3.5–5.2)
ALBUMIN/GLOB SERPL: 1.6 G/DL
ALP SERPL-CCNC: 79 U/L (ref 39–117)
ALT SERPL W P-5'-P-CCNC: 35 U/L (ref 1–33)
ANION GAP SERPL CALCULATED.3IONS-SCNC: 14.9 MMOL/L (ref 5–15)
AST SERPL-CCNC: 27 U/L (ref 1–32)
BASOPHILS # BLD AUTO: 0.06 10*3/MM3 (ref 0–0.2)
BASOPHILS NFR BLD AUTO: 0.6 % (ref 0–1.5)
BILIRUB SERPL-MCNC: 0.4 MG/DL (ref 0–1.2)
BUN SERPL-MCNC: 17 MG/DL (ref 6–20)
BUN/CREAT SERPL: 17.5 (ref 7–25)
CALCIUM SPEC-SCNC: 10 MG/DL (ref 8.6–10.5)
CHLORIDE SERPL-SCNC: 102 MMOL/L (ref 98–107)
CO2 SERPL-SCNC: 21.1 MMOL/L (ref 22–29)
COTININE UR QL SCN: NEGATIVE NG/ML
CREAT SERPL-MCNC: 0.97 MG/DL (ref 0.57–1)
DEPRECATED RDW RBC AUTO: 42.8 FL (ref 37–54)
EGFRCR SERPLBLD CKD-EPI 2021: 77.3 ML/MIN/1.73
EOSINOPHIL # BLD AUTO: 0.1 10*3/MM3 (ref 0–0.4)
EOSINOPHIL NFR BLD AUTO: 0.9 % (ref 0.3–6.2)
ERYTHROCYTE [DISTWIDTH] IN BLOOD BY AUTOMATED COUNT: 13.1 % (ref 12.3–15.4)
GLOBULIN UR ELPH-MCNC: 2.9 GM/DL
GLUCOSE SERPL-MCNC: 115 MG/DL (ref 65–99)
HCT VFR BLD AUTO: 40 % (ref 34–46.6)
HGB BLD-MCNC: 13.5 G/DL (ref 12–15.9)
IMM GRANULOCYTES # BLD AUTO: 0.02 10*3/MM3 (ref 0–0.05)
IMM GRANULOCYTES NFR BLD AUTO: 0.2 % (ref 0–0.5)
LYMPHOCYTES # BLD AUTO: 4.05 10*3/MM3 (ref 0.7–3.1)
LYMPHOCYTES NFR BLD AUTO: 38.3 % (ref 19.6–45.3)
Lab: NORMAL
MCH RBC QN AUTO: 30.3 PG (ref 26.6–33)
MCHC RBC AUTO-ENTMCNC: 33.8 G/DL (ref 31.5–35.7)
MCV RBC AUTO: 89.9 FL (ref 79–97)
MONOCYTES # BLD AUTO: 0.97 10*3/MM3 (ref 0.1–0.9)
MONOCYTES NFR BLD AUTO: 9.2 % (ref 5–12)
NEUTROPHILS NFR BLD AUTO: 5.38 10*3/MM3 (ref 1.7–7)
NEUTROPHILS NFR BLD AUTO: 50.8 % (ref 42.7–76)
NRBC BLD AUTO-RTO: 0 /100 WBC (ref 0–0.2)
PLATELET # BLD AUTO: 447 10*3/MM3 (ref 140–450)
PMV BLD AUTO: 9.8 FL (ref 6–12)
POTASSIUM SERPL-SCNC: 3.5 MMOL/L (ref 3.5–5.2)
PROT SERPL-MCNC: 7.4 G/DL (ref 6–8.5)
RBC # BLD AUTO: 4.45 10*6/MM3 (ref 3.77–5.28)
SODIUM SERPL-SCNC: 138 MMOL/L (ref 136–145)
TSH SERPL DL<=0.05 MIU/L-ACNC: 1.8 UIU/ML (ref 0.27–4.2)
WBC NRBC COR # BLD: 10.58 10*3/MM3 (ref 3.4–10.8)

## 2023-02-01 ENCOUNTER — TELEPHONE (OUTPATIENT)
Dept: BARIATRICS/WEIGHT MGMT | Facility: CLINIC | Age: 38
End: 2023-02-01
Payer: COMMERCIAL

## 2023-02-01 NOTE — TELEPHONE ENCOUNTER
----- Message from SAM Pennington sent at 1/26/2023  8:52 AM EST -----  Vitamin d low. Please encourage [phone] units vitamin d daily. Glucose and hgb a1c slightly elevated, alt slightly elevated, other labs look ok overall

## 2023-02-01 NOTE — TELEPHONE ENCOUNTER
Left message for Ariadna to call office.  Need to relay msg from [place] regarding low vitamin D and encourage [phone] units daily vitamin D. [facility]

## 2023-02-02 ENCOUNTER — TELEPHONE (OUTPATIENT)
Dept: BARIATRICS/WEIGHT MGMT | Facility: CLINIC | Age: 38
End: 2023-02-02
Payer: COMMERCIAL

## 2023-02-02 PROBLEM — K21.9 GASTROESOPHAGEAL REFLUX DISEASE: Status: ACTIVE | Noted: 2023-02-02

## 2023-02-02 PROBLEM — E66.01 OBESITY, CLASS III, BMI 40-49.9 (MORBID OBESITY): Status: ACTIVE | Noted: 2023-02-02

## 2023-02-02 PROBLEM — E66.813 OBESITY, CLASS III, BMI 40-49.9 (MORBID OBESITY): Status: ACTIVE | Noted: 2023-02-02

## 2023-02-02 NOTE — TELEPHONE ENCOUNTER
----- Message from SAM Pennington sent at 1/26/2023  8:52 AM EST -----  Vitamin d low. Please encourage [phone] units vitamin d daily. Glucose and hgb a1c slightly elevated, alt slightly elevated, other labs look ok overall

## 2023-02-22 ENCOUNTER — OFFICE VISIT (OUTPATIENT)
Dept: BARIATRICS/WEIGHT MGMT | Facility: CLINIC | Age: 38
End: 2023-02-22
Payer: COMMERCIAL

## 2023-02-22 DIAGNOSIS — E66.01 OBESITY, CLASS III, BMI 40-49.9 (MORBID OBESITY): Primary | ICD-10-CM

## 2023-02-22 NOTE — PROGRESS NOTES
Nutrition Services    Patient Name: Ariadna Ambrocio  YOB: 1985  MRN: [id number]  Date of Service: 02/22/23    RD Recommendation        Candidacy for surgery? Good   RD Comments Recommend this pt for bariatric surgery     NUTRITION ASSESSMENT - BARIATRIC SURGERY      Reason for Visit 2/22: Nutrition evaluation and supervised wt loss w/ RD     H&P      Past Medical History:   Diagnosis Date   • Anxiety    • Constipation    • Depression    • Diarrhea    • Elevated triglycerides with high cholesterol    • Heartburn    • Hiatal hernia    • Hypertension    • IBS (irritable bowel syndrome)    • Kidney stones    • Nausea    • Pre-diabetes    • Rapid heart beat    • Seasonal allergies        Past Surgical History:   Procedure Laterality Date   • COLONOSCOPY     • CYSTOSCOPY, URETEROSCOPY, RETROGRADE PYELOGRAM, STENT INSERTION Left 07/16/2021    Procedure: CYSTOSCOPY URETEROSCOPY RETROGRADE PYELOGRAM HOLMIUM LASER BASKET STONE EXTRACTION STENT INSERTION;  Surgeon: Carmelo Nguyễn MD;  Location: [facility] MAIN OR;  Service: Urology;  Laterality: Left;   • EAR TUBES     • KIDNEY STONE SURGERY     • WISDOM TOOTH EXTRACTION          Previous Goals   Eat/drink separately- pt trying to remember to do this  Eat slowly over 30 minutes- trying, usually around 15-20 minutes   Food logs- not met        Encounter Information        Visit Narrative     Pt states they have been drinking Premier protein shake for breakfast sometimes, pt has also purchased Fairlife protein shake to try. Encouraged pt to continue drinking protein shake 1x/day. Discussed protein sources, encouraged pt to include with each meal. Commended pt on decreasing soda intake, encouraged pt to continue cutting down on soda. Also recommended pt begin using food log or brandon to track intake.     Diet Recall:   Breakfast: protein shake or cereal  Lunch: salad w/ grilled chicken  Dinner: varies, used to eat out more but has been trying to eat at home, last night  "had meatloaf, mashed potatoes and green beans  Snacks: few snacks  Beverages: water, cutting down soda to 1/day     Exercise: started walking w/ daughter    Supplements: Vitamin D, B12    Self Monitoring: not tracking intake          Anthropometrics        Current Height, Weight  63\", 238lb          Trending Weight Hx  Wt stable x 6 months     Wt Readings from Last 30 Encounters:   01/23/23 1001 108 kg (238 lb)   01/19/23 1033 110 kg (242 lb 6.4 oz)   12/09/22 1347 108 kg (238 lb 5.1 oz)   08/16/22 0951 107 kg (236 lb 8.9 oz)   11/01/21 1534 102 kg (224 lb)   09/26/21 0759 96.6 kg (213 lb)   09/26/21 0150 96.8 kg (213 lb 6.5 oz)   09/25/21 2053 97.5 kg (215 lb)   09/23/21 1859 96.9 kg (213 lb 10 oz)   09/23/21 1340 97.4 kg (214 lb 11.7 oz)   07/15/21 2122 101 kg (223 lb 12.3 oz)   07/15/21 1404 101 kg (222 lb 3.6 oz)   12/29/20 1333 97.2 kg (214 lb 4.6 oz)   11/15/20 1659 94.8 kg (209 lb)   07/20/20 1310 96.5 kg (212 lb 11.9 oz)   10/10/18 1413 91.6 kg (202 lb)   09/19/18 1412 92.5 kg (204 lb)      BMI kg/m2 42.3 kg/m^2       Nutrition Diagnosis         Nutrition Dx Statement Overweight/obesity R/t multifactorial biochemical, behavioral, and environmental contributors to Dz; as evidenced by BMI 42.3 kg/m^2.       Monitor/Evaluation        New Goals Protein source at each meal  Continue cutting down on soda  Food logs     Electronically signed by:  Tali Godoy RD  02/22/23 17:56 EST    "

## 2023-02-27 ENCOUNTER — ANESTHESIA (OUTPATIENT)
Dept: GASTROENTEROLOGY | Facility: HOSPITAL | Age: 38
End: 2023-02-27
Payer: COMMERCIAL

## 2023-02-27 ENCOUNTER — HOSPITAL ENCOUNTER (OUTPATIENT)
Facility: HOSPITAL | Age: 38
Setting detail: HOSPITAL OUTPATIENT SURGERY
Discharge: HOME | End: 2023-02-27
Attending: SURGERY | Admitting: SURGERY
Payer: COMMERCIAL

## 2023-02-27 ENCOUNTER — ANESTHESIA EVENT (OUTPATIENT)
Dept: GASTROENTEROLOGY | Facility: HOSPITAL | Age: 38
End: 2023-02-27
Payer: COMMERCIAL

## 2023-02-27 VITALS
RESPIRATION RATE: 12 BRPM | DIASTOLIC BLOOD PRESSURE: 92 MMHG | WEIGHT: 238.54 LBS | SYSTOLIC BLOOD PRESSURE: 127 MMHG | HEART RATE: 71 BPM | OXYGEN SATURATION: 98 % | HEIGHT: 63 IN | BODY MASS INDEX: 42.27 KG/M2 | TEMPERATURE: 97.9 F

## 2023-02-27 DIAGNOSIS — E66.01 OBESITY, CLASS III, BMI 40-49.9 (MORBID OBESITY): ICD-10-CM

## 2023-02-27 DIAGNOSIS — K21.9 GASTROESOPHAGEAL REFLUX DISEASE, UNSPECIFIED WHETHER ESOPHAGITIS PRESENT: ICD-10-CM

## 2023-02-27 LAB — B-HCG UR QL: NEGATIVE

## 2023-02-27 PROCEDURE — S0260 H&P FOR SURGERY: HCPCS | Performed by: SURGERY

## 2023-02-27 PROCEDURE — 43239 EGD BIOPSY SINGLE/MULTIPLE: CPT | Performed by: SURGERY

## 2023-02-27 PROCEDURE — 25010000002 PROPOFOL 10 MG/ML EMULSION

## 2023-02-27 PROCEDURE — 88342 IMHCHEM/IMCYTCHM 1ST ANTB: CPT | Performed by: SURGERY

## 2023-02-27 PROCEDURE — 88305 TISSUE EXAM BY PATHOLOGIST: CPT | Performed by: SURGERY

## 2023-02-27 PROCEDURE — 81025 URINE PREGNANCY TEST: CPT | Performed by: ANESTHESIOLOGY

## 2023-02-27 RX ORDER — SODIUM CHLORIDE 9 MG/ML
30 INJECTION, SOLUTION INTRAVENOUS CONTINUOUS PRN
Status: DISCONTINUED | OUTPATIENT
Start: 2023-02-27 | End: 2023-02-27 | Stop reason: HOSPADM

## 2023-02-27 RX ORDER — LIDOCAINE HYDROCHLORIDE 20 MG/ML
INJECTION, SOLUTION INFILTRATION; PERINEURAL AS NEEDED
Status: DISCONTINUED | OUTPATIENT
Start: 2023-02-27 | End: 2023-02-27 | Stop reason: SURG

## 2023-02-27 RX ORDER — SODIUM CHLORIDE 9 MG/ML
INJECTION, SOLUTION INTRAVENOUS CONTINUOUS PRN
Status: DISCONTINUED | OUTPATIENT
Start: 2023-02-27 | End: 2023-02-27 | Stop reason: SURG

## 2023-02-27 RX ORDER — SODIUM CHLORIDE 9 MG/ML
30 INJECTION, SOLUTION INTRAVENOUS CONTINUOUS
Status: DISCONTINUED | OUTPATIENT
Start: 2023-02-27 | End: 2023-02-27 | Stop reason: HOSPADM

## 2023-02-27 RX ORDER — PROPOFOL 10 MG/ML
VIAL (ML) INTRAVENOUS AS NEEDED
Status: DISCONTINUED | OUTPATIENT
Start: 2023-02-27 | End: 2023-02-27 | Stop reason: SURG

## 2023-02-27 RX ORDER — SODIUM CHLORIDE 0.9 % (FLUSH) 0.9 %
10 SYRINGE (ML) INJECTION AS NEEDED
Status: DISCONTINUED | OUTPATIENT
Start: 2023-02-27 | End: 2023-02-27 | Stop reason: HOSPADM

## 2023-02-27 RX ADMIN — PROPOFOL 100 MG: 10 INJECTION, EMULSION INTRAVENOUS at 14:09

## 2023-02-27 RX ADMIN — LIDOCAINE HYDROCHLORIDE 50 MG: 20 INJECTION, SOLUTION INFILTRATION; PERINEURAL at 14:09

## 2023-02-27 RX ADMIN — SODIUM CHLORIDE: 9 INJECTION, SOLUTION INTRAVENOUS at 14:05

## 2023-02-27 RX ADMIN — PROPOFOL 100 MG: 10 INJECTION, EMULSION INTRAVENOUS at 14:07

## 2023-02-27 NOTE — ANESTHESIA POSTPROCEDURE EVALUATION
Patient: Ariadna Ambrocio    Procedure Summary     Date: 02/27/23 Room / Location: [facility] ENDOSCOPY 4 / [facility] ENDOSCOPY    Anesthesia Start: 1405 Anesthesia Stop: 1419    Procedure: ESOPHAGOGASTRODUODENOSCOPY with gastric antrum biopsy Diagnosis:       Obesity, Class III, BMI 40-49.9 (morbid obesity) ([facility])      Gastroesophageal reflux disease, unspecified whether esophagitis present      (Obesity, Class III, BMI 40-49.9 (morbid obesity) ([facility]) [E66.01])      (Gastroesophageal reflux disease, unspecified whether esophagitis present [K21.9])    Surgeons: Otilia Capone MD Provider: Derrek Root MD    Anesthesia Type: general, MAC ASA Status: 3          Anesthesia Type: general, MAC    Vitals  Vitals Value Taken Time   /92 02/27/23 1425   Temp     Pulse 71 02/27/23 1425   Resp 12 02/27/23 1425   SpO2 98 % 02/27/23 1425           Post Anesthesia Care and Evaluation    Patient location during evaluation: PACU  Patient participation: complete - patient participated  Level of consciousness: awake  Pain scale: See nurse's notes for pain score.  Pain management: adequate    Airway patency: patent  Anesthetic complications: No anesthetic complications  PONV Status: none  Cardiovascular status: acceptable  Respiratory status: acceptable and spontaneous ventilation  Hydration status: acceptable    Comments: Patient seen and examined postoperatively; vital signs stable; SpO2 greater than or equal to 90%; cardiopulmonary status stable; nausea/vomiting adequately controlled; pain adequately controlled; no apparent anesthesia complications; patient discharged from anesthesia care when discharge criteria were met

## 2023-02-27 NOTE — ANESTHESIA PREPROCEDURE EVALUATION
Anesthesia Evaluation     Patient summary reviewed and Nursing notes reviewed   no history of anesthetic complications:  NPO Solid Status: > 8 hours  NPO Liquid Status: > 8 hours           Airway   Mallampati: II  TM distance: >3 FB  Neck ROM: full  No difficulty expected  Dental - normal exam     Pulmonary    (+) shortness of breath,   Cardiovascular     ECG reviewed    (+) hypertension, hyperlipidemia,       Neuro/Psych  (+) psychiatric history Anxiety and Depression,    GI/Hepatic/Renal/Endo    (+) morbid obesity, hiatal hernia, GERD,  renal disease stones,     Musculoskeletal     Abdominal    Substance History      OB/GYN          Other                          Anesthesia Plan    ASA 3     general and MAC     intravenous induction     Anesthetic plan, risks, benefits, and alternatives have been provided, discussed and informed consent has been obtained with: patient.    Plan discussed with CRNA and CAA.

## 2023-03-01 ENCOUNTER — TELEMEDICINE (OUTPATIENT)
Dept: BARIATRICS/WEIGHT MGMT | Facility: CLINIC | Age: 38
End: 2023-03-01
Payer: COMMERCIAL

## 2023-03-01 DIAGNOSIS — Z71.3 NUTRITIONAL COUNSELING: ICD-10-CM

## 2023-03-01 DIAGNOSIS — E66.01 OBESITY, CLASS III, BMI 40-49.9 (MORBID OBESITY): Primary | ICD-10-CM

## 2023-03-01 LAB
LAB AP CASE REPORT: NORMAL
LAB AP CLINICAL INFORMATION: NORMAL
PATH REPORT.FINAL DX SPEC: NORMAL
PATH REPORT.GROSS SPEC: NORMAL

## 2023-03-01 PROCEDURE — 99213 OFFICE O/P EST LOW 20 MIN: CPT | Performed by: NURSE PRACTITIONER

## 2023-03-01 RX ORDER — AMOXICILLIN 500 MG/1
1000 CAPSULE ORAL 2 TIMES DAILY
Qty: 20 CAPSULE | Refills: 0 | Status: SHIPPED | OUTPATIENT
Start: 2023-03-01

## 2023-03-01 RX ORDER — OMEPRAZOLE 40 MG/1
40 CAPSULE, DELAYED RELEASE ORAL DAILY
Qty: 30 CAPSULE | Refills: 6 | Status: SHIPPED | OUTPATIENT
Start: 2023-03-01 | End: 2023-03-27

## 2023-03-01 RX ORDER — CLARITHROMYCIN 500 MG/1
500 TABLET, COATED ORAL 2 TIMES DAILY
Qty: 20 TABLET | Refills: 0 | Status: SHIPPED | OUTPATIENT
Start: 2023-03-01

## 2023-03-01 NOTE — PROGRESS NOTES
[facility] BARIATRIC SURGERY  [address] [place] [address]  [address] [place] [address]  Dept: [phone]  3/1/2023      Ariadna Ambrocio.  [id number]  [id number]  1985  female    You have chosen to receive care through a video visit. DO you consent to use a video visit for your medical care today? Yes    SWL #3  current weight 238 pounds    The patient is here for month 3 of their pre-operative physician supervised diet. She had a loss of 0 lbs. The patient states that she is following the recommendations given by our office and dietician including a high lean protein, low carb and low fat diet. We recommended adequate fruits and vegetable intake along with limited portion sizes. Patient is working on eliminating fast foods, fried foods, sweets and soda. Ariadna Ambrocio has been increasing her daily water intake. She has been exercising: has an elliptical but hasn't been using it.    Breakfast: eating breakfast more often now, cereal, protein shake, scrambled egg or palmer   Lunch: salad with grilled chicken, spaghettios  Dinner: cooking at home more, tacos with  quest chips, lemon pepper chicken,   Snacks: none usually, yogurt, minimal chips, peanuts    Drinks: water, soda 1 in the morning and 1 at night   Exercise: has an elliptical, but hasn't used it yet   Wt Readings from Last 10 Encounters:   02/27/23 108 kg (238 lb 8.6 oz)   01/23/23 108 kg (238 lb)   01/19/23 110 kg (242 lb 6.4 oz)   12/09/22 108 kg (238 lb 5.1 oz)   08/16/22 107 kg (236 lb 8.9 oz)   11/01/21 102 kg (224 lb)   09/26/21 96.6 kg (213 lb)   09/23/21 96.9 kg (213 lb 10 oz)   07/15/21 101 kg (223 lb 12.3 oz)   12/29/20 97.2 kg (214 lb 4.6 oz)     Patient states they have made positive changes including trying to cut back on carbonation, increased protein   The patient admits to be struggling with exercise, carbonation/ soda    Past goals:   Protein source at each  meal- parietally met   Continue cutting down on soda- met   Food logs- met     Review of Systems   Constitutional: Positive for fatigue.   Respiratory: Negative.    Cardiovascular: Negative.    Gastrointestinal: Negative.    Musculoskeletal: Positive for back pain.     Height 63 inches, weight 238 pounds, BMI 42.26  Patient Active Problem List   Diagnosis   • Ureterolithiasis   • Chest pain   • Dyspnea on exertion   • Paresthesia   • Hypertension   • Bradycardia, sinus   • Paroxysmal SVT (supraventricular tachycardia) ([facility])   • Obesity, Class III, BMI 40-49.9 (morbid obesity) ([facility])   • Gastroesophageal reflux disease     The following portions of the patient's history were reviewed and updated as appropriate: active problem list, medication list, allergies, social history, notes from last encounter  Past Medical History:   Diagnosis Date   • Anxiety    • Constipation    • Depression    • Diarrhea    • Elevated triglycerides with high cholesterol    • Heartburn    • Hiatal hernia    • Hypertension    • IBS (irritable bowel syndrome)    • Kidney stones    • Nausea    • Pre-diabetes    • Rapid heart beat    • Seasonal allergies      Past Surgical History:   Procedure Laterality Date   • COLONOSCOPY     • CYSTOSCOPY, URETEROSCOPY, RETROGRADE PYELOGRAM, STENT INSERTION Left 07/16/2021    Procedure: CYSTOSCOPY URETEROSCOPY RETROGRADE PYELOGRAM HOLMIUM LASER BASKET STONE EXTRACTION STENT INSERTION;  Surgeon: Carmelo Nguyễn MD;  Location: [facility] MAIN OR;  Service: Urology;  Laterality: Left;   • EAR TUBES     • ENDOSCOPY N/A 2/27/2023    Procedure: ESOPHAGOGASTRODUODENOSCOPY with gastric antrum biopsy;  Surgeon: Otilia Capone MD;  Location: [facility] ENDOSCOPY;  Service: General;  Laterality: N/A;  Post: large hiatal hernia   • KIDNEY STONE SURGERY     • WISDOM TOOTH EXTRACTION          Physical Exam  Constitutional:       Appearance: Normal appearance. She is obese.   Cardiovascular:      Comments: Unable to assess due to  video visit   Pulmonary:      Comments: Unable to assess due to video visit   Abdominal:      Comments: Unable to assess due to video visit    Neurological:      General: No focal deficit present.      Mental Status: She is alert and oriented to person, place, and time.   Psychiatric:         Mood and Affect: Mood normal.         Behavior: Behavior normal.         Thought Content: Thought content normal.         Judgment: Judgment normal.     limited due to video visit     Discussion/Plan:    New goals:  Continue working on eliminating carbonation from diet  Food logs  Try using elliptical 10 minutes 2-3 days a week    Obesity/Morbid Obesity: Currently the patient's weight is stable. There are no medications prescribed.Treatment plan includes prescribed diet, prescribed exercise regimen and behavior modification.    I reviewed the appropriate dietary choices with the patient and encouraged the necessary changes. Recommended at least 70 grams of protein per day, around 35 grams of fats and less than 100 grams of carbohydrates. Reviewed calorie intake if patient wanted to calorie count and/or had BMR. Instructed patient to drink half of body weight in ounces per day and exercise a minimum of 150 minutes per week including both cardio and strength training. Discussed the option of keeping a food journal which will help patient become more aware of the nutritional value of foods so they are more prepared after surgery.    The patient was given written materials from our office for education.   I answered all of the patients questions regarding dietary changes, exercise or surgical options.  The patient will follow up in 1 month. The total time spent face to face was 20 minutes with 15 minutes spent counseling.    SAM Islas  [facility]

## 2023-03-02 NOTE — OP NOTE
Surgeon:  Otilia Capone MD  Preoperative Diagnosis: Screening for bariatric surgery    Postoperative Diagnosis: moderate sized hiatal hernia    Procedure Performed: Esophagogastroduodenoscopy with biopsy of the antrum to check for H. pylori    Indications: The patient is interested in bariatric surgery for weight loss.  This is a screening EGD.      Specimen: biopsy of gastric antrum for H. Pylori    EBL: none    Procedure:     The procedure, indications, preparation and potential complications were explained to the patient, who indicated understanding and signed the corresponding consent forms.  The patient was identified, taken to the endoscopy suite, and placed on the left side down decubitus position.  The patient underwent a MAC anesthesia and was appropriately monitored through the case by the anesthesia personnel with continuous pulse oximetry, blood pressure, and cardiac monitoring.  A bite block was placed.  After adequate IV sedation and using a transoral technique a lubed flexible endoscope was placed in the hypopharynx and advanced to the second portion of the duodenum without difficulty. The scope was then withdrawn back into the antrum of the stomach.  Cold forcep biopsies of the antrum were taken to rule out Helicobacter pylori.  The scope was retroflexed noting the body, fundus and cardia.  The scope was then withdrawn back into the esophagus after decompressing the stomach.  The Z line was noted and GE junction measured from the incisors.  The scope was then completely withdrawn.  The patient tolerated the procedure well and left the endoscopy suite in stable condition.  The findings are listed below.      Moderate sized hiatal hernia

## 2023-03-06 DIAGNOSIS — I10 PRIMARY HYPERTENSION: ICD-10-CM

## 2023-03-06 DIAGNOSIS — M79.89 BILATERAL SWELLING OF FEET: ICD-10-CM

## 2023-03-06 RX ORDER — HYDROCHLOROTHIAZIDE 12.5 MG/1
25 CAPSULE, GELATIN COATED ORAL DAILY
Qty: 90 CAPSULE | Refills: 1 | Status: SHIPPED | OUTPATIENT
Start: 2023-03-06

## 2023-03-06 NOTE — TELEPHONE ENCOUNTER
Rx Refill Note  Requested Prescriptions     Pending Prescriptions Disp Refills   • hydroCHLOROthiazide (MICROZIDE) 12.5 MG capsule 90 capsule 1     Sig: Take 2 capsules by mouth Daily. Hold day of proced.      Last office visit with prescribing clinician: 11/1/2021   Last telemedicine visit with prescribing clinician: Visit date not found   Next office visit with prescribing clinician: 1/23/2024                         Would you like a call back once the refill request has been completed: [] Yes [] No    If the office needs to give you a call back, can they leave a voicemail: [] Yes [] No    Lola Huitron MA  03/06/23, 08:37 EST

## 2023-03-27 RX ORDER — OMEPRAZOLE 40 MG/1
CAPSULE, DELAYED RELEASE ORAL
Qty: 30 CAPSULE | Refills: 6 | Status: SHIPPED | OUTPATIENT
Start: 2023-03-27

## 2023-04-19 ENCOUNTER — TELEMEDICINE (OUTPATIENT)
Dept: BARIATRICS/WEIGHT MGMT | Facility: CLINIC | Age: 38
End: 2023-04-19
Payer: COMMERCIAL

## 2023-04-19 VITALS — BODY MASS INDEX: 42.35 KG/M2 | HEIGHT: 63 IN | WEIGHT: 239 LBS

## 2023-04-19 DIAGNOSIS — E66.01 OBESITY, CLASS III, BMI 40-49.9 (MORBID OBESITY): Primary | ICD-10-CM

## 2023-04-19 DIAGNOSIS — Z71.3 NUTRITIONAL COUNSELING: ICD-10-CM

## 2023-04-19 NOTE — PROGRESS NOTES
[facility]ATR[facility] SURGERY  [address] [place] [address]  [address] [place] [address]  Dept: [phone]  4/19/2023      Ariadna Ambrocio.  [id number]  [id number]  1985  female    You have chosen to receive care though a video visit. Do you consent to use a video visit for your medical care today? Yes    SWL #4/6   Current weight 239 pounds     The patient is here for month 4/6 of their pre-operative physician supervised diet. She had a gain of 1 lbs. The patient states that she is following the recommendations given by our office and dietician including a high lean protein, low carb and low fat diet. We recommended adequate fruits and vegetable intake along with limited portion sizes. Patient is working on eliminating fast foods, fried foods, sweets and soda. Ariadna Ambrocio has been increasing her daily water intake. She has been exercising: walking some, elliptical 3 times a week for 15 minutes of a time.  Wt Readings from Last 10 Encounters:   02/27/23 108 kg (238 lb 8.6 oz)   01/23/23 108 kg (238 lb)   01/19/23 110 kg (242 lb 6.4 oz)   12/09/22 108 kg (238 lb 5.1 oz)   08/16/22 107 kg (236 lb 8.9 oz)   11/01/21 102 kg (224 lb)   09/26/21 96.6 kg (213 lb)   09/23/21 96.9 kg (213 lb 10 oz)   07/15/21 101 kg (223 lb 12.3 oz)   12/29/20 97.2 kg (214 lb 4.6 oz)     Patient states they have made positive changes including increased exercise  The patient admits to be struggling with soda cravings       Breakfast: eggs,   Lunch: spaghettio's, salad   Dinner: lasagna, minute steak, mashed potatoes, varies a lot   Snacks: none usually   Drinks: some water, some soda prn,   Exercise: elliptical 3 times a week, 15 minutes at a time       Past goals:    Protein source at each meal- met  Continue cutting down on soda- partially met   Food logs- partially met        Review of Systems   Constitutional: Positive for fatigue.   Respiratory:  Negative.    Cardiovascular: Negative.    Gastrointestinal: Negative.    Musculoskeletal: Negative.      Height 63 inches, 239 pounds, BMI 42.34  Patient Active Problem List   Diagnosis   • Ureterolithiasis   • Chest pain   • Dyspnea on exertion   • Paresthesia   • Hypertension   • Bradycardia, sinus   • Paroxysmal SVT (supraventricular tachycardia)   • Obesity, Class III, BMI 40-49.9 (morbid obesity)   • Gastroesophageal reflux disease     The following portions of the patient's history were reviewed and updated as appropriate: active problem list, medication list, allergies, social history, notes from last encounter  Past Medical History:   Diagnosis Date   • Anxiety    • Constipation    • Depression    • Diarrhea    • Elevated triglycerides with high cholesterol    • Heartburn    • Hiatal hernia    • Hypertension    • IBS (irritable bowel syndrome)    • Kidney stones    • Nausea    • Pre-diabetes    • Rapid heart beat    • Seasonal allergies      Past Surgical History:   Procedure Laterality Date   • COLONOSCOPY     • CYSTOSCOPY, URETEROSCOPY, RETROGRADE PYELOGRAM, STENT INSERTION Left 07/16/2021    Procedure: CYSTOSCOPY URETEROSCOPY RETROGRADE PYELOGRAM HOLMIUM LASER BASKET STONE EXTRACTION STENT INSERTION;  Surgeon: Carmelo Nguyễn MD;  Location: [facility] MAIN OR;  Service: Urology;  Laterality: Left;   • EAR TUBES     • ENDOSCOPY N/A 2/27/2023    Procedure: ESOPHAGOGASTRODUODENOSCOPY with gastric antrum biopsy;  Surgeon: Otilia Capone MD;  Location: [facility] ENDOSCOPY;  Service: General;  Laterality: N/A;  Post: large hiatal hernia   • KIDNEY STONE SURGERY     • WISDOM TOOTH EXTRACTION          Physical Exam  Constitutional:       Appearance: Normal appearance. She is obese.   Cardiovascular:      Comments: Unable to assess due to video visit   Pulmonary:      Comments: Unable to assess due to video visit   Abdominal:      Comments: Unable to assess due to video visit    Neurological:      General: No focal deficit  present.      Mental Status: She is alert and oriented to person, place, and time.   Psychiatric:         Mood and Affect: Mood normal.         Behavior: Behavior normal.         Thought Content: Thought content normal.         Judgment: Judgment normal.         Discussion/Plan:    New goals:  No carbonation by next visit  Try eating something with protein 3 times a day  Food logs  Protein shakes 1 a day?      Obesity/Morbid Obesity: Currently the patient's weight is increased. There are no medications prescribed.Treatment plan includes prescribed diet, prescribed exercise regimen and behavior modification.    I reviewed the appropriate dietary choices with the patient and encouraged the necessary changes. Recommended at least 70 grams of protein per day, around 35 grams of fats and less than 100 grams of carbohydrates. Reviewed calorie intake if patient wanted to calorie count and/or had BMR. Instructed patient to drink half of body weight in ounces per day and exercise a minimum of 150 minutes per week including both cardio and strength training. Discussed the option of keeping a food journal which will help patient become more aware of the nutritional value of foods so they are more prepared after surgery.    The patient was given written materials from our office for education.   I answered all of the patients questions regarding dietary changes, exercise or surgical options.  The patient will follow up in 1 month. The total time spent face to face was 20 minutes with 15 minutes spent counseling.    Angie Carmona APRMARTHA  [facility]

## 2023-05-08 ENCOUNTER — TELEMEDICINE (OUTPATIENT)
Dept: BARIATRICS/WEIGHT MGMT | Facility: CLINIC | Age: 38
End: 2023-05-08
Payer: COMMERCIAL

## 2023-05-08 VITALS — WEIGHT: 236 LBS | HEIGHT: 63 IN | BODY MASS INDEX: 41.82 KG/M2

## 2023-05-08 DIAGNOSIS — Z71.3 NUTRITIONAL COUNSELING: ICD-10-CM

## 2023-05-08 DIAGNOSIS — E66.01 OBESITY, CLASS III, BMI 40-49.9 (MORBID OBESITY): Primary | ICD-10-CM

## 2023-05-08 NOTE — PROGRESS NOTES
[facility] SURG [facility] BARIATRIC SURGERY  [address] [place] [address]  [address] [place] [address]  Dept: [phone]  5/8/2023      Ariadna Ambrocio.  [id number]  [id number]  1985  female  You have chosen to receive care through a video visit. Do you consent to use a video visit for your medical care today? Yes      SWl #5/6   Current Weight: 236 pounds     The patient is here for month 5 of their pre-operative physician supervised diet. She had a loss of 3 lbs. The patient states that she is following the recommendations given by our office and dietician including a high lean protein, low carb and low fat diet. We recommended adequate fruits and vegetable intake along with limited portion sizes. Patient is working on eliminating fast foods, fried foods, sweets and soda. Ariadna Ambrocio has been increasing her daily water intake. She has been exercising:     Breakfast: none recently   Lunch: none recently , tomato soup today  Dinner: white [employer] 2 and chicken rings   Snacks: none usually  Drinks: more water, 1 soda   Exercise: elliptical 3 times a week,     Past goals:  No carbonation by next visit- partially met   Try eating something with protein 3 times a day- not met   Food logs- met   Protein shakes 1 a day?- partially met   .  Wt Readings from Last 10 Encounters:   04/19/23 108 kg (239 lb)   02/27/23 108 kg (238 lb 8.6 oz)   01/23/23 108 kg (238 lb)   01/19/23 110 kg (242 lb 6.4 oz)   12/09/22 108 kg (238 lb 5.1 oz)   08/16/22 107 kg (236 lb 8.9 oz)   11/01/21 102 kg (224 lb)   09/26/21 96.6 kg (213 lb)   09/23/21 96.9 kg (213 lb 10 oz)   07/15/21 101 kg (223 lb 12.3 oz)     Patient states they have made positive changes including trying to cut back on carbonation   The patient admits to be struggling with eating 3 meals a day,carboantion     Review of Systems   Constitutional: Negative.    Respiratory: Negative.    Cardiovascular:  Negative.    Gastrointestinal: Negative.    Musculoskeletal: Negative.      Height 63 inches, weight 236 pounds, BMI 41.81   Patient Active Problem List   Diagnosis   • Ureterolithiasis   • Chest pain   • Dyspnea on exertion   • Paresthesia   • Hypertension   • Bradycardia, sinus   • Paroxysmal SVT (supraventricular tachycardia)   • Obesity, Class III, BMI 40-49.9 (morbid obesity)   • Gastroesophageal reflux disease       The following portions of the patient's history were reviewed and updated as appropriate: active problem list, medication list, allergies, social history, notes from last encounter  Past Medical History:   Diagnosis Date   • Anxiety    • Constipation    • Depression    • Diarrhea    • Elevated triglycerides with high cholesterol    • Heartburn    • Hiatal hernia    • Hypertension    • IBS (irritable bowel syndrome)    • Kidney stones    • Nausea    • Pre-diabetes    • Rapid heart beat    • Seasonal allergies      Past Surgical History:   Procedure Laterality Date   • COLONOSCOPY     • CYSTOSCOPY, URETEROSCOPY, RETROGRADE PYELOGRAM, STENT INSERTION Left 07/16/2021    Procedure: CYSTOSCOPY URETEROSCOPY RETROGRADE PYELOGRAM HOLMIUM LASER BASKET STONE EXTRACTION STENT INSERTION;  Surgeon: Carmelo Nguyễn MD;  Location: [facility] MAIN OR;  Service: Urology;  Laterality: Left;   • EAR TUBES     • ENDOSCOPY N/A 2/27/2023    Procedure: ESOPHAGOGASTRODUODENOSCOPY with gastric antrum biopsy;  Surgeon: Otilia Capone MD;  Location: [facility] ENDOSCOPY;  Service: General;  Laterality: N/A;  Post: large hiatal hernia   • KIDNEY STONE SURGERY     • WISDOM TOOTH EXTRACTION          Physical Exam  Constitutional:       Appearance: Normal appearance. She is obese.   Cardiovascular:      Comments: Unable to assess due to video visit   Pulmonary:      Comments: Unable to assess due to video visit   Abdominal:      Comments: Unable to assess due to video visit    Neurological:      General: No focal deficit present.       Mental Status: She is alert and oriented to person, place, and time.   Psychiatric:         Mood and Affect: Mood normal.         Behavior: Behavior normal.         Thought Content: Thought content normal.         Judgment: Judgment normal.     limited due to video visit     Discussion/Plan:    New goals:  Weight 2 times a week, not daily  Eat something high protein 3 times a day  Continue working on eliminating carbonation from diet  Protein shake 1 a day after surgery  Food logs     Obesity/Morbid Obesity: Currently the patient's weight is decreased. There are no medications prescribed.Treatment plan includes prescribed diet, prescribed exercise regimen and behavior modification.    I reviewed the appropriate dietary choices with the patient and encouraged the necessary changes. Recommended at least 70 grams of protein per day, around 35 grams of fats and less than 100 grams of carbohydrates. Reviewed calorie intake if patient wanted to calorie count and/or had BMR. Instructed patient to drink half of body weight in ounces per day and exercise a minimum of 150 minutes per week including both cardio and strength training. Discussed the option of keeping a food journal which will help patient become more aware of the nutritional value of foods so they are more prepared after surgery.    The patient was given written materials from our office for education.   I answered all of the patients questions regarding dietary changes, exercise or surgical options.  The patient will follow up in 1 month. The total time spent face to face was 20 minutes with 15 minutes spent counseling.    SAM Islas  [facility]

## 2023-06-02 ENCOUNTER — TELEMEDICINE (OUTPATIENT)
Dept: BARIATRICS/WEIGHT MGMT | Facility: CLINIC | Age: 38
End: 2023-06-02

## 2023-06-02 VITALS — BODY MASS INDEX: 42.35 KG/M2 | HEIGHT: 63 IN | WEIGHT: 239 LBS

## 2023-06-02 DIAGNOSIS — E66.01 OBESITY, CLASS III, BMI 40-49.9 (MORBID OBESITY): Primary | ICD-10-CM

## 2023-06-02 DIAGNOSIS — Z71.3 NUTRITIONAL COUNSELING: ICD-10-CM

## 2023-06-02 NOTE — PROGRESS NOTES
MGK BAR SURG [facility] GROUP BARIATRIC SURGERY  [address] [place] [address]  [address] [place] [address]  Dept: [phone]  6/2/2023      Ariadna Ambrocio.  [id number]  [id number]  1985  female    You have chosen to receive care through a video visit. Do you consent to use a video visit for your medical care today? Yes    SWL #6   Current weight 239 pounds  -6 pounds overall      The patient is here for month 6 of their pre-operative physician supervised diet. She had a gain of 3 lbs. The patient states that she is following the recommendations given by our office and dietician including a high lean protein, low carb and low fat diet. We recommended adequate fruits and vegetable intake along with limited portion sizes. Patient is working on eliminating fast foods, fried foods, sweets and soda. Ariadna Ambrocio has been increasing her daily water intake. She has been exercising: riding bike, walking  Wt Readings from Last 10 Encounters:   05/08/23 107 kg (236 lb)   04/19/23 108 kg (239 lb)   02/27/23 108 kg (238 lb 8.6 oz)   01/23/23 108 kg (238 lb)   01/19/23 110 kg (242 lb 6.4 oz)   12/09/22 108 kg (238 lb 5.1 oz)   08/16/22 107 kg (236 lb 8.9 oz)   11/01/21 102 kg (224 lb)   09/26/21 96.6 kg (213 lb)   09/23/21 96.9 kg (213 lb 10 oz)     Patient states they have made positive changes including increased physical activity, increased exercise,     The patient admits to be struggling with none     Breakfast: protein shake, muffin , cereal, eggs   Lunch: salad- chicken salad , left overs from night before   Dinner: tacos last night, potato soup and lemon pepper chicken   Snacks: none usually  Drinks: protein shake, water, 1 soda a day   Exercise: riding bike , walking     Past goals:  Weight 2 times a week, not daily-met   Eat something high protein 3 times a day- met   Continue working on eliminating carbonation from diet- down to 1 soda a day  Protein  shake 1 a day after surgery- met   Food logs - met         Review of Systems   Constitutional: Positive for fatigue.   Respiratory: Negative.    Cardiovascular: Negative.    Gastrointestinal: Negative.    Musculoskeletal: Positive for back pain.     Height 63 inches, weight 239 pounds, BMI 42.34  Patient Active Problem List   Diagnosis   • Ureterolithiasis   • Chest pain   • Dyspnea on exertion   • Paresthesia   • Hypertension   • Bradycardia, sinus   • Paroxysmal SVT (supraventricular tachycardia)   • Obesity, Class III, BMI 40-49.9 (morbid obesity)   • Gastroesophageal reflux disease     The following portions of the patient's history were reviewed and updated as appropriate: active problem list, medication list, allergies, social history, notes from last encounter  Past Medical History:   Diagnosis Date   • Anxiety    • Constipation    • Depression    • Diarrhea    • Elevated triglycerides with high cholesterol    • Heartburn    • Hiatal hernia    • Hypertension    • IBS (irritable bowel syndrome)    • Kidney stones    • Nausea    • Pre-diabetes    • Rapid heart beat    • Seasonal allergies      Past Surgical History:   Procedure Laterality Date   • COLONOSCOPY     • CYSTOSCOPY, URETEROSCOPY, RETROGRADE PYELOGRAM, STENT INSERTION Left 07/16/2021    Procedure: CYSTOSCOPY URETEROSCOPY RETROGRADE PYELOGRAM HOLMIUM LASER BASKET STONE EXTRACTION STENT INSERTION;  Surgeon: Carmelo Nguyễn MD;  Location: [facility] MAIN OR;  Service: Urology;  Laterality: Left;   • EAR TUBES     • ENDOSCOPY N/A 2/27/2023    Procedure: ESOPHAGOGASTRODUODENOSCOPY with gastric antrum biopsy;  Surgeon: Otilia Capone MD;  Location: [facility] ENDOSCOPY;  Service: General;  Laterality: N/A;  Post: large hiatal hernia   • KIDNEY STONE SURGERY     • WISDOM TOOTH EXTRACTION          Physical Exam  Constitutional:       Appearance: Normal appearance. She is obese.   Cardiovascular:      Comments: Unable to assess due to video visit   Pulmonary:       Comments: Unable to assess due to video visit   Abdominal:      Comments: Unable to assess due to video visit    Neurological:      General: No focal deficit present.      Mental Status: She is alert and oriented to person, place, and time.   Psychiatric:         Mood and Affect: Mood normal.         Behavior: Behavior normal.         Thought Content: Thought content normal.         Judgment: Judgment normal.         Discussion/Plan:  Obesity/Morbid Obesity: Currently the patient's weight is increased. There are no medications prescribed.Treatment plan includes prescribed diet, prescribed exercise regimen and behavior modification.    I reviewed the appropriate dietary choices with the patient and encouraged the necessary changes. Recommended at least 70 grams of protein per day, around 35 grams of fats and less than 100 grams of carbohydrates. Reviewed calorie intake if patient wanted to calorie count and/or had BMR. Instructed patient to drink half of body weight in ounces per day and exercise a minimum of 150 minutes per week including both cardio and strength training. Discussed the option of keeping a food journal which will help patient become more aware of the nutritional value of foods so they are more prepared after surgery.    The patient was given written materials from our office for education.   I answered all of the patients questions regarding dietary changes, exercise or surgical options.  The patient will follow up fore pre op. The total time spent face to face was 20 minutes with 15 minutes spent counseling.    Pt is done with SWL. Plan to send off for insurance approval and follow up for pre op.     SAM Islas  [facility] Bariatrics

## 2023-06-03 DIAGNOSIS — E66.01 OBESITY, CLASS III, BMI 40-49.9 (MORBID OBESITY): Primary | ICD-10-CM

## 2023-06-03 RX ORDER — SODIUM CHLORIDE 9 MG/ML
40 INJECTION, SOLUTION INTRAVENOUS AS NEEDED
OUTPATIENT
Start: 2023-06-03

## 2023-06-03 RX ORDER — SODIUM CHLORIDE, SODIUM LACTATE, POTASSIUM CHLORIDE, CALCIUM CHLORIDE 600; 310; 30; 20 MG/100ML; MG/100ML; MG/100ML; MG/100ML
100 INJECTION, SOLUTION INTRAVENOUS CONTINUOUS
OUTPATIENT
Start: 2023-06-03

## 2023-06-03 RX ORDER — CHLORHEXIDINE GLUCONATE 0.12 MG/ML
15 RINSE ORAL SEE ADMIN INSTRUCTIONS
OUTPATIENT
Start: 2023-06-03

## 2023-06-03 RX ORDER — SODIUM CHLORIDE 0.9 % (FLUSH) 0.9 %
3-10 SYRINGE (ML) INJECTION AS NEEDED
OUTPATIENT
Start: 2023-06-03

## 2023-06-03 RX ORDER — SODIUM CHLORIDE 0.9 % (FLUSH) 0.9 %
3 SYRINGE (ML) INJECTION EVERY 12 HOURS SCHEDULED
OUTPATIENT
Start: 2023-06-03

## 2023-06-03 RX ORDER — PANTOPRAZOLE SODIUM 40 MG/10ML
40 INJECTION, POWDER, LYOPHILIZED, FOR SOLUTION INTRAVENOUS ONCE
OUTPATIENT
Start: 2023-06-03 | End: 2023-06-03

## 2023-06-03 RX ORDER — SCOLOPAMINE TRANSDERMAL SYSTEM 1 MG/1
1 PATCH, EXTENDED RELEASE TRANSDERMAL ONCE
OUTPATIENT
Start: 2023-06-03 | End: 2023-06-03

## 2023-06-03 RX ORDER — ACETAMINOPHEN 10 MG/ML
1000 INJECTION, SOLUTION INTRAVENOUS ONCE
OUTPATIENT
Start: 2023-06-03 | End: 2023-06-03

## 2023-06-07 ENCOUNTER — TELEPHONE (OUTPATIENT)
Dept: BARIATRICS/WEIGHT MGMT | Facility: CLINIC | Age: 38
End: 2023-06-07
Payer: COMMERCIAL

## 2023-06-07 NOTE — TELEPHONE ENCOUNTER
Pt called to request letter of intent for surgery for her employer. Per [facility], insurance is currently denied. Pt is attempting to resolve insurance. [facility]

## 2023-06-07 NOTE — TELEPHONE ENCOUNTER
Spoke with patient. Submitted for PA for Bariatric surgery. Primary insurance  denied PA due to requires a [facility]. [employer] denied PA, states due to primary not covering, they wont either. Patient states is going to call [employer] to see if she can get them to cover it and will call us back.

## 2023-06-16 ENCOUNTER — TELEPHONE (OUTPATIENT)
Dept: BARIATRICS/WEIGHT MGMT | Facility: CLINIC | Age: 38
End: 2023-06-16
Payer: COMMERCIAL

## 2023-06-16 ENCOUNTER — OFFICE VISIT (OUTPATIENT)
Dept: BARIATRICS/WEIGHT MGMT | Facility: CLINIC | Age: 38
End: 2023-06-16
Payer: COMMERCIAL

## 2023-06-16 VITALS
SYSTOLIC BLOOD PRESSURE: 110 MMHG | WEIGHT: 239 LBS | DIASTOLIC BLOOD PRESSURE: 78 MMHG | HEIGHT: 63 IN | OXYGEN SATURATION: 97 % | BODY MASS INDEX: 42.35 KG/M2 | HEART RATE: 81 BPM

## 2023-06-16 DIAGNOSIS — E66.01 OBESITY, CLASS III, BMI 40-49.9 (MORBID OBESITY): Primary | ICD-10-CM

## 2023-06-16 RX ORDER — MELATONIN
1000 DAILY
COMMUNITY

## 2023-06-16 NOTE — TELEPHONE ENCOUNTER
Spoke with patient. Primary insurance will not cover surgery at our facility due to requires patient to go to one of their facilities, closest is [facility]. Per patient. [employer], cancelled PA request due to primary insurance. [employer] denied peer to peer request, due to request wasn't denied and has primary insurance. Per [employer], patient started appeal process on 6.8.2023, states can take 30 day turn around. Per patient, unable to drop primary insurance until open enrollment time. Per patient, will see what decision comes from appeal. If still denied, will hold on surgery until after first of the year once can drop primary insurance.

## 2023-06-16 NOTE — PROGRESS NOTES
Bariatric Consult:    Chief Complaint: Morbid Obesity  Referred by Marifer Cooper MD    Ariadna Ambrocio is here today for consult on Morbid Obesity    History of Present Illness:     Ariadna Abmrocio is a 37 y.o. female with morbid obesity with co-morbidities including  has a past medical history of Anxiety, Constipation, Depression, Diarrhea, Elevated triglycerides with high cholesterol, Heartburn, Hiatal hernia, Hypertension, IBS (irritable bowel syndrome), Kidney stones, Nausea, Pre-diabetes, Rapid heart beat, and Seasonal allergies.  who presents for surgical consultation for the above procedure. Ariadna has completed the initial intake visit and has been examined by our nurse practitioner, dietician, psychologist and underwent the extensive educational teaching process under the guidance of our bariatric coordinator and myself. Ariadna also has seen the educational video BEN on the surgical procedure if available. Ariadna attended today more educational teaching from our bariatric coordinator and myself. Ariadna has had an extensive medical workup including a visit with their primary care physician, EKG, chest radiograph, blood work, EGD or UGI and possibly further testing. These have been reviewed by me and discussed with the patient. Ariadna is now ready to proceed with surgery. Ariadna presently denies nausea, vomiting, fever, chills, chest pain, shortness of air, melena, hematochezia, hemetemesis, dysuria, frequency, hematuria, jaundice or abdominal pain.     Wt Readings from Last 10 Encounters:   06/16/23 108 kg (239 lb)   06/02/23 108 kg (239 lb)   05/08/23 107 kg (236 lb)   04/19/23 108 kg (239 lb)   02/27/23 108 kg (238 lb 8.6 oz)   01/23/23 108 kg (238 lb)   01/19/23 110 kg (242 lb 6.4 oz)   12/09/22 108 kg (238 lb 5.1 oz)   08/16/22 107 kg (236 lb 8.9 oz)   11/01/21 102 kg (224 lb)       The  pre-op EGD shows   large hiatal hernia    Past Medical History:   Diagnosis Date   • Anxiety    • Constipation    •  Depression    • Diarrhea    • Elevated triglycerides with high cholesterol    • Heartburn    • Hiatal hernia    • Hypertension    • IBS (irritable bowel syndrome)    • Kidney stones    • Nausea    • Pre-diabetes    • Rapid heart beat    • Seasonal allergies        No diagnosis found.    Past Surgical History:   Procedure Laterality Date   • COLONOSCOPY     • CYSTOSCOPY, URETEROSCOPY, RETROGRADE PYELOGRAM, STENT INSERTION Left 07/16/2021    Procedure: CYSTOSCOPY URETEROSCOPY RETROGRADE PYELOGRAM HOLMIUM LASER BASKET STONE EXTRACTION STENT INSERTION;  Surgeon: Carmelo Nguyễn MD;  Location: [facility] MAIN OR;  Service: Urology;  Laterality: Left;   • EAR TUBES     • ENDOSCOPY N/A 2/27/2023    Procedure: ESOPHAGOGASTRODUODENOSCOPY with gastric antrum biopsy;  Surgeon: Otilia Capone MD;  Location: [facility] ENDOSCOPY;  Service: General;  Laterality: N/A;  Post: large hiatal hernia   • KIDNEY STONE SURGERY     • WISDOM TOOTH EXTRACTION         Patient Active Problem List   Diagnosis   • Ureterolithiasis   • Chest pain   • Dyspnea on exertion   • Paresthesia   • Hypertension   • Bradycardia, sinus   • Paroxysmal SVT (supraventricular tachycardia)   • Obesity, Class III, BMI 40-49.9 (morbid obesity)   • Gastroesophageal reflux disease       No Known Allergies      Current Outpatient Medications:   •  albuterol sulfate  (90 Base) MCG/ACT inhaler, Inhale 2 puffs Every 4 (Four) Hours As Needed for Wheezing. (Patient taking differently: Inhale 2 puffs Every 4 (Four) Hours As Needed for Wheezing. May use if needed day of , bring), Disp: 8.5 g, Rfl: 0  •  hydroCHLOROthiazide (MICROZIDE) 12.5 MG capsule, Take 2 capsules by mouth Daily. Hold day of proced., Disp: 90 capsule, Rfl: 1  •  metoprolol tartrate (LOPRESSOR) 25 MG tablet, Take 0.5 tablets by mouth 2 (Two) Times a Day. (Patient taking differently: Take 12.5 mg by mouth Daily. take day of procedure), Disp: 30 tablet, Rfl: 9  •  metoprolol tartrate (LOPRESSOR) 25 MG tablet,  Take 1 tablet by mouth 2 (Two) Times a Day., Disp: , Rfl:   •  omeprazole (priLOSEC) 40 MG capsule, TAKE 1 CAPSULE BY MOUTH EVERY DAY, Disp: 30 capsule, Rfl: 6  •  sertraline (ZOLOFT) 50 MG tablet, Take 1 tablet by mouth Daily., Disp: , Rfl:   •  vitamin B-12 (CYANOCOBALAMIN) 1000 MCG tablet, Take 1 tablet by mouth Daily., Disp: , Rfl:   •  amoxicillin (AMOXIL) 500 MG capsule, Take 2 capsules by mouth 2 (Two) Times a Day., Disp: 20 capsule, Rfl: 0  •  Cholecalciferol 25 MCG (1000 UT) tablet, Take 1 tablet by mouth Daily., Disp: , Rfl:   •  clarithromycin (BIAXIN) 500 MG tablet, Take 1 tablet by mouth 2 (Two) Times a Day., Disp: 20 tablet, Rfl: 0    Social History     Socioeconomic History   • Marital status:    Tobacco Use   • Smoking status: Never     Passive exposure: Current   • Smokeless tobacco: Never   Vaping Use   • Vaping Use: Never used   Substance and Sexual Activity   • Alcohol use: Yes     Comment: 2-3 drinks monthly   • Drug use: Never   • Sexual activity: Defer       Family History   Problem Relation Age of Onset   • Asthma Mother    • Hypertension Mother    • Hypertension Maternal Grandmother    • Heart attack Maternal Grandfather    • Cancer Paternal Grandmother    • Cancer Paternal Grandfather    • Breast cancer Neg Hx    • Ovarian cancer Neg Hx    • Uterine cancer Neg Hx    • Colon cancer Neg Hx    • Deep vein thrombosis Neg Hx    • Pulmonary embolism Neg Hx        Review of Systems:  Review of Systems    Review of Systems   Constitutional: Negative.    HENT: Negative.    Eyes: Negative.    Respiratory: Negative.    Cardiovascular: Negative.    Gastrointestinal: Negative.    Endocrine: Negative.    Genitourinary: Negative.    Musculoskeletal: Negative.    Skin: Negative.    Allergic/Immunologic: Negative.    Neurological: Negative.    Hematological: Negative.    Psychiatric/Behavioral: Negative.      Physical Exam:  Body mass index is 42.34 kg/m².   Vital Signs:  Weight: 108 kg (239 lb)    Body mass index is 42.34 kg/m².      Heart Rate: 81   BP: 110/78     Awake and alert  Normal mental status  Normal pulmonary effort  Abdomen appropriate tenderness  Incisions no erythema  Extremities no tenderness or swelling      Assessment:    Ariadna Ambrocio is a 37 y.o. year old female with medically complicated severe obesity with a BMI of Body mass index is 42.34 kg/m². and multiple co-morbidities listed in the encounter diagnosis.    I think she is an appropriate candidate for this surgery, and is ready to proceed.      The patient has returned to the office for a surgical consultation and has requested to proceed with a laparoscopic gastric sleeve.  I have had the opportunity to obtain a history, examine the patient and review the patient's chart.    The patient understands that surgery is a tool and that weight loss is not guaranteed but only seen in the context of appropriate use, regular follow up, exercise and making appropriate food choices.     I personally discussed the potential complications of the laparoscopic gastric sleeve with this patient.  The patient is well aware of potential complications of the surgery that include but not limited to bleeding, infections, deep vein thrombosis, pulmonary embolism, pulmonary complications such as pneumonia, cardiac event, hernias, small bowel obstruction, damage to the spleen or other organs, bowel injury, disfiguring scars, failure to lose weight, need for additional surgery, conversion to an open procedure and death.  The patient is also aware of complications which apply in particular to the gastric sleeve and can include but not limited to the leakage of gastric contents at the staple line, the development of an intra-abdominal abscess, gastroesophageal reflux disease, Palafox's esophagus, ulcers, vitamin/mineral deficiencies, strictures, and the possibility of converting this procedure to a Nicho-en-Y gastric bypass. The patient also understands the  possibility of requiring an acid reducer medication for the rest of their life.    The risks, benefits, potential complications and alternative therapies were discussed at great length as outlined in our extensive consent forms, online consent and educational teaching processes.    The patient has confirmed the participation in the programs extensive educational activities.    All questions and concerns were answered to patient's satisfaction.  The patient now wishes to proceed with surgery.    Patient has [default value] the pre-operative insertion of an IVC filter.     The patient has [default value] a postoperative course of anitcoagulant therapy.      Plan/Discussion/Summary:        I instructed patient to start on a H2 blocker or proton pump inhibitor if not already on one of these medications.    I explained in detail the procedures that we perform.  All of these procedures have a chance to convert to open if any technical challenges or complications do occur.  Bariatric surgery is not cosmetic surgery but rather a tool to help a patient make a life-long commitment lifestyle change including diet, exercise, behavior changes, and taking supplemental vitamins and minerals.    Problems after surgery may require more operations to correct them.    The risks, benefits, alternatives, and potential complications of all of the procedures were explained in detail including, but not limited to death, anesthesia and medication adverse effect, deep venous thrombosis, pulmonary embolism, trocar site/incisional hernia, wound infection, abdominal infection, bleeding, failure to lose weight, gain weight, a change in body image, metabolic complications with vitamin deficiences and anemia.    Weight loss expectations were discussed with the patient in detail. The weight loss operations most commonly performed are the sleeve gastrectomy and the Nicho-en-Y gastric bypass. These operations result in weight losses up to  approximately 25-35% of initial body weight 12 to 24 months after surgery with the gastric bypass usually the higher percent of weight loss but depends on patient using the tool.    For the gastric bypass and loop duodenal switch (GEORGIA-S) the risks include but not limited to the following early complications:  Anastomotic leak/peritonitis, Nicho/Alimentary/biliopancreatic limb obstruction, severe & minor wound infection/seroma, and nausea/vomiting.  Late complications can include but are not limited to malnutrition, vitamin deficiencies, frequent loose stools,  stomal stenosis, marginal ulcer, bowel obstruction, intussusception, internal, and incisional hernia.    Regarding the gastric sleeve, there is less long-term outcome data and higher risk of dysphagia and reflux compared to a gastric bypass, as well as risk of internal visceral/organ injury, splenectomy, bleeding, infection, leak (which could require further intervention possible conversion to Nicho-en-Y gastric bypass), stenosis and possibility of regaining weight.    Ariadna was counseled regarding diagnostic results, instructions for management, risk factor reductions, prognosis, patient and family education, impressions, risks and benefits of treatment options and importance of compliance with treatment. Total face to face time of the encounter was over 45 minutes and over 30 minutes was spent counseling.     Joe Smith   As part of this patient's treatment plan I am prescribing controlled substances. The patient has been made aware of appropriate use of such medications, including potential risk of somnolence, limited ability to drive and /or work safely, and potential for dependence or overdose. It has also been made clear that these medications are for use by this patient only, without concomitant use of alcohol or other substances unless prescribed.    Ariadna has completed prescribing agreement detailing terms of continued prescribing of controlled  substances, including monitoring BEN reports, urine drug screening, and pill counts if necessary. Ariadna is aware that inappropriate use will result in cessation of prescribing such medications.    BEN report has been reviewed      History and physical exam exhibit continued safe and appropriate use of controlled substances.      Ariadna understands the surgical procedures and the different surgical options that are available.  She understands the lifestyle changes that are required after surgery and has agreed to follow the guidelines outlined in the weight management program.  She also expressed understanding of the risks involved and had all of female questions answered and desires to proceed.      Otilia Capone MD  6/16/2023

## 2023-08-08 RX ORDER — OMEPRAZOLE 40 MG/1
CAPSULE, DELAYED RELEASE ORAL
Qty: 90 CAPSULE | Refills: 2 | Status: SHIPPED | OUTPATIENT
Start: 2023-08-08

## 2023-08-10 DIAGNOSIS — I10 PRIMARY HYPERTENSION: ICD-10-CM

## 2023-08-10 DIAGNOSIS — M79.89 BILATERAL SWELLING OF FEET: ICD-10-CM

## 2023-08-10 RX ORDER — HYDROCHLOROTHIAZIDE 12.5 MG/1
CAPSULE, GELATIN COATED ORAL
Qty: 90 CAPSULE | Refills: 1 | Status: SHIPPED | OUTPATIENT
Start: 2023-08-10

## 2023-08-10 NOTE — TELEPHONE ENCOUNTER
Rx Refill Note  Requested Prescriptions     Pending Prescriptions Disp Refills    hydroCHLOROthiazide (MICROZIDE) 12.5 MG capsule [Pharmacy Med Name: HYDROCHLOROTHIAZIDE 12.5 MG CP] 90 capsule 1     Sig: TAKE 2 CAPSULES BY MOUTH DAILY. DO NOT TAKE ON THE DAY OF THE PROCEDURE      Last office visit with prescribing clinician: 11/1/2021   Last telemedicine visit with prescribing clinician: Visit date not found   Next office visit with prescribing clinician: 1/23/2024                         Would you like a call back once the refill request has been completed: [] Yes [] No    If the office needs to give you a call back, can they leave a voicemail: [] Yes [] No    Lory Moon MA  08/10/23, 07:20 EDT

## 2023-08-16 ENCOUNTER — TELEPHONE (OUTPATIENT)
Dept: BARIATRICS/WEIGHT MGMT | Facility: CLINIC | Age: 38
End: 2023-08-16
Payer: MEDICAID
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
## 2023-08-28 ENCOUNTER — CONSULT (OUTPATIENT)
Dept: BARIATRICS/WEIGHT MGMT | Facility: CLINIC | Age: 38
End: 2023-08-28
Payer: MEDICAID

## 2023-08-28 ENCOUNTER — PREP FOR SURGERY (OUTPATIENT)
Dept: OTHER | Facility: HOSPITAL | Age: 38
End: 2023-08-28
Payer: MEDICAID

## 2023-08-28 VITALS
OXYGEN SATURATION: 95 % | DIASTOLIC BLOOD PRESSURE: 80 MMHG | BODY MASS INDEX: 43.59 KG/M2 | WEIGHT: 246 LBS | HEART RATE: 93 BPM | HEIGHT: 63 IN | SYSTOLIC BLOOD PRESSURE: 124 MMHG

## 2023-08-28 DIAGNOSIS — E66.01 OBESITY, CLASS III, BMI 40-49.9 (MORBID OBESITY): Primary | ICD-10-CM

## 2023-08-28 RX ORDER — SODIUM CHLORIDE, SODIUM LACTATE, POTASSIUM CHLORIDE, CALCIUM CHLORIDE 600; 310; 30; 20 MG/100ML; MG/100ML; MG/100ML; MG/100ML
100 INJECTION, SOLUTION INTRAVENOUS CONTINUOUS
OUTPATIENT
Start: 2023-08-28

## 2023-08-28 RX ORDER — CHLORHEXIDINE GLUCONATE 0.12 MG/ML
15 RINSE ORAL SEE ADMIN INSTRUCTIONS
OUTPATIENT
Start: 2023-08-28

## 2023-08-28 RX ORDER — METHOCARBAMOL 500 MG/1
500 TABLET, FILM COATED ORAL 3 TIMES DAILY
COMMUNITY
Start: 2023-07-29

## 2023-08-28 RX ORDER — SODIUM CHLORIDE 9 MG/ML
40 INJECTION, SOLUTION INTRAVENOUS AS NEEDED
OUTPATIENT
Start: 2023-08-28

## 2023-08-28 RX ORDER — SODIUM CHLORIDE 0.9 % (FLUSH) 0.9 %
1-10 SYRINGE (ML) INJECTION AS NEEDED
OUTPATIENT
Start: 2023-08-28

## 2023-08-28 RX ORDER — PANTOPRAZOLE SODIUM 40 MG/10ML
40 INJECTION, POWDER, LYOPHILIZED, FOR SOLUTION INTRAVENOUS ONCE
OUTPATIENT
Start: 2023-08-28 | End: 2023-08-28

## 2023-08-28 RX ORDER — SCOLOPAMINE TRANSDERMAL SYSTEM 1 MG/1
1 PATCH, EXTENDED RELEASE TRANSDERMAL ONCE
OUTPATIENT
Start: 2023-08-28 | End: 2023-08-28

## 2023-08-28 RX ORDER — SODIUM CHLORIDE 0.9 % (FLUSH) 0.9 %
10 SYRINGE (ML) INJECTION EVERY 12 HOURS SCHEDULED
OUTPATIENT
Start: 2023-08-28

## 2023-08-28 RX ORDER — ACETAMINOPHEN 10 MG/ML
1000 INJECTION, SOLUTION INTRAVENOUS ONCE
OUTPATIENT
Start: 2023-08-28 | End: 2023-08-28

## 2023-08-28 NOTE — PROGRESS NOTES
Bariatric Consult:    Chief Complaint: Morbid Obesity  Referred by Marifer Cooper MD    Ariadna Ambrocio is here today for consult on Morbid Obesity    History of Present Illness:     Ariadna Ambrocio is a 37 y.o. female with morbid obesity with co-morbidities including hypertension, dyslipidemia, and GERD who presents for surgical consultation for the above procedure. Ariadna has completed the initial intake visit and has been examined by our nurse practitioner, dietician, psychologist and underwent the extensive educational teaching process under the guidance of our bariatric coordinator and myself. Ariadna also has seen the educational video BEN on the surgical procedure if available. Ariadna attended today more educational teaching from our bariatric coordinator and myself. Ariadna has had an extensive medical workup including a visit with their primary care physician, EKG, chest radiograph, blood work, EGD or UGI and possibly further testing. These have been reviewed by me and discussed with the patient. Ariadna is now ready to proceed with surgery. Ariadna presently denies nausea, vomiting, fever, chills, chest pain, shortness of air, melena, hematochezia, hemetemesis, dysuria, frequency, hematuria, jaundice or abdominal pain.     Wt Readings from Last 10 Encounters:   08/28/23 112 kg (246 lb)   07/10/23 109 kg (240 lb)   06/16/23 108 kg (239 lb)   06/02/23 108 kg (239 lb)   05/08/23 107 kg (236 lb)   04/19/23 108 kg (239 lb)   02/27/23 108 kg (238 lb 8.6 oz)   01/23/23 108 kg (238 lb)   01/19/23 110 kg (242 lb 6.4 oz)   12/09/22 108 kg (238 lb 5.1 oz)       Her pre-op EGD shows large hiatal hernia      Past Medical History:   Diagnosis Date    Anxiety     Constipation     Depression     Diarrhea     Elevated triglycerides with high cholesterol     Heartburn     Hiatal hernia     Hypertension     IBS (irritable bowel syndrome)     Kidney stones     Nausea     Pre-diabetes     Rapid heart beat     Seasonal allergies         No diagnosis found.    Past Surgical History:   Procedure Laterality Date    COLONOSCOPY      CYSTOSCOPY, URETEROSCOPY, RETROGRADE PYELOGRAM, STENT INSERTION Left 07/16/2021    Procedure: CYSTOSCOPY URETEROSCOPY RETROGRADE PYELOGRAM HOLMIUM LASER BASKET STONE EXTRACTION STENT INSERTION;  Surgeon: Carmelo Nguyễn MD;  Location: [facility] MAIN OR;  Service: Urology;  Laterality: Left;    EAR TUBES      ENDOSCOPY N/A 2/27/2023    Procedure: ESOPHAGOGASTRODUODENOSCOPY with gastric antrum biopsy;  Surgeon: Otilia Capone MD;  Location: [facility] ENDOSCOPY;  Service: General;  Laterality: N/A;  Post: large hiatal hernia    KIDNEY STONE SURGERY      WISDOM TOOTH EXTRACTION           * No active hospital problems. *      No Known Allergies      Current Outpatient Medications:     albuterol sulfate  (90 Base) MCG/ACT inhaler, Inhale 2 puffs Every 4 (Four) Hours As Needed for Wheezing. (Patient taking differently: Inhale 2 puffs Every 4 (Four) Hours As Needed for Wheezing. May use if needed day of , bring), Disp: 8.5 g, Rfl: 0    cholecalciferol (VITAMIN D3) 25 MCG (1000 UT) tablet, Take 1 tablet by mouth Daily., Disp: , Rfl:     hydroCHLOROthiazide (MICROZIDE) 12.5 MG capsule, TAKE 2 CAPSULES BY MOUTH DAILY. DO NOT TAKE ON THE DAY OF THE PROCEDURE, Disp: 90 capsule, Rfl: 1    methocarbamol (ROBAXIN) 500 MG tablet, Take 1 tablet by mouth 3 (Three) Times a Day., Disp: , Rfl:     metoprolol tartrate (LOPRESSOR) 25 MG tablet, Take 0.5 tablets by mouth 2 (Two) Times a Day. (Patient taking differently: Take 0.5 tablets by mouth Daily. take day of procedure), Disp: 30 tablet, Rfl: 9    omeprazole (priLOSEC) 40 MG capsule, TAKE 1 CAPSULE BY MOUTH EVERY DAY, Disp: 90 capsule, Rfl: 2    vitamin B-12 (CYANOCOBALAMIN) 1000 MCG tablet, Take 1 tablet by mouth Daily., Disp: , Rfl:     sertraline (ZOLOFT) 50 MG tablet, Take 1 tablet by mouth Daily., Disp: , Rfl:     Social History     Socioeconomic History    Marital status:     Tobacco Use    Smoking status: Never     Passive exposure: Current    Smokeless tobacco: Never   Vaping Use    Vaping Use: Never used   Substance and Sexual Activity    Alcohol use: Yes     Comment: 2-3 drinks monthly    Drug use: Never    Sexual activity: Defer       Family History   Problem Relation Age of Onset    Asthma Mother     Hypertension Mother     No Known Problems Father     Hypertension Maternal Grandmother     Heart attack Maternal Grandfather     Cancer Paternal Grandmother     Cancer Paternal Grandfather     Breast cancer Neg Hx     Ovarian cancer Neg Hx     Uterine cancer Neg Hx     Colon cancer Neg Hx     Deep vein thrombosis Neg Hx     Pulmonary embolism Neg Hx        Review of Systems:  Review of Systems   Constitutional: Negative.    HENT: Negative.    Eyes: Negative.    Respiratory: Negative.    Cardiovascular: Negative.    Gastrointestinal: Negative.    Endocrine: Negative.    Genitourinary: Negative.    Musculoskeletal: Negative.    Skin: Negative.    Allergic/Immunologic: Negative.    Neurological: Negative.    Hematological: Negative.    Psychiatric/Behavioral: Negative.        Physical Exam:  Body mass index is 43.58 kg/mý.   Vital Signs:  Weight: 112 kg (246 lb)   Body mass index is 43.58 kg/mý.      Heart Rate: 93   BP: 124/80     Awake and alert  Normal mental status  Normal pulmonary effort  Abdomen appropriate tenderness  Incisions no erythema  Extremities no tenderness or swelling        Assessment:  Patient Active Problem List   Diagnosis    Ureterolithiasis    Chest pain    Dyspnea on exertion    Paresthesia    Hypertension    Bradycardia, sinus    Paroxysmal SVT (supraventricular tachycardia)    Obesity, Class III, BMI 40-49.9 (morbid obesity)    Gastroesophageal reflux disease         Ariadna Ambrocio is a 37 y.o. year old female with medically complicated severe obesity with a BMI of Body mass index is 43.58 kg/mý. and multiple co-morbidities listed in the encounter  diagnosis.    I think she is an appropriate candidate for this surgery, and is ready to proceed.    The patient has returned to the office for a surgical consultation and has requested to proceed with the Nicho-en-Y  gastric bypass.  I have had the opportunity to obtain the history, examine the patient and review the patient's chart.    The patient understands that surgery is a tool and that weight loss is not guaranteed but only seen in the context of appropriate use, regular follow up, exercise and making appropriate food choices.      I have personally discussed the potential complications of the laparoscopic gastric bypass with this patient.  The patient is well aware of the potential complications of the surgery that include but not limited to bleeding, infections, deep venous thrombosis, pulmonary embolism, pulmonary complications such as pneumonia, cardiac events, hernias, small bowel obstruction, damage to the spleen or other organs, bowel injury, disfiguring scars, failure to lose weight, need for additional surgery, conversion to an open procedure and death.  I also discussed the risks that apply in particular to the gastric bypass such as the leaking of stomach and/or intestinal contents at the staple or suture line, the development of an intra-abdominal abscess,  strictures, ulcers, and vitamin/mineral deficiencies.  The patient was strongly advised to avoid smoking and the use of non-steroidal anti-inflammatory drugs such as ibuprofen, Motrin and Advil in the postoperative period and understands the increased risk of ulcer formation, perforation, death if they did not stop the use of these medications/substances.     The risks, benefits, potential complications and alternative therapies were discussed at great lengths as outlined in our extensive consent forms, online consent, and educational teaching processes.      The patient has confirmed participation in the program's extensive educational  activities.      All questions and concerns were answered to the patient's satisfaction.  The patient now wishes to proceed with surgery.    The patient [default value] pre-operative insertion of an IVC filter.    The patient [default value] a postoperative course of anticoagulant therapy.      Plan/Discussion/Summary:        I instructed patient to start on a H2 blocker or proton pump inhibitor if not already on one of these medications.    I explained in detail the procedures that we perform.  All of these procedures have a chance to convert to open if any technical challenges or complications do occur.  Bariatric surgery is not cosmetic surgery but rather a tool to help a patient make a life-long commitment lifestyle change including diet, exercise, behavior changes, and taking supplemental vitamins and minerals.    Problems after surgery may require more operations to correct them.    The risks, benefits, alternatives, and potential complications of all of the procedures were explained in detail including, but not limited to death, anesthesia and medication adverse effect, deep venous thrombosis, pulmonary embolism, trocar site/incisional hernia, wound infection, abdominal infection, bleeding, failure to lose weight, gain weight, a change in body image, metabolic complications with vitamin deficiences and anemia.    Weight loss expectations were discussed with the patient in detail. The weight loss operations most commonly performed are the sleeve gastrectomy and the Nicho-en-Y gastric bypass. These operations result in weight losses up to approximately 25-35% of initial body weight 12 to 24 months after surgery with the gastric bypass usually the higher percent of weight loss but depends on patient using the tool.    For the gastric bypass and loop duodenal switch ([place]-S) the risks include but not limited to the following early complications:  Anastomotic leak/peritonitis, Nicho/Alimentary/biliopancreatic limb  obstruction, severe & minor wound infection/seroma, and nausea/vomiting.  Late complications can include but are not limited to malnutrition, vitamin deficiencies, frequent loose stools,  stomal stenosis, marginal ulcer, bowel obstruction, intussusception, internal, and incisional hernia.    Regarding the gastric sleeve, there is less long-term outcome data and higher risk of dysphagia and reflux compared to a gastric bypass, as well as risk of internal visceral/organ injury, splenectomy, bleeding, infection, leak (which could require further intervention possible conversion to Nicho-en-Y gastric bypass), stenosis and possibility of regaining weight.    Ariadna was counseled regarding diagnostic results, instructions for management, risk factor reductions, prognosis, patient and family education, impressions, risks and benefits of treatment options and importance of compliance with treatment. Total face to face time of the encounter was over 45 minutes and over 30 minutes was spent counseling.     Ben Report   As part of this patient's treatment plan I am prescribing controlled substances. The patient has been made aware of appropriate use of such medications, including potential risk of somnolence, limited ability to drive and /or work safely, and potential for dependence or overdose. It has also been made clear that these medications are for use by this patient only, without concomitant use of alcohol or other substances unless prescribed.    Ariadna has completed prescribing agreement detailing terms of continued prescribing of controlled substances, including monitoring BEN reports, urine drug screening, and pill counts if necessary. Ariadna is aware that inappropriate use will result in cessation of prescribing such medications.    BEN report has been reviewed      History and physical exam exhibit continued safe and appropriate use of controlled substances.      Ariadna understands the surgical procedures and  the different surgical options that are available.  She understands the lifestyle changes that are required after surgery and has agreed to follow the guidelines outlined in the weight management program.  She also expressed understanding of the risks involved and had all of female questions answered and desires to proceed.      Otilia Capone MD  8/28/2023

## 2023-08-31 DIAGNOSIS — M79.89 BILATERAL SWELLING OF FEET: ICD-10-CM

## 2023-08-31 DIAGNOSIS — I10 PRIMARY HYPERTENSION: ICD-10-CM

## 2023-08-31 RX ORDER — HYDROCHLOROTHIAZIDE 12.5 MG/1
CAPSULE, GELATIN COATED ORAL
Qty: 180 CAPSULE | Refills: 3 | Status: SHIPPED | OUTPATIENT
Start: 2023-08-31

## 2023-08-31 NOTE — TELEPHONE ENCOUNTER
Rx Refill Note  Requested Prescriptions     Pending Prescriptions Disp Refills    hydroCHLOROthiazide (MICROZIDE) 12.5 MG capsule [Pharmacy Med Name: HYDROCHLOROTHIAZIDE 12.5 MG CP] 180 capsule 3     Sig: TAKE 2 CAPSULES BY MOUTH DAILY. DO NOT TAKE ON THE DAY OF THE PROCEDURE      Last office visit with prescribing clinician: 11/1/2021   Last telemedicine visit with prescribing clinician: Visit date not found   Next office visit with prescribing clinician: 1/23/2024                         Would you like a call back once the refill request has been completed: [] Yes [] No    If the office needs to give you a call back, can they leave a voicemail: [] Yes [] No    Lola Huitron MA  08/31/23, 16:52 EDT

## 2023-09-01 ENCOUNTER — OFFICE VISIT (OUTPATIENT)
Dept: BARIATRICS/WEIGHT MGMT | Facility: CLINIC | Age: 38
End: 2023-09-01
Payer: MEDICAID

## 2023-09-01 VITALS
OXYGEN SATURATION: 98 % | BODY MASS INDEX: 44.19 KG/M2 | DIASTOLIC BLOOD PRESSURE: 82 MMHG | SYSTOLIC BLOOD PRESSURE: 119 MMHG | HEIGHT: 63 IN | HEART RATE: 67 BPM | WEIGHT: 249.4 LBS

## 2023-09-01 DIAGNOSIS — E66.01 OBESITY, CLASS III, BMI 40-49.9 (MORBID OBESITY): Primary | ICD-10-CM

## 2023-09-05 NOTE — H&P (VIEW-ONLY)
Bariatric Consult:    Chief Complaint: Morbid Obesity  Referred by Marifer Cooper MD    Ariadna Ambrocio is here today for consult on Morbid Obesity    History of Present Illness:     Ariadna Ambrocio is a 37 y.o. female with morbid obesity with co-morbidities including GERD who presents for surgical consultation for the above procedure. Ariadna has completed the initial intake visit and has been examined by our nurse practitioner, dietician, psychologist and underwent the extensive educational teaching process under the guidance of our bariatric coordinator and myself. Ariadna also has seen the educational video BEN on the surgical procedure if available. Ariadna attended today more educational teaching from our bariatric coordinator and myself. Ariadna has had an extensive medical workup including a visit with their primary care physician, EKG, chest radiograph, blood work, EGD or UGI and possibly further testing. These have been reviewed by me and discussed with the patient. Ariadna is now ready to proceed with surgery. Ariadna presently denies nausea, vomiting, fever, chills, chest pain, shortness of air, melena, hematochezia, hemetemesis, dysuria, frequency, hematuria, jaundice or abdominal pain.     Wt Readings from Last 10 Encounters:   09/01/23 113 kg (249 lb 6.4 oz)   08/28/23 112 kg (246 lb)   07/10/23 109 kg (240 lb)   06/16/23 108 kg (239 lb)   06/02/23 108 kg (239 lb)   05/08/23 107 kg (236 lb)   04/19/23 108 kg (239 lb)   02/27/23 108 kg (238 lb 8.6 oz)   01/23/23 108 kg (238 lb)   01/19/23 110 kg (242 lb 6.4 oz)       Her pre-op EGD shows large hiatal hernia      Past Medical History:   Diagnosis Date    Anxiety     Bulging lumbar disc     Constipation     Depression     Diarrhea     Elevated triglycerides with high cholesterol     Heartburn     Herniated cervical disc     Hiatal hernia     Hypertension     IBS (irritable bowel syndrome)     Kidney stones     Nausea     Pre-diabetes     Rapid heart beat      Seasonal allergies        Encounter Diagnoses   Name Primary?    Obesity, Class III, BMI 40-49.9 (morbid obesity) Yes       Past Surgical History:   Procedure Laterality Date    COLONOSCOPY      CYSTOSCOPY, URETEROSCOPY, RETROGRADE PYELOGRAM, STENT INSERTION Left 07/16/2021    Procedure: CYSTOSCOPY URETEROSCOPY RETROGRADE PYELOGRAM HOLMIUM LASER BASKET STONE EXTRACTION STENT INSERTION;  Surgeon: Carmelo Nguễyn MD;  Location: [facility] MAIN OR;  Service: Urology;  Laterality: Left;    EAR TUBES      ENDOSCOPY N/A 2/27/2023    Procedure: ESOPHAGOGASTRODUODENOSCOPY with gastric antrum biopsy;  Surgeon: Otilia Capone MD;  Location: [facility] ENDOSCOPY;  Service: General;  Laterality: N/A;  Post: large hiatal hernia    KIDNEY STONE SURGERY      WISDOM TOOTH EXTRACTION           * No active hospital problems. *      No Known Allergies      Current Outpatient Medications:     albuterol sulfate  (90 Base) MCG/ACT inhaler, Inhale 2 puffs Every 4 (Four) Hours As Needed for Wheezing. (Patient taking differently: Inhale 2 puffs Every 4 (Four) Hours As Needed for Wheezing. May use if needed day of , bring), Disp: 8.5 g, Rfl: 0    cholecalciferol (VITAMIN D3) 25 MCG (1000 UT) tablet, Take 1 tablet by mouth Daily., Disp: , Rfl:     hydroCHLOROthiazide (MICROZIDE) 12.5 MG capsule, TAKE 2 CAPSULES BY MOUTH DAILY. DO NOT TAKE ON THE DAY OF THE PROCEDURE, Disp: 180 capsule, Rfl: 3    methocarbamol (ROBAXIN) 500 MG tablet, Take 1 tablet by mouth 3 (Three) Times a Day., Disp: , Rfl:     metoprolol tartrate (LOPRESSOR) 25 MG tablet, Take 0.5 tablets by mouth 2 (Two) Times a Day. (Patient taking differently: Take 0.5 tablets by mouth Daily. take day of procedure), Disp: 30 tablet, Rfl: 9    omeprazole (priLOSEC) 40 MG capsule, TAKE 1 CAPSULE BY MOUTH EVERY DAY (Patient taking differently: 2 (Two) Times a Day.), Disp: 90 capsule, Rfl: 2    sertraline (ZOLOFT) 50 MG tablet, Take 1 tablet by mouth Daily., Disp: , Rfl:     vitamin B-12  (CYANOCOBALAMIN) 1000 MCG tablet, Take 1 tablet by mouth Daily., Disp: , Rfl:     Social History     Socioeconomic History    Marital status:    Tobacco Use    Smoking status: Never     Passive exposure: Current    Smokeless tobacco: Never   Vaping Use    Vaping Use: Never used   Substance and Sexual Activity    Alcohol use: Yes     Comment: 2-3 drinks monthly    Drug use: Never    Sexual activity: Defer       Family History   Problem Relation Age of Onset    Asthma Mother     Hypertension Mother     No Known Problems Father     Hypertension Maternal Grandmother     Heart attack Maternal Grandfather     Cancer Paternal Grandmother     Cancer Paternal Grandfather     Breast cancer Neg Hx     Ovarian cancer Neg Hx     Uterine cancer Neg Hx     Colon cancer Neg Hx     Deep vein thrombosis Neg Hx     Pulmonary embolism Neg Hx        Review of Systems:  Review of Systems   Constitutional: Negative.    HENT: Negative.    Eyes: Negative.    Respiratory: Negative.    Cardiovascular: Negative.    Gastrointestinal: Negative.    Endocrine: Negative.    Genitourinary: Negative.    Musculoskeletal: Negative.    Skin: Negative.    Allergic/Immunologic: Negative.    Neurological: Negative.    Hematological: Negative.    Psychiatric/Behavioral: Negative.        Physical Exam:  Body mass index is 44.18 kg/m².   Vital Signs:  Weight: 113 kg (249 lb 6.4 oz)   Body mass index is 44.18 kg/m².      Heart Rate: 67   BP: 119/82     Awake and alert  Normal mental status  Normal pulmonary effort  Abdomen appropriate tenderness  Incisions no erythema  Extremities no tenderness or swelling        Assessment:  Patient Active Problem List   Diagnosis    Ureterolithiasis    Chest pain    Dyspnea on exertion    Paresthesia    Hypertension    Bradycardia, sinus    Paroxysmal SVT (supraventricular tachycardia)    Obesity, Class III, BMI 40-49.9 (morbid obesity)    Gastroesophageal reflux disease         Ariadna Ambrocio is a 37 y.o. year old  female with medically complicated severe obesity with a BMI of Body mass index is 44.18 kg/m². and multiple co-morbidities listed in the encounter diagnosis.    I think she is an appropriate candidate for this surgery, and is ready to proceed.    The patient has returned to the office for a surgical consultation and has requested to proceed with the Nicho-en-Y  gastric bypass.  I have had the opportunity to obtain the history, examine the patient and review the patient's chart.    The patient understands that surgery is a tool and that weight loss is not guaranteed but only seen in the context of appropriate use, regular follow up, exercise and making appropriate food choices.      I have personally discussed the potential complications of the laparoscopic gastric bypass with this patient.  The patient is well aware of the potential complications of the surgery that include but not limited to bleeding, infections, deep venous thrombosis, pulmonary embolism, pulmonary complications such as pneumonia, cardiac events, hernias, small bowel obstruction, damage to the spleen or other organs, bowel injury, disfiguring scars, failure to lose weight, need for additional surgery, conversion to an open procedure and death.  I also discussed the risks that apply in particular to the gastric bypass such as the leaking of stomach and/or intestinal contents at the staple or suture line, the development of an intra-abdominal abscess,  strictures, ulcers, and vitamin/mineral deficiencies.  The patient was strongly advised to avoid smoking and the use of non-steroidal anti-inflammatory drugs such as ibuprofen, Motrin and Advil in the postoperative period and understands the increased risk of ulcer formation, perforation, death if they did not stop the use of these medications/substances.     The risks, benefits, potential complications and alternative therapies were discussed at great lengths as outlined in our extensive consent  forms, online consent, and educational teaching processes.      The patient has confirmed participation in the program's extensive educational activities.      All questions and concerns were answered to the patient's satisfaction.  The patient now wishes to proceed with surgery.    The patient [default value] pre-operative insertion of an IVC filter.    The patient [default value] a postoperative course of anticoagulant therapy.      Plan/Discussion/Summary:        I instructed patient to start on a H2 blocker or proton pump inhibitor if not already on one of these medications.    I explained in detail the procedures that we perform.  All of these procedures have a chance to convert to open if any technical challenges or complications do occur.  Bariatric surgery is not cosmetic surgery but rather a tool to help a patient make a life-long commitment lifestyle change including diet, exercise, behavior changes, and taking supplemental vitamins and minerals.    Problems after surgery may require more operations to correct them.    The risks, benefits, alternatives, and potential complications of all of the procedures were explained in detail including, but not limited to death, anesthesia and medication adverse effect, deep venous thrombosis, pulmonary embolism, trocar site/incisional hernia, wound infection, abdominal infection, bleeding, failure to lose weight, gain weight, a change in body image, metabolic complications with vitamin deficiences and anemia.    Weight loss expectations were discussed with the patient in detail. The weight loss operations most commonly performed are the sleeve gastrectomy and the Nicho-en-Y gastric bypass. These operations result in weight losses up to approximately 25-35% of initial body weight 12 to 24 months after surgery with the gastric bypass usually the higher percent of weight loss but depends on patient using the tool.    For the gastric bypass and loop duodenal switch  (GEORGIA-S) the risks include but not limited to the following early complications:  Anastomotic leak/peritonitis, Nicho/Alimentary/biliopancreatic limb obstruction, severe & minor wound infection/seroma, and nausea/vomiting.  Late complications can include but are not limited to malnutrition, vitamin deficiencies, frequent loose stools,  stomal stenosis, marginal ulcer, bowel obstruction, intussusception, internal, and incisional hernia.    Regarding the gastric sleeve, there is less long-term outcome data and higher risk of dysphagia and reflux compared to a gastric bypass, as well as risk of internal visceral/organ injury, splenectomy, bleeding, infection, leak (which could require further intervention possible conversion to Nicho-en-Y gastric bypass), stenosis and possibility of regaining weight.    Ariadna was counseled regarding diagnostic results, instructions for management, risk factor reductions, prognosis, patient and family education, impressions, risks and benefits of treatment options and importance of compliance with treatment. Total face to face time of the encounter was over 45 minutes and over 30 minutes was spent counseling.     Ben Report   As part of this patient's treatment plan I am prescribing controlled substances. The patient has been made aware of appropriate use of such medications, including potential risk of somnolence, limited ability to drive and /or work safely, and potential for dependence or overdose. It has also been made clear that these medications are for use by this patient only, without concomitant use of alcohol or other substances unless prescribed.    Ariadna has completed prescribing agreement detailing terms of continued prescribing of controlled substances, including monitoring BEN reports, urine drug screening, and pill counts if necessary. Ariadna is aware that inappropriate use will result in cessation of prescribing such medications.    BEN report has been reviewed       History and physical exam exhibit continued safe and appropriate use of controlled substances.      Ariadna understands the surgical procedures and the different surgical options that are available.  She understands the lifestyle changes that are required after surgery and has agreed to follow the guidelines outlined in the weight management program.  She also expressed understanding of the risks involved and had all of female questions answered and desires to proceed.      Otilia Capone MD  9/5/2023

## 2023-09-18 ENCOUNTER — HOSPITAL ENCOUNTER (OUTPATIENT)
Dept: CARDIOLOGY | Facility: HOSPITAL | Age: 38
Discharge: HOME | End: 2023-09-18
Payer: MEDICAID

## 2023-09-18 ENCOUNTER — LAB (OUTPATIENT)
Dept: LAB | Facility: HOSPITAL | Age: 38
End: 2023-09-18
Payer: MEDICAID

## 2023-09-18 DIAGNOSIS — E66.01 OBESITY, CLASS III, BMI 40-49.9 (MORBID OBESITY): ICD-10-CM

## 2023-09-18 LAB
ABO GROUP BLD: NORMAL
ALBUMIN SERPL-MCNC: 4.3 G/DL (ref 3.5–5.2)
ALBUMIN/GLOB SERPL: 1.6 G/DL
ALP SERPL-CCNC: 65 U/L (ref 39–117)
ALT SERPL W P-5'-P-CCNC: 53 U/L (ref 1–33)
ANION GAP SERPL CALCULATED.3IONS-SCNC: 12.4 MMOL/L (ref 5–15)
AST SERPL-CCNC: 37 U/L (ref 1–32)
BILIRUB SERPL-MCNC: 0.3 MG/DL (ref 0–1.2)
BLD GP AB SCN SERPL QL: NEGATIVE
BUN SERPL-MCNC: 11 MG/DL (ref 6–20)
BUN/CREAT SERPL: 12 (ref 7–25)
CALCIUM SPEC-SCNC: 9.3 MG/DL (ref 8.6–10.5)
CHLORIDE SERPL-SCNC: 103 MMOL/L (ref 98–107)
CO2 SERPL-SCNC: 21.6 MMOL/L (ref 22–29)
CREAT SERPL-MCNC: 0.92 MG/DL (ref 0.57–1)
DEPRECATED RDW RBC AUTO: 41.6 FL (ref 37–54)
EGFRCR SERPLBLD CKD-EPI 2021: 82.4 ML/MIN/1.73
ERYTHROCYTE [DISTWIDTH] IN BLOOD BY AUTOMATED COUNT: 13.2 % (ref 12.3–15.4)
GLOBULIN UR ELPH-MCNC: 2.7 GM/DL
GLUCOSE SERPL-MCNC: 118 MG/DL (ref 65–99)
HCT VFR BLD AUTO: 36.1 % (ref 34–46.6)
HGB BLD-MCNC: 12.4 G/DL (ref 12–15.9)
MCH RBC QN AUTO: 30 PG (ref 26.6–33)
MCHC RBC AUTO-ENTMCNC: 34.3 G/DL (ref 31.5–35.7)
MCV RBC AUTO: 87.4 FL (ref 79–97)
PLATELET # BLD AUTO: 328 10*3/MM3 (ref 140–450)
PMV BLD AUTO: 10 FL (ref 6–12)
POTASSIUM SERPL-SCNC: 3.5 MMOL/L (ref 3.5–5.2)
PROT SERPL-MCNC: 7 G/DL (ref 6–8.5)
RBC # BLD AUTO: 4.13 10*6/MM3 (ref 3.77–5.28)
RH BLD: POSITIVE
SODIUM SERPL-SCNC: 137 MMOL/L (ref 136–145)
T&S EXPIRATION DATE: NORMAL
WBC NRBC COR # BLD: 10.41 10*3/MM3 (ref 3.4–10.8)

## 2023-09-18 PROCEDURE — 93010 ELECTROCARDIOGRAM REPORT: CPT | Performed by: STUDENT IN AN ORGANIZED HEALTH CARE EDUCATION/TRAINING PROGRAM

## 2023-09-18 PROCEDURE — 86901 BLOOD TYPING SEROLOGIC RH(D): CPT

## 2023-09-18 PROCEDURE — 85027 COMPLETE CBC AUTOMATED: CPT

## 2023-09-18 PROCEDURE — 80053 COMPREHEN METABOLIC PANEL: CPT

## 2023-09-18 PROCEDURE — 86850 RBC ANTIBODY SCREEN: CPT

## 2023-09-18 PROCEDURE — 36415 COLL VENOUS BLD VENIPUNCTURE: CPT

## 2023-09-18 PROCEDURE — 86900 BLOOD TYPING SEROLOGIC ABO: CPT

## 2023-09-18 PROCEDURE — 93005 ELECTROCARDIOGRAM TRACING: CPT

## 2023-09-19 LAB
QT INTERVAL: 383 MS
QTC INTERVAL: 391 MS

## 2023-09-27 ENCOUNTER — HOSPITAL ENCOUNTER (INPATIENT)
Facility: HOSPITAL | Age: 38
LOS: 2 days | Discharge: HOME | End: 2023-09-29
Attending: SURGERY | Admitting: SURGERY
Payer: MEDICAID

## 2023-09-27 ENCOUNTER — ANESTHESIA (OUTPATIENT)
Dept: PERIOP | Facility: HOSPITAL | Age: 38
End: 2023-09-27
Payer: MEDICAID

## 2023-09-27 ENCOUNTER — ANESTHESIA EVENT (OUTPATIENT)
Dept: PERIOP | Facility: HOSPITAL | Age: 38
End: 2023-09-27
Payer: MEDICAID

## 2023-09-27 DIAGNOSIS — G89.18 POST-OP PAIN: Primary | ICD-10-CM

## 2023-09-27 DIAGNOSIS — E66.01 OBESITY, CLASS III, BMI 40-49.9 (MORBID OBESITY): ICD-10-CM

## 2023-09-27 LAB
ALBUMIN SERPL-MCNC: 4.1 G/DL (ref 3.5–5.2)
ALBUMIN/GLOB SERPL: 1.6 G/DL
ALP SERPL-CCNC: 56 U/L (ref 39–117)
ALT SERPL W P-5'-P-CCNC: 116 U/L (ref 1–33)
ANION GAP SERPL CALCULATED.3IONS-SCNC: 9 MMOL/L (ref 5–15)
ANISOCYTOSIS BLD QL: ABNORMAL
AST SERPL-CCNC: 127 U/L (ref 1–32)
B-HCG UR QL: NEGATIVE
BILIRUB SERPL-MCNC: 0.5 MG/DL (ref 0–1.2)
BUN SERPL-MCNC: 17 MG/DL (ref 6–20)
BUN/CREAT SERPL: 19.1 (ref 7–25)
CALCIUM SPEC-SCNC: 9.3 MG/DL (ref 8.6–10.5)
CHLORIDE SERPL-SCNC: 101 MMOL/L (ref 98–107)
CO2 SERPL-SCNC: 22 MMOL/L (ref 22–29)
CREAT SERPL-MCNC: 0.89 MG/DL (ref 0.57–1)
DEPRECATED RDW RBC AUTO: 44.2 FL (ref 37–54)
EGFRCR SERPLBLD CKD-EPI 2021: 85.8 ML/MIN/1.73
ERYTHROCYTE [DISTWIDTH] IN BLOOD BY AUTOMATED COUNT: 13.6 % (ref 12.3–15.4)
GLOBULIN UR ELPH-MCNC: 2.5 GM/DL
GLUCOSE SERPL-MCNC: 156 MG/DL (ref 65–99)
HCT VFR BLD AUTO: 35 % (ref 34–46.6)
HGB BLD-MCNC: 11.7 G/DL (ref 12–15.9)
LYMPHOCYTES # BLD MANUAL: 1.68 10*3/MM3 (ref 0.7–3.1)
LYMPHOCYTES NFR BLD MANUAL: 7 % (ref 5–12)
MAGNESIUM SERPL-MCNC: 1.8 MG/DL (ref 1.6–2.6)
MCH RBC QN AUTO: 29.7 PG (ref 26.6–33)
MCHC RBC AUTO-ENTMCNC: 33.6 G/DL (ref 31.5–35.7)
MCV RBC AUTO: 88.5 FL (ref 79–97)
MONOCYTES # BLD: 1.18 10*3/MM3 (ref 0.1–0.9)
NEUTROPHILS # BLD AUTO: 13.94 10*3/MM3 (ref 1.7–7)
NEUTROPHILS NFR BLD MANUAL: 83 % (ref 42.7–76)
PHOSPHATE SERPL-MCNC: 2.3 MG/DL (ref 2.5–4.5)
PLAT MORPH BLD: NORMAL
PLATELET # BLD AUTO: 320 10*3/MM3 (ref 140–450)
PMV BLD AUTO: 7.9 FL (ref 6–12)
POTASSIUM SERPL-SCNC: 4.9 MMOL/L (ref 3.5–5.2)
PROT SERPL-MCNC: 6.6 G/DL (ref 6–8.5)
RBC # BLD AUTO: 3.95 10*6/MM3 (ref 3.77–5.28)
SCAN SLIDE: NORMAL
SODIUM SERPL-SCNC: 132 MMOL/L (ref 136–145)
VARIANT LYMPHS NFR BLD MANUAL: 10 % (ref 19.6–45.3)
WBC MORPH BLD: NORMAL
WBC NRBC COR # BLD: 16.8 10*3/MM3 (ref 3.4–10.8)

## 2023-09-27 PROCEDURE — 0BQT4ZZ REPAIR DIAPHRAGM, PERCUTANEOUS ENDOSCOPIC APPROACH: ICD-10-PCS | Performed by: SURGERY

## 2023-09-27 PROCEDURE — 81025 URINE PREGNANCY TEST: CPT | Performed by: SURGERY

## 2023-09-27 PROCEDURE — 25010000002 ACETAMINOPHEN 10 MG/ML SOLUTION: Performed by: SURGERY

## 2023-09-27 PROCEDURE — 85007 BL SMEAR W/DIFF WBC COUNT: CPT | Performed by: SURGERY

## 2023-09-27 PROCEDURE — 25010000002 ONDANSETRON PER 1 MG: Performed by: ANESTHESIOLOGIST ASSISTANT

## 2023-09-27 PROCEDURE — 25010000002 CLONIDINE PER 1 MG: Performed by: SURGERY

## 2023-09-27 PROCEDURE — 25010000002 HYDROMORPHONE 1 MG/ML SOLUTION: Performed by: ANESTHESIOLOGIST ASSISTANT

## 2023-09-27 PROCEDURE — 84100 ASSAY OF PHOSPHORUS: CPT | Performed by: SURGERY

## 2023-09-27 PROCEDURE — 25010000002 MIDAZOLAM PER 1 MG: Performed by: ANESTHESIOLOGIST ASSISTANT

## 2023-09-27 PROCEDURE — S2900 ROBOTIC SURGICAL SYSTEM: HCPCS | Performed by: SURGERY

## 2023-09-27 PROCEDURE — 25010000002 INDOCYANINE GREEN 25 MG RECONSTITUTED SOLUTION: Performed by: ANESTHESIOLOGIST ASSISTANT

## 2023-09-27 PROCEDURE — 63710000001 ONDANSETRON PER 8 MG: Performed by: SURGERY

## 2023-09-27 PROCEDURE — 25010000002 SUGAMMADEX 200 MG/2ML SOLUTION: Performed by: ANESTHESIOLOGIST ASSISTANT

## 2023-09-27 PROCEDURE — 8E0W4CZ ROBOTIC ASSISTED PROCEDURE OF TRUNK REGION, PERCUTANEOUS ENDOSCOPIC APPROACH: ICD-10-PCS | Performed by: SURGERY

## 2023-09-27 PROCEDURE — 25010000002 PROPOFOL 1000 MG/100ML EMULSION: Performed by: ANESTHESIOLOGIST ASSISTANT

## 2023-09-27 PROCEDURE — 0D164ZA BYPASS STOMACH TO JEJUNUM, PERCUTANEOUS ENDOSCOPIC APPROACH: ICD-10-PCS | Performed by: SURGERY

## 2023-09-27 PROCEDURE — 25010000002 PROPOFOL 10 MG/ML EMULSION: Performed by: ANESTHESIOLOGIST ASSISTANT

## 2023-09-27 PROCEDURE — 25010000002 KETOROLAC TROMETHAMINE PER 15 MG: Performed by: SURGERY

## 2023-09-27 PROCEDURE — 25010000002 DEXAMETHASONE PER 1 MG: Performed by: ANESTHESIOLOGIST ASSISTANT

## 2023-09-27 PROCEDURE — 85025 COMPLETE CBC W/AUTO DIFF WBC: CPT | Performed by: SURGERY

## 2023-09-27 PROCEDURE — 25010000002 ROPIVACAINE PER 1 MG: Performed by: SURGERY

## 2023-09-27 PROCEDURE — 25010000002 ESMOLOL 100 MG/10ML SOLUTION: Performed by: ANESTHESIOLOGIST ASSISTANT

## 2023-09-27 PROCEDURE — 80053 COMPREHEN METABOLIC PANEL: CPT | Performed by: SURGERY

## 2023-09-27 PROCEDURE — 25010000002 EPINEPHRINE 1 MG/ML SOLUTION: Performed by: SURGERY

## 2023-09-27 PROCEDURE — 83735 ASSAY OF MAGNESIUM: CPT | Performed by: SURGERY

## 2023-09-27 PROCEDURE — 43644 LAP GASTRIC BYPASS/ROUX-EN-Y: CPT | Performed by: SURGERY

## 2023-09-27 PROCEDURE — 25010000002 FENTANYL CITRATE (PF) 100 MCG/2ML SOLUTION: Performed by: ANESTHESIOLOGIST ASSISTANT

## 2023-09-27 PROCEDURE — 25010000002 CEFAZOLIN PER 500 MG: Performed by: SURGERY

## 2023-09-27 DEVICE — STAPLER 60 RELOAD WHITE
Type: IMPLANTABLE DEVICE | Site: ABDOMEN | Status: FUNCTIONAL
Brand: SUREFORM

## 2023-09-27 DEVICE — ABSORBABLE WOUND CLOSURE DEVICE
Type: IMPLANTABLE DEVICE | Site: ABDOMEN | Status: FUNCTIONAL
Brand: V-LOC 90

## 2023-09-27 DEVICE — ABSORBABLE WOUND CLOSURE DEVICE
Type: IMPLANTABLE DEVICE | Site: ABDOMEN | Status: FUNCTIONAL
Brand: SYNETURE

## 2023-09-27 RX ORDER — ONDANSETRON 4 MG/1
8 TABLET, FILM COATED ORAL EVERY 6 HOURS PRN
Status: DISCONTINUED | OUTPATIENT
Start: 2023-09-27 | End: 2023-09-29 | Stop reason: HOSPADM

## 2023-09-27 RX ORDER — OXYCODONE HYDROCHLORIDE 5 MG/1
15 TABLET ORAL EVERY 4 HOURS PRN
Status: DISCONTINUED | OUTPATIENT
Start: 2023-09-27 | End: 2023-09-27 | Stop reason: HOSPADM

## 2023-09-27 RX ORDER — SCOLOPAMINE TRANSDERMAL SYSTEM 1 MG/1
1 PATCH, EXTENDED RELEASE TRANSDERMAL ONCE
Status: DISCONTINUED | OUTPATIENT
Start: 2023-09-27 | End: 2023-09-27

## 2023-09-27 RX ORDER — DIPHENHYDRAMINE HYDROCHLORIDE 50 MG/ML
12.5 INJECTION INTRAMUSCULAR; INTRAVENOUS ONCE AS NEEDED
Status: DISCONTINUED | OUTPATIENT
Start: 2023-09-27 | End: 2023-09-27 | Stop reason: HOSPADM

## 2023-09-27 RX ORDER — MIDAZOLAM HYDROCHLORIDE 1 MG/ML
INJECTION INTRAMUSCULAR; INTRAVENOUS AS NEEDED
Status: DISCONTINUED | OUTPATIENT
Start: 2023-09-27 | End: 2023-09-27 | Stop reason: SURG

## 2023-09-27 RX ORDER — ALBUTEROL SULFATE 2.5 MG/3ML
2.5 SOLUTION RESPIRATORY (INHALATION) EVERY 4 HOURS PRN
Status: DISCONTINUED | OUTPATIENT
Start: 2023-09-27 | End: 2023-09-29 | Stop reason: HOSPADM

## 2023-09-27 RX ORDER — ACETAMINOPHEN 500 MG
1000 TABLET ORAL EVERY 6 HOURS
Status: COMPLETED | OUTPATIENT
Start: 2023-09-27 | End: 2023-09-29

## 2023-09-27 RX ORDER — ENOXAPARIN SODIUM 100 MG/ML
40 INJECTION SUBCUTANEOUS
Status: DISCONTINUED | OUTPATIENT
Start: 2023-09-28 | End: 2023-09-28

## 2023-09-27 RX ORDER — PROPOFOL 10 MG/ML
INJECTION, EMULSION INTRAVENOUS AS NEEDED
Status: DISCONTINUED | OUTPATIENT
Start: 2023-09-27 | End: 2023-09-27 | Stop reason: SURG

## 2023-09-27 RX ORDER — ESMOLOL HYDROCHLORIDE 10 MG/ML
INJECTION INTRAVENOUS AS NEEDED
Status: DISCONTINUED | OUTPATIENT
Start: 2023-09-27 | End: 2023-09-27 | Stop reason: SURG

## 2023-09-27 RX ORDER — DEXAMETHASONE SODIUM PHOSPHATE 4 MG/ML
INJECTION, SOLUTION INTRA-ARTICULAR; INTRALESIONAL; INTRAMUSCULAR; INTRAVENOUS; SOFT TISSUE AS NEEDED
Status: DISCONTINUED | OUTPATIENT
Start: 2023-09-27 | End: 2023-09-27 | Stop reason: SURG

## 2023-09-27 RX ORDER — INDOCYANINE GREEN AND WATER 25 MG
KIT INJECTION AS NEEDED
Status: DISCONTINUED | OUTPATIENT
Start: 2023-09-27 | End: 2023-09-27 | Stop reason: SURG

## 2023-09-27 RX ORDER — ACETAMINOPHEN 10 MG/ML
1000 INJECTION, SOLUTION INTRAVENOUS ONCE
Status: COMPLETED | OUTPATIENT
Start: 2023-09-27 | End: 2023-09-27

## 2023-09-27 RX ORDER — OXYCODONE HYDROCHLORIDE 5 MG/1
10 TABLET ORAL EVERY 4 HOURS PRN
Status: DISCONTINUED | OUTPATIENT
Start: 2023-09-27 | End: 2023-09-27 | Stop reason: HOSPADM

## 2023-09-27 RX ORDER — GABAPENTIN 300 MG/1
300 CAPSULE ORAL EVERY 8 HOURS SCHEDULED
Status: COMPLETED | OUTPATIENT
Start: 2023-09-27 | End: 2023-09-29

## 2023-09-27 RX ORDER — EPHEDRINE SULFATE 5 MG/ML
5 INJECTION INTRAVENOUS ONCE AS NEEDED
Status: DISCONTINUED | OUTPATIENT
Start: 2023-09-27 | End: 2023-09-27 | Stop reason: HOSPADM

## 2023-09-27 RX ORDER — HYDRALAZINE HYDROCHLORIDE 20 MG/ML
20 INJECTION INTRAMUSCULAR; INTRAVENOUS
Status: DISCONTINUED | OUTPATIENT
Start: 2023-09-27 | End: 2023-09-29 | Stop reason: HOSPADM

## 2023-09-27 RX ORDER — SODIUM CHLORIDE 9 MG/ML
40 INJECTION, SOLUTION INTRAVENOUS AS NEEDED
Status: DISCONTINUED | OUTPATIENT
Start: 2023-09-27 | End: 2023-09-27 | Stop reason: HOSPADM

## 2023-09-27 RX ORDER — ONDANSETRON 2 MG/ML
4 INJECTION INTRAMUSCULAR; INTRAVENOUS ONCE AS NEEDED
Status: DISCONTINUED | OUTPATIENT
Start: 2023-09-27 | End: 2023-09-27 | Stop reason: HOSPADM

## 2023-09-27 RX ORDER — DIPHENHYDRAMINE HYDROCHLORIDE 50 MG/ML
25 INJECTION INTRAMUSCULAR; INTRAVENOUS EVERY 4 HOURS PRN
Status: DISCONTINUED | OUTPATIENT
Start: 2023-09-27 | End: 2023-09-29 | Stop reason: HOSPADM

## 2023-09-27 RX ORDER — PROMETHAZINE HYDROCHLORIDE 25 MG/1
25 SUPPOSITORY RECTAL ONCE AS NEEDED
Status: DISCONTINUED | OUTPATIENT
Start: 2023-09-27 | End: 2023-09-27 | Stop reason: HOSPADM

## 2023-09-27 RX ORDER — ROCURONIUM BROMIDE 10 MG/ML
INJECTION, SOLUTION INTRAVENOUS AS NEEDED
Status: DISCONTINUED | OUTPATIENT
Start: 2023-09-27 | End: 2023-09-27 | Stop reason: SURG

## 2023-09-27 RX ORDER — SODIUM CHLORIDE 0.9 % (FLUSH) 0.9 %
10 SYRINGE (ML) INJECTION EVERY 12 HOURS SCHEDULED
Status: DISCONTINUED | OUTPATIENT
Start: 2023-09-27 | End: 2023-09-27 | Stop reason: HOSPADM

## 2023-09-27 RX ORDER — LIDOCAINE HYDROCHLORIDE 10 MG/ML
INJECTION, SOLUTION EPIDURAL; INFILTRATION; INTRACAUDAL; PERINEURAL AS NEEDED
Status: DISCONTINUED | OUTPATIENT
Start: 2023-09-27 | End: 2023-09-27 | Stop reason: SURG

## 2023-09-27 RX ORDER — FENTANYL CITRATE 50 UG/ML
50 INJECTION, SOLUTION INTRAMUSCULAR; INTRAVENOUS
Status: DISCONTINUED | OUTPATIENT
Start: 2023-09-27 | End: 2023-09-27 | Stop reason: HOSPADM

## 2023-09-27 RX ORDER — FLUMAZENIL 0.1 MG/ML
0.5 INJECTION INTRAVENOUS AS NEEDED
Status: DISCONTINUED | OUTPATIENT
Start: 2023-09-27 | End: 2023-09-27 | Stop reason: HOSPADM

## 2023-09-27 RX ORDER — CYANOCOBALAMIN 1000 UG/ML
1000 INJECTION, SOLUTION INTRAMUSCULAR; SUBCUTANEOUS ONCE
Status: COMPLETED | OUTPATIENT
Start: 2023-09-28 | End: 2023-09-28

## 2023-09-27 RX ORDER — MEPERIDINE HYDROCHLORIDE 25 MG/ML
12.5 INJECTION INTRAMUSCULAR; INTRAVENOUS; SUBCUTANEOUS
Status: DISCONTINUED | OUTPATIENT
Start: 2023-09-27 | End: 2023-09-27 | Stop reason: HOSPADM

## 2023-09-27 RX ORDER — IPRATROPIUM BROMIDE AND ALBUTEROL SULFATE 2.5; .5 MG/3ML; MG/3ML
3 SOLUTION RESPIRATORY (INHALATION) ONCE AS NEEDED
Status: DISCONTINUED | OUTPATIENT
Start: 2023-09-27 | End: 2023-09-27 | Stop reason: HOSPADM

## 2023-09-27 RX ORDER — FENTANYL CITRATE 50 UG/ML
INJECTION, SOLUTION INTRAMUSCULAR; INTRAVENOUS AS NEEDED
Status: DISCONTINUED | OUTPATIENT
Start: 2023-09-27 | End: 2023-09-27 | Stop reason: SURG

## 2023-09-27 RX ORDER — ONDANSETRON 2 MG/ML
INJECTION INTRAMUSCULAR; INTRAVENOUS AS NEEDED
Status: DISCONTINUED | OUTPATIENT
Start: 2023-09-27 | End: 2023-09-27 | Stop reason: SURG

## 2023-09-27 RX ORDER — HYDRALAZINE HYDROCHLORIDE 20 MG/ML
5 INJECTION INTRAMUSCULAR; INTRAVENOUS
Status: DISCONTINUED | OUTPATIENT
Start: 2023-09-27 | End: 2023-09-27 | Stop reason: HOSPADM

## 2023-09-27 RX ORDER — METOPROLOL TARTRATE 5 MG/5ML
INJECTION INTRAVENOUS AS NEEDED
Status: DISCONTINUED | OUTPATIENT
Start: 2023-09-27 | End: 2023-09-27 | Stop reason: SURG

## 2023-09-27 RX ORDER — CHLORHEXIDINE GLUCONATE ORAL RINSE 1.2 MG/ML
15 SOLUTION DENTAL SEE ADMIN INSTRUCTIONS
Status: COMPLETED | OUTPATIENT
Start: 2023-09-27 | End: 2023-09-27

## 2023-09-27 RX ORDER — PROCHLORPERAZINE EDISYLATE 5 MG/ML
10 INJECTION INTRAMUSCULAR; INTRAVENOUS EVERY 6 HOURS PRN
Status: DISCONTINUED | OUTPATIENT
Start: 2023-09-27 | End: 2023-09-29 | Stop reason: HOSPADM

## 2023-09-27 RX ORDER — IBUPROFEN 400 MG/1
600 TABLET ORAL ONCE AS NEEDED
Status: DISCONTINUED | OUTPATIENT
Start: 2023-09-27 | End: 2023-09-27 | Stop reason: HOSPADM

## 2023-09-27 RX ORDER — MIDAZOLAM HYDROCHLORIDE 1 MG/ML
2 INJECTION INTRAMUSCULAR; INTRAVENOUS
Status: DISCONTINUED | OUTPATIENT
Start: 2023-09-27 | End: 2023-09-27 | Stop reason: HOSPADM

## 2023-09-27 RX ORDER — LABETALOL HYDROCHLORIDE 5 MG/ML
5 INJECTION, SOLUTION INTRAVENOUS
Status: DISCONTINUED | OUTPATIENT
Start: 2023-09-27 | End: 2023-09-27 | Stop reason: HOSPADM

## 2023-09-27 RX ORDER — LORAZEPAM 2 MG/ML
1 INJECTION INTRAMUSCULAR
Status: DISCONTINUED | OUTPATIENT
Start: 2023-09-27 | End: 2023-09-27 | Stop reason: HOSPADM

## 2023-09-27 RX ORDER — SODIUM CHLORIDE, SODIUM LACTATE, POTASSIUM CHLORIDE, CALCIUM CHLORIDE 600; 310; 30; 20 MG/100ML; MG/100ML; MG/100ML; MG/100ML
100 INJECTION, SOLUTION INTRAVENOUS CONTINUOUS
Status: DISCONTINUED | OUTPATIENT
Start: 2023-09-27 | End: 2023-09-27

## 2023-09-27 RX ORDER — ALBUTEROL SULFATE 90 UG/1
2 AEROSOL, METERED RESPIRATORY (INHALATION) EVERY 4 HOURS PRN
Status: DISCONTINUED | OUTPATIENT
Start: 2023-09-27 | End: 2023-09-29 | Stop reason: HOSPADM

## 2023-09-27 RX ORDER — PROMETHAZINE HYDROCHLORIDE 25 MG/1
25 TABLET ORAL ONCE AS NEEDED
Status: DISCONTINUED | OUTPATIENT
Start: 2023-09-27 | End: 2023-09-27 | Stop reason: HOSPADM

## 2023-09-27 RX ORDER — SODIUM CHLORIDE, SODIUM LACTATE, POTASSIUM CHLORIDE, CALCIUM CHLORIDE 600; 310; 30; 20 MG/100ML; MG/100ML; MG/100ML; MG/100ML
75 INJECTION, SOLUTION INTRAVENOUS CONTINUOUS
Status: DISCONTINUED | OUTPATIENT
Start: 2023-09-27 | End: 2023-09-29 | Stop reason: HOSPADM

## 2023-09-27 RX ORDER — SODIUM CHLORIDE 0.9 % (FLUSH) 0.9 %
1-10 SYRINGE (ML) INJECTION AS NEEDED
Status: DISCONTINUED | OUTPATIENT
Start: 2023-09-27 | End: 2023-09-27 | Stop reason: HOSPADM

## 2023-09-27 RX ORDER — DIPHENHYDRAMINE HYDROCHLORIDE 50 MG/ML
12.5 INJECTION INTRAMUSCULAR; INTRAVENOUS
Status: DISCONTINUED | OUTPATIENT
Start: 2023-09-27 | End: 2023-09-27 | Stop reason: HOSPADM

## 2023-09-27 RX ORDER — ONDANSETRON 2 MG/ML
8 INJECTION INTRAMUSCULAR; INTRAVENOUS EVERY 6 HOURS PRN
Status: DISCONTINUED | OUTPATIENT
Start: 2023-09-27 | End: 2023-09-29 | Stop reason: HOSPADM

## 2023-09-27 RX ORDER — KETAMINE HCL IN NACL, ISO-OSM 100MG/10ML
SYRINGE (ML) INJECTION AS NEEDED
Status: DISCONTINUED | OUTPATIENT
Start: 2023-09-27 | End: 2023-09-27 | Stop reason: SURG

## 2023-09-27 RX ORDER — HYDROMORPHONE HYDROCHLORIDE 2 MG/1
2 TABLET ORAL EVERY 4 HOURS PRN
Status: DISCONTINUED | OUTPATIENT
Start: 2023-09-27 | End: 2023-09-29 | Stop reason: HOSPADM

## 2023-09-27 RX ORDER — NALOXONE HCL 0.4 MG/ML
0.4 VIAL (ML) INJECTION AS NEEDED
Status: DISCONTINUED | OUTPATIENT
Start: 2023-09-27 | End: 2023-09-27 | Stop reason: HOSPADM

## 2023-09-27 RX ORDER — ACETAMINOPHEN 160 MG/5ML
1000 SOLUTION ORAL EVERY 6 HOURS
Status: COMPLETED | OUTPATIENT
Start: 2023-09-27 | End: 2023-09-29

## 2023-09-27 RX ORDER — NALOXONE HCL 0.4 MG/ML
0.1 VIAL (ML) INJECTION
Status: DISCONTINUED | OUTPATIENT
Start: 2023-09-27 | End: 2023-09-29 | Stop reason: HOSPADM

## 2023-09-27 RX ORDER — FENTANYL CITRATE 50 UG/ML
100 INJECTION, SOLUTION INTRAMUSCULAR; INTRAVENOUS
Status: DISCONTINUED | OUTPATIENT
Start: 2023-09-27 | End: 2023-09-27 | Stop reason: HOSPADM

## 2023-09-27 RX ORDER — DIPHENHYDRAMINE HCL 25 MG
25 CAPSULE ORAL
Status: DISCONTINUED | OUTPATIENT
Start: 2023-09-27 | End: 2023-09-27 | Stop reason: HOSPADM

## 2023-09-27 RX ORDER — METOCLOPRAMIDE HYDROCHLORIDE 5 MG/ML
10 INJECTION INTRAMUSCULAR; INTRAVENOUS EVERY 6 HOURS PRN
Status: DISCONTINUED | OUTPATIENT
Start: 2023-09-27 | End: 2023-09-29 | Stop reason: HOSPADM

## 2023-09-27 RX ORDER — PANTOPRAZOLE SODIUM 40 MG/10ML
40 INJECTION, POWDER, LYOPHILIZED, FOR SOLUTION INTRAVENOUS ONCE
Status: COMPLETED | OUTPATIENT
Start: 2023-09-27 | End: 2023-09-27

## 2023-09-27 RX ADMIN — PROPOFOL 220 MCG/KG/MIN: 10 INJECTION, EMULSION INTRAVENOUS at 09:01

## 2023-09-27 RX ADMIN — ESMOLOL HYDROCHLORIDE 10 MG: 100 INJECTION, SOLUTION INTRAVENOUS at 09:00

## 2023-09-27 RX ADMIN — ESMOLOL HYDROCHLORIDE 10 MG: 100 INJECTION, SOLUTION INTRAVENOUS at 09:10

## 2023-09-27 RX ADMIN — ROCURONIUM BROMIDE 10 MG: 10 INJECTION, SOLUTION INTRAVENOUS at 08:56

## 2023-09-27 RX ADMIN — ROCURONIUM BROMIDE 10 MG: 10 INJECTION, SOLUTION INTRAVENOUS at 09:17

## 2023-09-27 RX ADMIN — METOPROLOL TARTRATE 2.5 MG: 1 INJECTION, SOLUTION INTRAVENOUS at 09:52

## 2023-09-27 RX ADMIN — HYDROMORPHONE HYDROCHLORIDE 2 MG: 2 TABLET ORAL at 18:52

## 2023-09-27 RX ADMIN — SCOPALAMINE 1 PATCH: 1 PATCH, EXTENDED RELEASE TRANSDERMAL at 06:27

## 2023-09-27 RX ADMIN — INDOCYANINE GREEN AND WATER 1.25 MG: KIT at 09:48

## 2023-09-27 RX ADMIN — PROPOFOL INJECTABLE EMULSION 50 MG: 10 INJECTION, EMULSION INTRAVENOUS at 10:28

## 2023-09-27 RX ADMIN — FENTANYL CITRATE 50 MCG: 50 INJECTION, SOLUTION INTRAMUSCULAR; INTRAVENOUS at 07:54

## 2023-09-27 RX ADMIN — GABAPENTIN 300 MG: 300 CAPSULE ORAL at 14:46

## 2023-09-27 RX ADMIN — CHLORHEXIDINE GLUCONATE 15 ML: 1.2 SOLUTION ORAL at 06:27

## 2023-09-27 RX ADMIN — ACETAMINOPHEN 1000 MG: 325 TABLET, FILM COATED ORAL at 14:46

## 2023-09-27 RX ADMIN — PROPOFOL 235 MCG/KG/MIN: 10 INJECTION, EMULSION INTRAVENOUS at 09:46

## 2023-09-27 RX ADMIN — FENTANYL CITRATE 100 MCG: 50 INJECTION, SOLUTION INTRAMUSCULAR; INTRAVENOUS at 08:28

## 2023-09-27 RX ADMIN — METOPROLOL TARTRATE 2.5 MG: 1 INJECTION, SOLUTION INTRAVENOUS at 10:23

## 2023-09-27 RX ADMIN — ONDANSETRON HYDROCHLORIDE 8 MG: 4 TABLET, FILM COATED ORAL at 14:46

## 2023-09-27 RX ADMIN — MIDAZOLAM 2 MG: 1 INJECTION INTRAMUSCULAR; INTRAVENOUS at 08:45

## 2023-09-27 RX ADMIN — HYDROMORPHONE HYDROCHLORIDE 1 MG: 1 INJECTION, SOLUTION INTRAMUSCULAR; INTRAVENOUS; SUBCUTANEOUS at 08:50

## 2023-09-27 RX ADMIN — SODIUM CHLORIDE, POTASSIUM CHLORIDE, SODIUM LACTATE AND CALCIUM CHLORIDE 100 ML/HR: 600; 310; 30; 20 INJECTION, SOLUTION INTRAVENOUS at 06:27

## 2023-09-27 RX ADMIN — GABAPENTIN 300 MG: 300 CAPSULE ORAL at 21:00

## 2023-09-27 RX ADMIN — ACETAMINOPHEN 1000 MG: 10 INJECTION, SOLUTION INTRAVENOUS at 07:56

## 2023-09-27 RX ADMIN — METOPROLOL TARTRATE 2.5 MG: 1 INJECTION, SOLUTION INTRAVENOUS at 09:16

## 2023-09-27 RX ADMIN — PROPOFOL INJECTABLE EMULSION 200 MG: 10 INJECTION, EMULSION INTRAVENOUS at 08:02

## 2023-09-27 RX ADMIN — SODIUM CHLORIDE, POTASSIUM CHLORIDE, SODIUM LACTATE AND CALCIUM CHLORIDE 150 ML/HR: 600; 310; 30; 20 INJECTION, SOLUTION INTRAVENOUS at 21:04

## 2023-09-27 RX ADMIN — CEFAZOLIN 2000 MG: 2 INJECTION, POWDER, FOR SOLUTION INTRAMUSCULAR; INTRAVENOUS at 07:50

## 2023-09-27 RX ADMIN — PANTOPRAZOLE SODIUM 40 MG: 40 INJECTION, POWDER, LYOPHILIZED, FOR SOLUTION INTRAVENOUS at 06:27

## 2023-09-27 RX ADMIN — HYDROMORPHONE HYDROCHLORIDE 0.5 MG: 1 INJECTION, SOLUTION INTRAMUSCULAR; INTRAVENOUS; SUBCUTANEOUS at 09:44

## 2023-09-27 RX ADMIN — HYDROMORPHONE HYDROCHLORIDE 2 MG: 2 TABLET ORAL at 14:46

## 2023-09-27 RX ADMIN — ONDANSETRON 4 MG: 2 INJECTION INTRAMUSCULAR; INTRAVENOUS at 09:53

## 2023-09-27 RX ADMIN — SODIUM CHLORIDE, POTASSIUM CHLORIDE, SODIUM LACTATE AND CALCIUM CHLORIDE 150 ML/HR: 600; 310; 30; 20 INJECTION, SOLUTION INTRAVENOUS at 14:55

## 2023-09-27 RX ADMIN — FENTANYL CITRATE 50 MCG: 50 INJECTION, SOLUTION INTRAMUSCULAR; INTRAVENOUS at 07:56

## 2023-09-27 RX ADMIN — PROPOFOL 150 MCG/KG/MIN: 10 INJECTION, EMULSION INTRAVENOUS at 08:15

## 2023-09-27 RX ADMIN — ROCURONIUM BROMIDE 10 MG: 10 INJECTION, SOLUTION INTRAVENOUS at 09:43

## 2023-09-27 RX ADMIN — LIDOCAINE HYDROCHLORIDE 50 MG: 10 INJECTION, SOLUTION EPIDURAL; INFILTRATION; INTRACAUDAL; PERINEURAL at 07:54

## 2023-09-27 RX ADMIN — Medication 12.5 MG: at 14:46

## 2023-09-27 RX ADMIN — ACETAMINOPHEN 1000 MG: 325 TABLET, FILM COATED ORAL at 21:00

## 2023-09-27 RX ADMIN — ROCURONIUM BROMIDE 50 MG: 10 INJECTION, SOLUTION INTRAVENOUS at 08:02

## 2023-09-27 RX ADMIN — Medication 30 MG: at 08:45

## 2023-09-27 RX ADMIN — SUGAMMADEX 200 MG: 100 INJECTION, SOLUTION INTRAVENOUS at 10:27

## 2023-09-27 RX ADMIN — DEXAMETHASONE SODIUM PHOSPHATE 8 MG: 4 INJECTION, SOLUTION INTRAMUSCULAR; INTRAVENOUS at 08:09

## 2023-09-27 RX ADMIN — SODIUM CHLORIDE, POTASSIUM CHLORIDE, SODIUM LACTATE AND CALCIUM CHLORIDE: 600; 310; 30; 20 INJECTION, SOLUTION INTRAVENOUS at 10:02

## 2023-09-27 RX ADMIN — HYDROMORPHONE HYDROCHLORIDE 0.5 MG: 1 INJECTION, SOLUTION INTRAMUSCULAR; INTRAVENOUS; SUBCUTANEOUS at 09:29

## 2023-09-27 NOTE — ANESTHESIA PREPROCEDURE EVALUATION
Anesthesia Evaluation     Patient summary reviewed and Nursing notes reviewed   NPO Solid Status: > 8 hours  NPO Liquid Status: > 8 hours           Airway   Mallampati: III  Large neck circumference  Dental          Pulmonary    (+) ,shortness of breath  Cardiovascular - normal exam    ECG reviewed    (+) hypertension, dysrhythmias (H/O SVT), hyperlipidemia      Neuro/Psych  (+) psychiatric history Anxiety and Depression  GI/Hepatic/Renal/Endo    (+) morbid obesity, hiatal hernia, GERD, renal disease stones    Musculoskeletal     Abdominal   (+) obese   Substance History      OB/GYN          Other        ROS/Med Hx Other: 9/21  · Left ventricular systolic function is normal.  · Left ventricle ejection fraction is 55%  · Left ventricular diastolic function was normal.                    Anesthesia Plan    ASA 3     general     intravenous induction     Anesthetic plan, risks, benefits, and alternatives have been provided, discussed and informed consent has been obtained with: patient.    Use of blood products discussed with patient .    Plan discussed with CRNA and CAA.    CODE STATUS:

## 2023-09-27 NOTE — PLAN OF CARE
Goal Outcome Evaluation:      Pt is aox4, able to voice needs/concerns, still drowsy from surgery. Plan of care ongoing. Eating and drinking fine.

## 2023-09-27 NOTE — ANESTHESIA POSTPROCEDURE EVALUATION
Patient: Ariadna Ambrocio    Procedure Summary       Date: 09/27/23 Room / Location: [facility] OR 04 / [facility] MAIN OR    Anesthesia Start: 0747 Anesthesia Stop: 1055    Procedure: GASTRIC BYPASS AND HIATAL HERNIA REPAIR LAPAROSCOPIC WITH DAVINCI ROBOT (Abdomen) Diagnosis:       Obesity, Class III, BMI 40-49.9 (morbid obesity)      (Obesity, Class III, BMI 40-49.9 (morbid obesity) [E66.01])    Surgeons: Otilia Capone MD Provider: Wily Lewis MD    Anesthesia Type: general ASA Status: 3            Anesthesia Type: general    Vitals  Vitals Value Taken Time   /83 09/27/23 1305   Temp 97 °F (36.1 °C) 09/27/23 1305   Pulse 81 09/27/23 1308   Resp 10 09/27/23 1305   SpO2 96 % 09/27/23 1308   Vitals shown include unvalidated device data.        Post Anesthesia Care and Evaluation    Patient location during evaluation: PACU  Patient participation: complete - patient participated  Level of consciousness: awake  Pain score: 2 (See nurse's notes for pain score)  Pain management: adequate    Airway patency: patent  Anesthetic complications: No anesthetic complications  PONV Status: none  Cardiovascular status: acceptable  Respiratory status: acceptable and spontaneous ventilation  Hydration status: acceptable    Comments: Patient seen and examined postoperatively; vital signs stable; SpO2 greater than or equal to 90%; cardiopulmonary status stable; nausea/vomiting adequately controlled; pain adequately controlled; no apparent anesthesia complications; patient discharged from anesthesia care when discharge criteria were met

## 2023-09-27 NOTE — ANESTHESIA PROCEDURE NOTES
Airway  Urgency: elective    Date/Time: 9/27/2023 8:04 AM  End Time:9/27/2023 8:04 AM  Airway not difficult    General Information and Staff    Patient location during procedure: OR  Anesthesiologist: Wily Lewis MD  CRNA/CAA: Geovanna Schuster CAA    Indications and Patient Condition  Indications for airway management: airway protection    Preoxygenated: yes  Mask difficulty assessment: 1 - vent by mask    Final Airway Details  Final airway type: endotracheal airway      Successful airway: ETT  Cuffed: yes   Successful intubation technique: video laryngoscopy  Facilitating devices/methods: intubating stylet  Endotracheal tube insertion site: oral  Blade: Lay  Blade size: 3  ETT size (mm): 7.0  Cormack-Lehane Classification: grade I - full view of glottis  Placement verified by: chest auscultation, capnometry and palpation of cuff   Measured from: lips  ETT/EBT  to lips (cm): 21  Number of attempts at approach: 1  Assessment: lips, teeth, and gum same as pre-op and atraumatic intubation    Additional Comments  Pt has known chipped front tooth on top with cap, discussed in preop.  Avoided this area specifically during intubation and checked teeth to ensure they were in pre-intubation condition following intubation.

## 2023-09-27 NOTE — OP NOTE
GASTRIC BYPASS LAPAROSCOPIC WITH DAVINCI ROBOT  Procedure Report    Patient Name:  Ariadna Ambrocio  YOB: 1985    Date of Surgery:  9/27/2023     Indications:  This patient presents for elective laparoscopic Nicho-en-Y divided gastric bypass. The patient has undergone our extensive preoperative evaluation, teaching and consent process.     Pre-op Diagnosis:   Obesity, Class III, BMI 40-49.9 (morbid obesity) [E66.01]           Post-Op Diagnosis Codes:     * Obesity, Class III, BMI 40-49.9 (morbid obesity) [E66.01]      Procedure/CPT® Codes:      Procedure(s):  GASTRIC BYPASS AND sliding type 1 HIATAL HERNIA REPAIR LAPAROSCOPIC WITH DAVINCI ROBOT    Staff:  Surgeon(s):  Otilia Capone MD    Assistant: Acosta Santillan CSA  Assistant: Acosta Santillan CSA was responsible for performing the following activities: Retraction, Suction, Irrigation, and Suturing and their skilled assistance was necessary for the success of this case.    Anesthesia: General    Estimated Blood Loss: minimal    Implants:    Implant Name Type Inv. Item Serial No.  Lot No. LRB No. Used Action   RELOAD STPLR SUREFORM 60 DAVINCI/X/XI 6ROW 2.5 WHT 1P/U - [id number] Implant RELOAD STPLR SUREFORM 60 DAVINCI/X/XI 6ROW 2.5 WHT 1P/U  INTUITIVE SURGICAL [id number] N/A 6 Implanted   DEV CLS WND VLOC/90 HAIM ABS 1/2CIR SZ3/0 26MM 15CM TAVON - [id number] Implant DEV CLS WND VLOC/90 HAIM ABS 1/2CIR SZ3/0 26MM 15CM TAVON  COVIDIEN [id number] N/A 2 Implanted   DEV CLS WND VLOC/PBT HAIM NONABS 1/2CIR SZ2/0 27MM 15CM KARLA - [id number] Implant DEV CLS WND VLOC/PBT HAIM NONABS 1/2CIR SZ2/0 27MM 15CM KARLA  COVIDIEN [id number] N/A 2 Implanted   RELOAD STPLR SUREFORM 60 DAVINCI/X/XI 6ROW 2.5 WHT 1P/U - [id number] Implant RELOAD STPLR SUREFORM 60 DAVINCI/X/XI 6ROW 2.5 WHT 1P/U  INTUITIVE SURGICAL [id number] N/A 1 Implanted       Specimen:          None        Findings: hiatal hernia sliding type 1    Complications: none  Description of Procedure:   The  patient was brought to the operating room and placed in the supine position on the operating room table. The patient had previously received  IV antibiotics as well as SCD. The patient underwent uneventful general endotracheal anesthesia per the Anesthesiology staff. The abdomen was prepped with ChloraPrep and draped in the usual sterile fashion. An Ioban was used as well if not allergic.     A 1.5 cm transverse incision was made 10 cm to the left of midline 17 cm inferior to the xiphoid process and the peritoneal cavity entered under direct camera visualization using a 5 mm 0° laparoscope and 5 mm Optiview trocar. The abdomen was insufflated to a pressure of 15-16 mmHg with C02 gas.     Exploratory laparoscopy revealed no evidence of injury from the entrance technique and otherwise no abnormalities unless addendum dictated below. Under direct camera visualization, a 12 mm trocar was placed in the right lateral subcostal position and an 8 mm trocar  in the left lateral subcostal position.  The 5 mm trocar was then upsized to 12 mm trocar.  Another 8 mm trocar was placed just to the left of midline.  A 5 mm trocar was also placed on the right lower quadrant.    A 40 [place] citizen orogastric tube was placed in the stomach for decompression.  I used a 1 V-loc suture to create a liver hammock for retraction by suturing it to the peritoneum of the epigastrum, then to the anterior joellen, then the peritonenum of the right upper quadrant.         I then chose an area along the lesser curve of the stomach approximately 9 centimeters distally to the GE junction and began dissection with the vessel sealer.  I entered the retrogastric space and then placed a  60 mm white load in this area for a horizontal staple line.  Additional firings of the stapler were conducted vertically with the 40 [place] citizen VISI G in place to create the gastric pouch.   There was evidence of type I sliding hiatal hernia.  The esophagus was dissected freely from  the right and left joellen and the posterior hiatus was closed with a running 2-0 V loc nonabsorbable suture.  I also used the suture to create a gastropexy of the posterior stomach.  I also used the same type of suture to close the crura anteriorly.  This was all done while the 40 Egyptian ViSiGi was in place.     I divided the omentum with the vessel sealer to make way for the Nicho limb. I then identified the ligament of Treitz and ran the bowel distally 80 cm and brought this loop up to the gastric pouch.   With the proximal biliary limb on the left side and the distal alimentary limb on the right side, I performed a linear anastomosis with the stapler and only inserted it to 30 mm and then closed the enterotomy with 1 layer of 3-0 V-Loc 90 day suture.   At this point I decided to perform a leak test. ICG was injected in the orogastric tube and there was no evidence of leak.       I used a white load and fired it across the jejunum just to the left of the gastrojejunostomy and then at that point the limb on the patient's left side was the biliopancreatic limb.  I ran the Nicho limb distally 150 cm and then performed a side-to-side anastomosis with a white load using the 60 stapler and then I closed the enterotomy with a running 3-0 Vloc in 1 layer.        I then closed the mesenteric defect at the jejunojejunostomy  and I also closed Tyson space with a running 2-0 V-Loc suture.      All sponge and needle counts were correct.     Then the abdomen was desufflated all the skin incisions were closed with 4-0 Monocryl and surgical glue.            Otilia Capone MD     Date: 9/27/2023  Time: 10:39 EDT

## 2023-09-28 LAB
BASOPHILS # BLD AUTO: 0 10*3/MM3 (ref 0–0.2)
BASOPHILS NFR BLD AUTO: 0.2 % (ref 0–1.5)
DEPRECATED RDW RBC AUTO: 42.4 FL (ref 37–54)
EOSINOPHIL # BLD AUTO: 0 10*3/MM3 (ref 0–0.4)
EOSINOPHIL NFR BLD AUTO: 0 % (ref 0.3–6.2)
ERYTHROCYTE [DISTWIDTH] IN BLOOD BY AUTOMATED COUNT: 13.5 % (ref 12.3–15.4)
HCT VFR BLD AUTO: 31.5 % (ref 34–46.6)
HCT VFR BLD AUTO: 32.2 % (ref 34–46.6)
HGB BLD-MCNC: 10.4 G/DL (ref 12–15.9)
HGB BLD-MCNC: 10.4 G/DL (ref 12–15.9)
LYMPHOCYTES # BLD AUTO: 3.6 10*3/MM3 (ref 0.7–3.1)
LYMPHOCYTES NFR BLD AUTO: 22.1 % (ref 19.6–45.3)
MCH RBC QN AUTO: 30 PG (ref 26.6–33)
MCHC RBC AUTO-ENTMCNC: 33 G/DL (ref 31.5–35.7)
MCV RBC AUTO: 91 FL (ref 79–97)
MONOCYTES # BLD AUTO: 1.4 10*3/MM3 (ref 0.1–0.9)
MONOCYTES NFR BLD AUTO: 8.3 % (ref 5–12)
NEUTROPHILS NFR BLD AUTO: 11.4 10*3/MM3 (ref 1.7–7)
NEUTROPHILS NFR BLD AUTO: 69.4 % (ref 42.7–76)
NRBC BLD AUTO-RTO: 0 /100 WBC (ref 0–0.2)
PLATELET # BLD AUTO: 301 10*3/MM3 (ref 140–450)
PMV BLD AUTO: 8 FL (ref 6–12)
RBC # BLD AUTO: 3.46 10*6/MM3 (ref 3.77–5.28)
WBC NRBC COR # BLD: 16.4 10*3/MM3 (ref 3.4–10.8)

## 2023-09-28 PROCEDURE — 25010000002 CYANOCOBALAMIN PER 1000 MCG: Performed by: SURGERY

## 2023-09-28 PROCEDURE — 25010000002 THIAMINE HCL 200 MG/2ML SOLUTION 2 ML VIAL: Performed by: SURGERY

## 2023-09-28 PROCEDURE — 85025 COMPLETE CBC W/AUTO DIFF WBC: CPT | Performed by: SURGERY

## 2023-09-28 PROCEDURE — 99024 POSTOP FOLLOW-UP VISIT: CPT | Performed by: SURGERY

## 2023-09-28 PROCEDURE — 85014 HEMATOCRIT: CPT | Performed by: SURGERY

## 2023-09-28 PROCEDURE — 85018 HEMOGLOBIN: CPT | Performed by: SURGERY

## 2023-09-28 RX ORDER — SODIUM PHOSPHATE IN 0.9 % NACL 15MMOL/100
15 PLASTIC BAG, INJECTION (ML) INTRAVENOUS ONCE
Status: COMPLETED | OUTPATIENT
Start: 2023-09-28 | End: 2023-09-28

## 2023-09-28 RX ORDER — OMEPRAZOLE 40 MG/1
40 CAPSULE, DELAYED RELEASE ORAL 2 TIMES DAILY
COMMUNITY

## 2023-09-28 RX ORDER — PANTOPRAZOLE SODIUM 40 MG/10ML
40 INJECTION, POWDER, LYOPHILIZED, FOR SOLUTION INTRAVENOUS
Status: DISCONTINUED | OUTPATIENT
Start: 2023-09-29 | End: 2023-09-29 | Stop reason: HOSPADM

## 2023-09-28 RX ADMIN — HYDROMORPHONE HYDROCHLORIDE 2 MG: 2 TABLET ORAL at 05:41

## 2023-09-28 RX ADMIN — SODIUM PHOSPHATE, MONOBASIC, MONOHYDRATE AND SODIUM PHOSPHATE, DIBASIC, ANHYDROUS 15 MMOL: 142; 276 INJECTION, SOLUTION INTRAVENOUS at 10:57

## 2023-09-28 RX ADMIN — HYDROMORPHONE HYDROCHLORIDE 2 MG: 2 TABLET ORAL at 00:21

## 2023-09-28 RX ADMIN — GABAPENTIN 300 MG: 300 CAPSULE ORAL at 14:11

## 2023-09-28 RX ADMIN — CYANOCOBALAMIN 1000 MCG: 1000 INJECTION, SOLUTION INTRAMUSCULAR; SUBCUTANEOUS at 09:19

## 2023-09-28 RX ADMIN — HYDROMORPHONE HYDROCHLORIDE 2 MG: 2 TABLET ORAL at 16:12

## 2023-09-28 RX ADMIN — GABAPENTIN 300 MG: 300 CAPSULE ORAL at 21:19

## 2023-09-28 RX ADMIN — ACETAMINOPHEN 1000 MG: 325 TABLET, FILM COATED ORAL at 09:19

## 2023-09-28 RX ADMIN — Medication 12.5 MG: at 20:12

## 2023-09-28 RX ADMIN — FOLIC ACID 100 ML/HR: 5 INJECTION, SOLUTION INTRAMUSCULAR; INTRAVENOUS; SUBCUTANEOUS at 00:25

## 2023-09-28 RX ADMIN — ACETAMINOPHEN 1000 MG: 325 TABLET, FILM COATED ORAL at 02:12

## 2023-09-28 RX ADMIN — GABAPENTIN 300 MG: 300 CAPSULE ORAL at 05:41

## 2023-09-28 RX ADMIN — SODIUM CHLORIDE, POTASSIUM CHLORIDE, SODIUM LACTATE AND CALCIUM CHLORIDE 75 ML/HR: 600; 310; 30; 20 INJECTION, SOLUTION INTRAVENOUS at 22:27

## 2023-09-28 RX ADMIN — ACETAMINOPHEN 1000 MG: 325 TABLET, FILM COATED ORAL at 14:11

## 2023-09-28 RX ADMIN — ACETAMINOPHEN 1000 MG: 325 TABLET, FILM COATED ORAL at 21:19

## 2023-09-28 RX ADMIN — HYDROMORPHONE HYDROCHLORIDE 2 MG: 2 TABLET ORAL at 20:09

## 2023-09-28 RX ADMIN — Medication 12.5 MG: at 09:19

## 2023-09-28 RX ADMIN — SODIUM CHLORIDE, POTASSIUM CHLORIDE, SODIUM LACTATE AND CALCIUM CHLORIDE 150 ML/HR: 600; 310; 30; 20 INJECTION, SOLUTION INTRAVENOUS at 10:36

## 2023-09-28 NOTE — PAYOR COMM NOTE
"This is OR report for Shasha Ambrocio  Reference/Auth#: [id number]     Pt had elective prior approved IP surgery as planned on 9/27/23.    Pratibha Guerrero, RN, BSN  Utilization Review Nurse  [facility]  Direct & confidential phone # [phone]  Fax # [phone]    Shasha Ambrocio (37 y.o. Female)       Date of Birth   1985    Social Security Number       Address   [address] [place] [address]    Home Phone   [phone]    MRN   [id number]       Voodoo   None    Marital Status                               Admission Date   9/27/23    Admission Type   Elective    Admitting Provider   Otilia Capone MD    Attending Provider   Otilia Capone MD    Department, Room/Bed   [facility] SURGICAL INPATIENT, 4119/1       Discharge Date       Discharge Disposition       Discharge Destination                                 Attending Provider: Otilia Capone MD    Allergies: No Known Allergies    Isolation: None   Infection: None   Code Status: CPR    Ht: 160 cm (63\")   Wt: 110 kg (242 lb 11.2 oz)    Admission Cmt: None   Principal Problem: Obesity, Class III, BMI 40-49.9 (morbid obesity) [E66.01]                   Active Insurance as of 9/27/2023       Primary Coverage       Payor Plan Insurance Group Employer/Plan Group    [id number]       Payor Plan Address Payor Plan Phone Number Payor Plan Fax Number Effective Dates    MAIL STOP:   4/8/2020 - None Entered    [address] [address]       [facility] [address]         Subscriber Name Subscriber Birth Date Member ID       SHASHA AMBROCIO 1985 [id number]                     Emergency Contacts        (Rel.) Home Phone Work Phone Mobile Phone    Shilo Ambrocio (Spouse) [phone] -- [phone]    SESARENEDINA (Mother) [phone] -- [phone]                 History & Physical        Otilia Capone MD at 09/27/23 0730            H&P reviewed. The " patient was examined and there are no changes to the H&P.          Electronically signed by Otilia Capone MD at 09/27/23 4646   Source Note          Bariatric Consult:    Chief Complaint: Morbid Obesity  Referred by Marifer Cooper MD    Ariadna Ambrocio is here today for consult on Morbid Obesity    History of Present Illness:     Ariadna Ambrocio is a 37 y.o. female with morbid obesity with co-morbidities including GERD who presents for surgical consultation for the above procedure. Ariadna has completed the initial intake visit and has been examined by our nurse practitioner, dietician, psychologist and underwent the extensive educational teaching process under the guidance of our bariatric coordinator and myself. Ariadna also has seen the educational video BEN on the surgical procedure if available. Ariadna attended today more educational teaching from our bariatric coordinator and myself. Ariadna has had an extensive medical workup including a visit with their primary care physician, EKG, chest radiograph, blood work, EGD or UGI and possibly further testing. These have been reviewed by me and discussed with the patient. Ariadna is now ready to proceed with surgery. Ariadna presently denies nausea, vomiting, fever, chills, chest pain, shortness of air, melena, hematochezia, hemetemesis, dysuria, frequency, hematuria, jaundice or abdominal pain.     Wt Readings from Last 10 Encounters:   09/01/23 113 kg (249 lb 6.4 oz)   08/28/23 112 kg (246 lb)   07/10/23 109 kg (240 lb)   06/16/23 108 kg (239 lb)   06/02/23 108 kg (239 lb)   05/08/23 107 kg (236 lb)   04/19/23 108 kg (239 lb)   02/27/23 108 kg (238 lb 8.6 oz)   01/23/23 108 kg (238 lb)   01/19/23 110 kg (242 lb 6.4 oz)     Her pre-op EGD shows large hiatal hernia      Past Medical History:   Diagnosis Date    Anxiety     Bulging lumbar disc     Constipation     Depression     Diarrhea     Elevated triglycerides with high cholesterol     Heartburn     Herniated cervical  disc     Hiatal hernia     Hypertension     IBS (irritable bowel syndrome)     Kidney stones     Nausea     Pre-diabetes     Rapid heart beat     Seasonal allergies      Encounter Diagnoses   Name Primary?    Obesity, Class III, BMI 40-49.9 (morbid obesity) Yes     Past Surgical History:   Procedure Laterality Date    COLONOSCOPY      CYSTOSCOPY, URETEROSCOPY, RETROGRADE PYELOGRAM, STENT INSERTION Left 07/16/2021    Procedure: CYSTOSCOPY URETEROSCOPY RETROGRADE PYELOGRAM HOLMIUM LASER BASKET STONE EXTRACTION STENT INSERTION;  Surgeon: Carmelo Nguyễn MD;  Location: [facility] MAIN OR;  Service: Urology;  Laterality: Left;    EAR TUBES      ENDOSCOPY N/A 2/27/2023    Procedure: ESOPHAGOGASTRODUODENOSCOPY with gastric antrum biopsy;  Surgeon: Otilia Capone MD;  Location: [facility] ENDOSCOPY;  Service: General;  Laterality: N/A;  Post: large hiatal hernia    KIDNEY STONE SURGERY      WISDOM TOOTH EXTRACTION         * No active hospital problems. *      No Known Allergies      Current Outpatient Medications:     albuterol sulfate  (90 Base) MCG/ACT inhaler, Inhale 2 puffs Every 4 (Four) Hours As Needed for Wheezing. (Patient taking differently: Inhale 2 puffs Every 4 (Four) Hours As Needed for Wheezing. May use if needed day of , bring), Disp: 8.5 g, Rfl: 0    cholecalciferol (VITAMIN D3) 25 MCG (1000 UT) tablet, Take 1 tablet by mouth Daily., Disp: , Rfl:     hydroCHLOROthiazide (MICROZIDE) 12.5 MG capsule, TAKE 2 CAPSULES BY MOUTH DAILY. DO NOT TAKE ON THE DAY OF THE PROCEDURE, Disp: 180 capsule, Rfl: 3    methocarbamol (ROBAXIN) 500 MG tablet, Take 1 tablet by mouth 3 (Three) Times a Day., Disp: , Rfl:     metoprolol tartrate (LOPRESSOR) 25 MG tablet, Take 0.5 tablets by mouth 2 (Two) Times a Day. (Patient taking differently: Take 0.5 tablets by mouth Daily. take day of procedure), Disp: 30 tablet, Rfl: 9    omeprazole (priLOSEC) 40 MG capsule, TAKE 1 CAPSULE BY MOUTH EVERY DAY (Patient taking differently: 2 (Two)  Times a Day.), Disp: 90 capsule, Rfl: 2    sertraline (ZOLOFT) 50 MG tablet, Take 1 tablet by mouth Daily., Disp: , Rfl:     vitamin B-12 (CYANOCOBALAMIN) 1000 MCG tablet, Take 1 tablet by mouth Daily., Disp: , Rfl:     Social History     Socioeconomic History    Marital status:    Tobacco Use    Smoking status: Never     Passive exposure: Current    Smokeless tobacco: Never   Vaping Use    Vaping Use: Never used   Substance and Sexual Activity    Alcohol use: Yes     Comment: 2-3 drinks monthly    Drug use: Never    Sexual activity: Defer     Family History   Problem Relation Age of Onset    Asthma Mother     Hypertension Mother     No Known Problems Father     Hypertension Maternal Grandmother     Heart attack Maternal Grandfather     Cancer Paternal Grandmother     Cancer Paternal Grandfather     Breast cancer Neg Hx     Ovarian cancer Neg Hx     Uterine cancer Neg Hx     Colon cancer Neg Hx     Deep vein thrombosis Neg Hx     Pulmonary embolism Neg Hx      Review of Systems:  Review of Systems   Constitutional: Negative.    HENT: Negative.    Eyes: Negative.    Respiratory: Negative.    Cardiovascular: Negative.    Gastrointestinal: Negative.    Endocrine: Negative.    Genitourinary: Negative.    Musculoskeletal: Negative.    Skin: Negative.    Allergic/Immunologic: Negative.    Neurological: Negative.    Hematological: Negative.    Psychiatric/Behavioral: Negative.        Physical Exam:  Body mass index is 44.18 kg/m².   Vital Signs:  Weight: 113 kg (249 lb 6.4 oz)   Body mass index is 44.18 kg/m².      Heart Rate: 67   BP: 119/82     Awake and alert  Normal mental status  Normal pulmonary effort  Abdomen appropriate tenderness  Incisions no erythema  Extremities no tenderness or swelling        Assessment:  Patient Active Problem List   Diagnosis    Ureterolithiasis    Chest pain    Dyspnea on exertion    Paresthesia    Hypertension    Bradycardia, sinus    Paroxysmal SVT (supraventricular tachycardia)     Obesity, Class III, BMI 40-49.9 (morbid obesity)    Gastroesophageal reflux disease       Ariadna Ambrocio is a 37 y.o. year old female with medically complicated severe obesity with a BMI of Body mass index is 44.18 kg/m². and multiple co-morbidities listed in the encounter diagnosis.    I think she is an appropriate candidate for this surgery, and is ready to proceed.    The patient has returned to the office for a surgical consultation and has requested to proceed with the Nicho-en-Y  gastric bypass.  I have had the opportunity to obtain the history, examine the patient and review the patient's chart.    The patient understands that surgery is a tool and that weight loss is not guaranteed but only seen in the context of appropriate use, regular follow up, exercise and making appropriate food choices.      I have personally discussed the potential complications of the laparoscopic gastric bypass with this patient.  The patient is well aware of the potential complications of the surgery that include but not limited to bleeding, infections, deep venous thrombosis, pulmonary embolism, pulmonary complications such as pneumonia, cardiac events, hernias, small bowel obstruction, damage to the spleen or other organs, bowel injury, disfiguring scars, failure to lose weight, need for additional surgery, conversion to an open procedure and death.  I also discussed the risks that apply in particular to the gastric bypass such as the leaking of stomach and/or intestinal contents at the staple or suture line, the development of an intra-abdominal abscess,  strictures, ulcers, and vitamin/mineral deficiencies.  The patient was strongly advised to avoid smoking and the use of non-steroidal anti-inflammatory drugs such as ibuprofen, Motrin and Advil in the postoperative period and understands the increased risk of ulcer formation, perforation, death if they did not stop the use of these medications/substances.     The risks,  benefits, potential complications and alternative therapies were discussed at great lengths as outlined in our extensive consent forms, online consent, and educational teaching processes.      The patient has confirmed participation in the program's extensive educational activities.      All questions and concerns were answered to the patient's satisfaction.  The patient now wishes to proceed with surgery.    The patient [default value] pre-operative insertion of an IVC filter.    The patient [default value] a postoperative course of anticoagulant therapy.      Plan/Discussion/Summary:        I instructed patient to start on a H2 blocker or proton pump inhibitor if not already on one of these medications.    I explained in detail the procedures that we perform.  All of these procedures have a chance to convert to open if any technical challenges or complications do occur.  Bariatric surgery is not cosmetic surgery but rather a tool to help a patient make a life-long commitment lifestyle change including diet, exercise, behavior changes, and taking supplemental vitamins and minerals.    Problems after surgery may require more operations to correct them.    The risks, benefits, alternatives, and potential complications of all of the procedures were explained in detail including, but not limited to death, anesthesia and medication adverse effect, deep venous thrombosis, pulmonary embolism, trocar site/incisional hernia, wound infection, abdominal infection, bleeding, failure to lose weight, gain weight, a change in body image, metabolic complications with vitamin deficiences and anemia.    Weight loss expectations were discussed with the patient in detail. The weight loss operations most commonly performed are the sleeve gastrectomy and the Nicho-en-Y gastric bypass. These operations result in weight losses up to approximately 25-35% of initial body weight 12 to 24 months after surgery with the gastric bypass usually  the higher percent of weight loss but depends on patient using the tool.    For the gastric bypass and loop duodenal switch ([place]-S) the risks include but not limited to the following early complications:  Anastomotic leak/peritonitis, Nicho/Alimentary/biliopancreatic limb obstruction, severe & minor wound infection/seroma, and nausea/vomiting.  Late complications can include but are not limited to malnutrition, vitamin deficiencies, frequent loose stools,  stomal stenosis, marginal ulcer, bowel obstruction, intussusception, internal, and incisional hernia.    Regarding the gastric sleeve, there is less long-term outcome data and higher risk of dysphagia and reflux compared to a gastric bypass, as well as risk of internal visceral/organ injury, splenectomy, bleeding, infection, leak (which could require further intervention possible conversion to Nicho-en-Y gastric bypass), stenosis and possibility of regaining weight.    Ariadna was counseled regarding diagnostic results, instructions for management, risk factor reductions, prognosis, patient and family education, impressions, risks and benefits of treatment options and importance of compliance with treatment. Total face to face time of the encounter was over 45 minutes and over 30 minutes was spent counseling.     Ben Report   As part of this patient's treatment plan I am prescribing controlled substances. The patient has been made aware of appropriate use of such medications, including potential risk of somnolence, limited ability to drive and /or work safely, and potential for dependence or overdose. It has also been made clear that these medications are for use by this patient only, without concomitant use of alcohol or other substances unless prescribed.    Ariadna has completed prescribing agreement detailing terms of continued prescribing of controlled substances, including monitoring BEN reports, urine drug screening, and pill counts if necessary. Ariadna is  aware that inappropriate use will result in cessation of prescribing such medications.    BEN report has been reviewed      History and physical exam exhibit continued safe and appropriate use of controlled substances.      Ariadna understands the surgical procedures and the different surgical options that are available.  She understands the lifestyle changes that are required after surgery and has agreed to follow the guidelines outlined in the weight management program.  She also expressed understanding of the risks involved and had all of female questions answered and desires to proceed.      Otilia Capone MD  9/5/2023    Electronically signed by Otilia Capone MD at 09/05/23 0856                 Otilia Capone MD at 09/01/23 0800          Bariatric Consult:    Chief Complaint: Morbid Obesity  Referred by Marifer Cooper MD    Ariadna Ambrocio is here today for consult on Morbid Obesity    History of Present Illness:     Ariadna Ambrocio is a 37 y.o. female with morbid obesity with co-morbidities including GERD who presents for surgical consultation for the above procedure. Ariadna has completed the initial intake visit and has been examined by our nurse practitioner, dietician, psychologist and underwent the extensive educational teaching process under the guidance of our bariatric coordinator and myself. Ariadna also has seen the educational video BEN on the surgical procedure if available. Ariadna attended today more educational teaching from our bariatric coordinator and myself. Ariadna has had an extensive medical workup including a visit with their primary care physician, EKG, chest radiograph, blood work, EGD or UGI and possibly further testing. These have been reviewed by me and discussed with the patient. Ariadna is now ready to proceed with surgery. Ariadna presently denies nausea, vomiting, fever, chills, chest pain, shortness of air, melena, hematochezia, hemetemesis, dysuria, frequency, hematuria, jaundice or  abdominal pain.     Wt Readings from Last 10 Encounters:   09/01/23 113 kg (249 lb 6.4 oz)   08/28/23 112 kg (246 lb)   07/10/23 109 kg (240 lb)   06/16/23 108 kg (239 lb)   06/02/23 108 kg (239 lb)   05/08/23 107 kg (236 lb)   04/19/23 108 kg (239 lb)   02/27/23 108 kg (238 lb 8.6 oz)   01/23/23 108 kg (238 lb)   01/19/23 110 kg (242 lb 6.4 oz)       Her pre-op EGD shows large hiatal hernia      Past Medical History:   Diagnosis Date    Anxiety     Bulging lumbar disc     Constipation     Depression     Diarrhea     Elevated triglycerides with high cholesterol     Heartburn     Herniated cervical disc     Hiatal hernia     Hypertension     IBS (irritable bowel syndrome)     Kidney stones     Nausea     Pre-diabetes     Rapid heart beat     Seasonal allergies        Encounter Diagnoses   Name Primary?    Obesity, Class III, BMI 40-49.9 (morbid obesity) Yes       Past Surgical History:   Procedure Laterality Date    COLONOSCOPY      CYSTOSCOPY, URETEROSCOPY, RETROGRADE PYELOGRAM, STENT INSERTION Left 07/16/2021    Procedure: CYSTOSCOPY URETEROSCOPY RETROGRADE PYELOGRAM HOLMIUM LASER BASKET STONE EXTRACTION STENT INSERTION;  Surgeon: Carmelo Nguyễn MD;  Location: [facility] MAIN OR;  Service: Urology;  Laterality: Left;    EAR TUBES      ENDOSCOPY N/A 2/27/2023    Procedure: ESOPHAGOGASTRODUODENOSCOPY with gastric antrum biopsy;  Surgeon: Otilia Capone MD;  Location: [facility] ENDOSCOPY;  Service: General;  Laterality: N/A;  Post: large hiatal hernia    KIDNEY STONE SURGERY      WISDOM TOOTH EXTRACTION           * No active hospital problems. *      No Known Allergies      Current Outpatient Medications:     albuterol sulfate  (90 Base) MCG/ACT inhaler, Inhale 2 puffs Every 4 (Four) Hours As Needed for Wheezing. (Patient taking differently: Inhale 2 puffs Every 4 (Four) Hours As Needed for Wheezing. May use if needed day of , bring), Disp: 8.5 g, Rfl: 0    cholecalciferol (VITAMIN D3) 25 MCG (1000 UT) tablet, Take 1  tablet by mouth Daily., Disp: , Rfl:     hydroCHLOROthiazide (MICROZIDE) 12.5 MG capsule, TAKE 2 CAPSULES BY MOUTH DAILY. DO NOT TAKE ON THE DAY OF THE PROCEDURE, Disp: 180 capsule, Rfl: 3    methocarbamol (ROBAXIN) 500 MG tablet, Take 1 tablet by mouth 3 (Three) Times a Day., Disp: , Rfl:     metoprolol tartrate (LOPRESSOR) 25 MG tablet, Take 0.5 tablets by mouth 2 (Two) Times a Day. (Patient taking differently: Take 0.5 tablets by mouth Daily. take day of procedure), Disp: 30 tablet, Rfl: 9    omeprazole (priLOSEC) 40 MG capsule, TAKE 1 CAPSULE BY MOUTH EVERY DAY (Patient taking differently: 2 (Two) Times a Day.), Disp: 90 capsule, Rfl: 2    sertraline (ZOLOFT) 50 MG tablet, Take 1 tablet by mouth Daily., Disp: , Rfl:     vitamin B-12 (CYANOCOBALAMIN) 1000 MCG tablet, Take 1 tablet by mouth Daily., Disp: , Rfl:     Social History     Socioeconomic History    Marital status:    Tobacco Use    Smoking status: Never     Passive exposure: Current    Smokeless tobacco: Never   Vaping Use    Vaping Use: Never used   Substance and Sexual Activity    Alcohol use: Yes     Comment: 2-3 drinks monthly    Drug use: Never    Sexual activity: Defer       Family History   Problem Relation Age of Onset    Asthma Mother     Hypertension Mother     No Known Problems Father     Hypertension Maternal Grandmother     Heart attack Maternal Grandfather     Cancer Paternal Grandmother     Cancer Paternal Grandfather     Breast cancer Neg Hx     Ovarian cancer Neg Hx     Uterine cancer Neg Hx     Colon cancer Neg Hx     Deep vein thrombosis Neg Hx     Pulmonary embolism Neg Hx        Review of Systems:  Review of Systems   Constitutional: Negative.    HENT: Negative.    Eyes: Negative.    Respiratory: Negative.    Cardiovascular: Negative.    Gastrointestinal: Negative.    Endocrine: Negative.    Genitourinary: Negative.    Musculoskeletal: Negative.    Skin: Negative.    Allergic/Immunologic: Negative.    Neurological: Negative.     Hematological: Negative.    Psychiatric/Behavioral: Negative.        Physical Exam:  Body mass index is 44.18 kg/m².   Vital Signs:  Weight: 113 kg (249 lb 6.4 oz)   Body mass index is 44.18 kg/m².      Heart Rate: 67   BP: 119/82     Awake and alert  Normal mental status  Normal pulmonary effort  Abdomen appropriate tenderness  Incisions no erythema  Extremities no tenderness or swelling        Assessment:  Patient Active Problem List   Diagnosis    Ureterolithiasis    Chest pain    Dyspnea on exertion    Paresthesia    Hypertension    Bradycardia, sinus    Paroxysmal SVT (supraventricular tachycardia)    Obesity, Class III, BMI 40-49.9 (morbid obesity)    Gastroesophageal reflux disease         Ariadna Ambrocio is a 37 y.o. year old female with medically complicated severe obesity with a BMI of Body mass index is 44.18 kg/m². and multiple co-morbidities listed in the encounter diagnosis.    I think she is an appropriate candidate for this surgery, and is ready to proceed.    The patient has returned to the office for a surgical consultation and has requested to proceed with the Nicho-en-Y  gastric bypass.  I have had the opportunity to obtain the history, examine the patient and review the patient's chart.    The patient understands that surgery is a tool and that weight loss is not guaranteed but only seen in the context of appropriate use, regular follow up, exercise and making appropriate food choices.      I have personally discussed the potential complications of the laparoscopic gastric bypass with this patient.  The patient is well aware of the potential complications of the surgery that include but not limited to bleeding, infections, deep venous thrombosis, pulmonary embolism, pulmonary complications such as pneumonia, cardiac events, hernias, small bowel obstruction, damage to the spleen or other organs, bowel injury, disfiguring scars, failure to lose weight, need for additional surgery, conversion to  an open procedure and death.  I also discussed the risks that apply in particular to the gastric bypass such as the leaking of stomach and/or intestinal contents at the staple or suture line, the development of an intra-abdominal abscess,  strictures, ulcers, and vitamin/mineral deficiencies.  The patient was strongly advised to avoid smoking and the use of non-steroidal anti-inflammatory drugs such as ibuprofen, Motrin and Advil in the postoperative period and understands the increased risk of ulcer formation, perforation, death if they did not stop the use of these medications/substances.     The risks, benefits, potential complications and alternative therapies were discussed at great lengths as outlined in our extensive consent forms, online consent, and educational teaching processes.      The patient has confirmed participation in the program's extensive educational activities.      All questions and concerns were answered to the patient's satisfaction.  The patient now wishes to proceed with surgery.    The patient [default value] pre-operative insertion of an IVC filter.    The patient [default value] a postoperative course of anticoagulant therapy.      Plan/Discussion/Summary:        I instructed patient to start on a H2 blocker or proton pump inhibitor if not already on one of these medications.    I explained in detail the procedures that we perform.  All of these procedures have a chance to convert to open if any technical challenges or complications do occur.  Bariatric surgery is not cosmetic surgery but rather a tool to help a patient make a life-long commitment lifestyle change including diet, exercise, behavior changes, and taking supplemental vitamins and minerals.    Problems after surgery may require more operations to correct them.    The risks, benefits, alternatives, and potential complications of all of the procedures were explained in detail including, but not limited to death, anesthesia  and medication adverse effect, deep venous thrombosis, pulmonary embolism, trocar site/incisional hernia, wound infection, abdominal infection, bleeding, failure to lose weight, gain weight, a change in body image, metabolic complications with vitamin deficiences and anemia.    Weight loss expectations were discussed with the patient in detail. The weight loss operations most commonly performed are the sleeve gastrectomy and the Nicho-en-Y gastric bypass. These operations result in weight losses up to approximately 25-35% of initial body weight 12 to 24 months after surgery with the gastric bypass usually the higher percent of weight loss but depends on patient using the tool.    For the gastric bypass and loop duodenal switch ([place]-S) the risks include but not limited to the following early complications:  Anastomotic leak/peritonitis, Nicho/Alimentary/biliopancreatic limb obstruction, severe & minor wound infection/seroma, and nausea/vomiting.  Late complications can include but are not limited to malnutrition, vitamin deficiencies, frequent loose stools,  stomal stenosis, marginal ulcer, bowel obstruction, intussusception, internal, and incisional hernia.    Regarding the gastric sleeve, there is less long-term outcome data and higher risk of dysphagia and reflux compared to a gastric bypass, as well as risk of internal visceral/organ injury, splenectomy, bleeding, infection, leak (which could require further intervention possible conversion to Nicho-en-Y gastric bypass), stenosis and possibility of regaining weight.    Ariadna was counseled regarding diagnostic results, instructions for management, risk factor reductions, prognosis, patient and family education, impressions, risks and benefits of treatment options and importance of compliance with treatment. Total face to face time of the encounter was over 45 minutes and over 30 minutes was spent counseling.     Joe Smith   As part of this patient's treatment  plan I am prescribing controlled substances. The patient has been made aware of appropriate use of such medications, including potential risk of somnolence, limited ability to drive and /or work safely, and potential for dependence or overdose. It has also been made clear that these medications are for use by this patient only, without concomitant use of alcohol or other substances unless prescribed.    Ariadna has completed prescribing agreement detailing terms of continued prescribing of controlled substances, including monitoring BEN reports, urine drug screening, and pill counts if necessary. Ariadna is aware that inappropriate use will result in cessation of prescribing such medications.    BEN report has been reviewed      History and physical exam exhibit continued safe and appropriate use of controlled substances.      Ariadna understands the surgical procedures and the different surgical options that are available.  She understands the lifestyle changes that are required after surgery and has agreed to follow the guidelines outlined in the weight management program.  She also expressed understanding of the risks involved and had all of female questions answered and desires to proceed.      Otilia Capone MD  9/5/2023    Electronically signed by Otilia Capone MD at 09/05/23 0856          Operative/Procedure Notes (last 48 hours)        Otilia Capone MD at 09/27/23 0814          GASTRIC BYPASS LAPAROSCOPIC WITH DAVINCI ROBOT  Procedure Report    Patient Name:  Ariadna Ambrocio  YOB: 1985    Date of Surgery:  9/27/2023     Indications:  This patient presents for elective laparoscopic Nicho-en-Y divided gastric bypass. The patient has undergone our extensive preoperative evaluation, teaching and consent process.     Pre-op Diagnosis:   Obesity, Class III, BMI 40-49.9 (morbid obesity) [E66.01]           Post-Op Diagnosis Codes:     * Obesity, Class III, BMI 40-49.9 (morbid obesity)  [E66.01]      Procedure/CPT® Codes:      Procedure(s):  GASTRIC BYPASS AND sliding type 1 HIATAL HERNIA REPAIR LAPAROSCOPIC WITH DAVINCI ROBOT    Staff:  Surgeon(s):  Otilia Capone MD    Assistant: Acosta Santillan CSA  Assistant: Acosta Santillan CSA was responsible for performing the following activities: Retraction, Suction, Irrigation, and Suturing and their skilled assistance was necessary for the success of this case.    Anesthesia: General    Estimated Blood Loss: minimal    Implants:    Implant Name Type Inv. Item Serial No.  Lot No. LRB No. Used Action   RELOAD STPLR SUREFORM 60 DAVINCI/X/XI 6ROW 2.5 WHT 1P/U - [id number] Implant RELOAD STPLR SUREFORM 60 DAVINCI/X/XI 6ROW 2.5 WHT 1P/U  INTUITIVE SURGICAL [id number] N/A 6 Implanted   DEV CLS WND VLOC/90 HAIM ABS 1/2CIR SZ3/0 26MM 15CM TAVON - [id number] Implant DEV CLS WND VLOC/90 HAIM ABS 1/2CIR SZ3/0 26MM 15CM TAVON  COVIDIEN [id number] N/A 2 Implanted   DEV CLS WND VLOC/PBT HAIM NONABS 1/2CIR SZ2/0 27MM 15CM KARLA - [id number] Implant DEV CLS WND VLOC/PBT HAIM NONABS 1/2CIR SZ2/0 27MM 15CM KARLA  COVIDIEN [id number] N/A 2 Implanted   RELOAD STPLR SUREFORM 60 DAVINCI/X/XI 6ROW 2.5 WHT 1P/U - [id number] Implant RELOAD STPLR SUREFORM 60 DAVINCI/X/XI 6ROW 2.5 WHT 1P/U  INTUITIVE SURGICAL [id number] N/A 1 Implanted       Specimen:          None        Findings: hiatal hernia sliding type 1    Complications: none  Description of Procedure:   The patient was brought to the operating room and placed in the supine position on the operating room table. The patient had previously received  IV antibiotics as well as SCD. The patient underwent uneventful general endotracheal anesthesia per the Anesthesiology staff. The abdomen was prepped with ChloraPrep and draped in the usual sterile fashion. An Ioban was used as well if not allergic.     A 1.5 cm transverse incision was made 10 cm to the left of midline 17 cm inferior to the xiphoid process and the peritoneal cavity  entered under direct camera visualization using a 5 mm 0° laparoscope and 5 mm Optiview trocar. The abdomen was insufflated to a pressure of 15-16 mmHg with C02 gas.     Exploratory laparoscopy revealed no evidence of injury from the entrance technique and otherwise no abnormalities unless addendum dictated below. Under direct camera visualization, a 12 mm trocar was placed in the right lateral subcostal position and an 8 mm trocar  in the left lateral subcostal position.  The 5 mm trocar was then upsized to 12 mm trocar.  Another 8 mm trocar was placed just to the left of midline.  A 5 mm trocar was also placed on the right lower quadrant.    A 40 Kazakh orogastric tube was placed in the stomach for decompression.  I used a 1 V-loc suture to create a liver hammock for retraction by suturing it to the peritoneum of the epigastrum, then to the anterior joellen, then the peritonenum of the right upper quadrant.         I then chose an area along the lesser curve of the stomach approximately 9 centimeters distally to the GE junction and began dissection with the vessel sealer.  I entered the retrogastric space and then placed a  60 mm white load in this area for a horizontal staple line.  Additional firings of the stapler were conducted vertically with the 40 Kazakh VISI G in place to create the gastric pouch.   There was evidence of type I sliding hiatal hernia.  The esophagus was dissected freely from the right and left joellen and the posterior hiatus was closed with a running 2-0 V loc nonabsorbable suture.  I also used the suture to create a gastropexy of the posterior stomach.  I also used the same type of suture to close the crura anteriorly.  This was all done while the 40 Kazakh ViSiGi was in place.     I divided the omentum with the vessel sealer to make way for the Nicho limb. I then identified the ligament of Treitz and ran the bowel distally 80 cm and brought this loop up to the gastric pouch.   With the proximal  biliary limb on the left side and the distal alimentary limb on the right side, I performed a linear anastomosis with the stapler and only inserted it to 30 mm and then closed the enterotomy with 1 layer of 3-0 V-Loc 90 day suture.   At this point I decided to perform a leak test. ICG was injected in the orogastric tube and there was no evidence of leak.       I used a white load and fired it across the jejunum just to the left of the gastrojejunostomy and then at that point the limb on the patient's left side was the biliopancreatic limb.  I ran the Nicho limb distally 150 cm and then performed a side-to-side anastomosis with a white load using the 60 stapler and then I closed the enterotomy with a running 3-0 Vloc in 1 layer.        I then closed the mesenteric defect at the jejunojejunostomy  and I also closed Tyson space with a running 2-0 V-Loc suture.      All sponge and needle counts were correct.     Then the abdomen was desufflated all the skin incisions were closed with 4-0 Monocryl and surgical glue.            Otilia Capone MD     Date: 2023  Time: 10:39 EDT    Electronically signed by Otilia Capone MD at 23 1042          Physician Progress Notes (last 48 hours)        Otilia Capone MD at 23 1048          Bariatric Surgery Progress Note    Name: Ariadna Ambrocio ADMIT: 2023   : 1985  PCP: Marifer Cooper MD    MRN: [id number] LOS: 1 days   AGE/SEX: 37 y.o. female  ROOM: [facility]   Subjective   Objective    Mild abd pain, said she was unsteady on her feet after surgery yesterday but feels better now  Vital Signs  Vital Signs (range)  Temp:  [97 °F (36.1 °C)-99.1 °F (37.3 °C)] 99.1 °F (37.3 °C)  Heart Rate:  [] 113  Resp:  [8-18] 16  BP: (112-145)/(74-94) 128/85    Physical Exam:    Awake and alert  Normal mental status  Normal pulmonary effort  Abdomen appropriate tenderness  Incisions no erythema  Extremities no tenderness or swelling        Results Review:     CBC     Results from last 7 days   Lab Units 09/27/23 2018   WBC 10*3/mm3 16.80*   HEMOGLOBIN g/dL 11.7*   PLATELETS 10*3/mm3 320     CMP   Results from last 7 days   Lab Units 09/27/23 2018   SODIUM mmol/L 132*   POTASSIUM mmol/L 4.9   CHLORIDE mmol/L 101   CO2 mmol/L 22.0   BUN mg/dL 17   CREATININE mg/dL 0.89   GLUCOSE mg/dL 156*   ALBUMIN g/dL 4.1   BILIRUBIN mg/dL 0.5   ALK PHOS U/L 56   AST (SGOT) U/L 127*   ALT (SGPT) U/L 116*     Radiology(recent) No radiology results for the last day        Assessment & Plan     Obesity, Class III, BMI 40-49.9 (morbid obesity)    POD 1 Gastric bypass and hiatal hernia repair  -tachycardia, patient is on metoprolol and got dose yesterday and today, I clarified the dose and she has been taking 12.5 metoprolol bid. She used to be on 25 bid but she said her HR got too low  -tolerating clear  -ambulate.   -recheck HGB to make sure no blood loss anemia        This note was created using Dragon Voice Recognition software.    Otilia Capone MD  09/28/23  10:48 EDT      Electronically signed by Otilia Capone MD at 09/28/23 1055       Consult Notes (last 48 hours)  Notes from 09/26/23 1509 through 09/28/23 1509   No notes of this type exist for this encounter.

## 2023-09-28 NOTE — PLAN OF CARE
Goal Outcome Evaluation Pt amb inn bathroom. With assist of staff. Voiding without difficulty. Pain managed per MAR. Plan to d/c home upon discharge

## 2023-09-28 NOTE — PROGRESS NOTES
Nutrition Services    Patient Name: Ariadna Ambrocio  YOB: 1985  MRN: [id number]  Admission date: 9/27/2023    PPE Documentation        PPE Worn By Provider N/A   PPE Worn By Patient  N/A     PROGRESS NOTE      Encounter Information: Visited patient this morning - POD 1 gastric bypass. Patient doing well this morning, no nausea or pain. Patient has tried walking but reports her heart rate has been elevated and unable to walk until she gets her medications. Patient drinking well with no issues. Patient had no questions over diet advance and aware she can drink 1-2 protein shakes each day elsy home if desired.        PO Diet: Diet: Liquid Diets; Clear Liquid; Bariatric Stage 1; Texture: Regular Texture (IDDSI 7); Fluid Consistency: Thin (IDDSI 0)   PO Supplements:    PO Intake:  Patient drinking breakfast tray while in room       Current nutrition support:    Nutrition support review:        Labs (reviewed below): Phos low, no other significant labs        GI Function:         Nutrition Intervention Updates:        Results from last 7 days   Lab Units 09/27/23 2018   SODIUM mmol/L 132*   POTASSIUM mmol/L 4.9   CHLORIDE mmol/L 101   CO2 mmol/L 22.0   BUN mg/dL 17   CREATININE mg/dL 0.89   CALCIUM mg/dL 9.3   BILIRUBIN mg/dL 0.5   ALK PHOS U/L 56   ALT (SGPT) U/L 116*   AST (SGOT) U/L 127*   GLUCOSE mg/dL 156*     Results from last 7 days   Lab Units 09/27/23 2018   MAGNESIUM mg/dL 1.8   PHOSPHORUS mg/dL 2.3*   HEMOGLOBIN g/dL 11.7*   HEMATOCRIT % 35.0     COVID19   Date Value Ref Range Status   09/26/2021 Not Detected Not Detected - Ref. Range Final     Lab Results   Component Value Date    HGBA1C 6.0 (H) 01/25/2023       Patient with no further questions. Patient to follow up in office Monday.    RD to follow up per protocol.    Electronically signed by:  Pastora Hughes RD  09/28/23 08:37 EDT

## 2023-09-28 NOTE — PROGRESS NOTES
Bariatric Surgery Progress Note    Name: Ariadna Ambrocio ADMIT: 2023   : 1985  PCP: Marifer Cooper MD    MRN: [id number] LOS: 1 days   AGE/SEX: 37 y.o. female  ROOM: [facility]   Subjective   Objective    Mild abd pain, said she was unsteady on her feet after surgery yesterday but feels better now  Vital Signs  Vital Signs (range)  Temp:  [97 °F (36.1 °C)-99.1 °F (37.3 °C)] 99.1 °F (37.3 °C)  Heart Rate:  [] 113  Resp:  [8-18] 16  BP: (112-145)/(74-94) 128/85    Physical Exam:    Awake and alert  Normal mental status  Normal pulmonary effort  Abdomen appropriate tenderness  Incisions no erythema  Extremities no tenderness or swelling        Results Review:     CBC    Results from last 7 days   Lab Units 23   WBC 10*3/mm3 16.80*   HEMOGLOBIN g/dL 11.7*   PLATELETS 10*3/mm3 320     CMP   Results from last 7 days   Lab Units 23   SODIUM mmol/L 132*   POTASSIUM mmol/L 4.9   CHLORIDE mmol/L 101   CO2 mmol/L 22.0   BUN mg/dL 17   CREATININE mg/dL 0.89   GLUCOSE mg/dL 156*   ALBUMIN g/dL 4.1   BILIRUBIN mg/dL 0.5   ALK PHOS U/L 56   AST (SGOT) U/L 127*   ALT (SGPT) U/L 116*     Radiology(recent) No radiology results for the last day        Assessment & Plan     Obesity, Class III, BMI 40-49.9 (morbid obesity)    POD 1 Gastric bypass and hiatal hernia repair  -tachycardia, patient is on metoprolol and got dose yesterday and today, I clarified the dose and she has been taking 12.5 metoprolol bid. She used to be on 25 bid but she said her HR got too low  -tolerating clear  -ambulate.   -recheck HGB to make sure no blood loss anemia        This note was created using Dragon Voice Recognition software.    Otilia Capone MD  23  10:48 EDT

## 2023-09-28 NOTE — SIGNIFICANT NOTE
09/28/23 1322   Living Situation   Current Living Arrangements home   Potentially Unsafe Housing Conditions none   Food Insecurity   Within the past 12 months, you worried that your food would run out before you got the money to buy more. Never true   Within the past 12 months, the food you bought just didn't last and you didn't have money to get more. Never true   Transportation Needs   In the past 12 months, has lack of transportation kept you from medical appointments or from getting medications? no   In the past 12 months, has lack of transportation kept you from meetings, work, or from getting things needed for daily living? No   Utilities   In the past 12 months has the electric, gas, oil, or water company threatened to shut off services in your home? Yes   Abuse Screen (yes response referral indicated)   Feels Unsafe at Home or Work/School no   Feels Threatened by Someone no   Does Anyone Try to Keep You From Having Contact with Others or Doing Things Outside Your Home? no   Physical Signs of Abuse Present no   Financial Resource Strain   How hard is it for you to pay for the very basics like food, housing, medical care, and heating? Not very   Employment   Do you want help finding or keeping work or a job? I do not need or want help   Family and Community Support   If for any reason you need help with day-to-day activities such as bathing, preparing meals, shopping, managing finances, etc., do you get the help you need? I don't need any help   How often do you feel lonely or isolated from those around you? Never   Education   Preferred Language English   Do you want help with school or training? For example, starting or completing job training or getting a high school diploma, GED or equivalent No   Physical Activity   On average, how many days per week do you engage in moderate to strenuous exercise (like a brisk walk)? 3 days   On average, how many minutes do you engage in exercise at this level? 150+  min   Number of minutes of exercise per week 450   Alcohol Use   Q1: How often do you have a drink containing alcohol? Monthly or l   Q2: How many drinks containing alcohol do you have on a typical day when you are drinking? None   Q3: How often do you have six or more drinks on one occasion? Never   Mental Health   Little Interest or Pleasure in Doing Things 0-->not at all   Feeling Down, Depressed or Hopeless 0-->not at all   Disabilities   Concentrating, Remembering or Making Decisions Difficulty yes   Doing Errands Independently Difficulty (such as shopping) no      Ly Conley RN

## 2023-09-29 VITALS
DIASTOLIC BLOOD PRESSURE: 74 MMHG | BODY MASS INDEX: 43 KG/M2 | SYSTOLIC BLOOD PRESSURE: 111 MMHG | WEIGHT: 242.7 LBS | HEIGHT: 63 IN | OXYGEN SATURATION: 95 % | TEMPERATURE: 98 F | HEART RATE: 90 BPM | RESPIRATION RATE: 17 BRPM

## 2023-09-29 LAB
BASOPHILS # BLD AUTO: 0.1 10*3/MM3 (ref 0–0.2)
BASOPHILS NFR BLD AUTO: 0.4 % (ref 0–1.5)
DEPRECATED RDW RBC AUTO: 43.8 FL (ref 37–54)
EOSINOPHIL # BLD AUTO: 0.1 10*3/MM3 (ref 0–0.4)
EOSINOPHIL NFR BLD AUTO: 0.6 % (ref 0.3–6.2)
ERYTHROCYTE [DISTWIDTH] IN BLOOD BY AUTOMATED COUNT: 13.9 % (ref 12.3–15.4)
HCT VFR BLD AUTO: 29.6 % (ref 34–46.6)
HGB BLD-MCNC: 9.8 G/DL (ref 12–15.9)
HOLD SPECIMEN: NORMAL
LYMPHOCYTES # BLD AUTO: 4.4 10*3/MM3 (ref 0.7–3.1)
LYMPHOCYTES NFR BLD AUTO: 30.3 % (ref 19.6–45.3)
MCH RBC QN AUTO: 29.9 PG (ref 26.6–33)
MCHC RBC AUTO-ENTMCNC: 33 G/DL (ref 31.5–35.7)
MCV RBC AUTO: 90.6 FL (ref 79–97)
MONOCYTES # BLD AUTO: 1.3 10*3/MM3 (ref 0.1–0.9)
MONOCYTES NFR BLD AUTO: 8.8 % (ref 5–12)
NEUTROPHILS NFR BLD AUTO: 59.9 % (ref 42.7–76)
NEUTROPHILS NFR BLD AUTO: 8.8 10*3/MM3 (ref 1.7–7)
NRBC BLD AUTO-RTO: 0 /100 WBC (ref 0–0.2)
PLATELET # BLD AUTO: 285 10*3/MM3 (ref 140–450)
PMV BLD AUTO: 8.2 FL (ref 6–12)
RBC # BLD AUTO: 3.27 10*6/MM3 (ref 3.77–5.28)
WBC NRBC COR # BLD: 14.7 10*3/MM3 (ref 3.4–10.8)

## 2023-09-29 PROCEDURE — 99024 POSTOP FOLLOW-UP VISIT: CPT | Performed by: SURGERY

## 2023-09-29 RX ORDER — URSODIOL 250 MG/1
250 TABLET, FILM COATED ORAL 2 TIMES DAILY
Qty: 60 TABLET | Refills: 5 | Status: SHIPPED | OUTPATIENT
Start: 2023-09-29 | End: 2023-10-29

## 2023-09-29 RX ORDER — ONDANSETRON 8 MG/1
8 TABLET, ORALLY DISINTEGRATING ORAL EVERY 8 HOURS PRN
Qty: 30 TABLET | Refills: 5 | Status: SHIPPED | OUTPATIENT
Start: 2023-09-29

## 2023-09-29 RX ORDER — HYDROMORPHONE HYDROCHLORIDE 2 MG/1
2 TABLET ORAL EVERY 6 HOURS PRN
Qty: 18 TABLET | Refills: 0 | Status: SHIPPED | OUTPATIENT
Start: 2023-09-29 | End: 2023-10-05 | Stop reason: SDUPTHER

## 2023-09-29 RX ADMIN — GABAPENTIN 300 MG: 300 CAPSULE ORAL at 05:52

## 2023-09-29 RX ADMIN — ACETAMINOPHEN 1000 MG: 325 TABLET, FILM COATED ORAL at 09:25

## 2023-09-29 RX ADMIN — Medication 12.5 MG: at 09:25

## 2023-09-29 RX ADMIN — PANTOPRAZOLE SODIUM 40 MG: 40 INJECTION, POWDER, FOR SOLUTION INTRAVENOUS at 05:52

## 2023-09-29 RX ADMIN — HYDROMORPHONE HYDROCHLORIDE 2 MG: 2 TABLET ORAL at 09:25

## 2023-09-29 RX ADMIN — ACETAMINOPHEN 1000 MG: 325 TABLET, FILM COATED ORAL at 03:52

## 2023-09-29 RX ADMIN — HYDROMORPHONE HYDROCHLORIDE 2 MG: 2 TABLET ORAL at 00:17

## 2023-09-29 RX ADMIN — HYDROMORPHONE HYDROCHLORIDE 2 MG: 2 TABLET ORAL at 04:23

## 2023-09-29 NOTE — CASE MANAGEMENT/SOCIAL WORK
Case Management Discharge Note      Final Note: Home with family.       Transportation Services  Private: Car    Final Discharge Disposition Code: 01 - home or self-care

## 2023-09-29 NOTE — DISCHARGE INSTRUCTIONS
GOING HOME AFTER GASTRIC SLEEVE/ GASTRIC BYPASS SURGERY  [facility] Weight Loss: Post-Operative Information/Instructions  Otilia Capone MD  General Patient Instructions for Discharge   - Call Surgeon's office at [phone] for follow-up appointment.    - Be sure you, the patient, have a follow-up appointment to be seen within seven (7) days after discharge. If not, please call [phone] to schedule an appointment. If you are discharged on a Saturday or Sunday, please call Monday to schedule the appointment.  - Contact the Surgeon at [phone] for any questions or concerns, including temperature greater than or equal to 101F, shortness of breath, leg swelling, redness at incision sites, nausea, vomiting, chills, or problems or questions.    - Follow the Gastric Stage 1 Diet    Clear liquids, liquids that are thin, room temperature, non-carbonated, 70 grams of protein.Three days after surgery  if you are tolerating the stage 1 diet, you may then proceed to stage 2 diet, as instructed in the . Do not progress to the stage 2 diet if you are having nausea/vomiting. Refer to the Basic Nutrition and Food Principles guide.  - You may shower. No tub bath for 2 weeks.  - No lifting, pushing, pulling, or tugging >25 pounds for 3 weeks.  - Ambulate every 3 hours while awake minimum for seven (7) days, increase distance daily.  - For the next several weeks, you are at an increased risk for blood clot formation. Therefore, you should walk regularly. You should not sit for prolonged periods of time, more than 45 minutes, without getting up and walking for 5-10 minutes. This includes any car rides, including the drive home from the hospital. If driving any distance greater than 30 miles over the next two (2) weeks, stop every 30-45 minutes and walk for 5-10 minutes each time.  - Continue using Incentive Spirometer and coughing exercises at least every two (2) hours while awake for one week.  - Continue  use of CPAP/BIPAP for diagnosis of sleep apnea as directed.  - No driving or operating machinery allowed while taking narcotic (prescription) pain medication, and until you feel comfortable forcefully applying the brakes if needed. (This usually takes more than 3 days.)    - Make an appointment with your Primary Care Physician within one week post-op to look at your home medications for possible changes or discontinuity.   Medications  - The nurse will provide a list of medications for you to continue at home   - If you received a Lovenox (Enoxaparin) or Apixiban (Eliquis) prescription at pre-op visit with Surgeon, start taking the medicine the morning after discharge unless directed otherwise.    - If you were prescribed Lovenox (Enoxaparin), review the education/teaching material/video with the nurse.    - Take post op pain meds as prescribed as needed.   - Continue Foltx until finished.   - Resume use of Actigall (Ursodiol) one (1) week after surgery if patient still has gallbladder. You should have been given a prescription at your pre-op visit. Contact the office if you do not have the prescription.   - Resume bariatric vitamin regimen as instructed in pre-op education with bariatric coordinator.    - Zegerid or Prilosec OTC (or generic) by mouth once daily for four (4) weeks unless you are already taking a proton pump inhibitor as home medication. Follow dosing instructions on package.   Nausea/Vomiting:  The following are possible causes for nausea/vomiting:  - Drinking too much or too fast.  - Sinus drainage/post nasal drip for allergy sufferers (you may take Sudafed, Claritin, Tylenol Sinus/Allergy, or other decongestants and nose sprays to help with this discomfort).  - Low blood sugar (sweating, shaky, irritable, weakness, dizzy or tunnel-vision) - treatment is to sip 100% fruit juice - no sugar added until symptoms subside.  - Acid in fruit juice - (may dilute with water or avoid).  - Eating or drinking  something that is not on clear liquid (stage 1) diet.  Any nausea/vomiting that prohibits you from keeping fluids down for greater than 24 hours requires a call to the surgeon's office.  Urine:  Use your urine color as a guide to determine if you are drinking enough fluid. The darker the urine, the more fluids you need to drink. Urine should be clear to light yellow if you are getting enough fluid. If you should experience frequency, burning or pain with urination, blood in urine, contact us or your primary care physician for possible UTI (urinary tract infection), which could require antibiotics (liquid preferred).  Bowel Movements:  You may not have a bowel movement for 2-5 days after going home. You may then experience liquid, runny or loose stools for approximately 3-4 weeks following surgery. This would require you to drink even more fluids to prevent dehydration. Some patients may experience constipation, which can be treated with increased fluids, drinking warm liquids, increased activity and the use of a Fleets Enema, Milk of Magnesia, or suppositories. The first couple of bowel movements could be bloody, tarry black or dark maroon in color. This is OK as long as the stool returns to a normal color in 1-2 days. If however, you have frequent or a large amount of bloody or tarry black stools and/or become light-headed or dizzy, you may be bleeding and require urgent attention. Please call us right away.  Abdominal Incisions:  You will have small incisions. Do not scrub incisions, but allow the warm, soapy water to run over the incisions, rinse well, and pat dry. You may use any brand of anti-bacterial soap. Do not use Peroxide or Neosporin type ointments on sites, unless instructed to do so by a surgeon or nurse. Monitor daily for signs/symptoms of infection, which might include: drainage with a foul odor, pain, redness, swelling or heat at the incision sight; fever, body aches and chills. If you suspect  infection or have a fever, give us a call.  Pain:  You will be given a prescription for pain medication to control your pain. If you feel the dose is too strong, you may take half the ordered dose, or you may take Tylenol adult liquid per package instructions for minor pain. Do not take any medications that contain aspirin or aspirin products.  Do not take medications like: Motrin, Aleve, Ibuprofen, Advil, Naproxen, Celebrex, Daypro, Bextra, Meloxicam or other medications commonly used for arthritis or joint pain.  No steroids or cortisone injections. There may be pain, which should improve every few days. Pain should not suddenly get worse or more intense. Pain that suddenly changes and is constant and severe should be called in to the surgeon's office. Any sudden pain in the lower extremities with associated warmth and redness should be called in to the surgeon's office immediately. Do not rub or massage this area, as it could be a blood clot.  Diet:  Remain on the clear liquid diet (stage 1) per your  which includes 70 grams of protein each day,  non carbonated. Since you are not getting hardly any calories it is ok to have some sugar in your drinks. For instance it is ok to have regular Gatorade instead of Gatorade Zero, and it is ok to have juice or sugar in your tea. Three days after surgery  if you are tolerating the stage 1 diet, you may then proceed to stage 2 diet, as instructed in the . Do not progress to the stage 2 diet if you are having nausea/vomiting. Refer to the Basic Nutrition and Food Principles guide.  Medications:  The nurse will let you know which medications you will need to continue once you go home. Do not take any medications that are extended or time released if you had the gastric bypass procedure, OK to take if you had the gastric sleeve procedure. Large capsules can be opened and diluted with clear liquids. Check with your physician or pharmacist as to which  pills may be crushed and which capsules may be opened and diluted safely. If you still have your gall bladder and were prescribed Actigall (Ursodiol), you may resume this medication one week after your surgery. You will remain on Actigall (Ursodiol) for approximately 6 months. The dose is 1 pill, 2 times each day for 6 months.  Activity:  Continue your deep breathing and coughing exercises with your Incentive Spirometer breathing device at least every 3 hours while awake (10 repetitions each time) for one week. May use CPAP. This will help to prevent respiratory problems such as pneumonia. No lifting, pulling or tugging anything over 25 pounds for 3 weeks after surgery. You may shower but no tub baths, hot tubs or swimming for 2 weeks. Moderate walking is recommended every 3 hours while awake minimum, increase distance daily. Further exercise will be discussed at the first post-op visit. No driving or operating machinery allow until off narcotic pain medication and until you feel comfortable forcefully applying the brakes (usually takes 3 or more days). For the next few weeks you are at an increased risk for blood clot formation. Therefore you should walk regularly and you should not sit for prolonged periods of time, more than 45 minutes without getting up and walking for 5-10 minutes. This includes car rides. Including riding home from the hospital. If riding a distance greater than 30 miles over the next 2 weeks stop every 30-45 minutes and walk 5-10 mintues each time. No tanning bed use for 8 weeks after surgery and in general, not recommended due to the increased risk for skin cancer. Incisions will burn/blister very badly with tanning bed use.  Illness:  Your primary care physician should treat general illness such as ear infections, sinus infections, and viral type illnesses, etc. Medications prescribed should be liquid/elixir form when possible, for the first 30 days.  General:  In general, it is recommended  that you weigh yourself no more than once per week. Let the weight come off you and concentrate on more important things. Remember the weight was not gained overnight, nor will it be lost overnight. Gastric Bypass/ Gastric Sleeve weight loss will continue over a period of 12-18 months. Do not  yourself according to how others are doing after surgery, as this will cause unnecessary discouragement.  THE ABOVE ARE GENERAL GUIDELINES TO ASSIST YOU ONCE HOME, IF YOU ARE IN DOUBT, OR YOU HAVE ANY QUESTIONS, CALL US AT THE NUMBERS LISTED BELOW.  IN THE EVENT OF SUDDEN CHEST PAIN, SHORTNESS OF BREATH, OR ANY LIFE THREATENING CONDITION, CALL 911.  Any time you are evaluated or admitted to another facility, please have someone notify the surgeon's office.  Supplements:  70 grams of protein taken EVERY DAY. Remember to drink at least 64 ounces of fluid a day, sipping slowly early on. Increase this amount during the summertime. Sipping slowly will not stretch your new stomach. Drinking too fast or gulping liquids will cause brief discomfort and early could cause staple line disruption (leak). With eating, tiny bites, then chew, chew, chew, and swallow. Lay your fork/spoon down for 2-3 minutes, and then take your next bite. Your pouch will tell you within 1-2 bites if it is going to tolerate what you are eating.   Protein Vendors:  Refer to protein vendors' handout from consult class. You can always find protein drinks at the bariatric office, grocery stores, [employer], drug [employer], [employer], health food stores, and on the Internet. Find one high in protein (15-30 grams per serving) and low carb (less than 18 grams per serving).  Now is a great time to re-read your . Please review specific instructions given to you at discharge by your physician (surgeon).  HOW/WHEN TO CONTACT US:  It is imperative that you contact us with any of the following:    Ÿ fever greater than 101 degrees  Ÿ shortness of breath  Ÿ leg  swelling  Ÿ body aches  Ÿ shaking chills  Ÿ nausea and vomitting  Ÿ pain that has worsened  Ÿ redness at incision sites  Ÿ pus or foul smelling drainage from an incision or wound  Ÿ inability to keep fluids down for more than a day  Ÿ any other condition you feel needs our attention.  [facility] - Bariatric: [phone] call this number anytime 24 hours a day / 7 days a week.  Teach-back Questions to be answered by the patient prior to discharge.   What complications would prompt you to call your doctor when you return home? _________________    What is the purpose of your prescribed medication? ________________  What are some potential side effects of the medications you will be taking at home? _______________

## 2023-09-29 NOTE — DISCHARGE SUMMARY
"Discharge Summary    Patient name: Ariadna Ambrocio    Medical record number: [id number]    Admission date: 9/27/2023  Discharge date:      Attending physician: Dr. Otilia Capone    Primary care physician: Marifer Cooper MD    Referring physician: Otilia Capone MD  [address]  [place] [address]    Condition on discharge: Stable    Primary Diagnoses:  Morbid obesity with co-morbidities    Operative Procedure: Robotic Laparoscopic sleeve gastrectomy AND HIATAL HERNIA REPAIR     Ariadna Ambrocio  is post op day one status post procedure listed. Patient denies shortness of air and lower extremity pain. Feels better than yesterday. No vomiting this am. Ambulating well and using incentive spirometer.          /74 (BP Location: Left arm, Patient Position: Lying)   Pulse 90   Temp 98 °F (36.7 °C) (Oral)   Resp 17   Ht 160 cm (63\")   Wt 110 kg (242 lb 11.2 oz)   LMP 02/02/2021 (Approximate)   SpO2 95%   BMI 42.99 kg/m²     General:  alert, appears stated age and cooperative   Abdomen: soft, bowel sounds active, appropriate tenderness   Incision:   healing well, no drainage, no erythema, no hernia, no seroma, no swelling, no dehiscence, incision well approximated   Heart: Regular rate   Lungs: Clear to auscultation bilaterally     I reviewed the patient's new clinical results.     Lab Results (last 24 hours)       Procedure Component Value Units Date/Time    Extra Tubes [[id number]] Collected: 09/28/23 2317    Specimen: Blood, Venous Line Updated: 09/29/23 0030    Narrative:      The following orders were created for panel order Extra Tubes.  Procedure                               Abnormality         Status                     ---------                               -----------         ------                     Green Top (Gel)[[id number]]                                  Final result                 Please view results for these tests on the individual orders.    Green Top (Gel) [[id number]] Collected: " 09/28/23 2317    Specimen: Blood Updated: 09/29/23 0030     Extra Tube Hold for add-ons.     Comment: Auto resulted.       CBC & Differential [[id number]]  (Abnormal) Collected: 09/28/23 2317    Specimen: Blood Updated: 09/29/23 0021    Narrative:      The following orders were created for panel order CBC & Differential.  Procedure                               Abnormality         Status                     ---------                               -----------         ------                     CBC Auto Differential[[id number]]        Abnormal            Final result                 Please view results for these tests on the individual orders.    CBC Auto Differential [[id number]]  (Abnormal) Collected: 09/28/23 2317    Specimen: Blood Updated: 09/29/23 0021     WBC 14.70 10*3/mm3      RBC 3.27 10*6/mm3      Hemoglobin 9.8 g/dL      Hematocrit 29.6 %      MCV 90.6 fL      MCH 29.9 pg      MCHC 33.0 g/dL      RDW 13.9 %      RDW-SD 43.8 fl      MPV 8.2 fL      Platelets 285 10*3/mm3      Neutrophil % 59.9 %      Lymphocyte % 30.3 %      Monocyte % 8.8 %      Eosinophil % 0.6 %      Basophil % 0.4 %      Neutrophils, Absolute 8.80 10*3/mm3      Lymphocytes, Absolute 4.40 10*3/mm3      Monocytes, Absolute 1.30 10*3/mm3      Eosinophils, Absolute 0.10 10*3/mm3      Basophils, Absolute 0.10 10*3/mm3      nRBC 0.0 /100 WBC     Hemoglobin & Hematocrit, Blood [[id number]]  (Abnormal) Collected: 09/28/23 1905    Specimen: Blood Updated: 09/28/23 1938     Hemoglobin 10.4 g/dL      Hematocrit 32.2 %     CBC & Differential [[id number]]  (Abnormal) Collected: 09/28/23 1042    Specimen: Blood Updated: 09/28/23 1120    Narrative:      The following orders were created for panel order CBC & Differential.  Procedure                               Abnormality         Status                     ---------                               -----------         ------                     CBC Auto Differential[[id number]]        Abnormal             Final result                 Please view results for these tests on the individual orders.    CBC Auto Differential [[id number]]  (Abnormal) Collected: 09/28/23 1042    Specimen: Blood Updated: 09/28/23 1120     WBC 16.40 10*3/mm3      RBC 3.46 10*6/mm3      Hemoglobin 10.4 g/dL      Hematocrit 31.5 %      MCV 91.0 fL      MCH 30.0 pg      MCHC 33.0 g/dL      RDW 13.5 %      RDW-SD 42.4 fl      MPV 8.0 fL      Platelets 301 10*3/mm3      Neutrophil % 69.4 %      Lymphocyte % 22.1 %      Monocyte % 8.3 %      Eosinophil % 0.0 %      Basophil % 0.2 %      Neutrophils, Absolute 11.40 10*3/mm3      Lymphocytes, Absolute 3.60 10*3/mm3      Monocytes, Absolute 1.40 10*3/mm3      Eosinophils, Absolute 0.00 10*3/mm3      Basophils, Absolute 0.00 10*3/mm3      nRBC 0.0 /100 WBC                Assessment:      Doing well postoperatively.      Plan:   1. Continue Stage 1 diet  2. Continue with ambulation and Incentive spirometry  3. Plan for d/c home      Hospital Course: The patient is a very pleasant 37 y.o. female that was admitted to the hospital with with morbid obesity underwent a laparoscopic gastric sleeve and hiatal hernia repair.  The next morning the patient was able to tolerate liquids and was ambulating and vital signs and labs were stable.  The patient is discharged home with follow-up in my office next week.      Discharge medications:      Discharge Medications        New Medications        Instructions Start Date   apixaban 5 MG tablet tablet  Commonly known as: ELIQUIS   5 mg, Oral, 2 Times Daily      HYDROmorphone 2 MG tablet  Commonly known as: Dilaudid   2 mg, Oral, Every 6 Hours PRN      ondansetron ODT 8 MG disintegrating tablet  Commonly known as: ZOFRAN-ODT   8 mg, Translingual, Every 8 Hours PRN      ursodiol 250 MG tablet  Commonly known as: ACTIGALL   250 mg, Oral, 2 Times Daily             Continue These Medications        Instructions Start Date   cholecalciferol 25 MCG (1000 UT) tablet  Commonly  known as: VITAMIN D3   1,000 Units, Oral, Daily      hydroCHLOROthiazide 12.5 MG capsule  Commonly known as: MICROZIDE   TAKE 2 CAPSULES BY MOUTH DAILY. DO NOT TAKE ON THE DAY OF THE PROCEDURE      metoprolol tartrate 25 MG tablet  Commonly known as: LOPRESSOR   12.5 mg, Oral, 2 Times Daily      omeprazole 40 MG capsule  Commonly known as: priLOSEC   40 mg, Oral, 2 Times Daily      vitamin B-12 1000 MCG tablet  Commonly known as: CYANOCOBALAMIN   1,000 mcg, Oral, Daily               Discharge instructions:  Per Bariatric manual; per our protocol      Follow-up appointment: Follow up with Dr. Capone in the office as scheduled.  If not already scheduled call for appointment at [phone]

## 2023-09-29 NOTE — PLAN OF CARE
Goal Outcome Evaluation: Pt up amb multiple time in hallway. Pain managed per MAR. Heart rate remains elevated. Dr Capone secure chat r/t pt home meds. New orders noted. Plan to d/c home upon discharge

## 2023-09-29 NOTE — PLAN OF CARE
Goal Outcome Evaluation:                      VSS on room air. Pain treated per PRN PO orders. Pt ambulating independently around unit. Pt to dc home with family today.

## 2023-10-02 ENCOUNTER — OFFICE VISIT (OUTPATIENT)
Dept: BARIATRICS/WEIGHT MGMT | Facility: CLINIC | Age: 38
End: 2023-10-02
Payer: MEDICAID

## 2023-10-02 VITALS
SYSTOLIC BLOOD PRESSURE: 113 MMHG | HEART RATE: 92 BPM | HEIGHT: 63 IN | OXYGEN SATURATION: 98 % | WEIGHT: 240 LBS | DIASTOLIC BLOOD PRESSURE: 80 MMHG | BODY MASS INDEX: 42.52 KG/M2

## 2023-10-02 DIAGNOSIS — E66.01 OBESITY, CLASS III, BMI 40-49.9 (MORBID OBESITY): Primary | ICD-10-CM

## 2023-10-02 PROCEDURE — 1159F MED LIST DOCD IN RCRD: CPT | Performed by: SURGERY

## 2023-10-02 PROCEDURE — 3074F SYST BP LT 130 MM HG: CPT | Performed by: SURGERY

## 2023-10-02 PROCEDURE — 1160F RVW MEDS BY RX/DR IN RCRD: CPT | Performed by: SURGERY

## 2023-10-02 PROCEDURE — 3079F DIAST BP 80-89 MM HG: CPT | Performed by: SURGERY

## 2023-10-02 PROCEDURE — 99024 POSTOP FOLLOW-UP VISIT: CPT | Performed by: SURGERY

## 2023-10-02 NOTE — PROGRESS NOTES
[facility] BAR SURG [facility] BARIATRIC SURGERY  [address] [place] [address]  [address] [place] [address]  Dept: [phone]  10/2/2023      Ariadna Ambrocio.  [id number]  [id number]  1985  female    Date of last surgery: 9/27/2023Gastric Bypass And Hiatal Hernia Repair Laparoscopic With Davinci Robot      Chief Complaint: BH Post-Op Bariatric Surgery:   Ariadna Ambrocio is status post procedure listed above  HPI: Had some blood in urine, stopped eliquis and this has improved. She has been ambulating.     Wt Readings from Last 10 Encounters:   10/02/23 109 kg (240 lb)   09/27/23 110 kg (242 lb 11.2 oz)   09/01/23 113 kg (249 lb 6.4 oz)   08/28/23 112 kg (246 lb)   07/10/23 109 kg (240 lb)   06/16/23 108 kg (239 lb)   06/02/23 108 kg (239 lb)   05/08/23 107 kg (236 lb)   04/19/23 108 kg (239 lb)   02/27/23 108 kg (238 lb 8.6 oz)            Review of Systems    Review of Systems   Constitutional: Negative.    HENT: Negative.    Eyes: Negative.    Respiratory: Negative.    Cardiovascular: Negative.    Gastrointestinal: Negative.    Endocrine: Negative.    Genitourinary: Negative.    Musculoskeletal: Negative.    Skin: Negative.    Allergic/Immunologic: Negative.    Neurological: Negative.    Hematological: Negative.    Psychiatric/Behavioral: Negative.    Patient Active Problem List   Diagnosis   • Ureterolithiasis   • Chest pain   • Dyspnea on exertion   • Paresthesia   • Hypertension   • Bradycardia, sinus   • Paroxysmal SVT (supraventricular tachycardia)   • Obesity, Class III, BMI 40-49.9 (morbid obesity)   • Gastroesophageal reflux disease       Past Medical History:   Diagnosis Date   • Anxiety    • Bulging lumbar disc    • Constipation    • Depression    • Diarrhea    • Elevated triglycerides with high cholesterol    • Heartburn    • Herniated cervical disc    • Hiatal hernia    • Hypertension    • IBS (irritable bowel syndrome)    • Kidney stones     • Nausea    • Pre-diabetes    • Rapid heart beat    • Seasonal allergies      Past Surgical History:   Procedure Laterality Date   • CYSTOSCOPY, URETEROSCOPY, RETROGRADE PYELOGRAM, STENT INSERTION Left 07/16/2021    Procedure: CYSTOSCOPY URETEROSCOPY RETROGRADE PYELOGRAM HOLMIUM LASER BASKET STONE EXTRACTION STENT INSERTION;  Surgeon: Carmelo Nguyễn MD;  Location: [facility] MAIN OR;  Service: Urology;  Laterality: Left;   • ENDOSCOPY N/A 2/27/2023    Procedure: ESOPHAGOGASTRODUODENOSCOPY with gastric antrum biopsy;  Surgeon: Otilia Capone MD;  Location: [facility] ENDOSCOPY;  Service: General;  Laterality: N/A;  Post: large hiatal hernia   • GASTRIC BYPASS N/A 9/27/2023    Procedure: GASTRIC BYPASS AND HIATAL HERNIA REPAIR LAPAROSCOPIC WITH DAVINCI ROBOT;  Surgeon: Otilia Capone MD;  Location: [facility] MAIN OR;  Service: Robotics - DaVinci;  Laterality: N/A;   • COLONOSCOPY     • EAR TUBES     • KIDNEY STONE SURGERY     • WISDOM TOOTH EXTRACTION        The following portions of the patient's history were reviewed and updated as appropriate: allergies, current medications, past family history, past medical history, past social history, past surgical history, and problem list.    Vitals:    10/02/23 0844   BP: 113/80   Pulse: 92   SpO2: 98%       Physical Exam  Awake and alert  Normal mental status  Normal pulmonary effort  Abdomen appropriate tenderness  Incisions no erythema  Extremities no tenderness or swelling      Assessment:     S/p gastric bypass    Post-op, the patient is doing well.     Plan:     Encouraged patient to be sure to get plenty of lean protein per day through small frequent meals all with a protein source.   Activity restrictions: none.   Recommended patient be sure to get at least 70 grams of protein per day by eating small, frequent meals all with high lean protein choices. Be sure to limit/cut back on daily carbohydrate intake. Discussed with the patient the recommended amount of water per day to  intake- half of body weight in ounces. Reviewed vitamin requirements. Be sure to do routine exercise, 150 minutes per week minimum, including both cardio and strength training.     Instructions / Recommendations: dietary counseling recommended, recommended a daily protein intake of  grams, vitamin supplement(s) recommended, recommended exercising at least 150 minutes per week, behavior modifications recommended and instructed to call the office for concerns, questions, or problems.     The patient was instructed to follow up in 1 months.     The patient was counseled regarding. Total time spent face to face was 15 minutes and 15 minutes was spent counseling.

## 2023-10-05 DIAGNOSIS — E66.01 OBESITY, CLASS III, BMI 40-49.9 (MORBID OBESITY): Primary | ICD-10-CM

## 2023-10-05 RX ORDER — HYDROMORPHONE HYDROCHLORIDE 2 MG/1
2 TABLET ORAL EVERY 12 HOURS PRN
Qty: 8 TABLET | Refills: 0 | Status: SHIPPED | OUTPATIENT
Start: 2023-10-05

## 2023-10-19 ENCOUNTER — TELEPHONE (OUTPATIENT)
Dept: BARIATRICS/WEIGHT MGMT | Facility: CLINIC | Age: 38
End: 2023-10-19
Payer: MEDICAID

## 2023-10-19 DIAGNOSIS — Z91.89 AT RISK FOR DEHYDRATION DUE TO POOR FLUID INTAKE: Primary | ICD-10-CM

## 2023-10-19 DIAGNOSIS — Z90.3 H/O GASTRIC SLEEVE: ICD-10-CM

## 2023-10-19 RX ORDER — SODIUM CHLORIDE 9 MG/ML
1000 INJECTION, SOLUTION INTRAVENOUS ONCE
Status: CANCELLED | OUTPATIENT
Start: 2023-10-20

## 2023-10-19 RX ORDER — ONDANSETRON 2 MG/ML
8 INJECTION INTRAMUSCULAR; INTRAVENOUS ONCE
Status: CANCELLED | OUTPATIENT
Start: 2023-10-20

## 2023-10-19 RX ORDER — FAMOTIDINE 10 MG/ML
20 INJECTION, SOLUTION INTRAVENOUS ONCE
Status: CANCELLED
Start: 2023-10-20

## 2023-10-19 NOTE — TELEPHONE ENCOUNTER
Pt scheduled for IVF at 2:00 pm 10/20. She will try to increase fluid and protein intake over the weekend and call if she does not improve. [facility]

## 2023-10-20 ENCOUNTER — HOSPITAL ENCOUNTER (OUTPATIENT)
Dept: INFUSION THERAPY | Facility: HOSPITAL | Age: 38
Discharge: HOME | End: 2023-10-20
Payer: MEDICAID

## 2023-10-20 VITALS
SYSTOLIC BLOOD PRESSURE: 111 MMHG | OXYGEN SATURATION: 100 % | TEMPERATURE: 98 F | RESPIRATION RATE: 14 BRPM | HEART RATE: 56 BPM | DIASTOLIC BLOOD PRESSURE: 70 MMHG

## 2023-10-20 DIAGNOSIS — Z91.89 AT RISK FOR DEHYDRATION DUE TO POOR FLUID INTAKE: ICD-10-CM

## 2023-10-20 DIAGNOSIS — Z90.3 H/O GASTRIC SLEEVE: Primary | ICD-10-CM

## 2023-10-20 LAB
ALBUMIN SERPL-MCNC: 3.7 G/DL (ref 3.5–5.2)
ALBUMIN/GLOB SERPL: 1.3 G/DL
ALP SERPL-CCNC: 65 U/L (ref 39–117)
ALT SERPL W P-5'-P-CCNC: 23 U/L (ref 1–33)
ANION GAP SERPL CALCULATED.3IONS-SCNC: 11 MMOL/L (ref 5–15)
AST SERPL-CCNC: 28 U/L (ref 1–32)
BASOPHILS # BLD AUTO: 0.1 10*3/MM3 (ref 0–0.2)
BASOPHILS NFR BLD AUTO: 0.9 % (ref 0–1.5)
BILIRUB SERPL-MCNC: 0.5 MG/DL (ref 0–1.2)
BUN SERPL-MCNC: 6 MG/DL (ref 6–20)
BUN/CREAT SERPL: 7.5 (ref 7–25)
CALCIUM SPEC-SCNC: 9.1 MG/DL (ref 8.6–10.5)
CHLORIDE SERPL-SCNC: 107 MMOL/L (ref 98–107)
CO2 SERPL-SCNC: 23 MMOL/L (ref 22–29)
CREAT SERPL-MCNC: 0.8 MG/DL (ref 0.57–1)
DEPRECATED RDW RBC AUTO: 49.9 FL (ref 37–54)
EGFRCR SERPLBLD CKD-EPI 2021: 97.5 ML/MIN/1.73
EOSINOPHIL # BLD AUTO: 0.1 10*3/MM3 (ref 0–0.4)
EOSINOPHIL NFR BLD AUTO: 1.3 % (ref 0.3–6.2)
ERYTHROCYTE [DISTWIDTH] IN BLOOD BY AUTOMATED COUNT: 15 % (ref 12.3–15.4)
GLOBULIN UR ELPH-MCNC: 2.8 GM/DL
GLUCOSE SERPL-MCNC: 118 MG/DL (ref 65–99)
HCT VFR BLD AUTO: 32.9 % (ref 34–46.6)
HGB BLD-MCNC: 10.8 G/DL (ref 12–15.9)
LYMPHOCYTES # BLD AUTO: 2.4 10*3/MM3 (ref 0.7–3.1)
LYMPHOCYTES NFR BLD AUTO: 32 % (ref 19.6–45.3)
MAGNESIUM SERPL-MCNC: 1.8 MG/DL (ref 1.6–2.6)
MCH RBC QN AUTO: 29.6 PG (ref 26.6–33)
MCHC RBC AUTO-ENTMCNC: 32.8 G/DL (ref 31.5–35.7)
MCV RBC AUTO: 90.2 FL (ref 79–97)
MONOCYTES # BLD AUTO: 0.9 10*3/MM3 (ref 0.1–0.9)
MONOCYTES NFR BLD AUTO: 11.4 % (ref 5–12)
NEUTROPHILS NFR BLD AUTO: 4.1 10*3/MM3 (ref 1.7–7)
NEUTROPHILS NFR BLD AUTO: 54.4 % (ref 42.7–76)
NRBC BLD AUTO-RTO: 0.2 /100 WBC (ref 0–0.2)
PHOSPHATE SERPL-MCNC: 3.2 MG/DL (ref 2.5–4.5)
PLATELET # BLD AUTO: 340 10*3/MM3 (ref 140–450)
PMV BLD AUTO: 8.9 FL (ref 6–12)
POTASSIUM SERPL-SCNC: 3.4 MMOL/L (ref 3.5–5.2)
PROT SERPL-MCNC: 6.5 G/DL (ref 6–8.5)
RBC # BLD AUTO: 3.65 10*6/MM3 (ref 3.77–5.28)
SODIUM SERPL-SCNC: 141 MMOL/L (ref 136–145)
WBC NRBC COR # BLD: 7.6 10*3/MM3 (ref 3.4–10.8)

## 2023-10-20 PROCEDURE — 84100 ASSAY OF PHOSPHORUS: CPT | Performed by: NURSE PRACTITIONER

## 2023-10-20 PROCEDURE — 25810000003 SODIUM CHLORIDE 0.9 % SOLUTION: Performed by: NURSE PRACTITIONER

## 2023-10-20 PROCEDURE — 80053 COMPREHEN METABOLIC PANEL: CPT | Performed by: NURSE PRACTITIONER

## 2023-10-20 PROCEDURE — 96374 THER/PROPH/DIAG INJ IV PUSH: CPT

## 2023-10-20 PROCEDURE — 83735 ASSAY OF MAGNESIUM: CPT | Performed by: NURSE PRACTITIONER

## 2023-10-20 PROCEDURE — 96361 HYDRATE IV INFUSION ADD-ON: CPT

## 2023-10-20 PROCEDURE — 25010000002 ONDANSETRON PER 1 MG: Performed by: NURSE PRACTITIONER

## 2023-10-20 PROCEDURE — 96360 HYDRATION IV INFUSION INIT: CPT

## 2023-10-20 PROCEDURE — 85025 COMPLETE CBC W/AUTO DIFF WBC: CPT | Performed by: NURSE PRACTITIONER

## 2023-10-20 PROCEDURE — 96375 TX/PRO/DX INJ NEW DRUG ADDON: CPT

## 2023-10-20 RX ORDER — FAMOTIDINE 10 MG/ML
20 INJECTION, SOLUTION INTRAVENOUS ONCE
Status: CANCELLED
Start: 2023-10-20 | End: 2023-10-20

## 2023-10-20 RX ORDER — ONDANSETRON 2 MG/ML
8 INJECTION INTRAMUSCULAR; INTRAVENOUS ONCE
Status: CANCELLED | OUTPATIENT
Start: 2023-10-20

## 2023-10-20 RX ORDER — ONDANSETRON 2 MG/ML
8 INJECTION INTRAMUSCULAR; INTRAVENOUS ONCE
Status: COMPLETED | OUTPATIENT
Start: 2023-10-20 | End: 2023-10-20

## 2023-10-20 RX ORDER — FAMOTIDINE 10 MG/ML
20 INJECTION, SOLUTION INTRAVENOUS ONCE
Status: COMPLETED | OUTPATIENT
Start: 2023-10-20 | End: 2023-10-20

## 2023-10-20 RX ORDER — SODIUM CHLORIDE 9 MG/ML
1000 INJECTION, SOLUTION INTRAVENOUS ONCE
Status: CANCELLED | OUTPATIENT
Start: 2023-10-20

## 2023-10-20 RX ORDER — SODIUM CHLORIDE 9 MG/ML
1000 INJECTION, SOLUTION INTRAVENOUS ONCE
Status: COMPLETED | OUTPATIENT
Start: 2023-10-20 | End: 2023-10-20

## 2023-10-20 RX ADMIN — SODIUM CHLORIDE 1000 ML/HR: 9 INJECTION, SOLUTION INTRAVENOUS at 15:24

## 2023-10-20 RX ADMIN — ONDANSETRON 8 MG: 2 INJECTION INTRAMUSCULAR; INTRAVENOUS at 15:11

## 2023-10-20 RX ADMIN — FAMOTIDINE 20 MG: 10 INJECTION INTRAVENOUS at 15:12

## 2023-10-23 RX ORDER — POTASSIUM CHLORIDE 750 MG/1
10 TABLET, EXTENDED RELEASE ORAL DAILY
Qty: 30 TABLET | Refills: 0 | Status: SHIPPED | OUTPATIENT
Start: 2023-10-23

## 2023-10-24 ENCOUNTER — TELEPHONE (OUTPATIENT)
Dept: BARIATRICS/WEIGHT MGMT | Facility: CLINIC | Age: 38
End: 2023-10-24
Payer: MEDICAID

## 2023-10-24 RX ORDER — POTASSIUM CHLORIDE 750 MG/1
10 TABLET, FILM COATED, EXTENDED RELEASE ORAL DAILY
Qty: 30 TABLET | Refills: 0 | Status: SHIPPED | OUTPATIENT
Start: 2023-10-24

## 2023-10-24 NOTE — TELEPHONE ENCOUNTER
Ariadna called stated the [facility] needed [facility] from the [facility] to fill the RX for potassium. She also had a question via [employer] abou her hgb that was 10.2 please.

## 2023-11-02 NOTE — PROGRESS NOTES
Nutrition Services    Patient Name: Ariadna Ambrocio  YOB: 1985  MRN: [id number]  Date of Service: 11/06/23      ICD-10-CM ICD-9-CM   1. Obesity, Class III, BMI 40-49.9 (morbid obesity)  E66.01 278.01          NUTRITION ASSESSMENT - BARIATRIC SURGERY      Reason for Visit 1 month post op bypass     H&P      Past Medical History:   Diagnosis Date    Anxiety     Bulging lumbar disc     Constipation     Depression     Diarrhea     Elevated triglycerides with high cholesterol     Heartburn     Herniated cervical disc     Hiatal hernia     Hypertension     IBS (irritable bowel syndrome)     Kidney stones     Nausea     Pre-diabetes     Rapid heart beat     Seasonal allergies        Past Surgical History:   Procedure Laterality Date    COLONOSCOPY      CYSTOSCOPY, URETEROSCOPY, RETROGRADE PYELOGRAM, STENT INSERTION Left 07/16/2021    Procedure: CYSTOSCOPY URETEROSCOPY RETROGRADE PYELOGRAM HOLMIUM LASER BASKET STONE EXTRACTION STENT INSERTION;  Surgeon: Carmelo Nguyễn MD;  Location: [facility] MAIN OR;  Service: Urology;  Laterality: Left;    EAR TUBES      ENDOSCOPY N/A 2/27/2023    Procedure: ESOPHAGOGASTRODUODENOSCOPY with gastric antrum biopsy;  Surgeon: Otilia Capone MD;  Location: [facility] ENDOSCOPY;  Service: General;  Laterality: N/A;  Post: large hiatal hernia    GASTRIC BYPASS N/A 9/27/2023    Procedure: GASTRIC BYPASS AND HIATAL HERNIA REPAIR LAPAROSCOPIC WITH DAVINCI ROBOT;  Surgeon: Otilia Capone MD;  Location: [facility] MAIN OR;  Service: Robotics - DaVinci;  Laterality: N/A;    KIDNEY STONE SURGERY      WISDOM TOOTH EXTRACTION          Previous Goals          Encounter Information        Visit Narrative     Patient reports no issues so far, states she has been feeling tired. Patient reports she is increasing protein. Patient has been trying to add new things to diet. Patient reports everything tasted bad at first but now is tasting better.     Encouraged patient to add in electrolyte drinks for leg  "pain/cramping.     Diet Recall:   Breakfast: scrambled eggs  Lunch: protein shake   Dinner: chicken   Snacks: no usual snacking  Beverages: water - 40-50 oz each day, orange juice     Exercise: patient has been walking. Patient reports legs have been sore/cramping    Supplements: patient taking MV    Self Monitoring:          Anthropometrics        Current Height, Weight Height: 160 cm (63\")  Weight: 102 kg (224 lb) (11/06/23 1358)       Trending Weight Hx  7.5% weight loss in 1 month    Wt Readings from Last 30 Encounters:   11/06/23 1358 102 kg (224 lb)   10/02/23 0844 109 kg (240 lb)   09/27/23 1322 110 kg (242 lb 11.2 oz)   09/27/23 0629 110 kg (242 lb 11.2 oz)   09/12/23 1707 111 kg (245 lb)   09/01/23 0934 113 kg (249 lb 6.4 oz)   08/28/23 1151 112 kg (246 lb)   07/10/23 1106 109 kg (240 lb)   06/16/23 1004 108 kg (239 lb)   06/02/23 1444 108 kg (239 lb)   05/08/23 1524 107 kg (236 lb)   04/19/23 1459 108 kg (239 lb)   02/27/23 1228 108 kg (238 lb 8.6 oz)   02/21/23 1008 108 kg (239 lb)   01/23/23 1001 108 kg (238 lb)   01/19/23 1033 110 kg (242 lb 6.4 oz)   12/09/22 1347 108 kg (238 lb 5.1 oz)   08/16/22 0951 107 kg (236 lb 8.9 oz)   11/01/21 1534 102 kg (224 lb)   09/26/21 0759 96.6 kg (213 lb)   09/26/21 0150 96.8 kg (213 lb 6.5 oz)   09/25/21 2053 97.5 kg (215 lb)   09/23/21 1859 96.9 kg (213 lb 10 oz)   09/23/21 1340 97.4 kg (214 lb 11.7 oz)   07/15/21 2122 101 kg (223 lb 12.3 oz)   07/15/21 1404 101 kg (222 lb 3.6 oz)   12/29/20 1333 97.2 kg (214 lb 4.6 oz)   11/15/20 1659 94.8 kg (209 lb)   07/20/20 1310 96.5 kg (212 lb 11.9 oz)   10/10/18 1413 91.6 kg (202 lb)   09/19/18 1412 92.5 kg (204 lb)      BMI kg/m2 Body mass index is 39.68 kg/m².       Nutrition Diagnosis         Nutrition Dx Statement Overweight/obesity RT multifactorial biochemical, behavioral and environmental contributors to disease AEB BMI 39.68 kg/m^2         Nutrition Intervention         Nutrition Intervention Nutrition education " related to diet modification and physical activity        Monitor/Evaluation        New Goals Patient to continue increasing protein and water and add in electrolyte drinks as able  Patient to continue advancing diet as tolerated       Total time spent with pt 15 minutes of which 15 minutes were spent on education.       Electronically signed by:  Pastora Hughes RD  11/06/23 14:00 EST

## 2023-11-06 ENCOUNTER — OFFICE VISIT (OUTPATIENT)
Dept: BARIATRICS/WEIGHT MGMT | Facility: CLINIC | Age: 38
End: 2023-11-06
Payer: MEDICAID

## 2023-11-06 VITALS — WEIGHT: 224 LBS | HEIGHT: 63 IN | BODY MASS INDEX: 39.69 KG/M2

## 2023-11-06 DIAGNOSIS — E66.9 OBESITY, CLASS II, BMI 35-39.9: Primary | ICD-10-CM

## 2023-11-06 PROBLEM — E66.812 OBESITY, CLASS II, BMI 35-39.9: Status: ACTIVE | Noted: 2023-11-06

## 2023-11-06 PROCEDURE — 99024 POSTOP FOLLOW-UP VISIT: CPT | Performed by: DIETITIAN, REGISTERED

## 2023-11-06 PROCEDURE — 1160F RVW MEDS BY RX/DR IN RCRD: CPT | Performed by: DIETITIAN, REGISTERED

## 2023-11-06 PROCEDURE — 1159F MED LIST DOCD IN RCRD: CPT | Performed by: DIETITIAN, REGISTERED

## 2023-12-26 NOTE — CASE MANAGEMENT/SOCIAL WORK
12/26/2023  I, Georgi Morales DO, saw and evaluated the patient. I have discussed the patient with the resident/non-physician practitioner and agree with the resident's/non-physician practitioner's findings, Plan of Care, and MDM as documented in the resident's/non-physician practitioner's note, except where noted. All available labs and Radiology studies were reviewed.  I was present for key portions of any procedure(s) performed by the resident/non-physician practitioner and I was immediately available to provide assistance.       At this point I agree with the current assessment done in the Emergency Department.  I have conducted an independent evaluation of this patient a history and physical is as follows:    Patient is an 83-year-old male with a history of bladder carcinoma status post urostomy, coronary disease, hyperlipidemia, type 2 diabetes, accompanied by his wife.  For the last 2 weeks or so the patient was feeling weak, not focally weak but is overall tired and fatigued, having some nonproductive cough occasionally, sometimes some posttussive emesis and other times emesis is not posttussive.  He has been eating and drinking a bit less than normal.  No chest pain, no palpitations, no fever, no chills.  He is scheduled for outpatient chest abdomen and pelvis CT per his wife's report for routine screening.    General:  Patient is well-appearing  Head:  Atraumatic  Eyes:  Conjunctiva pink  ENT:  Mucous membranes are moist  Neck:  Supple  Cardiac:  S1-S2, without murmurs  Lungs:  Clear to auscultation bilaterally  Abdomen:  Soft, nontender, normal bowel sounds, no CVA tenderness, no tympany, no rigidity, no guarding, anterior urostomy, yellow urine, no blood in the bag  Extremities:  Normal range of motion  Neurologic:  Awake, fluent speech, normal comprehension. AAOx3.   Skin:  Pink warm and dry  Psychiatric:  Alert, pleasant, cooperative          ED Course  ED Course as of 12/26/23 1244   Tue Dec 26,  Discharge Planning Assessment   Simone     Patient Name: Ariadna Ambrocio  MRN: [id number]  Today's Date: 9/28/2023    Admit Date: 9/27/2023    Plan: Routine home with family.   Discharge Needs Assessment       Row Name 09/28/23 1325       Living Environment    People in Home child(tyrone), dependent;parent(s);spouse    Name(s) of People in Home Beni Lao    Current Living Arrangements home    Potentially Unsafe Housing Conditions none    Primary Care Provided by self    Provides Primary Care For child(tyrone)    Family Caregiver if Needed spouse    Family Caregiver Names Beni Lao    Quality of Family Relationships helpful;involved;supportive    Able to Return to Prior Arrangements yes       Resource/Environmental Concerns    Resource/Environmental Concerns none    Transportation Concerns none       Transition Planning    Patient/Family Anticipates Transition to home with family    Patient/Family Anticipated Services at Transition none       Discharge Needs Assessment    Equipment Currently Used at Home bp cuff    Concerns to be Addressed discharge planning    Anticipated Changes Related to Illness none    Equipment Needed After Discharge none                   Discharge Plan       Row Name 09/28/23 4240       Plan    Plan Routine home with family.    Patient/Family in Agreement with Plan yes    Plan Comments Patient lives at home with her , Shilo, her mother, and her three dependent children. Patient does drive and her  will transport her at discharge. Patient is able to perform IADL. PCP and pharamcy confirmed. Patient wishes to be enrolled in the meds to bed program at this time. CM will update this in the patient's chart. Denied finanical assitance with medications and/or food. Denies PT and/or HH needs at this time. Dishcharge barriers: clear liquid diet, cont. fluids, POD 1, abnormal labs.             Expected Discharge Date and Time       Expected Discharge Date Expected Discharge  2023   1200 ECG interpreted by me, right bundle branch block, rate of 87, old inferior infarction change, appears to be atrial flutter with consistent response, no acute change from September 20, 2023     Patient presented hypoxic, 85% on room air, placed on 5 L nasal cannula, reassessment, pulse ox 94%, feeling comfortable.  Patient presents at risk for sepsis, or pneumonia or acute viral syndrome, or acute coronary syndrome.  ECG shows no evidence of life-threatening dysrhythmia.  Workup shows the patient does have severe sepsis, sepsis alert called.  Patient administered antibiotics after blood cultures.  Reassessed several times, remained hemodynamically stable.    XR chest 2 views   ED Interpretation   Chest x-ray interpreted by me shows a right lower lobe infiltrate      Final Result      Bilateral right greater than left pleural effusions and right lower lobe consolidation concerning for a possible pneumonia.      The study was marked in EPIC for immediate notification.                  Workstation performed: [id number]         CT chest abdomen pelvis wo contrast    (Results Pending)       Labs Reviewed   CBC AND DIFFERENTIAL - Abnormal       Result Value Ref Range Status    WBC 17.94 (*) 4.31 - 10.16 Thousand/uL Final    RBC 4.11  3.88 - 5.62 Million/uL Final    Hemoglobin 11.8 (*) 12.0 - 17.0 g/dL Final    Hematocrit 38.9  36.5 - 49.3 % Final    MCV 95  82 - 98 fL Final    MCH 28.7  26.8 - 34.3 pg Final    MCHC 30.3 (*) 31.4 - 37.4 g/dL Final    RDW 19.4 (*) 11.6 - 15.1 % Final    MPV 11.8  8.9 - 12.7 fL Final    Platelets 309  149 - 390 Thousands/uL Final    nRBC 0  /100 WBCs Final    Neutrophils Relative 88 (*) 43 - 75 % Final    Immat GRANS % 1  0 - 2 % Final    Lymphocytes Relative 3 (*) 14 - 44 % Final    Monocytes Relative 8  4 - 12 % Final    Eosinophils Relative 0  0 - 6 % Final    Basophils Relative 0  0 - 1 % Final    Neutrophils Absolute 15.71 (*) 1.85 - 7.62 Thousands/µL Final    Immature Grans  Time    Sep 30, 2023            Demographic Summary       Row Name 09/28/23 1324       General Information    Admission Type inpatient    Arrived From emergency department    Referral Source admission list    Reason for Consult discharge planning    Preferred Language English       Contact Information    Permission Granted to Share Info With                    Functional Status       Row Name 09/28/23 1325       Functional Status    Usual Activity Tolerance good    Current Activity Tolerance good       Functional Status, IADL    Medications independent    Meal Preparation independent    Housekeeping independent    Laundry independent    Shopping independent           Met with patient in room.    Maintained distance greater than six feet and spent less than 15 minutes in the room.       Ly Conley RN     Absolute 0.26 (*) 0.00 - 0.20 Thousand/uL Final    Lymphocytes Absolute 0.57 (*) 0.60 - 4.47 Thousands/µL Final    Monocytes Absolute 1.34 (*) 0.17 - 1.22 Thousand/µL Final    Eosinophils Absolute 0.02  0.00 - 0.61 Thousand/µL Final    Basophils Absolute 0.04  0.00 - 0.10 Thousands/µL Final   COMPREHENSIVE METABOLIC PANEL - Abnormal    Sodium 136  135 - 147 mmol/L Final    Potassium 4.8  3.5 - 5.3 mmol/L Final    Chloride 101  96 - 108 mmol/L Final    CO2 18 (*) 21 - 32 mmol/L Final    ANION GAP 17  mmol/L Final    BUN 57 (*) 5 - 25 mg/dL Final    Creatinine 3.52 (*) 0.60 - 1.30 mg/dL Final    Comment: Standardized to IDMS reference method    Glucose 177 (*) 65 - 140 mg/dL Final    Comment: If the patient is fasting, the ADA then defines impaired fasting glucose as > 100 mg/dL and diabetes as > or equal to 123 mg/dL.    Calcium 8.7  8.4 - 10.2 mg/dL Final    Corrected Calcium 9.3  8.3 - 10.1 mg/dL Final     (*) 13 - 39 U/L Final    ALT 95 (*) 7 - 52 U/L Final    Comment: Specimen collection should occur prior to Sulfasalazine administration due to the potential for falsely depressed results.     Alkaline Phosphatase 176 (*) 34 - 104 U/L Final    Total Protein 6.8  6.4 - 8.4 g/dL Final    Albumin 3.3 (*) 3.5 - 5.0 g/dL Final    Total Bilirubin 0.83  0.20 - 1.00 mg/dL Final    Comment: Use of this assay is not recommended for patients undergoing treatment with eltrombopag due to the potential for falsely elevated results.  N-acetyl-p-benzoquinone imine (metabolite of Acetaminophen) will generate erroneously low results in samples for patients that have taken an overdose of Acetaminophen.    eGFR 15  ml/min/1.73sq m Final    Narrative:     National Kidney Disease Foundation guidelines for Chronic Kidney Disease (CKD):     Stage 1 with normal or high GFR (GFR > 90 mL/min/1.73 square meters)    Stage 2 Mild CKD (GFR = 60-89 mL/min/1.73 square meters)    Stage 3A Moderate CKD (GFR = 45-59 mL/min/1.73 square  "meters)    Stage 3B Moderate CKD (GFR = 30-44 mL/min/1.73 square meters)    Stage 4 Severe CKD (GFR = 15-29 mL/min/1.73 square meters)    Stage 5 End Stage CKD (GFR <15 mL/min/1.73 square meters)  Note: GFR calculation is accurate only with a steady state creatinine   HS TROPONIN I 0HR - Abnormal    hs TnI 0hr 4,705 (*) \"Refer to ACS Flowchart\"- see link ng/L Final    Comment:                                              Initial (time 0) result  If >=50 ng/L, Myocardial injury suggested ;  Type of myocardial injury and treatment strategy  to be determined.  If 5-49 ng/L, a delta result at 2 hours and or 4 hours will be needed to further evaluate.  If <4 ng/L, and chest pain has been >3 hours since onset, patient may qualify for discharge based on the HEART score in the ED.  If <5 ng/L and <3hours since onset of chest pain, a delta result at 2 hours will be needed to further evaluate.    HS Troponin 99th Percentile URL of a Health Population=12 ng/L with a 95% Confidence Interval of 8-18 ng/L.    Second Troponin (time 2 hours)  If calculated delta >= 20 ng/L,  Myocardial injury suggested ; Type of myocardial injury and treatment strategy to be determined.  If 5-49 ng/L and the calculated delta is 5-19 ng/L, consult medical service for evaluation.  Continue evaluation for ischemia on ecg and other possible etiology and repeat hs troponin at 4 hours.  If delta is <5 ng/L at 2 hours, consider discharge based on risk stratification via the HEART score (if in ED), or YARIEL risk score in IP/Observation.    HS Troponin 99th Percentile URL of a Health Population=12 ng/L with a 95% Confidence Interval of 8-18 ng/L.   PROCALCITONIN TEST - Abnormal    Procalcitonin 0.69 (*) <=0.25 ng/ml Final    Comment: Suspected Lower Respiratory Tract Infection (LRTI):  - LESS than or EQUAL to 0.25 ng/mL:   low likelihood for bacterial LRTI; antibiotics DISCOURAGED.  - GREATER than 0.25 ng/mL:   increased likelihood for bacterial LRTI; " antibiotics ENCOURAGED.    Suspected Sepsis:  - Strongly consider initiating antibiotics in ALL UNSTABLE patients.  - LESS than or EQUAL to 0.5 ng/mL:   low likelihood for bacterial sepsis; antibiotics DISCOURAGED.  - GREATER than 0.5 ng/mL:   increased likelihood for bacterial sepsis; antibiotics ENCOURAGED.  - GREATER than 2 ng/mL:   high risk for severe sepsis / septic shock; antibiotics strongly ENCOURAGED.    Decisions on antibiotic use should not be based solely on Procalcitonin (PCT) levels. If PCT is low but uncertainty exists with stopping antibiotics, repeat PCT in 6-24 hours to confirm the low level. If antibiotics are administered (regardless if initial PCT was high or low), repeat PCT every 1-2 days to consider early antibiotic cessation (when GREATER than 80% decrease from the peak OR when PCT drops below designated cutoffs, whichever comes first), so long as the infection is NOT one that typically requires prolonged treatment durations (e.g., bone/joint infections, endocarditis, Staph. aureus bacteremia).    Situations of FALSE-POSITIVE Procalcitonin values:  1) Newborns < 72 hours old  2) Massive stress from severe trauma / burns, major surgery, acute pancreatitis, cardiogenic / hemorrhagic shock, sickle cell crisis, or other organ perfusion abnormalities  3) Malaria and some Candidal infections  4) Treatment with agents that stimulate cytokines (e.g., OKT3, anti-lymphocyte globulins, alemtuzumab, IL-2, granulocyte transfusion [NOT GCSFs])  5) Chronic renal disease causes elevated baseline levels (consider GREATER than 0.75 ng/mL as an abnormal cut-off); initiating HD/CRRT may cause transient decreases  6) Paraneoplastic syndromes from medullary thyroid or SCLC, some forms of vasculitis, and acute grxaq-re-swjn disease    Situations of FALSE-NEGATIVE Procalcitonin values:  1) Too early in clinical course for PCT to have reached its peak (may repeat in 6-24 hours to confirm low level)  2) Localized  "infection WITHOUT systemic (SIRS / sepsis) response (e.g., an abscess, osteomyelitis, cystitis)  3) Mycobacteria (e.g., Tuberculosis, MAC)  4) Cystic fibrosis exacerbations     PROTIME-INR - Abnormal    Protime 16.0 (*) 11.6 - 14.5 seconds Final    INR 1.29 (*) 0.84 - 1.19 Final   HS TROPONIN I 2HR - Abnormal    hs TnI 2hr 3,885 (*) \"Refer to ACS Flowchart\"- see link ng/L Final    Comment:                                              Initial (time 0) result  If >=50 ng/L, Myocardial injury suggested ;  Type of myocardial injury and treatment strategy  to be determined.  If 5-49 ng/L, a delta result at 2 hours and or 4 hours will be needed to further evaluate.  If <4 ng/L, and chest pain has been >3 hours since onset, patient may qualify for discharge based on the HEART score in the ED.  If <5 ng/L and <3hours since onset of chest pain, a delta result at 2 hours will be needed to further evaluate.    HS Troponin 99th Percentile URL of a Health Population=12 ng/L with a 95% Confidence Interval of 8-18 ng/L.    Second Troponin (time 2 hours)  If calculated delta >= 20 ng/L,  Myocardial injury suggested ; Type of myocardial injury and treatment strategy to be determined.  If 5-49 ng/L and the calculated delta is 5-19 ng/L, consult medical service for evaluation.  Continue evaluation for ischemia on ecg and other possible etiology and repeat hs troponin at 4 hours.  If delta is <5 ng/L at 2 hours, consider discharge based on risk stratification via the HEART score (if in ED), or YARIEL risk score in IP/Observation.    HS Troponin 99th Percentile URL of a Health Population=12 ng/L with a 95% Confidence Interval of 8-18 ng/L.    Delta 2hr hsTnI -820  <20 ng/L Final   D-DIMER, QUANTITATIVE - Abnormal    D-Dimer, Quant 7.41 (*) <0.50 ug/ml FEU Final    Comment: Reference and upper limits to exclude DVT and PE are the same.  Do not use to exclude if clinical symptoms are present.    Narrative:     In the evaluation for possible " pulmonary embolism, in the appropriate (Well's Score of 4 or less) patient, the age adjusted d-dimer cutoff for this patient can be calculated as:    Age x 0.01 (in ug/mL) for Age-adjusted D-dimer exclusion threshold for a patient over 50 years.   LACTIC ACID, PLASMA (W/REFLEX IF RESULT > 2.0) - Normal    LACTIC ACID 1.9  0.5 - 2.0 mmol/L Final    Narrative:     Result may be elevated if tourniquet was used during collection.   APTT - Normal    PTT 27  23 - 37 seconds Final    Comment: Therapeutic Heparin Range =  60-90 seconds   BLOOD CULTURE   BLOOD CULTURE   HS TROPONIN I 4HR   CK   CKMB       DIAGNOSIS:  Acute pneumonia, acute sepsis    MEDICAL DECISION MAKING CODING    Patient presents with acute new problem with:  Threat to life or bodily function    Chronic conditions affecting care: as per HPI    COLLECTION AND INTERPRETATION OF DATA  Additional history obtained from: Wife  I reviewed prior external notes, including September 20, 2023 ECG      I ordered each unique test  Tests reviewed personally by me:  ECG: See my ED course  Labs: See above  Imaging: I independently reviewed the chest x-ray as noted above    Discussion with other providers: Admitting medicine physician    RISK    Treatment:  Patient admitted      Critical Care Time  CriticalCare Time    Date/Time: 12/26/2023 3:28 PM    Performed by: Georgi Morales DO  Authorized by: Georgi Morales DO    Critical care provider statement:     Critical care time (minutes):  35    Critical care time was exclusive of:  Separately billable procedures and treating other patients and teaching time    Critical care was necessary to treat or prevent imminent or life-threatening deterioration of the following conditions:  Sepsis    Critical care was time spent personally by me on the following activities:  Obtaining history from patient or surrogate, development of treatment plan with patient or surrogate, discussions with primary provider, evaluation of  patient's response to treatment, examination of patient, review of old charts, re-evaluation of patient's condition, ordering and review of radiographic studies, ordering and review of laboratory studies and ordering and performing treatments and interventions    I assumed direction of critical care for this patient from another provider in my specialty: no

## 2024-01-02 ENCOUNTER — PATIENT MESSAGE (OUTPATIENT)
Dept: CARDIOLOGY | Facility: CLINIC | Age: 39
End: 2024-01-02
Payer: MEDICAID

## 2024-01-02 DIAGNOSIS — R00.2 PALPITATIONS: ICD-10-CM

## 2024-01-02 NOTE — TELEPHONE ENCOUNTER
Rx Refill Note  Requested Prescriptions     Pending Prescriptions Disp Refills    metoprolol tartrate (LOPRESSOR) 25 MG tablet 30 tablet 0     Sig: Take 0.5 tablets by mouth 2 (Two) Times a Day.      Last office visit with prescribing clinician: 1/23/2023   Last telemedicine visit with prescribing clinician: Visit date not found   Next office visit with prescribing clinician: Visit date not found                         Would you like a call back once the refill request has been completed: [] Yes [] No    If the office needs to give you a call back, can they leave a voicemail: [] Yes [] No    Kylee Johnson MA  01/02/24, 08:04 EST

## 2024-01-05 ENCOUNTER — OFFICE VISIT (OUTPATIENT)
Dept: BARIATRICS/WEIGHT MGMT | Facility: CLINIC | Age: 39
End: 2024-01-05
Payer: MEDICAID

## 2024-01-05 VITALS
HEIGHT: 63 IN | DIASTOLIC BLOOD PRESSURE: 89 MMHG | BODY MASS INDEX: 37.32 KG/M2 | WEIGHT: 210.6 LBS | OXYGEN SATURATION: 98 % | HEART RATE: 72 BPM | SYSTOLIC BLOOD PRESSURE: 143 MMHG

## 2024-01-05 DIAGNOSIS — Z98.84 H/O GASTRIC BYPASS: ICD-10-CM

## 2024-01-05 DIAGNOSIS — E66.9 OBESITY, CLASS II, BMI 35-39.9: Primary | ICD-10-CM

## 2024-01-05 PROCEDURE — 1159F MED LIST DOCD IN RCRD: CPT | Performed by: NURSE PRACTITIONER

## 2024-01-05 PROCEDURE — 3077F SYST BP >= 140 MM HG: CPT | Performed by: NURSE PRACTITIONER

## 2024-01-05 PROCEDURE — 3079F DIAST BP 80-89 MM HG: CPT | Performed by: NURSE PRACTITIONER

## 2024-01-05 PROCEDURE — 1160F RVW MEDS BY RX/DR IN RCRD: CPT | Performed by: NURSE PRACTITIONER

## 2024-01-05 PROCEDURE — 99213 OFFICE O/P EST LOW 20 MIN: CPT | Performed by: NURSE PRACTITIONER

## 2024-01-05 RX ORDER — URSODIOL 300 MG/1
300 CAPSULE ORAL 2 TIMES DAILY
COMMUNITY

## 2024-01-05 RX ORDER — HYDROCHLOROTHIAZIDE 12.5 MG/1
CAPSULE, GELATIN COATED ORAL
COMMUNITY
Start: 2024-01-02

## 2024-01-05 NOTE — PROGRESS NOTES
[facility] BARIATRIC SURGERY  [address] [place] [address]  [address] [place] [address]  Dept: [phone]  1/5/2024      Ariadna Ambrocio.  [id number]  [id number]  1985  female    Date of last surgery: 9/27/2023Gastric Bypass And Hiatal Hernia Repair Laparoscopic With Davinci Robot      Chief Complaint: BH Post-Op Bariatric Surgery:   Ariadna Ambrocio is status post procedure listed above  HPI:     Wt Readings from Last 10 Encounters:   01/05/24 95.5 kg (210 lb 9.6 oz)   11/06/23 102 kg (224 lb)   10/02/23 109 kg (240 lb)   09/27/23 110 kg (242 lb 11.2 oz)   09/01/23 113 kg (249 lb 6.4 oz)   08/28/23 112 kg (246 lb)   07/10/23 109 kg (240 lb)   06/16/23 108 kg (239 lb)   06/02/23 108 kg (239 lb)   05/08/23 107 kg (236 lb)        Today's weight is 95.5 kg (210 lb 9.6 oz) pounds,BMI 37.31 has a  loss of 14 pounds since the last visit and  weight loss since surgery is 32 pounds. The patient reports a decreased portion size and loss of appetite.      Ariadna Ambrocio denies nausea and vomiting, no GERD      Diet and Exercise: Diet history reviewed and discussed with the patient. Weight loss/gains to date discussed with the patient.     She reports eating 2 meals per day, a typical portion size of 1/2-1 cup, eating 1 snacks per day, drinking 8 or more 8-oz. glasses of water per day, no carbonated beverage consumption and exercising regularly.       The patient states they are eating  60+ grams of protein per day.     Breakfast: coffee with protein shake   Lunch: usually nothing, apples maybe  Dinner:cooked meat, chicken, eating protein first  Snacks: apple, usually no snacks - few pieces of candy recently   Drinks: coffee, protein shake, water  Exercise: elliptical 10 minutes at a time , insta cart on weekend     Multi vitamin - fusion one a day       Review of Systems   Constitutional:  Positive for activity change and appetite change.    Respiratory: Negative.     Cardiovascular: Negative.    Gastrointestinal: Negative.    Musculoskeletal: Negative.          Patient Active Problem List   Diagnosis    Ureterolithiasis    Chest pain    Dyspnea on exertion    Paresthesia    Hypertension    Bradycardia, sinus    Paroxysmal SVT (supraventricular tachycardia)    Obesity, Class III, BMI 40-49.9 (morbid obesity)    Gastroesophageal reflux disease    At risk for dehydration due to poor fluid intake    H/O gastric sleeve    Obesity, Class II, BMI 35-39.9       Past Medical History:   Diagnosis Date    Anxiety     Bulging lumbar disc     Constipation     Depression     Diarrhea     Elevated triglycerides with high cholesterol     Heartburn     Herniated cervical disc     Hiatal hernia     Hypertension     IBS (irritable bowel syndrome)     Kidney stones     Nausea     Pre-diabetes     Rapid heart beat     Seasonal allergies      Past Surgical History:   Procedure Laterality Date    CYSTOSCOPY, URETEROSCOPY, RETROGRADE PYELOGRAM, STENT INSERTION Left 07/16/2021    Procedure: CYSTOSCOPY URETEROSCOPY RETROGRADE PYELOGRAM HOLMIUM LASER BASKET STONE EXTRACTION STENT INSERTION;  Surgeon: Carmelo Nguyễn MD;  Location: [facility] MAIN OR;  Service: Urology;  Laterality: Left;    ENDOSCOPY N/A 2/27/2023    Procedure: ESOPHAGOGASTRODUODENOSCOPY with gastric antrum biopsy;  Surgeon: Otilia Capone MD;  Location: [facility] ENDOSCOPY;  Service: General;  Laterality: N/A;  Post: large hiatal hernia    GASTRIC BYPASS N/A 9/27/2023    Procedure: GASTRIC BYPASS AND HIATAL HERNIA REPAIR LAPAROSCOPIC WITH DAVINCI ROBOT;  Surgeon: Otilia Capone MD;  Location: [facility] MAIN OR;  Service: Robotics - DaVinci;  Laterality: N/A;    COLONOSCOPY      EAR TUBES      KIDNEY STONE SURGERY      WISDOM TOOTH EXTRACTION        The following portions of the patient's history were reviewed and updated as appropriate: allergies, current medications, past medical history, past social history, past surgical  history, and problem list.        Physical Exam  Constitutional:       Appearance: Normal appearance. She is obese.   Pulmonary:      Effort: Pulmonary effort is normal.   Abdominal:      General: Abdomen is flat.      Palpations: Abdomen is soft.   Skin:     General: Skin is warm and dry.   Neurological:      General: No focal deficit present.      Mental Status: She is alert and oriented to person, place, and time.   Psychiatric:         Mood and Affect: Mood normal.         Behavior: Behavior normal.         Thought Content: Thought content normal.         Judgment: Judgment normal.             Assessment:   BMI 37.31, class 2 obesity, 3 month gastric bypass    Post-op, the patient is doing well. She is getting in 60 grams of protein in her diet a day. She does work at home but is also doing insta cart and on those day is getting in 10,000 steps a day. Encourage resistance training and exercise 2-3 days a week for 20-30 minutes at a time. Plan to follow up in 3 months with labs then.      Plan:     Encouraged patient to be sure to get plenty of lean protein per day through small frequent meals all with a protein source.   Activity restrictions: none.   Recommended patient be sure to get at least 70 grams of protein per day by eating small, frequent meals all with high lean protein choices. Be sure to limit/cut back on daily carbohydrate intake. Discussed with the patient the recommended amount of water per day to intake- half of body weight in ounces. Reviewed vitamin requirements. Be sure to do routine exercise, 150 minutes per week minimum, including both cardio and strength training.     Instructions / Recommendations: dietary counseling recommended, recommended a daily protein intake of  grams, vitamin supplement(s) recommended, recommended exercising at least 150 minutes per week, behavior modifications recommended and instructed to call the office for concerns, questions, or problems.     The patient  was instructed to follow up in 3 months.     The patient was counseled regarding diet and exercise. Total time spent face to face was 20 minutes and 15 minutes was spent counseling.     SAM Islas  [facility]

## 2024-01-23 ENCOUNTER — OFFICE VISIT (OUTPATIENT)
Dept: CARDIOLOGY | Facility: CLINIC | Age: 39
End: 2024-01-23
Payer: MEDICAID

## 2024-01-23 VITALS
BODY MASS INDEX: 36.68 KG/M2 | HEART RATE: 79 BPM | OXYGEN SATURATION: 99 % | HEIGHT: 63 IN | WEIGHT: 207 LBS | DIASTOLIC BLOOD PRESSURE: 81 MMHG | SYSTOLIC BLOOD PRESSURE: 121 MMHG

## 2024-01-23 DIAGNOSIS — I47.10 PAROXYSMAL SVT (SUPRAVENTRICULAR TACHYCARDIA): ICD-10-CM

## 2024-01-23 DIAGNOSIS — I10 PRIMARY HYPERTENSION: ICD-10-CM

## 2024-01-23 DIAGNOSIS — R00.2 PALPITATIONS: Primary | ICD-10-CM

## 2024-01-23 PROCEDURE — 1159F MED LIST DOCD IN RCRD: CPT | Performed by: INTERNAL MEDICINE

## 2024-01-23 PROCEDURE — 99213 OFFICE O/P EST LOW 20 MIN: CPT | Performed by: INTERNAL MEDICINE

## 2024-01-23 PROCEDURE — 3074F SYST BP LT 130 MM HG: CPT | Performed by: INTERNAL MEDICINE

## 2024-01-23 PROCEDURE — 3079F DIAST BP 80-89 MM HG: CPT | Performed by: INTERNAL MEDICINE

## 2024-01-23 PROCEDURE — 1160F RVW MEDS BY RX/DR IN RCRD: CPT | Performed by: INTERNAL MEDICINE

## 2024-01-23 NOTE — PROGRESS NOTES
Subjective:     Encounter Date:01/23/2024      Patient ID: Ariadna Ambrocio is a 38 y.o. female.    Chief Complaint:  History of Present Illness 38-year-old white female with history of morbid obesity status post gastric sleeve surgery history of hypertension paroxysmal SVT in the past presents to my office for follow-up.  Patient is currently stable without any symptoms of chest pain or shortness of breath at rest or exertion.  No complains any PND orthopnea.  No palpitation dizziness syncope or swelling of the feet.  Patient is taking all her medicines regularly.  Patient does not smoke.    The following portions of the patient's history were reviewed and updated as appropriate: allergies, current medications, past family history, past medical history, past social history, past surgical history, and problem list.  Past Medical History:   Diagnosis Date    Anxiety     Bulging lumbar disc     Constipation     Depression     Diarrhea     Elevated triglycerides with high cholesterol     Heartburn     Herniated cervical disc     Hiatal hernia     Hyperlipidemia     Hypertension     IBS (irritable bowel syndrome)     Kidney stones     Nausea     Pre-diabetes     Rapid heart beat     Seasonal allergies      Past Surgical History:   Procedure Laterality Date    COLONOSCOPY      CYSTOSCOPY, URETEROSCOPY, RETROGRADE PYELOGRAM, STENT INSERTION Left 07/16/2021    Procedure: CYSTOSCOPY URETEROSCOPY RETROGRADE PYELOGRAM HOLMIUM LASER BASKET STONE EXTRACTION STENT INSERTION;  Surgeon: Carmelo Nguyễn MD;  Location: [facility] MAIN OR;  Service: Urology;  Laterality: Left;    EAR TUBES      ENDOSCOPY N/A 2/27/2023    Procedure: ESOPHAGOGASTRODUODENOSCOPY with gastric antrum biopsy;  Surgeon: Otilia Capone MD;  Location: [facility] ENDOSCOPY;  Service: General;  Laterality: N/A;  Post: large hiatal hernia    GASTRIC BYPASS N/A 9/27/2023    Procedure: GASTRIC BYPASS AND HIATAL HERNIA REPAIR LAPAROSCOPIC WITH DAVINCI ROBOT;  Surgeon: Estelita  "MD Otilia;  Location: [facility] MAIN OR;  Service: Robotics - DaVinci;  Laterality: N/A;    KIDNEY STONE SURGERY      WISDOM TOOTH EXTRACTION       /81 (BP Location: Right arm, Patient Position: Sitting, Cuff Size: Adult)   Pulse 79   Ht 160 cm (63\")   Wt 93.9 kg (207 lb)   SpO2 99%   BMI 36.67 kg/m²   Family History   Problem Relation Age of Onset    Asthma Mother     Hypertension Mother     No Known Problems Father     Hypertension Maternal Grandmother     Heart attack Maternal Grandfather     Cancer Paternal Grandmother     Cancer Paternal Grandfather     Breast cancer Neg Hx     Ovarian cancer Neg Hx     Uterine cancer Neg Hx     Colon cancer Neg Hx     Deep vein thrombosis Neg Hx     Pulmonary embolism Neg Hx        Current Outpatient Medications:     ursodiol (ACTIGALL) 250 MG tablet, Take 1 tablet by mouth 2 (Two) Times a Day., Disp: , Rfl:     cholecalciferol (VITAMIN D3) 25 MCG (1000 UT) tablet, Take 1 tablet by mouth Daily. (Patient not taking: Reported on 1/23/2024), Disp: , Rfl:     hydroCHLOROthiazide (MICROZIDE) 12.5 MG capsule, , Disp: , Rfl:     HYDROmorphone (Dilaudid) 2 MG tablet, Take 1 tablet by mouth Every 12 (Twelve) Hours As Needed for Severe Pain. (Patient not taking: Reported on 1/23/2024), Disp: 8 tablet, Rfl: 0    metoprolol tartrate (LOPRESSOR) 25 MG tablet, Take 0.5 tablets by mouth 2 (Two) Times a Day. (Patient not taking: Reported on 1/23/2024), Disp: 30 tablet, Rfl: 0    omeprazole (priLOSEC) 40 MG capsule, Take 1 capsule by mouth 2 (Two) Times a Day. (Patient not taking: Reported on 1/23/2024), Disp: , Rfl:     ondansetron ODT (ZOFRAN-ODT) 8 MG disintegrating tablet, Place 1 tablet on the tongue Every 8 (Eight) Hours As Needed for Nausea or Vomiting. (Patient not taking: Reported on 1/23/2024), Disp: 30 tablet, Rfl: 5    potassium chloride (K-DUR,KLOR-CON) 10 MEQ CR tablet, Take 1 tablet by mouth Daily. (Patient not taking: Reported on 1/23/2024), Disp: 30 tablet, Rfl: 0    " potassium chloride 10 MEQ CR tablet, Take 1 tablet by mouth Daily. (Patient not taking: Reported on 1/23/2024), Disp: 30 tablet, Rfl: 0    sertraline (ZOLOFT) 50 MG tablet, , Disp: , Rfl:   No Known Allergies  Social History     Socioeconomic History    Marital status:    Tobacco Use    Smoking status: Never     Passive exposure: Past    Smokeless tobacco: Never   Vaping Use    Vaping Use: Never used   Substance and Sexual Activity    Alcohol use: Yes     Comment: 2-3 drinks monthly    Drug use: Never    Sexual activity: Yes     Partners: Male     Birth control/protection: I.U.D., Same-sex partner     Review of Systems   Constitutional: Negative for malaise/fatigue.   Cardiovascular:  Negative for chest pain, dyspnea on exertion, leg swelling and palpitations.   Respiratory:  Negative for cough and shortness of breath.    Gastrointestinal:  Negative for abdominal pain, nausea and vomiting.   Neurological:  Negative for dizziness, focal weakness, headaches, light-headedness and numbness.   All other systems reviewed and are negative.             Objective:     Constitutional:       Appearance: Well-developed.   Eyes:      General: No scleral icterus.     Conjunctiva/sclera: Conjunctivae normal.   HENT:      Head: Normocephalic and atraumatic.   Neck:      Vascular: No carotid bruit or JVD.   Pulmonary:      Effort: Pulmonary effort is normal.      Breath sounds: Normal breath sounds. No wheezing. No rales.   Cardiovascular:      Normal rate. Regular rhythm.   Pulses:     Intact distal pulses.   Abdominal:      General: Bowel sounds are normal.      Palpations: Abdomen is soft.   Musculoskeletal:      Cervical back: Normal range of motion and neck supple. Skin:     General: Skin is warm and dry.      Findings: No rash.   Neurological:      Mental Status: Alert.       Procedures    Lab Review:         MDM    #1 palpitations with paroxysmal SVT  Patient had history of SVT in the past but does not having any more  symptoms and is not taking any medications at this time including beta-blockers  2.  Hypertension  Patient lost weight and since then had blood pressures in the normal range and hence is not taking her blood pressure medicines.    Patient's previous medical records, labs, and EKG were reviewed and discussed with the patient at today's visit.

## 2024-01-29 DIAGNOSIS — R00.2 PALPITATIONS: ICD-10-CM

## 2024-01-29 NOTE — TELEPHONE ENCOUNTER
Rx Refill Note  Requested Prescriptions     Pending Prescriptions Disp Refills    metoprolol tartrate (LOPRESSOR) 25 MG tablet 30 tablet 3     Sig: Take 0.5 tablets by mouth 2 (Two) Times a Day.      Last office visit with prescribing clinician: 1/23/2023   Last telemedicine visit with prescribing clinician: Visit date not found   Next office visit with prescribing clinician: Visit date not found                         Would you like a call back once the refill request has been completed: [] Yes [] No    If the office needs to give you a call back, can they leave a voicemail: [] Yes [] No    Megan Chi MA  01/29/24, 09:49 EST

## 2024-02-07 ENCOUNTER — HOSPITAL ENCOUNTER (EMERGENCY)
Facility: HOSPITAL | Age: 39
Discharge: HOME | End: 2024-02-07
Attending: EMERGENCY MEDICINE | Admitting: EMERGENCY MEDICINE
Payer: MEDICAID

## 2024-02-07 ENCOUNTER — APPOINTMENT (OUTPATIENT)
Dept: ULTRASOUND IMAGING | Facility: HOSPITAL | Age: 39
End: 2024-02-07
Payer: MEDICAID

## 2024-02-07 VITALS
RESPIRATION RATE: 17 BRPM | TEMPERATURE: 98 F | DIASTOLIC BLOOD PRESSURE: 87 MMHG | OXYGEN SATURATION: 95 % | BODY MASS INDEX: 35.86 KG/M2 | SYSTOLIC BLOOD PRESSURE: 126 MMHG | HEIGHT: 63 IN | HEART RATE: 69 BPM | WEIGHT: 202.38 LBS

## 2024-02-07 DIAGNOSIS — K29.00 ACUTE SUPERFICIAL GASTRITIS WITHOUT HEMORRHAGE: ICD-10-CM

## 2024-02-07 DIAGNOSIS — R10.13 EPIGASTRIC PAIN: Primary | ICD-10-CM

## 2024-02-07 DIAGNOSIS — N39.0 ACUTE URINARY TRACT INFECTION: ICD-10-CM

## 2024-02-07 DIAGNOSIS — N34.2 URETHRITIS: ICD-10-CM

## 2024-02-07 LAB
ALBUMIN SERPL-MCNC: 4.2 G/DL (ref 3.5–5.2)
ALBUMIN/GLOB SERPL: 1.5 G/DL
ALP SERPL-CCNC: 104 U/L (ref 39–117)
ALT SERPL W P-5'-P-CCNC: 14 U/L (ref 1–33)
ANION GAP SERPL CALCULATED.3IONS-SCNC: 13 MMOL/L (ref 5–15)
AST SERPL-CCNC: 18 U/L (ref 1–32)
B-HCG UR QL: NEGATIVE
BACTERIA UR QL AUTO: ABNORMAL /HPF
BASOPHILS # BLD AUTO: 0.1 10*3/MM3 (ref 0–0.2)
BASOPHILS NFR BLD AUTO: 0.6 % (ref 0–1.5)
BILIRUB SERPL-MCNC: 0.8 MG/DL (ref 0–1.2)
BILIRUB UR QL STRIP: ABNORMAL
BUN SERPL-MCNC: 8 MG/DL (ref 6–20)
BUN/CREAT SERPL: 10.5 (ref 7–25)
CALCIUM SPEC-SCNC: 9.5 MG/DL (ref 8.6–10.5)
CHLORIDE SERPL-SCNC: 106 MMOL/L (ref 98–107)
CLARITY UR: CLEAR
CO2 SERPL-SCNC: 20 MMOL/L (ref 22–29)
COLOR UR: ABNORMAL
CREAT SERPL-MCNC: 0.76 MG/DL (ref 0.57–1)
DEPRECATED RDW RBC AUTO: 46.8 FL (ref 37–54)
EGFRCR SERPLBLD CKD-EPI 2021: 103 ML/MIN/1.73
EOSINOPHIL # BLD AUTO: 0.2 10*3/MM3 (ref 0–0.4)
EOSINOPHIL NFR BLD AUTO: 2.4 % (ref 0.3–6.2)
ERYTHROCYTE [DISTWIDTH] IN BLOOD BY AUTOMATED COUNT: 14.7 % (ref 12.3–15.4)
GLOBULIN UR ELPH-MCNC: 2.8 GM/DL
GLUCOSE SERPL-MCNC: 107 MG/DL (ref 65–99)
GLUCOSE UR STRIP-MCNC: NEGATIVE MG/DL
HCT VFR BLD AUTO: 35.6 % (ref 34–46.6)
HGB BLD-MCNC: 11.5 G/DL (ref 12–15.9)
HGB UR QL STRIP.AUTO: ABNORMAL
HOLD SPECIMEN: NORMAL
HOLD SPECIMEN: NORMAL
HYALINE CASTS UR QL AUTO: ABNORMAL /LPF
KETONES UR QL STRIP: NEGATIVE
LEUKOCYTE ESTERASE UR QL STRIP.AUTO: ABNORMAL
LIPASE SERPL-CCNC: 34 U/L (ref 13–60)
LYMPHOCYTES # BLD AUTO: 2.9 10*3/MM3 (ref 0.7–3.1)
LYMPHOCYTES NFR BLD AUTO: 33.5 % (ref 19.6–45.3)
MCH RBC QN AUTO: 27.9 PG (ref 26.6–33)
MCHC RBC AUTO-ENTMCNC: 32.3 G/DL (ref 31.5–35.7)
MCV RBC AUTO: 86.3 FL (ref 79–97)
MONOCYTES # BLD AUTO: 0.7 10*3/MM3 (ref 0.1–0.9)
MONOCYTES NFR BLD AUTO: 7.7 % (ref 5–12)
NEUTROPHILS NFR BLD AUTO: 4.8 10*3/MM3 (ref 1.7–7)
NEUTROPHILS NFR BLD AUTO: 55.8 % (ref 42.7–76)
NITRITE UR QL STRIP: POSITIVE
NRBC BLD AUTO-RTO: 0 /100 WBC (ref 0–0.2)
PH UR STRIP.AUTO: <=5 [PH] (ref 5–8)
PLATELET # BLD AUTO: 308 10*3/MM3 (ref 140–450)
PMV BLD AUTO: 8.9 FL (ref 6–12)
POTASSIUM SERPL-SCNC: 3.7 MMOL/L (ref 3.5–5.2)
PROT SERPL-MCNC: 7 G/DL (ref 6–8.5)
PROT UR QL STRIP: ABNORMAL
RBC # BLD AUTO: 4.12 10*6/MM3 (ref 3.77–5.28)
RBC # UR STRIP: ABNORMAL /HPF
REF LAB TEST METHOD: ABNORMAL
SODIUM SERPL-SCNC: 139 MMOL/L (ref 136–145)
SP GR UR STRIP: 1.02 (ref 1–1.03)
SQUAMOUS #/AREA URNS HPF: ABNORMAL /HPF
UROBILINOGEN UR QL STRIP: ABNORMAL
WBC # UR STRIP: ABNORMAL /HPF
WBC NRBC COR # BLD AUTO: 8.6 10*3/MM3 (ref 3.4–10.8)
WHOLE BLOOD HOLD COAG: NORMAL
WHOLE BLOOD HOLD SPECIMEN: NORMAL

## 2024-02-07 PROCEDURE — 85025 COMPLETE CBC W/AUTO DIFF WBC: CPT | Performed by: EMERGENCY MEDICINE

## 2024-02-07 PROCEDURE — 99284 EMERGENCY DEPT VISIT MOD MDM: CPT

## 2024-02-07 PROCEDURE — 25010000002 HYDROMORPHONE 1 MG/ML SOLUTION: Performed by: EMERGENCY MEDICINE

## 2024-02-07 PROCEDURE — 81001 URINALYSIS AUTO W/SCOPE: CPT | Performed by: EMERGENCY MEDICINE

## 2024-02-07 PROCEDURE — 76705 ECHO EXAM OF ABDOMEN: CPT

## 2024-02-07 PROCEDURE — 83690 ASSAY OF LIPASE: CPT | Performed by: EMERGENCY MEDICINE

## 2024-02-07 PROCEDURE — 96374 THER/PROPH/DIAG INJ IV PUSH: CPT

## 2024-02-07 PROCEDURE — 25010000002 CEFTRIAXONE PER 250 MG: Performed by: EMERGENCY MEDICINE

## 2024-02-07 PROCEDURE — 96375 TX/PRO/DX INJ NEW DRUG ADDON: CPT

## 2024-02-07 PROCEDURE — 25010000002 ONDANSETRON PER 1 MG: Performed by: EMERGENCY MEDICINE

## 2024-02-07 PROCEDURE — 80053 COMPREHEN METABOLIC PANEL: CPT | Performed by: EMERGENCY MEDICINE

## 2024-02-07 PROCEDURE — 25810000003 LACTATED RINGERS SOLUTION: Performed by: EMERGENCY MEDICINE

## 2024-02-07 PROCEDURE — 81025 URINE PREGNANCY TEST: CPT | Performed by: EMERGENCY MEDICINE

## 2024-02-07 RX ORDER — AZITHROMYCIN 250 MG/1
1000 TABLET, FILM COATED ORAL ONCE
Status: COMPLETED | OUTPATIENT
Start: 2024-02-07 | End: 2024-02-07

## 2024-02-07 RX ORDER — ONDANSETRON 8 MG/1
8 TABLET, ORALLY DISINTEGRATING ORAL EVERY 8 HOURS PRN
Qty: 12 TABLET | Refills: 0 | Status: SHIPPED | OUTPATIENT
Start: 2024-02-07

## 2024-02-07 RX ORDER — CEFDINIR 300 MG/1
300 CAPSULE ORAL 2 TIMES DAILY
Qty: 10 CAPSULE | Refills: 0 | Status: SHIPPED | OUTPATIENT
Start: 2024-02-07

## 2024-02-07 RX ORDER — ONDANSETRON 2 MG/ML
4 INJECTION INTRAMUSCULAR; INTRAVENOUS ONCE
Status: COMPLETED | OUTPATIENT
Start: 2024-02-07 | End: 2024-02-07

## 2024-02-07 RX ORDER — PHENAZOPYRIDINE HYDROCHLORIDE 200 MG/1
200 TABLET, FILM COATED ORAL 3 TIMES DAILY PRN
Qty: 6 TABLET | Refills: 0 | Status: SHIPPED | OUTPATIENT
Start: 2024-02-07

## 2024-02-07 RX ORDER — OMEPRAZOLE 40 MG/1
40 CAPSULE, DELAYED RELEASE ORAL DAILY
Qty: 30 CAPSULE | Refills: 0 | Status: SHIPPED | OUTPATIENT
Start: 2024-02-07

## 2024-02-07 RX ORDER — FAMOTIDINE 10 MG/ML
20 INJECTION, SOLUTION INTRAVENOUS ONCE
Status: COMPLETED | OUTPATIENT
Start: 2024-02-07 | End: 2024-02-07

## 2024-02-07 RX ADMIN — SODIUM CHLORIDE, POTASSIUM CHLORIDE, SODIUM LACTATE AND CALCIUM CHLORIDE 500 ML: 600; 310; 30; 20 INJECTION, SOLUTION INTRAVENOUS at 14:15

## 2024-02-07 RX ADMIN — HYDROMORPHONE HYDROCHLORIDE 0.25 MG: 1 INJECTION, SOLUTION INTRAMUSCULAR; INTRAVENOUS; SUBCUTANEOUS at 14:16

## 2024-02-07 RX ADMIN — ONDANSETRON 4 MG: 2 INJECTION INTRAMUSCULAR; INTRAVENOUS at 14:16

## 2024-02-07 RX ADMIN — FAMOTIDINE 20 MG: 10 INJECTION INTRAVENOUS at 14:16

## 2024-02-07 RX ADMIN — CEFTRIAXONE 2000 MG: 2 INJECTION, POWDER, FOR SOLUTION INTRAMUSCULAR; INTRAVENOUS at 14:53

## 2024-02-07 RX ADMIN — AZITHROMYCIN DIHYDRATE 1000 MG: 250 TABLET ORAL at 14:53

## 2024-02-07 NOTE — ED PROVIDER NOTES
Subjective   History of Present Illness  38-year-old female complaining of pelvic pain.  She reports that she has had burning with urination as well as frequency.  She reports subjective fever as well as chills.  She states that this began going on for 3 or 4 days.  She also states she intermittently has right upper quadrant pain after she eats.  She reports no melena hematemesis or hematochezia.  She denies hematuria      Review of Systems   Constitutional:  Positive for chills, fatigue and fever.   Gastrointestinal:  Positive for nausea.   Genitourinary:  Positive for dysuria and frequency. Negative for flank pain.   Musculoskeletal:  Negative for back pain and neck pain.       Past Medical History:   Diagnosis Date    Anxiety     Bulging lumbar disc     Constipation     Depression     Diarrhea     Elevated triglycerides with high cholesterol     Heartburn     Herniated cervical disc     Hiatal hernia     Hyperlipidemia     Hypertension     IBS (irritable bowel syndrome)     Kidney stones     Nausea     Pre-diabetes     Rapid heart beat     Seasonal allergies        No Known Allergies    Past Surgical History:   Procedure Laterality Date    COLONOSCOPY      CYSTOSCOPY, URETEROSCOPY, RETROGRADE PYELOGRAM, STENT INSERTION Left 07/16/2021    Procedure: CYSTOSCOPY URETEROSCOPY RETROGRADE PYELOGRAM HOLMIUM LASER BASKET STONE EXTRACTION STENT INSERTION;  Surgeon: Carmelo Nguyễn MD;  Location: [facility] OR;  Service: Urology;  Laterality: Left;    EAR TUBES      ENDOSCOPY N/A 2/27/2023    Procedure: ESOPHAGOGASTRODUODENOSCOPY with gastric antrum biopsy;  Surgeon: Otilia Capone MD;  Location: [facility] ENDOSCOPY;  Service: General;  Laterality: N/A;  Post: large hiatal hernia    GASTRIC BYPASS N/A 9/27/2023    Procedure: GASTRIC BYPASS AND HIATAL HERNIA REPAIR LAPAROSCOPIC WITH DAVINCI ROBOT;  Surgeon: Otilia Capone MD;  Location: [facility] MAIN OR;  Service: Robotics - DaVinci;  Laterality: N/A;    KIDNEY STONE SURGERY       WISDOM TOOTH EXTRACTION         Family History   Problem Relation Age of Onset    Asthma Mother     Hypertension Mother     No Known Problems Father     Hypertension Maternal Grandmother     Heart attack Maternal Grandfather     Cancer Paternal Grandmother     Cancer Paternal Grandfather     Breast cancer Neg Hx     Ovarian cancer Neg Hx     Uterine cancer Neg Hx     Colon cancer Neg Hx     Deep vein thrombosis Neg Hx     Pulmonary embolism Neg Hx        Social History     Socioeconomic History    Marital status:    Tobacco Use    Smoking status: Never     Passive exposure: Past    Smokeless tobacco: Never   Vaping Use    Vaping Use: Never used   Substance and Sexual Activity    Alcohol use: Yes     Comment: 2-3 drinks monthly    Drug use: Never    Sexual activity: Yes     Partners: Male     Birth control/protection: I.U.D., Same-sex partner     Reports no unusual food water travel or activity      Objective   Physical Exam  Alert [place] Coma Scale 15   HEENT: Pupils equal and reactive to light. Conjunctivae are not injected. Normal tympanic membranes. Oropharynx and nares are normal.   Neck: Supple. Midline trachea. No JVD. No goiter.   Chest: Clear and equal breath sounds bilaterally, regular rate and rhythm without murmur or rub.   Abdomen: Positive bowel sounds, epigastric discomfort is noted, equivocal Garrido sign, the abdomen is obese but nondistended. No rebound or peritoneal signs. No CVA tenderness.   Extremities no clubbing. cyanosis or edema. Motor sensory exam is normal. The full range of motion is intact   Skin: Warm and dry, no rashes or petechia.   Lymphatic: No regional lymphadenopathy. No calf pain, swelling or Homans sign    Procedures           ED Course                                   Labs Reviewed   COMPREHENSIVE METABOLIC PANEL - Abnormal; Notable for the following components:       Result Value    Glucose 107 (*)     CO2 20.0 (*)     All other components within normal limits     Narrative:     GFR Normal >60  Chronic Kidney Disease <60  Kidney Failure <15     URINALYSIS W/ CULTURE IF INDICATED - Abnormal; Notable for the following components:    Color, UA Orange (*)     Bilirubin, UA Small (1+) (*)     Blood, UA Trace (*)     Protein, UA Trace (*)     Leuk Esterase, UA Moderate (2+) (*)     Nitrite, UA Positive (*)     All other components within normal limits    Narrative:     In absence of clinical symptoms, the presence of pyuria, bacteria, and/or nitrites on the urinalysis result does not correlate with infection.   CBC WITH AUTO DIFFERENTIAL - Abnormal; Notable for the following components:    Hemoglobin 11.5 (*)     All other components within normal limits   URINALYSIS, MICROSCOPIC ONLY - Abnormal; Notable for the following components:    RBC, UA 3-5 (*)     WBC, UA 3-5 (*)     Squamous Epithelial Cells, UA 3-6 (*)     All other components within normal limits   LIPASE - Normal   PREGNANCY, URINE - Normal   RAINBOW DRAW    Narrative:     The following orders were created for panel order [place] Draw.  Procedure                               Abnormality         Status                     ---------                               -----------         ------                     Green Top (Gel)[[id number]]                                  Final result               Lavender Top[[id number]]                                     Final result               Gold Top - SST[[id number]]                                   Final result               Light Blue Top[[id number]]                                   Final result                 Please view results for these tests on the individual orders.   GREEN TOP   LAVENDER TOP   GOLD TOP - SST   LIGHT BLUE TOP   CBC AND DIFFERENTIAL    Narrative:     The following orders were created for panel order CBC & Differential.  Procedure                               Abnormality         Status                     ---------                               -----------          ------                     CBC Auto Differential[[id number]]        Abnormal            Final result                 Please view results for these tests on the individual orders.     Medications   cefTRIAXone (ROCEPHIN) 2,000 mg in sodium chloride 0.9 % 100 mL IVPB (has no administration in time range)   azithromycin (ZITHROMAX) tablet 1,000 mg (has no administration in time range)   lactated ringers bolus 500 mL (500 mL Intravenous New Bag 2/7/24 1415)   ondansetron (ZOFRAN) injection 4 mg (4 mg Intravenous Given 2/7/24 1416)   HYDROmorphone (DILAUDID) injection 0.25 mg (0.25 mg Intravenous Given 2/7/24 1416)   famotidine (PEPCID) injection 20 mg (20 mg Intravenous Given 2/7/24 1416)     US Abdomen Limited    Result Date: 2/7/2024  Impression: Right renal cyst. Otherwise, unremarkable right upper quadrant abdominal ultrasound. Electronically Signed: Aurelia Brandon MD  2/7/2024 1:40 PM EST  Workstation ID: [id number] \\\        ED overflow overcrowding conditions          Medical Decision Making  The patient was discharged a prescription for cefdinir, Zofran, omeprazole, Pyridium.  The patient was stable at discharge and vocalized understanding of discharge instructions warnings    Amount and/or Complexity of Data Reviewed  Labs: ordered. Decision-making details documented in ED Course.  Radiology: ordered and independent interpretation performed.    Risk  OTC drugs.  Prescription drug management.        Final diagnoses:   Epigastric pain   Acute superficial gastritis without hemorrhage   Acute urinary tract infection   Urethritis       ED Disposition  ED Disposition       ED Disposition   Discharge    Condition   Stable    Comment   --               Marifer Cooper MD  [address] [place] [id number] [phone]               Medication List        New Prescriptions      cefdinir 300 MG capsule  Commonly known as: OMNICEF  Take 1 capsule by mouth 2 (Two) Times a Day.     phenazopyridine 200  MG tablet  Commonly known as: PYRIDIUM  Take 1 tablet by mouth 3 (Three) Times a Day As Needed for Bladder Spasms or Dysuria.            Changed      omeprazole 40 MG capsule  Commonly known as: priLOSEC  Take 1 capsule by mouth Daily.  What changed: when to take this               Where to Get Your Medications        These medications were sent to [facility]/pharmacy #[id number] - [place], [place] - [address] - [phone]  - [phone]   [address] [facility] [place] [address]      Phone: [phone]   cefdinir 300 MG capsule  omeprazole 40 MG capsule  ondansetron ODT 8 MG disintegrating tablet  phenazopyridine 200 MG tablet            Derrek Muller MD  02/07/24 0214

## 2024-02-07 NOTE — Clinical Note
[facility] EMERGENCY DEPARTMENT  [address] [place] [address]  Phone: [phone]    Ariadna Ambrocio was seen and treated in our emergency department on 2/7/2024.  She may return to work on 02/09/2024.         Thank you for choosing [facility].    Zarina Kimbrough RN

## 2024-02-07 NOTE — Clinical Note
[facility] EMERGENCY DEPARTMENT  [address] [place] [address]  Phone: [phone]    Ariadna Ambrocio was seen and treated in our emergency department on 2/7/2024.  She may return to work on 02/09/2024.         Thank you for choosing [facility].    Zarina Kimbrough RN

## 2024-02-07 NOTE — DISCHARGE INSTRUCTIONS
Rest, no work or exertion, next 2 days  Tylenol or ibuprofen as needed for fever and discomfort  Medication as directed  Follow-up with your primary care provider

## 2024-02-15 ENCOUNTER — PRIOR AUTHORIZATION (OUTPATIENT)
Dept: BARIATRICS/WEIGHT MGMT | Facility: CLINIC | Age: 39
End: 2024-02-15
Payer: MEDICAID

## 2024-03-12 ENCOUNTER — APPOINTMENT (OUTPATIENT)
Dept: CT IMAGING | Facility: HOSPITAL | Age: 39
End: 2024-03-12
Payer: MEDICAID

## 2024-03-12 ENCOUNTER — HOSPITAL ENCOUNTER (OUTPATIENT)
Facility: HOSPITAL | Age: 39
Discharge: HOME | End: 2024-03-14
Attending: EMERGENCY MEDICINE | Admitting: STUDENT IN AN ORGANIZED HEALTH CARE EDUCATION/TRAINING PROGRAM
Payer: MEDICAID

## 2024-03-12 DIAGNOSIS — R10.84 GENERALIZED ABDOMINAL PAIN: ICD-10-CM

## 2024-03-12 DIAGNOSIS — N13.2 URETERAL STONE WITH HYDRONEPHROSIS: Primary | ICD-10-CM

## 2024-03-12 DIAGNOSIS — K37 APPENDICITIS: ICD-10-CM

## 2024-03-12 DIAGNOSIS — K35.80 ACUTE APPENDICITIS, UNSPECIFIED ACUTE APPENDICITIS TYPE: ICD-10-CM

## 2024-03-12 DIAGNOSIS — R50.9 FEVER AND CHILLS: ICD-10-CM

## 2024-03-12 LAB
ALBUMIN SERPL-MCNC: 4.3 G/DL (ref 3.5–5.2)
ALBUMIN/GLOB SERPL: 1.5 G/DL
ALP SERPL-CCNC: 109 U/L (ref 39–117)
ALT SERPL W P-5'-P-CCNC: 22 U/L (ref 1–33)
ANION GAP SERPL CALCULATED.3IONS-SCNC: 15 MMOL/L (ref 5–15)
AST SERPL-CCNC: 30 U/L (ref 1–32)
BACTERIA UR QL AUTO: ABNORMAL /HPF
BASOPHILS # BLD AUTO: 0.2 10*3/MM3 (ref 0–0.2)
BASOPHILS NFR BLD AUTO: 1.3 % (ref 0–1.5)
BILIRUB SERPL-MCNC: 0.7 MG/DL (ref 0–1.2)
BILIRUB UR QL STRIP: NEGATIVE
BUN SERPL-MCNC: 11 MG/DL (ref 6–20)
BUN/CREAT SERPL: 11.5 (ref 7–25)
CALCIUM SPEC-SCNC: 9.3 MG/DL (ref 8.6–10.5)
CHLORIDE SERPL-SCNC: 105 MMOL/L (ref 98–107)
CLARITY UR: CLEAR
CO2 SERPL-SCNC: 19 MMOL/L (ref 22–29)
COD CRY URNS QL: ABNORMAL /HPF
COLOR UR: YELLOW
CREAT SERPL-MCNC: 0.96 MG/DL (ref 0.57–1)
DEPRECATED RDW RBC AUTO: 52.5 FL (ref 37–54)
EGFRCR SERPLBLD CKD-EPI 2021: 77.8 ML/MIN/1.73
EOSINOPHIL # BLD AUTO: 0.1 10*3/MM3 (ref 0–0.4)
EOSINOPHIL NFR BLD AUTO: 0.7 % (ref 0.3–6.2)
ERYTHROCYTE [DISTWIDTH] IN BLOOD BY AUTOMATED COUNT: 16 % (ref 12.3–15.4)
GLOBULIN UR ELPH-MCNC: 2.9 GM/DL
GLUCOSE SERPL-MCNC: 128 MG/DL (ref 65–99)
GLUCOSE UR STRIP-MCNC: NEGATIVE MG/DL
HCG INTACT+B SERPL-ACNC: <1 MIU/ML
HCT VFR BLD AUTO: 36 % (ref 34–46.6)
HGB BLD-MCNC: 11.5 G/DL (ref 12–15.9)
HGB UR QL STRIP.AUTO: ABNORMAL
HYALINE CASTS UR QL AUTO: ABNORMAL /LPF
KETONES UR QL STRIP: NEGATIVE
LEUKOCYTE ESTERASE UR QL STRIP.AUTO: ABNORMAL
LIPASE SERPL-CCNC: 26 U/L (ref 13–60)
LYMPHOCYTES # BLD AUTO: 2.8 10*3/MM3 (ref 0.7–3.1)
LYMPHOCYTES NFR BLD AUTO: 17.6 % (ref 19.6–45.3)
MCH RBC QN AUTO: 28.6 PG (ref 26.6–33)
MCHC RBC AUTO-ENTMCNC: 31.8 G/DL (ref 31.5–35.7)
MCV RBC AUTO: 89.7 FL (ref 79–97)
MONOCYTES # BLD AUTO: 1.2 10*3/MM3 (ref 0.1–0.9)
MONOCYTES NFR BLD AUTO: 7.4 % (ref 5–12)
NEUTROPHILS NFR BLD AUTO: 11.7 10*3/MM3 (ref 1.7–7)
NEUTROPHILS NFR BLD AUTO: 73 % (ref 42.7–76)
NITRITE UR QL STRIP: NEGATIVE
NRBC BLD AUTO-RTO: 0 /100 WBC (ref 0–0.2)
PH UR STRIP.AUTO: 6 [PH] (ref 5–8)
PLATELET # BLD AUTO: 293 10*3/MM3 (ref 140–450)
PMV BLD AUTO: 8.2 FL (ref 6–12)
POTASSIUM SERPL-SCNC: 3.7 MMOL/L (ref 3.5–5.2)
PROT SERPL-MCNC: 7.2 G/DL (ref 6–8.5)
PROT UR QL STRIP: NEGATIVE
RBC # BLD AUTO: 4.01 10*6/MM3 (ref 3.77–5.28)
RBC # UR STRIP: ABNORMAL /HPF
REF LAB TEST METHOD: ABNORMAL
SODIUM SERPL-SCNC: 139 MMOL/L (ref 136–145)
SP GR UR STRIP: 1.01 (ref 1–1.03)
SQUAMOUS #/AREA URNS HPF: ABNORMAL /HPF
UROBILINOGEN UR QL STRIP: ABNORMAL
WBC # UR STRIP: ABNORMAL /HPF
WBC NRBC COR # BLD AUTO: 16 10*3/MM3 (ref 3.4–10.8)
WHOLE BLOOD HOLD COAG: NORMAL

## 2024-03-12 PROCEDURE — 87040 BLOOD CULTURE FOR BACTERIA: CPT | Performed by: NURSE PRACTITIONER

## 2024-03-12 PROCEDURE — 25010000002 PIPERACILLIN SOD-TAZOBACTAM PER 1 G: Performed by: NURSE PRACTITIONER

## 2024-03-12 PROCEDURE — 83605 ASSAY OF LACTIC ACID: CPT | Performed by: NURSE PRACTITIONER

## 2024-03-12 PROCEDURE — 96375 TX/PRO/DX INJ NEW DRUG ADDON: CPT

## 2024-03-12 PROCEDURE — 85025 COMPLETE CBC W/AUTO DIFF WBC: CPT | Performed by: NURSE PRACTITIONER

## 2024-03-12 PROCEDURE — 81001 URINALYSIS AUTO W/SCOPE: CPT | Performed by: NURSE PRACTITIONER

## 2024-03-12 PROCEDURE — 87086 URINE CULTURE/COLONY COUNT: CPT | Performed by: NURSE PRACTITIONER

## 2024-03-12 PROCEDURE — 25010000002 ONDANSETRON PER 1 MG: Performed by: NURSE PRACTITIONER

## 2024-03-12 PROCEDURE — 25010000002 HYDROMORPHONE 1 MG/ML SOLUTION: Performed by: NURSE PRACTITIONER

## 2024-03-12 PROCEDURE — 25510000001 IOPAMIDOL PER 1 ML: Performed by: EMERGENCY MEDICINE

## 2024-03-12 PROCEDURE — 96365 THER/PROPH/DIAG IV INF INIT: CPT

## 2024-03-12 PROCEDURE — 84702 CHORIONIC GONADOTROPIN TEST: CPT | Performed by: NURSE PRACTITIONER

## 2024-03-12 PROCEDURE — 80053 COMPREHEN METABOLIC PANEL: CPT | Performed by: NURSE PRACTITIONER

## 2024-03-12 PROCEDURE — 99285 EMERGENCY DEPT VISIT HI MDM: CPT

## 2024-03-12 PROCEDURE — 83690 ASSAY OF LIPASE: CPT | Performed by: NURSE PRACTITIONER

## 2024-03-12 PROCEDURE — 74177 CT ABD & PELVIS W/CONTRAST: CPT

## 2024-03-12 PROCEDURE — 25810000003 LACTATED RINGERS SOLUTION: Performed by: NURSE PRACTITIONER

## 2024-03-12 PROCEDURE — 25010000002 KETOROLAC TROMETHAMINE PER 15 MG: Performed by: NURSE PRACTITIONER

## 2024-03-12 PROCEDURE — 81025 URINE PREGNANCY TEST: CPT | Performed by: STUDENT IN AN ORGANIZED HEALTH CARE EDUCATION/TRAINING PROGRAM

## 2024-03-12 PROCEDURE — 36415 COLL VENOUS BLD VENIPUNCTURE: CPT

## 2024-03-12 RX ORDER — KETOROLAC TROMETHAMINE 30 MG/ML
15 INJECTION, SOLUTION INTRAMUSCULAR; INTRAVENOUS ONCE
Status: COMPLETED | OUTPATIENT
Start: 2024-03-12 | End: 2024-03-12

## 2024-03-12 RX ORDER — ONDANSETRON 2 MG/ML
4 INJECTION INTRAMUSCULAR; INTRAVENOUS ONCE
Status: COMPLETED | OUTPATIENT
Start: 2024-03-12 | End: 2024-03-12

## 2024-03-12 RX ORDER — SODIUM CHLORIDE 0.9 % (FLUSH) 0.9 %
10 SYRINGE (ML) INJECTION AS NEEDED
Status: DISCONTINUED | OUTPATIENT
Start: 2024-03-12 | End: 2024-03-14 | Stop reason: HOSPADM

## 2024-03-12 RX ADMIN — KETOROLAC TROMETHAMINE 15 MG: 30 INJECTION, SOLUTION INTRAMUSCULAR; INTRAVENOUS at 23:36

## 2024-03-12 RX ADMIN — ONDANSETRON 4 MG: 2 INJECTION INTRAMUSCULAR; INTRAVENOUS at 21:43

## 2024-03-12 RX ADMIN — SODIUM CHLORIDE, POTASSIUM CHLORIDE, SODIUM LACTATE AND CALCIUM CHLORIDE 1000 ML: 600; 310; 30; 20 INJECTION, SOLUTION INTRAVENOUS at 23:38

## 2024-03-12 RX ADMIN — IOPAMIDOL 100 ML: 755 INJECTION, SOLUTION INTRAVENOUS at 22:14

## 2024-03-12 RX ADMIN — HYDROMORPHONE HYDROCHLORIDE 1 MG: 1 INJECTION, SOLUTION INTRAMUSCULAR; INTRAVENOUS; SUBCUTANEOUS at 21:46

## 2024-03-12 RX ADMIN — PIPERACILLIN AND TAZOBACTAM 3.38 G: 3; .375 INJECTION, POWDER, FOR SOLUTION INTRAVENOUS at 23:38

## 2024-03-12 NOTE — Clinical Note
Level of Care: Telemetry [5]   Admitting Physician: VERNON KAUR [[id number]]   Attending Physician: VERNON KAUR [[id number]]

## 2024-03-13 ENCOUNTER — ANESTHESIA (OUTPATIENT)
Dept: PERIOP | Facility: HOSPITAL | Age: 39
End: 2024-03-13
Payer: MEDICAID

## 2024-03-13 ENCOUNTER — ANESTHESIA EVENT (OUTPATIENT)
Dept: PERIOP | Facility: HOSPITAL | Age: 39
End: 2024-03-13
Payer: MEDICAID

## 2024-03-13 LAB
ANION GAP SERPL CALCULATED.3IONS-SCNC: 10 MMOL/L (ref 5–15)
B-HCG UR QL: NEGATIVE
BACTERIA SPEC AEROBE CULT: NO GROWTH
BASOPHILS # BLD AUTO: 0 10*3/MM3 (ref 0–0.2)
BASOPHILS NFR BLD AUTO: 0.2 % (ref 0–1.5)
BUN SERPL-MCNC: 9 MG/DL (ref 6–20)
BUN/CREAT SERPL: 11 (ref 7–25)
CALCIUM SPEC-SCNC: 8.9 MG/DL (ref 8.6–10.5)
CHLORIDE SERPL-SCNC: 107 MMOL/L (ref 98–107)
CO2 SERPL-SCNC: 24 MMOL/L (ref 22–29)
CREAT SERPL-MCNC: 0.82 MG/DL (ref 0.57–1)
D-LACTATE SERPL-SCNC: 1.2 MMOL/L (ref 0.5–2)
DEPRECATED RDW RBC AUTO: 48.6 FL (ref 37–54)
EGFRCR SERPLBLD CKD-EPI 2021: 94 ML/MIN/1.73
EOSINOPHIL # BLD AUTO: 0.2 10*3/MM3 (ref 0–0.4)
EOSINOPHIL NFR BLD AUTO: 1.4 % (ref 0.3–6.2)
ERYTHROCYTE [DISTWIDTH] IN BLOOD BY AUTOMATED COUNT: 15.6 % (ref 12.3–15.4)
GLUCOSE SERPL-MCNC: 106 MG/DL (ref 65–99)
HCT VFR BLD AUTO: 31.5 % (ref 34–46.6)
HGB BLD-MCNC: 10.3 G/DL (ref 12–15.9)
LYMPHOCYTES # BLD AUTO: 3.6 10*3/MM3 (ref 0.7–3.1)
LYMPHOCYTES NFR BLD AUTO: 31 % (ref 19.6–45.3)
MCH RBC QN AUTO: 29.6 PG (ref 26.6–33)
MCHC RBC AUTO-ENTMCNC: 32.7 G/DL (ref 31.5–35.7)
MCV RBC AUTO: 90.5 FL (ref 79–97)
MONOCYTES # BLD AUTO: 0.9 10*3/MM3 (ref 0.1–0.9)
MONOCYTES NFR BLD AUTO: 7.5 % (ref 5–12)
NEUTROPHILS NFR BLD AUTO: 59.9 % (ref 42.7–76)
NEUTROPHILS NFR BLD AUTO: 6.9 10*3/MM3 (ref 1.7–7)
NRBC BLD AUTO-RTO: 0 /100 WBC (ref 0–0.2)
PLATELET # BLD AUTO: 296 10*3/MM3 (ref 140–450)
PMV BLD AUTO: 8.2 FL (ref 6–12)
POTASSIUM SERPL-SCNC: 3.5 MMOL/L (ref 3.5–5.2)
RBC # BLD AUTO: 3.48 10*6/MM3 (ref 3.77–5.28)
SODIUM SERPL-SCNC: 141 MMOL/L (ref 136–145)
WBC NRBC COR # BLD AUTO: 11.5 10*3/MM3 (ref 3.4–10.8)

## 2024-03-13 PROCEDURE — 25010000002 DIPHENHYDRAMINE PER 50 MG: Performed by: PHYSICIAN ASSISTANT

## 2024-03-13 PROCEDURE — 80048 BASIC METABOLIC PNL TOTAL CA: CPT | Performed by: PHYSICIAN ASSISTANT

## 2024-03-13 PROCEDURE — 25010000002 ONDANSETRON PER 1 MG

## 2024-03-13 PROCEDURE — 25010000002 FENTANYL CITRATE (PF) 100 MCG/2ML SOLUTION: Performed by: ANESTHESIOLOGIST ASSISTANT

## 2024-03-13 PROCEDURE — G0378 HOSPITAL OBSERVATION PER HR: HCPCS

## 2024-03-13 PROCEDURE — C2617 STENT, NON-COR, TEM W/O DEL: HCPCS | Performed by: UROLOGY

## 2024-03-13 PROCEDURE — 25810000003 LACTATED RINGERS PER 1000 ML: Performed by: ANESTHESIOLOGIST ASSISTANT

## 2024-03-13 PROCEDURE — 25010000002 PIPERACILLIN SOD-TAZOBACTAM PER 1 G: Performed by: PHYSICIAN ASSISTANT

## 2024-03-13 PROCEDURE — 25010000002 SUGAMMADEX 200 MG/2ML SOLUTION

## 2024-03-13 PROCEDURE — 25810000003 SODIUM CHLORIDE 0.9 % SOLUTION: Performed by: PHYSICIAN ASSISTANT

## 2024-03-13 PROCEDURE — 85025 COMPLETE CBC W/AUTO DIFF WBC: CPT | Performed by: PHYSICIAN ASSISTANT

## 2024-03-13 PROCEDURE — 25010000002 HYDROMORPHONE 1 MG/ML SOLUTION: Performed by: PHYSICIAN ASSISTANT

## 2024-03-13 PROCEDURE — C1769 GUIDE WIRE: HCPCS | Performed by: UROLOGY

## 2024-03-13 PROCEDURE — 25010000002 DEXAMETHASONE PER 1 MG: Performed by: ANESTHESIOLOGIST ASSISTANT

## 2024-03-13 PROCEDURE — 96375 TX/PRO/DX INJ NEW DRUG ADDON: CPT

## 2024-03-13 PROCEDURE — 25010000002 DIPHENHYDRAMINE PER 50 MG: Performed by: STUDENT IN AN ORGANIZED HEALTH CARE EDUCATION/TRAINING PROGRAM

## 2024-03-13 PROCEDURE — 88304 TISSUE EXAM BY PATHOLOGIST: CPT | Performed by: STUDENT IN AN ORGANIZED HEALTH CARE EDUCATION/TRAINING PROGRAM

## 2024-03-13 PROCEDURE — 25010000002 PROPOFOL 200 MG/20ML EMULSION: Performed by: ANESTHESIOLOGIST ASSISTANT

## 2024-03-13 PROCEDURE — 96376 TX/PRO/DX INJ SAME DRUG ADON: CPT

## 2024-03-13 PROCEDURE — 44970 LAPAROSCOPY APPENDECTOMY: CPT | Performed by: STUDENT IN AN ORGANIZED HEALTH CARE EDUCATION/TRAINING PROGRAM

## 2024-03-13 PROCEDURE — 25010000002 HYDROMORPHONE 1 MG/ML SOLUTION: Performed by: STUDENT IN AN ORGANIZED HEALTH CARE EDUCATION/TRAINING PROGRAM

## 2024-03-13 PROCEDURE — 25810000003 SODIUM CHLORIDE 0.9 % SOLUTION: Performed by: STUDENT IN AN ORGANIZED HEALTH CARE EDUCATION/TRAINING PROGRAM

## 2024-03-13 PROCEDURE — 99204 OFFICE O/P NEW MOD 45 MIN: CPT | Performed by: STUDENT IN AN ORGANIZED HEALTH CARE EDUCATION/TRAINING PROGRAM

## 2024-03-13 PROCEDURE — C1758 CATHETER, URETERAL: HCPCS | Performed by: UROLOGY

## 2024-03-13 DEVICE — URETERAL STENT
Type: IMPLANTABLE DEVICE | Site: URETER | Status: FUNCTIONAL
Brand: PERCUFLEX™ PLUS

## 2024-03-13 DEVICE — THE ECHELON, ECHELON ENDOPATH™ AND ECHELON FLEX™ FAMILIES OF ENDOSCOPIC LINEAR CUTTERS AND RELOADS ARE STERILE, SINGLE PATIENT USE INSTRUMENTS THAT SIMULTANEOUSLY CUT AND STAPLE TISSUE. THERE ARE SIX STAGGERED ROWS OF STAPLES, THREE ON EITHER SIDE OF THE CUT LINE. THE 45 MM INSTRUMENTS HAVE A STAPLE LINE THATIS APPROXIMATELY 45 MM LONG AND A CUT LINE THAT IS APPROXIMATELY 42 MM LONG. THE SHAFT CAN ROTATE FREELY IN BOTH DIRECTIONS AND AN ARTICULATION MECHANISM ON ARTICULATING INSTRUMENTS ENABLES BENDING THE DISTAL PORTIONOF THE SHAFT TO FACILITATE LATERAL ACCESS OF THE OPERATIVE SITE.THE INSTRUMENTS ARE SHIPPED WITHOUT A RELOAD AND MUST BE LOADED PRIOR TO USE. A STAPLE RETAINING CAP ON THE RELOAD PROTECTS THE STAPLE LEG POINTS DURING SHIPPING AND TRANSPORTATION. THE INSTRUMENTS’ LOCK-OUT FEATURE IS DESIGNED TO PREVENT A USED RELOAD FROM BEING REFIRED.
Type: IMPLANTABLE DEVICE | Site: ABDOMEN | Status: FUNCTIONAL
Brand: ECHELON ENDOPATH

## 2024-03-13 RX ORDER — HYDROCODONE BITARTRATE AND ACETAMINOPHEN 5; 325 MG/1; MG/1
1 TABLET ORAL EVERY 6 HOURS PRN
Status: DISCONTINUED | OUTPATIENT
Start: 2024-03-13 | End: 2024-03-14 | Stop reason: HOSPADM

## 2024-03-13 RX ORDER — CHOLECALCIFEROL (VITAMIN D3) 125 MCG
5 CAPSULE ORAL NIGHTLY PRN
Status: DISCONTINUED | OUTPATIENT
Start: 2024-03-13 | End: 2024-03-14 | Stop reason: HOSPADM

## 2024-03-13 RX ORDER — LABETALOL HYDROCHLORIDE 5 MG/ML
5 INJECTION, SOLUTION INTRAVENOUS
Status: DISCONTINUED | OUTPATIENT
Start: 2024-03-13 | End: 2024-03-13 | Stop reason: HOSPADM

## 2024-03-13 RX ORDER — DEXAMETHASONE SODIUM PHOSPHATE 4 MG/ML
INJECTION, SOLUTION INTRA-ARTICULAR; INTRALESIONAL; INTRAMUSCULAR; INTRAVENOUS; SOFT TISSUE AS NEEDED
Status: DISCONTINUED | OUTPATIENT
Start: 2024-03-13 | End: 2024-03-13 | Stop reason: SURG

## 2024-03-13 RX ORDER — HYDROCODONE BITARTRATE AND ACETAMINOPHEN 5; 325 MG/1; MG/1
1 TABLET ORAL EVERY 6 HOURS PRN
Qty: 12 TABLET | Refills: 0 | Status: SHIPPED | OUTPATIENT
Start: 2024-03-13

## 2024-03-13 RX ORDER — MIDAZOLAM HYDROCHLORIDE 1 MG/ML
2 INJECTION INTRAMUSCULAR; INTRAVENOUS
Status: DISCONTINUED | OUTPATIENT
Start: 2024-03-13 | End: 2024-03-13 | Stop reason: HOSPADM

## 2024-03-13 RX ORDER — PROPOFOL 10 MG/ML
INJECTION, EMULSION INTRAVENOUS AS NEEDED
Status: DISCONTINUED | OUTPATIENT
Start: 2024-03-13 | End: 2024-03-13 | Stop reason: SURG

## 2024-03-13 RX ORDER — SODIUM CHLORIDE 0.9 % (FLUSH) 0.9 %
10 SYRINGE (ML) INJECTION AS NEEDED
Status: DISCONTINUED | OUTPATIENT
Start: 2024-03-13 | End: 2024-03-14 | Stop reason: HOSPADM

## 2024-03-13 RX ORDER — ROCURONIUM BROMIDE 10 MG/ML
INJECTION, SOLUTION INTRAVENOUS AS NEEDED
Status: DISCONTINUED | OUTPATIENT
Start: 2024-03-13 | End: 2024-03-13 | Stop reason: SURG

## 2024-03-13 RX ORDER — NITROGLYCERIN 0.4 MG/1
0.4 TABLET SUBLINGUAL
Status: DISCONTINUED | OUTPATIENT
Start: 2024-03-13 | End: 2024-03-14 | Stop reason: HOSPADM

## 2024-03-13 RX ORDER — SODIUM CHLORIDE, SODIUM LACTATE, POTASSIUM CHLORIDE, CALCIUM CHLORIDE 600; 310; 30; 20 MG/100ML; MG/100ML; MG/100ML; MG/100ML
INJECTION, SOLUTION INTRAVENOUS CONTINUOUS PRN
Status: DISCONTINUED | OUTPATIENT
Start: 2024-03-13 | End: 2024-03-13 | Stop reason: SURG

## 2024-03-13 RX ORDER — LORAZEPAM 1 MG/1
1 TABLET ORAL EVERY 6 HOURS PRN
Status: DISCONTINUED | OUTPATIENT
Start: 2024-03-13 | End: 2024-03-13 | Stop reason: HOSPADM

## 2024-03-13 RX ORDER — OXYCODONE HYDROCHLORIDE 5 MG/1
10 TABLET ORAL ONCE AS NEEDED
Status: DISCONTINUED | OUTPATIENT
Start: 2024-03-13 | End: 2024-03-13 | Stop reason: HOSPADM

## 2024-03-13 RX ORDER — OXYCODONE HYDROCHLORIDE 5 MG/1
15 TABLET ORAL EVERY 4 HOURS PRN
Status: DISCONTINUED | OUTPATIENT
Start: 2024-03-13 | End: 2024-03-13 | Stop reason: HOSPADM

## 2024-03-13 RX ORDER — FENTANYL CITRATE 50 UG/ML
100 INJECTION, SOLUTION INTRAMUSCULAR; INTRAVENOUS
Status: DISCONTINUED | OUTPATIENT
Start: 2024-03-13 | End: 2024-03-13 | Stop reason: HOSPADM

## 2024-03-13 RX ORDER — DIPHENHYDRAMINE HYDROCHLORIDE 50 MG/ML
12.5 INJECTION INTRAMUSCULAR; INTRAVENOUS
Status: DISCONTINUED | OUTPATIENT
Start: 2024-03-13 | End: 2024-03-13 | Stop reason: HOSPADM

## 2024-03-13 RX ORDER — IBUPROFEN 600 MG/1
600 TABLET ORAL ONCE AS NEEDED
Status: DISCONTINUED | OUTPATIENT
Start: 2024-03-13 | End: 2024-03-13 | Stop reason: HOSPADM

## 2024-03-13 RX ORDER — PHENYLEPHRINE HCL IN 0.9% NACL 1 MG/10 ML
SYRINGE (ML) INTRAVENOUS AS NEEDED
Status: DISCONTINUED | OUTPATIENT
Start: 2024-03-13 | End: 2024-03-13 | Stop reason: SURG

## 2024-03-13 RX ORDER — ONDANSETRON 2 MG/ML
INJECTION INTRAMUSCULAR; INTRAVENOUS AS NEEDED
Status: DISCONTINUED | OUTPATIENT
Start: 2024-03-13 | End: 2024-03-13 | Stop reason: SURG

## 2024-03-13 RX ORDER — ONDANSETRON 2 MG/ML
4 INJECTION INTRAMUSCULAR; INTRAVENOUS EVERY 6 HOURS PRN
Status: DISCONTINUED | OUTPATIENT
Start: 2024-03-13 | End: 2024-03-14 | Stop reason: HOSPADM

## 2024-03-13 RX ORDER — PROMETHAZINE HYDROCHLORIDE 25 MG/1
25 SUPPOSITORY RECTAL ONCE AS NEEDED
Status: DISCONTINUED | OUTPATIENT
Start: 2024-03-13 | End: 2024-03-13 | Stop reason: HOSPADM

## 2024-03-13 RX ORDER — MEPERIDINE HYDROCHLORIDE 25 MG/ML
12.5 INJECTION INTRAMUSCULAR; INTRAVENOUS; SUBCUTANEOUS
Status: DISCONTINUED | OUTPATIENT
Start: 2024-03-13 | End: 2024-03-13 | Stop reason: HOSPADM

## 2024-03-13 RX ORDER — ONDANSETRON 4 MG/1
4 TABLET, ORALLY DISINTEGRATING ORAL EVERY 6 HOURS PRN
Status: DISCONTINUED | OUTPATIENT
Start: 2024-03-13 | End: 2024-03-14 | Stop reason: HOSPADM

## 2024-03-13 RX ORDER — FENTANYL CITRATE 50 UG/ML
50 INJECTION, SOLUTION INTRAMUSCULAR; INTRAVENOUS
Status: DISCONTINUED | OUTPATIENT
Start: 2024-03-13 | End: 2024-03-13 | Stop reason: HOSPADM

## 2024-03-13 RX ORDER — SODIUM CHLORIDE 9 MG/ML
40 INJECTION, SOLUTION INTRAVENOUS AS NEEDED
Status: DISCONTINUED | OUTPATIENT
Start: 2024-03-13 | End: 2024-03-14 | Stop reason: HOSPADM

## 2024-03-13 RX ORDER — ACETAMINOPHEN 325 MG/1
650 TABLET ORAL EVERY 4 HOURS PRN
Status: DISCONTINUED | OUTPATIENT
Start: 2024-03-13 | End: 2024-03-14 | Stop reason: HOSPADM

## 2024-03-13 RX ORDER — BISACODYL 5 MG/1
5 TABLET, DELAYED RELEASE ORAL DAILY PRN
Status: DISCONTINUED | OUTPATIENT
Start: 2024-03-13 | End: 2024-03-14 | Stop reason: HOSPADM

## 2024-03-13 RX ORDER — LIDOCAINE HYDROCHLORIDE 10 MG/ML
INJECTION, SOLUTION EPIDURAL; INFILTRATION; INTRACAUDAL; PERINEURAL AS NEEDED
Status: DISCONTINUED | OUTPATIENT
Start: 2024-03-13 | End: 2024-03-13 | Stop reason: SURG

## 2024-03-13 RX ORDER — IPRATROPIUM BROMIDE AND ALBUTEROL SULFATE 2.5; .5 MG/3ML; MG/3ML
3 SOLUTION RESPIRATORY (INHALATION) ONCE AS NEEDED
Status: DISCONTINUED | OUTPATIENT
Start: 2024-03-13 | End: 2024-03-13 | Stop reason: HOSPADM

## 2024-03-13 RX ORDER — DIPHENHYDRAMINE HYDROCHLORIDE 50 MG/ML
12.5 INJECTION INTRAMUSCULAR; INTRAVENOUS ONCE AS NEEDED
Status: DISCONTINUED | OUTPATIENT
Start: 2024-03-13 | End: 2024-03-13 | Stop reason: HOSPADM

## 2024-03-13 RX ORDER — BUPIVACAINE HYDROCHLORIDE AND EPINEPHRINE 2.5; 5 MG/ML; UG/ML
INJECTION, SOLUTION EPIDURAL; INFILTRATION; INTRACAUDAL; PERINEURAL AS NEEDED
Status: DISCONTINUED | OUTPATIENT
Start: 2024-03-13 | End: 2024-03-13 | Stop reason: HOSPADM

## 2024-03-13 RX ORDER — PROMETHAZINE HYDROCHLORIDE 25 MG/1
25 TABLET ORAL ONCE AS NEEDED
Status: DISCONTINUED | OUTPATIENT
Start: 2024-03-13 | End: 2024-03-13 | Stop reason: HOSPADM

## 2024-03-13 RX ORDER — DIPHENHYDRAMINE HYDROCHLORIDE 50 MG/ML
25 INJECTION INTRAMUSCULAR; INTRAVENOUS EVERY 6 HOURS PRN
Status: DISCONTINUED | OUTPATIENT
Start: 2024-03-13 | End: 2024-03-14 | Stop reason: HOSPADM

## 2024-03-13 RX ORDER — FLUMAZENIL 0.1 MG/ML
0.5 INJECTION INTRAVENOUS AS NEEDED
Status: DISCONTINUED | OUTPATIENT
Start: 2024-03-13 | End: 2024-03-13 | Stop reason: HOSPADM

## 2024-03-13 RX ORDER — AMOXICILLIN 250 MG
2 CAPSULE ORAL 2 TIMES DAILY PRN
Status: DISCONTINUED | OUTPATIENT
Start: 2024-03-13 | End: 2024-03-14 | Stop reason: HOSPADM

## 2024-03-13 RX ORDER — ACETAMINOPHEN 160 MG/5ML
650 SOLUTION ORAL EVERY 4 HOURS PRN
Status: DISCONTINUED | OUTPATIENT
Start: 2024-03-13 | End: 2024-03-14 | Stop reason: HOSPADM

## 2024-03-13 RX ORDER — ALUMINA, MAGNESIA, AND SIMETHICONE 2400; 2400; 240 MG/30ML; MG/30ML; MG/30ML
15 SUSPENSION ORAL EVERY 6 HOURS PRN
Status: DISCONTINUED | OUTPATIENT
Start: 2024-03-13 | End: 2024-03-14 | Stop reason: HOSPADM

## 2024-03-13 RX ORDER — POLYETHYLENE GLYCOL 3350 17 G/17G
17 POWDER, FOR SOLUTION ORAL DAILY PRN
Status: DISCONTINUED | OUTPATIENT
Start: 2024-03-13 | End: 2024-03-14 | Stop reason: HOSPADM

## 2024-03-13 RX ORDER — NALOXONE HCL 0.4 MG/ML
0.4 VIAL (ML) INJECTION AS NEEDED
Status: DISCONTINUED | OUTPATIENT
Start: 2024-03-13 | End: 2024-03-13 | Stop reason: HOSPADM

## 2024-03-13 RX ORDER — HYDRALAZINE HYDROCHLORIDE 20 MG/ML
5 INJECTION INTRAMUSCULAR; INTRAVENOUS
Status: DISCONTINUED | OUTPATIENT
Start: 2024-03-13 | End: 2024-03-13 | Stop reason: HOSPADM

## 2024-03-13 RX ORDER — SODIUM CHLORIDE 0.9 % (FLUSH) 0.9 %
10 SYRINGE (ML) INJECTION EVERY 12 HOURS SCHEDULED
Status: DISCONTINUED | OUTPATIENT
Start: 2024-03-13 | End: 2024-03-14 | Stop reason: HOSPADM

## 2024-03-13 RX ORDER — ONDANSETRON 2 MG/ML
4 INJECTION INTRAMUSCULAR; INTRAVENOUS ONCE AS NEEDED
Status: DISCONTINUED | OUTPATIENT
Start: 2024-03-13 | End: 2024-03-13 | Stop reason: HOSPADM

## 2024-03-13 RX ORDER — SODIUM CHLORIDE 9 MG/ML
75 INJECTION, SOLUTION INTRAVENOUS CONTINUOUS
Status: DISCONTINUED | OUTPATIENT
Start: 2024-03-13 | End: 2024-03-14 | Stop reason: HOSPADM

## 2024-03-13 RX ORDER — BISACODYL 10 MG
10 SUPPOSITORY, RECTAL RECTAL DAILY PRN
Status: DISCONTINUED | OUTPATIENT
Start: 2024-03-13 | End: 2024-03-14 | Stop reason: HOSPADM

## 2024-03-13 RX ORDER — FENTANYL CITRATE 50 UG/ML
INJECTION, SOLUTION INTRAMUSCULAR; INTRAVENOUS AS NEEDED
Status: DISCONTINUED | OUTPATIENT
Start: 2024-03-13 | End: 2024-03-13 | Stop reason: SURG

## 2024-03-13 RX ORDER — DIPHENHYDRAMINE HCL 25 MG
25 CAPSULE ORAL
Status: DISCONTINUED | OUTPATIENT
Start: 2024-03-13 | End: 2024-03-13 | Stop reason: HOSPADM

## 2024-03-13 RX ORDER — EPHEDRINE SULFATE 5 MG/ML
5 INJECTION INTRAVENOUS ONCE AS NEEDED
Status: DISCONTINUED | OUTPATIENT
Start: 2024-03-13 | End: 2024-03-13 | Stop reason: HOSPADM

## 2024-03-13 RX ORDER — ACETAMINOPHEN 650 MG/1
650 SUPPOSITORY RECTAL EVERY 4 HOURS PRN
Status: DISCONTINUED | OUTPATIENT
Start: 2024-03-13 | End: 2024-03-14 | Stop reason: HOSPADM

## 2024-03-13 RX ADMIN — HYDROMORPHONE HYDROCHLORIDE 1 MG: 1 INJECTION, SOLUTION INTRAMUSCULAR; INTRAVENOUS; SUBCUTANEOUS at 16:23

## 2024-03-13 RX ADMIN — FENTANYL CITRATE 100 MCG: 50 INJECTION, SOLUTION INTRAMUSCULAR; INTRAVENOUS at 13:38

## 2024-03-13 RX ADMIN — DIPHENHYDRAMINE HYDROCHLORIDE 25 MG: 50 INJECTION, SOLUTION INTRAMUSCULAR; INTRAVENOUS at 05:07

## 2024-03-13 RX ADMIN — HYDROMORPHONE HYDROCHLORIDE 1 MG: 1 INJECTION, SOLUTION INTRAMUSCULAR; INTRAVENOUS; SUBCUTANEOUS at 02:20

## 2024-03-13 RX ADMIN — SUGAMMADEX 200 MG: 100 INJECTION, SOLUTION INTRAVENOUS at 14:35

## 2024-03-13 RX ADMIN — PIPERACILLIN AND TAZOBACTAM 3.38 G: 3; .375 INJECTION, POWDER, FOR SOLUTION INTRAVENOUS at 05:07

## 2024-03-13 RX ADMIN — DIPHENHYDRAMINE HYDROCHLORIDE 25 MG: 50 INJECTION, SOLUTION INTRAMUSCULAR; INTRAVENOUS at 16:55

## 2024-03-13 RX ADMIN — Medication 150 MCG: at 13:54

## 2024-03-13 RX ADMIN — DEXAMETHASONE SODIUM PHOSPHATE 4 MG: 4 INJECTION, SOLUTION INTRAMUSCULAR; INTRAVENOUS at 13:45

## 2024-03-13 RX ADMIN — SODIUM CHLORIDE, SODIUM LACTATE, POTASSIUM CHLORIDE, AND CALCIUM CHLORIDE: .6; .31; .03; .02 INJECTION, SOLUTION INTRAVENOUS at 13:35

## 2024-03-13 RX ADMIN — Medication 10 ML: at 02:21

## 2024-03-13 RX ADMIN — Medication 100 MCG: at 13:46

## 2024-03-13 RX ADMIN — HYDROMORPHONE HYDROCHLORIDE 1 MG: 1 INJECTION, SOLUTION INTRAMUSCULAR; INTRAVENOUS; SUBCUTANEOUS at 10:06

## 2024-03-13 RX ADMIN — ROCURONIUM BROMIDE 50 MG: 10 INJECTION, SOLUTION INTRAVENOUS at 13:38

## 2024-03-13 RX ADMIN — SODIUM CHLORIDE 75 ML/HR: 9 INJECTION, SOLUTION INTRAVENOUS at 05:08

## 2024-03-13 RX ADMIN — LIDOCAINE HYDROCHLORIDE 50 MG: 10 INJECTION, SOLUTION EPIDURAL; INFILTRATION; INTRACAUDAL; PERINEURAL at 13:38

## 2024-03-13 RX ADMIN — DIPHENHYDRAMINE HYDROCHLORIDE 25 MG: 50 INJECTION, SOLUTION INTRAMUSCULAR; INTRAVENOUS at 10:57

## 2024-03-13 RX ADMIN — PIPERACILLIN AND TAZOBACTAM 3.38 G: 3; .375 INJECTION, POWDER, FOR SOLUTION INTRAVENOUS at 13:36

## 2024-03-13 RX ADMIN — Medication 10 ML: at 22:25

## 2024-03-13 RX ADMIN — HYDROMORPHONE HYDROCHLORIDE 1 MG: 1 INJECTION, SOLUTION INTRAMUSCULAR; INTRAVENOUS; SUBCUTANEOUS at 20:33

## 2024-03-13 RX ADMIN — PROPOFOL 160 MG: 10 INJECTION, EMULSION INTRAVENOUS at 13:38

## 2024-03-13 RX ADMIN — ONDANSETRON 4 MG: 2 INJECTION INTRAMUSCULAR; INTRAVENOUS at 14:34

## 2024-03-13 NOTE — OP NOTE
Urology Operative Note    3/13/2024    Ariadna Ambrocio  38 y.o.  1985  female  [id number]      Surgeon(s) and Role:  Carmelo Nguyễn MD - Primary     Pre-operative Diagnosis: Left proximal ureteral and renal stones    Post-operative Diagnosis: Same    Complications: None    Procedures:  Cystoscopy  Left ureteroscopy  Basket-extraction of left ureteral stone  Basket extraction of left renal stone  Stent placement  Fluoroscopy with Interpretation     Indications   Ariadna Ambrocio is a 38 y.o. female who was found to have acute appendicitis as well as left proximal ureteral stones and small bilateral renal stones.  She was added on for appendectomy with ureteroscopy under same anesthesia time    During the informed consent process, the procedure was discussed in detail including the risks of bleeding, infection, damage to surrounding structures and need for staged procedure.        Description of procedure:  The patient was properly identified in the preoperative holding area and taken to the operating room where general anesthesia was induced. The patient was placed in the cystolithotomy position and prepped and draped in a sterile fashion. The patient was given antibiotics intravenously before the start of the surgery. After ensuring that all of the required equipment was ready and available a surgical timeout was performed.     A 21 Tongan rigid cystoscope was inserted into the bladder under direct visualization.   A Sensor wire was passed up the ureter   Digital flexible ureteroscope was passed into the ureter. The stone was visualized. The stone was crumbly and basket was used to clear the proximal ureteral stones.  Scope was then advanced into the kidney multiple intraparenchymal calcifications were seen as well as free-floating stones in the lower pole which were basket extracted.  No significant fragments were remaining.  The cystoscope was then replaced over the wire and a 6 Tongan x 26cm stent was  placed  The bladder was then emptied and scope removed.    There were no apparent complications. The patient woke up in the operating room and was taken to the recovery room in stable condition.     I was present and scrubbed for the entire procedure.     Specimens: None    Estimated Blood Loss: minimal      Plan   -Follow up with Dr. Nguyễn next week for stent removal     Carmelo Nguyễn MD  [facility]  [address] [place] [address], [address]  [place] [address] [phone]

## 2024-03-13 NOTE — ANESTHESIA PREPROCEDURE EVALUATION
Anesthesia Evaluation     Patient summary reviewed and Nursing notes reviewed   NPO Solid Status: > 8 hours  NPO Liquid Status: > 8 hours           Airway   Mallampati: III  Large neck circumference  Dental          Pulmonary    (+) ,shortness of breath  Cardiovascular - normal exam    ECG reviewed    (+) hypertension, dysrhythmias (H/O SVT), hyperlipidemia      Neuro/Psych  (+) psychiatric history Anxiety and Depression  GI/Hepatic/Renal/Endo    (+) morbid obesity, hiatal hernia, GERD, renal disease- stones    ROS Comment: S/p gastric bypass    Musculoskeletal     Abdominal   (+) obese   Substance History      OB/GYN          Other        ROS/Med Hx Other: 9/21  · Left ventricular systolic function is normal.  · Left ventricle ejection fraction is 55%  · Left ventricular diastolic function was normal.                  Anesthesia Plan    ASA 3     general     intravenous induction     Anesthetic plan, risks, benefits, and alternatives have been provided, discussed and informed consent has been obtained with: patient.    Use of blood products discussed with patient .    Plan discussed with CRNA and CAA.    CODE STATUS:    Code Status (Patient has no pulse and is not breathing): CPR (Attempt to Resuscitate)  Medical Interventions (Patient has pulse or is breathing): Full Support

## 2024-03-13 NOTE — PLAN OF CARE
Goal Outcome Evaluation: Pt A&Ox4. Incisions clean dry and intact. Pain treated per MAR. Family at bedside. Call light in reach and care plan ongoing.

## 2024-03-13 NOTE — ED PROVIDER NOTES
Subjective   History of Present Illness  Patient is a 38-year-old obese female who comes in with diffuse abdominal pain that she states started this morning she states she has a history of the patient but had a bowel movement about noon and has not felt better since.  She states she also had a fever of 101 at home and took Tylenol prior to arrival      Review of Systems   Constitutional:  Positive for fever. Negative for chills and fatigue.   HENT:  Negative for congestion, tinnitus and trouble swallowing.    Eyes:  Negative for photophobia, discharge and redness.   Respiratory:  Negative for cough and shortness of breath.    Cardiovascular:  Negative for chest pain and palpitations.   Gastrointestinal:  Positive for abdominal pain. Negative for diarrhea, nausea and vomiting.   Genitourinary:  Negative for dysuria, frequency and urgency.   Musculoskeletal:  Negative for back pain, joint swelling and myalgias.   Skin:  Negative for rash.   Neurological:  Negative for dizziness and headaches.   Psychiatric/Behavioral:  Negative for confusion.    All other systems reviewed and are negative.      Past Medical History:   Diagnosis Date    Anxiety     Bulging lumbar disc     Constipation     Depression     Diarrhea     Elevated triglycerides with high cholesterol     Heartburn     Herniated cervical disc     Hiatal hernia     Hyperlipidemia     Hypertension     IBS (irritable bowel syndrome)     Kidney stones     Nausea     Pre-diabetes     Rapid heart beat     Seasonal allergies        No Known Allergies    Past Surgical History:   Procedure Laterality Date    COLONOSCOPY      CYSTOSCOPY, URETEROSCOPY, RETROGRADE PYELOGRAM, STENT INSERTION Left 07/16/2021    Procedure: CYSTOSCOPY URETEROSCOPY RETROGRADE PYELOGRAM HOLMIUM LASER BASKET STONE EXTRACTION STENT INSERTION;  Surgeon: Carmelo Nguyễn MD;  Location: [facility] MAIN OR;  Service: Urology;  Laterality: Left;    EAR TUBES      ENDOSCOPY N/A 2/27/2023    Procedure:  ESOPHAGOGASTRODUODENOSCOPY with gastric antrum biopsy;  Surgeon: Otilia Capone MD;  Location: [facility] ENDOSCOPY;  Service: General;  Laterality: N/A;  Post: large hiatal hernia    GASTRIC BYPASS N/A 9/27/2023    Procedure: GASTRIC BYPASS AND HIATAL HERNIA REPAIR LAPAROSCOPIC WITH DAVINCI ROBOT;  Surgeon: Otilia Capone MD;  Location: [facility] MAIN OR;  Service: Robotics - DaVinci;  Laterality: N/A;    KIDNEY STONE SURGERY      WISDOM TOOTH EXTRACTION         Family History   Problem Relation Age of Onset    Asthma Mother     Hypertension Mother     No Known Problems Father     Hypertension Maternal Grandmother     Heart attack Maternal Grandfather     Cancer Paternal Grandmother     Cancer Paternal Grandfather     Breast cancer Neg Hx     Ovarian cancer Neg Hx     Uterine cancer Neg Hx     Colon cancer Neg Hx     Deep vein thrombosis Neg Hx     Pulmonary embolism Neg Hx        Social History     Socioeconomic History    Marital status:    Tobacco Use    Smoking status: Never     Passive exposure: Past    Smokeless tobacco: Never   Vaping Use    Vaping status: Never Used   Substance and Sexual Activity    Alcohol use: Yes     Comment: 2-3 drinks monthly    Drug use: Never    Sexual activity: Yes     Partners: Male     Birth control/protection: I.U.D., Same-sex partner           Objective   Physical Exam  Vitals reviewed.   Constitutional:       Appearance: She is well-developed.   HENT:      Head: Normocephalic and atraumatic.   Eyes:      Conjunctiva/sclera: Conjunctivae normal.      Pupils: Pupils are equal, round, and reactive to light.   Cardiovascular:      Rate and Rhythm: Normal rate and regular rhythm.      Heart sounds: Normal heart sounds.   Pulmonary:      Effort: Pulmonary effort is normal. No respiratory distress.      Breath sounds: Normal breath sounds. No wheezing.   Abdominal:      General: Abdomen is protuberant. Bowel sounds are normal.      Palpations: Abdomen is soft.      Tenderness: There is  "generalized no abdominal tenderness.   Musculoskeletal:         General: No deformity. Normal range of motion.      Cervical back: Normal range of motion and neck supple.   Skin:     General: Skin is warm and dry.      Capillary Refill: Capillary refill takes less than 2 seconds.   Neurological:      General: No focal deficit present.      Mental Status: She is alert and oriented to person, place, and time.      GCS: GCS eye subscore is 4. GCS verbal subscore is 5. GCS motor subscore is 6.      Motor: No weakness.   Psychiatric:         Mood and Affect: Mood normal.         Behavior: Behavior normal.         Procedures           ED Course  ED Course as of 03/12/24 2337   Tue Mar 12, 2024   2328 Spoke with Torey Awad who agreed to admit for Dr. Mitchell [KW]      ED Course User Index  [KW] Zarina Tay, APRN      /81 (BP Location: Right arm, Patient Position: Lying)   Pulse 73   Temp 98.5 °F (36.9 °C) (Oral)   Resp 18   Ht 160 cm (63\")   Wt 89 kg (196 lb 3.4 oz)   SpO2 99%   BMI 34.76 kg/m²   Labs Reviewed   COMPREHENSIVE METABOLIC PANEL - Abnormal; Notable for the following components:       Result Value    Glucose 128 (*)     CO2 19.0 (*)     All other components within normal limits    Narrative:     GFR Normal >60  Chronic Kidney Disease <60  Kidney Failure <15     URINALYSIS W/ CULTURE IF INDICATED - Abnormal; Notable for the following components:    Blood, UA Moderate (2+) (*)     Leuk Esterase, UA Trace (*)     All other components within normal limits    Narrative:     In absence of clinical symptoms, the presence of pyuria, bacteria, and/or nitrites on the urinalysis result does not correlate with infection.   CBC WITH AUTO DIFFERENTIAL - Abnormal; Notable for the following components:    WBC 16.00 (*)     Hemoglobin 11.5 (*)     RDW 16.0 (*)     Lymphocyte % 17.6 (*)     Neutrophils, Absolute 11.70 (*)     Monocytes, Absolute 1.20 (*)     All other components within normal limits "   URINALYSIS, MICROSCOPIC ONLY - Abnormal; Notable for the following components:    WBC, UA 6-10 (*)     Bacteria, UA Trace (*)     Squamous Epithelial Cells, UA 3-6 (*)     All other components within normal limits   LIPASE - Normal   BLOOD CULTURE   BLOOD CULTURE   URINE CULTURE   HCG, QUANTITATIVE, PREGNANCY    Narrative:     HCG Ranges by Gestational Age    Females - non-pregnant premenopausal   </= 1mIU/mL HCG  Females - postmenopausal               </= 7mIU/mL HCG    3 Weeks       5.4   -      72 mIU/mL  4 Weeks      10.2   -     708 mIU/mL  5 Weeks       217   -   8,245 mIU/mL  6 Weeks       152   -  32,177 mIU/mL  7 Weeks     4,059   - 153,767 mIU/mL  8 Weeks    31,366   - 149,094 mIU/mL  9 Weeks    59,109   - 135,901 mIU/mL  10 Weeks   44,186   - 170,409 mIU/mL  12 Weeks   27,107   - 201,615 mIU/mL  14 Weeks   24,302   -  93,646 mIU/mL  15 Weeks   12,540   -  69,747 mIU/mL  16 Weeks    8,904   -  55,332 mIU/mL  17 Weeks    8,240   -  51,793 mIU/mL  18 Weeks    9,649   -  55,271 mIU/mL     LACTIC ACID, PLASMA   CBC AND DIFFERENTIAL    Narrative:     The following orders were created for panel order CBC & Differential.  Procedure                               Abnormality         Status                     ---------                               -----------         ------                     CBC Auto Differential[[id number]]        Abnormal            Final result                 Please view results for these tests on the individual orders.   EXTRA TUBES    Narrative:     The following orders were created for panel order Extra Tubes.  Procedure                               Abnormality         Status                     ---------                               -----------         ------                     Light Blue Top[[id number]]                                   Final result                 Please view results for these tests on the individual orders.   LIGHT BLUE TOP     Medications   sodium chloride 0.9 %  flush 10 mL (has no administration in time range)   lactated ringers bolus 1,000 mL (has no administration in time range)   ketorolac (TORADOL) injection 15 mg (has no administration in time range)   piperacillin-tazobactam (ZOSYN) 3.375 g IVPB in 100 mL NS MBP (CD) (has no administration in time range)   HYDROmorphone (DILAUDID) injection 1 mg (1 mg Intravenous Given 3/12/24 2146)   ondansetron (ZOFRAN) injection 4 mg (4 mg Intravenous Given 3/12/24 2143)   iopamidol (ISOVUE-370) 76 % injection 100 mL (100 mL Intravenous Given 3/12/24 2214)     CT Abdomen Pelvis With Contrast    Result Date: 3/12/2024  Impression: Findings suspicious for acute appendicitis. Two adjacent obstructing stones in the proximal left ureter, each measuring 3-4 mm, with mild upstream hydroureteronephrosis. Electronically Signed: Mumtaz Quintero MD  3/12/2024 10:29 PM EDT  Workstation ID: [id number]                                          Medical Decision Making  Patient is a 38-year-old female who comes in with abdominal pain and fever at home who had IV established and blood work was obtained and she was given some fluids including a liter of lactated Ringer's as well as some pain medication and Zofran she had no further vomiting while in the emergency room CT was obtained and reviewed and found to have a 3 to 4 mm kidney stone on the left in the proximal ureter as well as a possible suspected appendicitis the patient was covered with IV antibiotics blood cultures were obtained as she did have a white count of 16,000 and the patient was admitted to the hospitalist for surgical consultation IV antibiotics in the morning.    Problems Addressed:  Acute appendicitis, unspecified acute appendicitis type: complicated acute illness or injury  Generalized abdominal pain: complicated acute illness or injury  Ureteral stone with hydronephrosis: complicated acute illness or injury    Amount and/or Complexity of Data Reviewed  Labs: ordered.  Decision-making details documented in ED Course.  Radiology: ordered and independent interpretation performed. Decision-making details documented in ED Course.  ECG/medicine tests: ordered and independent interpretation performed. Decision-making details documented in ED Course.    Risk  OTC drugs.  Prescription drug management.  Decision regarding hospitalization.        Final diagnoses:   Ureteral stone with hydronephrosis   Generalized abdominal pain   Acute appendicitis, unspecified acute appendicitis type   Fever and chills       ED Disposition  ED Disposition       ED Disposition   Decision to Admit    Condition   --    Comment   Level of Care: Telemetry [5]   Admitting Physician: VERNON KAUR [[id number]]   Attending Physician: VERNON KAUR [[id number]]                 No follow-up provider specified.       Medication List      No changes were made to your prescriptions during this visit.            Zarina Tay, APRN  03/12/24 3104

## 2024-03-13 NOTE — OP NOTE
Operative Report:    Patient Name:  Ariadna Ambrocio  YOB: 1985    Date of Surgery:  3/13/2024     Indications: Patient is a 38-year-old woman here with abdominal pain and found to have acute appendicitis as well as obstructive nephrolithiasis on CT scan.  Here for lap appendectomy as well as ureteral stent placement with Dr. Nguyễn    Pre-op Diagnosis:   Acute appendicitis       Post-Op Diagnosis Codes:  Same    Procedure/CPT® Codes:      Procedure(s):  CYSTOSCOPY URETEROSCOPY RETROGRADE PYELOGRAM HOLMIUM LASER STENT INSERTION  APPENDECTOMY LAPAROSCOPIC    Staff:  Surgeon(s):  Carmelo Nguyễn MD Bishop, William, MD    Circulator: Gaby Pena RN; Tacos Morrison RN  Scrub Person: Mina Sharma  Assistant: Sherri Guzmán CSFA  was responsible for performing the following activities: Suturing, Closing, Placing Dressing, and Held/Positioned Camera and their skilled assistance was necessary for the success of this case.        Anesthesia: General    Estimated Blood Loss: minimal    Implants:    Nothing was implanted during the procedure    Specimen: Appendix                 Findings: Mildly inflamed appendix    Complications: None    Description of Procedure: After informed consent was obtained detailing the risks, benefits, and alternatives to the procedure, the patient was brought to the operating room and placed supine on the operating table. General anesthesia was induced. Their abdomen was prepped and draped in the standard fashion. A time out was held detailing the patient, procedure, and location to be performed. We began by gaining access to the abdomen with a 5mm Optiview trocar just to the left of the umbilicus. Pneumoperitoneum was established and we ensured no injury to the under lying structures. We then placed a lateral 12mm port and a suprapubic 5mm port. The appendix was identified and grasped. A window was made in the mesoappendix and the appendix was transected with a blue  load of the endo ANGELES stapler. The mesoappendix was then divided using the Ligasure. The appendix was placed into a bag and removed. It was passed off the field and sent for permanent pathology. We then ensured meticulous hemostasis and the 12mm port site was closed with a 0 Vicryl and Bonilla Tomasen. The ports were removed and their skin was closed with subcuticular 4-0 Vicryl. Exofin was applied.  At this point the case was turned over to Dr. Nguyễn for cystoscopy and ureteral stent placement. Please see his dictated op note    Georges Chandler MD     Date: 3/13/2024  Time: 14:12 EDT

## 2024-03-13 NOTE — ED NOTES
Pt aware she is awaiting bed assignment. No needs, Denies pain. No nausea/vomiting. Clothing (pink jacket/pink venkat) bagged. Pt. Has purse, cell phone, grey sweats and slides on. Vitals stable. NPO at this time.

## 2024-03-13 NOTE — CASE MANAGEMENT/SOCIAL WORK
Continued Stay Note  RENEA Nichols     Patient Name: Ariadna Ambrocio  MRN: [id number]  Today's Date: 3/13/2024    Admit Date: 3/12/2024        Discharge Plan       Row Name 03/13/24 1241       Plan    Plan Comments CM attempted to see patient, patient was KATHLEEN in surger. Will need CM assessment             Expected Discharge Date and Time       Expected Discharge Date Expected Discharge Time    Mar 13, 2024         Kristen Coto RN    phone [phone]  fax [phone]

## 2024-03-13 NOTE — PROGRESS NOTES
[facility] MEDICINE SERVICE  DAILY PROGRESS NOTE    NAME: Ariadna Ambrocio  : 1985  MRN: [id number]      LOS: 0 days     PROVIDER OF SERVICE: Naomi Watson MD    Chief Complaint: Appendicitis    Subjective:     Interval History: Patient seen and examined, deniesd any abdo pian ot nausea or vomiting at this time  Review of Systems:   Review of Systems  10 point ROS is negative other than what is stated positive above  Objective:     Vital Signs  Temp:  [98 °F (36.7 °C)-98.5 °F (36.9 °C)] 98.1 °F (36.7 °C)  Heart Rate:  [] 62  Resp:  [11-22] 17  BP: (105-136)/(66-86) 105/66   Body mass index is 35.73 kg/m².    Physical Exam  Physical Exam  Physical Exam    Constitutional:       General: She is not in acute distress.     Appearance: She is well-developed. She is not diaphoretic.   HENT:      Head: Normocephalic and atraumatic.      Right Ear: External ear normal.      Left Ear: External ear normal.      Nose: Nose normal.      Mouth/Throat:      Mouth: Mucous membranes are moist.   Eyes:      Extraocular Movements: Extraocular movements intact.      Conjunctiva/sclera: Conjunctivae normal.      Pupils: Pupils are equal, round, and reactive to light.   Cardiovascular:      Rate and Rhythm: Normal rate and regular rhythm.      Pulses: Normal pulses.      Heart sounds: Normal heart sounds.      Comments: S1, S2 audible.  Pulmonary:      Effort: Pulmonary effort is normal. No respiratory distress.      Breath sounds: Normal breath sounds. No wheezing, rhonchi or rales.      Comments: On room air.  Abdominal:      General: Bowel sounds are normal. There is no distension.      Palpations: Abdomen is soft.      Tenderness: There is no abdominal tenderness. There is no guarding or rebound.   Musculoskeletal:         General: No tenderness or deformity. Normal range of motion.      Cervical back: Normal range of motion.   Skin:     General: Skin is warm.      Capillary Refill: Capillary refill takes less  than 2 seconds.      Findings: No erythema or rash.   Neurological:      General: No focal deficit present.      Mental Status: She is alert and oriented to person, place, and time.      Cranial Nerves: No cranial nerve deficit.      Sensory: No sensory deficit.      Motor: No weakness.   Psychiatric:         Mood and Affect: Mood normal.         Behavior: Behavior normal.      Scheduled Meds   piperacillin-tazobactam, 3.375 g, Intravenous, Q8H  sodium chloride, 10 mL, Intravenous, Q12H       PRN Meds     acetaminophen **OR** acetaminophen **OR** acetaminophen    aluminum-magnesium hydroxide-simethicone    senna-docusate sodium **AND** polyethylene glycol **AND** bisacodyl **AND** bisacodyl    Calcium Replacement - Follow Nurse / BPA Driven Protocol    diphenhydrAMINE    HYDROmorphone    Magnesium Standard Dose Replacement - Follow Nurse / BPA Driven Protocol    melatonin    nitroglycerin    ondansetron ODT **OR** ondansetron    Pharmacy to Dose Zosyn    Phosphorus Replacement - Follow Nurse / BPA Driven Protocol    Potassium Replacement - Follow Nurse / BPA Driven Protocol    sodium chloride    sodium chloride    sodium chloride   Infusions  Pharmacy to Dose Zosyn,   sodium chloride, 75 mL/hr, Last Rate: 75 mL/hr (03/13/24 0508)          Diagnostic Data    Results from last 7 days   Lab Units 03/13/24  0509 03/12/24  2059   WBC 10*3/mm3 11.50* 16.00*   HEMOGLOBIN g/dL 10.3* 11.5*   HEMATOCRIT % 31.5* 36.0   PLATELETS 10*3/mm3 296 293   GLUCOSE mg/dL 106* 128*   CREATININE mg/dL 0.82 0.96   BUN mg/dL 9 11   SODIUM mmol/L 141 139   POTASSIUM mmol/L 3.5 3.7   AST (SGOT) U/L  --  30   ALT (SGPT) U/L  --  22   ALK PHOS U/L  --  109   BILIRUBIN mg/dL  --  0.7   ANION GAP mmol/L 10.0 15.0       CT Abdomen Pelvis With Contrast    Result Date: 3/12/2024  Impression: Findings suspicious for acute appendicitis. Two adjacent obstructing stones in the proximal left ureter, each measuring 3-4 mm, with mild upstream  hydroureteronephrosis. Electronically Signed: Mumtaz Quintero MD  3/12/2024 10:29 PM EDT  Workstation ID: [id number]       I reviewed the patient's new clinical results.    Assessment/Plan:     Active and Resolved Problems  Active Hospital Problems    Diagnosis  POA    **Appendicitis [K37]  Yes    Hypertension [I10]  Yes    Ureterolithiasis [N20.1]  Yes      Resolved Hospital Problems   No resolved problems to display.            Appendicitis  Simple sepsis  Abdominal pain, nausea and vomiting  -History of fever, 101 at home, leukocytosis and tachycardia  -CMP largely unremarkable for acute findings.  hCG quant negative, lipase normal.    -White blood cell count evaded at 16,000 which was previously normal about 1 month ago. -Now improving with IV antibiotics  -UA shows 6-10 WBCs and trace bacteria.   - Blood cultures x 2 pending.  Urine culture pending.    -CT abdomen pelvis with contrast shows findings suspicious for acute appendicitis.     -Patient is afebrile, pulse initially 117, on room air oxygen 99% SpO2 and blood pressure normotensive.    -Patient given Dilaudid and Toradol for pain control.    -Patient started on Zosyn.  Patient given Zofran for nausea.  Patient given 1 L of LR.   -General surgery consulted  -Continue Zosyn  -Normal saline 75 mL/h  -N.p.o.     Ureterolithiasis  -CT abdomen pelvis with contrast shows 2 adjacent obstructing stones in the proximal left ureter, each measuring 3 to 4 mm, with mild upstream hydroureteronephrosis.  -Likely also contributing to pain  -Urology consult, Dr. Nguyễn     Questionable UTI  -Continue antibiotics above  -UA shows 6-10 WBCs and trace bacteria.  Urine cultures however showed no growth     Normocytic normochromic anemia  -Stable     Essential hypertension, chronic, controlled  -Continue home metoprolol     Obesity, BMI 34  -Encourage lifestyle modifications        DVT prophylaxis:SCDs    DVT prophylaxis:  Mechanical DVT prophylaxis orders are present.          Code status is   Code Status and Medical Interventions:   Ordered at: 03/13/24 0009     Code Status (Patient has no pulse and is not breathing):    CPR (Attempt to Resuscitate)     Medical Interventions (Patient has pulse or is breathing):    Full Support       Plan for disposition: Pending clinical course    Time: 30 minutes    Signature: Electronically signed by Naomi Watson MD, 03/13/24, 10:35 EDT.  [facility]

## 2024-03-13 NOTE — CONSULTS
[facility] CONSULT      Patient Identification:  NAME:  Ariadna Ambrocio  Age:  38 y.o.   Sex:  female   :  1985   MRN:  [id number]       Chief complaint/Reason for consult: Left proximal stones    History of present illness:  38 y.o. female history of stones requiring intervention in the past.  Admitted with right and left sided pain.  Found to have acute appendicitis as well as to left proximal ureteral stones.  Leukocytosis urinalysis with microhematuria urine culture prelim negative      Past medical history:  Past Medical History:   Diagnosis Date    Anxiety     Bulging lumbar disc     Constipation     Depression     Diarrhea     Elevated triglycerides with high cholesterol     Heartburn     Herniated cervical disc     Hiatal hernia     Hyperlipidemia     Hypertension     IBS (irritable bowel syndrome)     Kidney stones     Nausea     Pre-diabetes     Rapid heart beat     Seasonal allergies        Past surgical history:  Past Surgical History:   Procedure Laterality Date    COLONOSCOPY      CYSTOSCOPY, URETEROSCOPY, RETROGRADE PYELOGRAM, STENT INSERTION Left 2021    Procedure: CYSTOSCOPY URETEROSCOPY RETROGRADE PYELOGRAM HOLMIUM LASER BASKET STONE EXTRACTION STENT INSERTION;  Surgeon: Carmelo Nguyễn MD;  Location: [facility] MAIN OR;  Service: Urology;  Laterality: Left;    EAR TUBES      ENDOSCOPY N/A 2023    Procedure: ESOPHAGOGASTRODUODENOSCOPY with gastric antrum biopsy;  Surgeon: Otilia Capone MD;  Location: [facility] ENDOSCOPY;  Service: General;  Laterality: N/A;  Post: large hiatal hernia    GASTRIC BYPASS N/A 2023    Procedure: GASTRIC BYPASS AND HIATAL HERNIA REPAIR LAPAROSCOPIC WITH DAVINCI ROBOT;  Surgeon: Otilia Capone MD;  Location: [facility] MAIN OR;  Service: Robotics - DaVinci;  Laterality: N/A;    KIDNEY STONE SURGERY      WISDOM TOOTH EXTRACTION         Allergies:  Patient has no known allergies.    Home medications:  Medications Prior to Admission   Medication Sig  Dispense Refill Last Dose    ALBUTEROL IN Inhale 2 puffs Every 6 (Six) Hours As Needed.       NON FORMULARY Bariatric vitamin.  Take one tab po daily.   3/13/2024    NON FORMULARY Fiber gummy chewable. Take 4 chewables po daily.   3/13/2024    ursodiol (ACTIGALL) 250 MG tablet Take 1 tablet by mouth 2 (Two) Times a Day for 30 days. 60 tablet 5 3/12/2024        Hospital medications:  piperacillin-tazobactam, 3.375 g, Intravenous, Q8H  [Transfer Hold] sodium chloride, 10 mL, Intravenous, Q12H      Pharmacy to Dose Zosyn,   sodium chloride, 75 mL/hr, Last Rate: 75 mL/hr (03/13/24 4646)        [Transfer Hold] acetaminophen **OR** [Transfer Hold] acetaminophen **OR** [Transfer Hold] acetaminophen    [Transfer Hold] aluminum-magnesium hydroxide-simethicone    [Transfer Hold] senna-docusate sodium **AND** [Transfer Hold] polyethylene glycol **AND** [Transfer Hold] bisacodyl **AND** [Transfer Hold] bisacodyl    [Transfer Hold] Calcium Replacement - Follow Nurse / BPA Driven Protocol    [Transfer Hold] diphenhydrAMINE    [Transfer Hold] HYDROmorphone    [Transfer Hold] Magnesium Standard Dose Replacement - Follow Nurse / BPA Driven Protocol    [Transfer Hold] melatonin    [Transfer Hold] nitroglycerin    [Transfer Hold] ondansetron ODT **OR** [Transfer Hold] ondansetron    Pharmacy to Dose Zosyn    [Transfer Hold] Phosphorus Replacement - Follow Nurse / BPA Driven Protocol    Potassium Replacement - Follow Nurse / BPA Driven Protocol    [Transfer Hold] sodium chloride    [Transfer Hold] sodium chloride    [Transfer Hold] sodium chloride    Family history:  Family History   Problem Relation Age of Onset    Asthma Mother     Hypertension Mother     No Known Problems Father     Hypertension Maternal Grandmother     Heart attack Maternal Grandfather     Cancer Paternal Grandmother     Cancer Paternal Grandfather     Breast cancer Neg Hx     Ovarian cancer Neg Hx     Uterine cancer Neg Hx     Colon cancer Neg Hx     Deep vein  thrombosis Neg Hx     Pulmonary embolism Neg Hx        Social history:  Social History     Tobacco Use    Smoking status: Never     Passive exposure: Past    Smokeless tobacco: Never   Vaping Use    Vaping status: Never Used   Substance Use Topics    Alcohol use: Yes     Comment: 2-3 drinks monthly    Drug use: Never       Objective:  TMax 24 hours:   Temp (24hrs), Av.3 °F (36.8 °C), Min:98 °F (36.7 °C), Max:98.5 °F (36.9 °C)      Vitals Ranges:   Temp:  [98 °F (36.7 °C)-98.5 °F (36.9 °C)] 98.5 °F (36.9 °C)  Heart Rate:  [] 98  Resp:  [11-22] 17  BP: (105-136)/(66-86) 136/84    Intake/Output Last 3 shifts:  No intake/output data recorded.     Physical Exam:    General Appearance:    Alert, cooperative, NAD   Lungs:     Respirations unlabored, no audible wheezing    Heart:    No cyanosis   Abdomen:     Soft, ND    :    No suprapubic distention              Results review:   I reviewed the patient's new clinical results.    Data review:  Lab Results (last 24 hours)       Procedure Component Value Units Date/Time    Pregnancy, Urine - Urine, Clean Catch [[id number]]  (Normal) Collected: 24    Specimen: Urine, Clean Catch Updated: 24 1142     HCG, Urine QL Negative    Urine Culture - Urine, Urine, Clean Catch [[id number]]  (Normal) Collected: 24    Specimen: Urine, Clean Catch Updated: 24 1031     Urine Culture No growth    Basic Metabolic Panel [[id number]]  (Abnormal) Collected: 24    Specimen: Blood Updated: 24 0554     Glucose 106 mg/dL      BUN 9 mg/dL      Creatinine 0.82 mg/dL      Sodium 141 mmol/L      Potassium 3.5 mmol/L      Chloride 107 mmol/L      CO2 24.0 mmol/L      Calcium 8.9 mg/dL      BUN/Creatinine Ratio 11.0     Anion Gap 10.0 mmol/L      eGFR 94.0 mL/min/1.73     Narrative:      GFR Normal >60  Chronic Kidney Disease <60  Kidney Failure <15      CBC Auto Differential [[id number]]  (Abnormal) Collected: 24    Specimen: Blood  Updated: 03/13/24 0550     WBC 11.50 10*3/mm3      RBC 3.48 10*6/mm3      Hemoglobin 10.3 g/dL      Hematocrit 31.5 %      MCV 90.5 fL      MCH 29.6 pg      MCHC 32.7 g/dL      RDW 15.6 %      RDW-SD 48.6 fl      MPV 8.2 fL      Platelets 296 10*3/mm3      Neutrophil % 59.9 %      Lymphocyte % 31.0 %      Monocyte % 7.5 %      Eosinophil % 1.4 %      Basophil % 0.2 %      Neutrophils, Absolute 6.90 10*3/mm3      Lymphocytes, Absolute 3.60 10*3/mm3      Monocytes, Absolute 0.90 10*3/mm3      Eosinophils, Absolute 0.20 10*3/mm3      Basophils, Absolute 0.00 10*3/mm3      nRBC 0.0 /100 WBC     Lactic Acid, Plasma [[id number]]  (Normal) Collected: 03/12/24 2335    Specimen: Blood Updated: 03/13/24 0002     Lactate 1.2 mmol/L     Blood Culture - Blood, Hand, Left [[id number]] Collected: 03/12/24 2220    Specimen: Blood from Hand, Left Updated: 03/12/24 2225    Extra Tubes [[id number]] Collected: 03/12/24 2059    Specimen: Blood, Venous Line Updated: 03/12/24 2200    Narrative:      The following orders were created for panel order Extra Tubes.  Procedure                               Abnormality         Status                     ---------                               -----------         ------                     Light Blue Top[[id number]]                                   Final result                 Please view results for these tests on the individual orders.    Light Blue Top [[id number]] Collected: 03/12/24 2059    Specimen: Blood Updated: 03/12/24 2200     Extra Tube Hold for add-ons.     Comment: Auto resulted       hCG, Quantitative, Pregnancy [[id number]] Collected: 03/12/24 2059    Specimen: Blood Updated: 03/12/24 2131     HCG Quantitative <1.00 mIU/mL     Narrative:      HCG Ranges by Gestational Age    Females - non-pregnant premenopausal   </= 1mIU/mL HCG  Females - postmenopausal               </= 7mIU/mL HCG    3 Weeks       5.4   -      72 mIU/mL  4 Weeks      10.2   -     708 mIU/mL  5 Weeks       217   -    8,245 mIU/mL  6 Weeks       152   -  32,177 mIU/mL  7 Weeks     4,059   - 153,767 mIU/mL  8 Weeks    31,366   - 149,094 mIU/mL  9 Weeks    59,109   - 135,901 mIU/mL  10 Weeks   44,186   - 170,409 mIU/mL  12 Weeks   27,107   - 201,615 mIU/mL  14 Weeks   24,302   -  93,646 mIU/mL  15 Weeks   12,540   -  69,747 mIU/mL  16 Weeks    8,904   -  55,332 mIU/mL  17 Weeks    8,240   -  51,793 mIU/mL  18 Weeks    9,649   -  55,271 mIU/mL      Comprehensive Metabolic Panel [[id number]]  (Abnormal) Collected: 03/12/24 2059    Specimen: Blood Updated: 03/12/24 2129     Glucose 128 mg/dL      BUN 11 mg/dL      Creatinine 0.96 mg/dL      Sodium 139 mmol/L      Potassium 3.7 mmol/L      Comment: Slight hemolysis detected by analyzer. Result may be falsely elevated.        Chloride 105 mmol/L      CO2 19.0 mmol/L      Calcium 9.3 mg/dL      Total Protein 7.2 g/dL      Albumin 4.3 g/dL      ALT (SGPT) 22 U/L      AST (SGOT) 30 U/L      Comment: Slight hemolysis detected by analyzer. Result may be falsely elevated.        Alkaline Phosphatase 109 U/L      Total Bilirubin 0.7 mg/dL      Globulin 2.9 gm/dL      A/G Ratio 1.5 g/dL      BUN/Creatinine Ratio 11.5     Anion Gap 15.0 mmol/L      eGFR 77.8 mL/min/1.73     Narrative:      GFR Normal >60  Chronic Kidney Disease <60  Kidney Failure <15      Lipase [[id number]]  (Normal) Collected: 03/12/24 2059    Specimen: Blood Updated: 03/12/24 2128     Lipase 26 U/L     CBC & Differential [[id number]]  (Abnormal) Collected: 03/12/24 2059    Specimen: Blood Updated: 03/12/24 2110    Narrative:      The following orders were created for panel order CBC & Differential.  Procedure                               Abnormality         Status                     ---------                               -----------         ------                     CBC Auto Differential[[id number]]        Abnormal            Final result                 Please view results for these tests on the individual orders.    CBC  Auto Differential [[id number]]  (Abnormal) Collected: 03/12/24 2059    Specimen: Blood Updated: 03/12/24 2110     WBC 16.00 10*3/mm3      RBC 4.01 10*6/mm3      Hemoglobin 11.5 g/dL      Hematocrit 36.0 %      MCV 89.7 fL      MCH 28.6 pg      MCHC 31.8 g/dL      RDW 16.0 %      RDW-SD 52.5 fl      MPV 8.2 fL      Platelets 293 10*3/mm3      Neutrophil % 73.0 %      Lymphocyte % 17.6 %      Monocyte % 7.4 %      Eosinophil % 0.7 %      Basophil % 1.3 %      Neutrophils, Absolute 11.70 10*3/mm3      Lymphocytes, Absolute 2.80 10*3/mm3      Monocytes, Absolute 1.20 10*3/mm3      Eosinophils, Absolute 0.10 10*3/mm3      Basophils, Absolute 0.20 10*3/mm3      nRBC 0.0 /100 WBC     Urinalysis, Microscopic Only - Urine, Clean Catch [[id number]]  (Abnormal) Collected: 03/12/24 2045    Specimen: Urine, Clean Catch Updated: 03/12/24 2107     RBC, UA 0-2 /HPF      WBC, UA 6-10 /HPF      Bacteria, UA Trace /HPF      Squamous Epithelial Cells, UA 3-6 /HPF      Hyaline Casts, UA 3-6 /LPF      Calcium Oxalate Crystals, UA Small/1+ /HPF      Methodology Manual Light Microscopy    Blood Culture - Blood, Arm, Left [[id number]] Collected: 03/12/24 2059    Specimen: Blood from Arm, Left Updated: 03/12/24 2107    Urinalysis With Culture If Indicated - Urine, Clean Catch [[id number]]  (Abnormal) Collected: 03/12/24 2045    Specimen: Urine, Clean Catch Updated: 03/12/24 2054     Color, UA Yellow     Appearance, UA Clear     pH, UA 6.0     Specific Gravity, UA 1.011     Glucose, UA Negative     Ketones, UA Negative     Bilirubin, UA Negative     Blood, UA Moderate (2+)     Protein, UA Negative     Leuk Esterase, UA Trace     Nitrite, UA Negative     Urobilinogen, UA 0.2 E.U./dL    Narrative:      In absence of clinical symptoms, the presence of pyuria, bacteria, and/or nitrites on the urinalysis result does not correlate with infection.             Imaging:  Imaging Results (Last 24 Hours)       Procedure Component Value Units Date/Time    CT  Abdomen Pelvis With Contrast [[id number]] Collected: 03/12/24 2216     Updated: 03/12/24 2231    Narrative:      CT ABDOMEN PELVIS W CONTRAST    Date of Exam: 3/12/2024 10:06 PM EDT    Indication: upper abd pain with increased WBC count/ fever.    Comparison: CT abdomen pelvis 8/16/2022.    Technique: Axial CT images were obtained of the abdomen and pelvis following the uneventful intravenous administration of iodinated contrast. Sagittal and coronal reconstructions were performed.  Automated exposure control and iterative reconstruction   methods were used.    Findings:    Lower thorax: No focal consolidation.     Liver: No focal hepatic lesion.    Gallbladder and bile ducts: Gallbladder is mildly distended but otherwise unremarkable. No biliary ductal dilatation.     Pancreas: No pancreatic duct dilation. No surrounding inflammation.     Spleen: Normal in size.     Adrenal glands: No adrenal nodule.     Kidneys: There are two adjacent stones in the proximal left ureter, each measuring 3-4 mm (coronal image 55, 56), with mild upstream hydroureteronephrosis and urothelial enhancement, as well as mild perirenal fat stranding. Additionally, there are   multiple small nonobstructing renal renal stones bilaterally. No right hydronephrosis. Right upper pole sinus cyst.    Urinary bladder: Unremarkable.    Reproductive organs: Intrauterine device in expected position.    Stomach and bowel: The appendix is dilated measuring 1.2 cm and there is periappendiceal fat stranding. Postsurgical changes of Nicho-en-Y gastric bypass. No evidence of bowel obstruction.    Lymph nodes: No pathologically enlarged lymph nodes.     Vessels: No abdominal aortic aneurysm. Major vasculature is patent. Atherosclerosis. Circumaortic left renal vein.    Peritoneum and retroperitoneum: No free air or free fluid.     Soft tissues: Unremarkable.    Osseous structures: No acute or suspicious osseous lesions.       Impression:       Impression:    Findings suspicious for acute appendicitis.    Two adjacent obstructing stones in the proximal left ureter, each measuring 3-4 mm, with mild upstream hydroureteronephrosis.      Electronically Signed: Mumtaz Quintero MD    3/12/2024 10:29 PM EDT    Workstation ID: [id number]               Assessment:       Appendicitis    Ureterolithiasis    Hypertension      Left proximal ureteral stones  Small bilateral renal stones    Plan:     CT images reviewed she is set up to undergo appendectomy today we will do left ureteroscopy with laser lithotripsy and stent placement at the same anesthesia time  Culture negative no fevers  All risks, benefits and alternatives to surgery were discussed with the patient preoperatively including but not limited to need for multiple procedures, infection, sepsis, bleeding, risk of anesthesia and damage to other organs. The details of the procedure have been fully reviewed with the patient and informed consent has been obtained.      Carmelo Nguyễn MD  [facility]  [facility], [address]  [place] [address]  Office: [phone]  Available via [employer]  03/13/24  13:46 EDT

## 2024-03-13 NOTE — ED NOTES
Maria Del Rosario NP at bedside to explain labs/ct results and discuss plan of care/pending admission.

## 2024-03-13 NOTE — ANESTHESIA PROCEDURE NOTES
Airway  Urgency: elective    Date/Time: 3/13/2024 1:40 PM  End Time:3/13/2024 1:40 PM  Airway not difficult    General Information and Staff    Patient location during procedure: OR  Anesthesiologist: Joan Willis MD  CRNA/CAA: Geovanna Schuster CAA    Indications and Patient Condition  Indications for airway management: airway protection    Preoxygenated: yes  Mask difficulty assessment: 1 - vent by mask    Final Airway Details  Final airway type: endotracheal airway      Successful airway: ETT  Cuffed: yes   Successful intubation technique: video laryngoscopy  Facilitating devices/methods: intubating stylet  Endotracheal tube insertion site: oral  Blade: Lay  Blade size: 3  ETT size (mm): 7.0  Cormack-Lehane Classification: grade I - full view of glottis  Placement verified by: chest auscultation, capnometry and palpation of cuff   Measured from: lips  ETT/EBT  to lips (cm): 21  Number of attempts at approach: 1  Assessment: lips, teeth, and gum same as pre-op and atraumatic intubation

## 2024-03-13 NOTE — ANESTHESIA POSTPROCEDURE EVALUATION
Patient: Ariadna Ambrocio    Procedure Summary       Date: 03/13/24 Room / Location: [facility] OR 09 / [facility] MAIN OR    Anesthesia Start: 1335 Anesthesia Stop: 1451    Procedures:       CYSTOSCOPY URETEROSCOPY, STENT INSERTION, STONE EXTRACTION (Left)      APPENDECTOMY LAPAROSCOPIC (Abdomen) Diagnosis:     Surgeons: Carmelo Nguyễn MD; Georges Chandler MD Provider: Joan Willis MD    Anesthesia Type: general ASA Status: 3            Anesthesia Type: general    Vitals  Vitals Value Taken Time   /65 03/13/24 1536   Temp 98.9 °F (37.2 °C) 03/13/24 1536   Pulse 65 03/13/24 1537   Resp 12 03/13/24 1536   SpO2 93 % 03/13/24 1537   Vitals shown include unfiled device data.        Post Anesthesia Care and Evaluation    Patient location during evaluation: PACU  Patient participation: complete - patient participated  Level of consciousness: awake  Pain score: 0  Pain management: adequate  Anesthetic complications: No anesthetic complications  PONV Status: none  Cardiovascular status: acceptable  Respiratory status: acceptable  Hydration status: acceptable

## 2024-03-13 NOTE — CONSULTS
General Surgery Consult Note      Name: Ariadna Ambrocio ADMIT: 3/12/2024   : 1985  PCP: Marifer Cooper MD    MRN: [id number] LOS: 0 days   AGE/SEX: 38 y.o. female  ROOM: [facility] OR/[facility] OR   [facility]      Patient Care Team:  Marifer Cooper MD as PCP - General (Internal Medicine)  Austen Abarca MD as Consulting Physician (Cardiology)  Yaneli Jacobson APRN as Nurse Practitioner (Cardiology)  Otilia Capone MD as Consulting Physician (Bariatrics)  Terrance Liu MD as Surgeon (General Surgery)  Chief Complaint   Patient presents with    Abdominal Pain       Subjective   Patient is a 38-year-old woman here with abdominal pain.  Started yesterday.  Is diffuse all over.  Reports fever at home as well as nausea and emesis.  CT scan in the ER revealed appendicitis as well as obstructive nephrolithiasis.    Past Medical History:   Diagnosis Date    Anxiety     Bulging lumbar disc     Constipation     Depression     Diarrhea     Elevated triglycerides with high cholesterol     Heartburn     Herniated cervical disc     Hiatal hernia     Hyperlipidemia     Hypertension     IBS (irritable bowel syndrome)     Kidney stones     Nausea     Pre-diabetes     Rapid heart beat     Seasonal allergies      Past Surgical History:   Procedure Laterality Date    COLONOSCOPY      CYSTOSCOPY, URETEROSCOPY, RETROGRADE PYELOGRAM, STENT INSERTION Left 2021    Procedure: CYSTOSCOPY URETEROSCOPY RETROGRADE PYELOGRAM HOLMIUM LASER BASKET STONE EXTRACTION STENT INSERTION;  Surgeon: Carmelo Nguyễn MD;  Location: [facility] OR;  Service: Urology;  Laterality: Left;    EAR TUBES      ENDOSCOPY N/A 2023    Procedure: ESOPHAGOGASTRODUODENOSCOPY with gastric antrum biopsy;  Surgeon: Otilia Capone MD;  Location: [facility] ENDOSCOPY;  Service: General;  Laterality: N/A;  Post: large hiatal hernia    GASTRIC BYPASS N/A 2023    Procedure: GASTRIC BYPASS AND HIATAL HERNIA REPAIR LAPAROSCOPIC WITH DAVINCI ROBOT;  Surgeon:  Otilia Capone MD;  Location: [facility] MAIN OR;  Service: Robotics - DaVinci;  Laterality: N/A;    KIDNEY STONE SURGERY      WISDOM TOOTH EXTRACTION       Family History   Problem Relation Age of Onset    Asthma Mother     Hypertension Mother     No Known Problems Father     Hypertension Maternal Grandmother     Heart attack Maternal Grandfather     Cancer Paternal Grandmother     Cancer Paternal Grandfather     Breast cancer Neg Hx     Ovarian cancer Neg Hx     Uterine cancer Neg Hx     Colon cancer Neg Hx     Deep vein thrombosis Neg Hx     Pulmonary embolism Neg Hx        Social History     Tobacco Use    Smoking status: Never     Passive exposure: Past    Smokeless tobacco: Never   Vaping Use    Vaping status: Never Used   Substance Use Topics    Alcohol use: Yes     Comment: 2-3 drinks monthly    Drug use: Never     Medications Prior to Admission   Medication Sig Dispense Refill Last Dose    ALBUTEROL IN Inhale 2 puffs Every 6 (Six) Hours As Needed.       NON FORMULARY Bariatric vitamin.  Take one tab po daily.   3/13/2024    NON FORMULARY Fiber gummy chewable. Take 4 chewables po daily.   3/13/2024    ursodiol (ACTIGALL) 250 MG tablet Take 1 tablet by mouth 2 (Two) Times a Day for 30 days. 60 tablet 5 3/12/2024     piperacillin-tazobactam, 3.375 g, Intravenous, Q8H  [Transfer Hold] sodium chloride, 10 mL, Intravenous, Q12H      Pharmacy to Dose Zosyn,   sodium chloride, 75 mL/hr, Last Rate: 75 mL/hr (03/13/24 9471)        [Transfer Hold] acetaminophen **OR** [Transfer Hold] acetaminophen **OR** [Transfer Hold] acetaminophen    [Transfer Hold] aluminum-magnesium hydroxide-simethicone    [Transfer Hold] senna-docusate sodium **AND** [Transfer Hold] polyethylene glycol **AND** [Transfer Hold] bisacodyl **AND** [Transfer Hold] bisacodyl    [Transfer Hold] Calcium Replacement - Follow Nurse / BPA Driven Protocol    [Transfer Hold] diphenhydrAMINE    [Transfer Hold] HYDROmorphone    [Transfer Hold] Magnesium Standard  "Dose Replacement - Follow Nurse / BPA Driven Protocol    [Transfer Hold] melatonin    [Transfer Hold] nitroglycerin    [Transfer Hold] ondansetron ODT **OR** [Transfer Hold] ondansetron    Pharmacy to Dose Zosyn    [Transfer Hold] Phosphorus Replacement - Follow Nurse / BPA Driven Protocol    Potassium Replacement - Follow Nurse / BPA Driven Protocol    [Transfer Hold] sodium chloride    [Transfer Hold] sodium chloride    [Transfer Hold] sodium chloride  Patient has no known allergies.    Review of Systems   Gastrointestinal:  Positive for abdominal pain and nausea.        Objective     Vital Signs and Labs:  Vital Signs Patient Vitals for the past 24 hrs:   BP Temp Temp src Pulse Resp SpO2 Height Weight   03/13/24 1200 136/84 98.5 °F (36.9 °C) Oral 98 17 96 % -- --   03/13/24 0819 105/66 98.1 °F (36.7 °C) Oral 62 17 95 % -- --   03/13/24 0519 126/73 98.3 °F (36.8 °C) Oral 59 11 90 % -- --   03/13/24 0208 129/86 98 °F (36.7 °C) Oral 77 16 96 % 160 cm (63\") 91.5 kg (201 lb 11.5 oz)   03/12/24 2356 121/66 -- -- 98 22 99 % -- --   03/12/24 2152 123/81 -- -- 73 18 99 % -- --   03/12/24 2035 -- -- -- -- 17 -- -- --   03/12/24 2034 -- -- -- -- -- -- 160 cm (63\") 89 kg (196 lb 3.4 oz)   03/12/24 2025 136/84 98.5 °F (36.9 °C) Oral 117 -- 99 % -- --     I/O:  No intake/output data recorded.    Physical Exam:  Physical Exam  Cardiovascular:      Rate and Rhythm: Normal rate.   Pulmonary:      Effort: Pulmonary effort is normal.   Abdominal:      General: Abdomen is flat.      Palpations: Abdomen is soft.      Comments: Focally tender to palpation right lower quadrant   Neurological:      Mental Status: She is alert.         CBC    Results from last 7 days   Lab Units 03/13/24  0509 03/12/24 2059   WBC 10*3/mm3 11.50* 16.00*   HEMOGLOBIN g/dL 10.3* 11.5*   PLATELETS 10*3/mm3 296 293     BMP   Results from last 7 days   Lab Units 03/13/24  0509 03/12/24  2059   SODIUM mmol/L 141 139   POTASSIUM mmol/L 3.5 3.7   CHLORIDE mmol/L " 107 105   CO2 mmol/L 24.0 19.0*   BUN mg/dL 9 11   CREATININE mg/dL 0.82 0.96   GLUCOSE mg/dL 106* 128*     Radiology(recent) CT Abdomen Pelvis With Contrast    Result Date: 3/12/2024  Impression: Findings suspicious for acute appendicitis. Two adjacent obstructing stones in the proximal left ureter, each measuring 3-4 mm, with mild upstream hydroureteronephrosis. Electronically Signed: Mumtaz Quintero MD  3/12/2024 10:29 PM EDT  Workstation ID: [id number]     I reviewed the patient's new clinical results.  I reviewed the patient's new imaging results and agree with the interpretation.  I reviewed the patient's other test results and agree with the interpretation    Assessment & Plan       Appendicitis    Ureterolithiasis    Hypertension      38 y.o. female with obstructive nephrolithiasis and acute appendicitis    CT reviewed and agree with findings.  Discussed the pathophysiology of, natural history of, and indications for operation for acute appendicitis which she understands.  Will plan for lap appendectomy as well as cystoscopy and stent placement with urology.    Discussed risks, benefits and alternatives to laparoscopic appendectomy, including option of nonoperative management, risks including bowel injury, abscess, infection or bleeding, and need to convert to an open procedure and patient elected to proceed with surgery.      Georges Chandler MD  03/13/24  12:56 EDT

## 2024-03-13 NOTE — PLAN OF CARE
Goal Outcome Evaluation:         Pt arrived on the floor after 2 am, pain tx per MAR PRN, VSS on RA, call light within reach, plan of care ongoing.                                       Complex Repair And Flap Additional Text (Will Appearing After The Standard Complex Repair Text): The complex repair was not sufficient to completely close the primary defect. The remaining additional defect was repaired with the flap mentioned below.

## 2024-03-13 NOTE — H&P
[facility] Medicine Services  History & Physical    Patient Name: Ariadna Ambrocio  : 1985  MRN: [id number]  Primary Care Physician:  Marifer Cooper MD  Date of admission: 3/12/2024  Date and Time of Service: 3/12/2024 at 00:34 EDT      Subjective      Chief Complaint: abdominal pain    History of Present Illness: Ariadna Ambrocio is a 38 y.o. female with a CMH of anxiety who presented to [facility] on 3/12/2024 with abdominal pain since this morning.  Patient states the pain persisted worse throughout the day.  Patient states pain is diffuse in the abdomen.  Patient reports history of kidney stones but states that this pain felt different than usual.  Patient reports some chills and reportedly had a fever of 101 at home and took Tylenol prior to arrival.  Patient reports a few bouts of vomiting earlier today as well.  Patient denies any urinary symptoms.      In the ED, CMP largely unremarkable for acute findings.  hCG quant negative, lipase normal.  White blood cell count evaded at 16,000 which was previously normal about 1 month ago.  Hemoglobin of 11.5, normocytic normochromic, similar to previous study about 1 month ago.  UA shows 6-10 WBCs and trace bacteria.  Blood cultures x 2 pending.  Urine culture pending.  CT abdomen pelvis with contrast shows findings suspicious for acute appendicitis.  2 adjacent obstructing stones in the proximal left ureter, each measuring 3 to 4 mm, with mild upstream hydroureteronephrosis.  Patient is afebrile, pulse initially 117, on room air oxygen 99% SpO2 and blood pressure normotensive.  Patient given Dilaudid and Toradol for pain control.  Patient started on Zosyn.  Patient given Zofran for nausea.  Patient given 1 L of LR.  Spoke with SAM Ward, and I accepted patient on behalf of Dr. Mitchell, for admission to hospital.       Review of Systems   Constitutional:  Positive for chills. Negative for fatigue and fever.   HENT:  Negative for  congestion, sore throat, tinnitus and trouble swallowing.    Eyes:  Negative for photophobia, discharge and visual disturbance.   Respiratory:  Negative for cough, shortness of breath and wheezing.    Cardiovascular:  Negative for chest pain and leg swelling.   Gastrointestinal:  Positive for abdominal pain, nausea and vomiting. Negative for diarrhea.   Genitourinary:  Negative for dysuria, flank pain, frequency and urgency.   Musculoskeletal:  Negative for arthralgias and myalgias.   Skin:  Negative for rash.   Neurological:  Negative for dizziness and headaches.   Psychiatric/Behavioral:  Negative for confusion.        Personal History     Past Medical History:   Diagnosis Date    Anxiety     Bulging lumbar disc     Constipation     Depression     Diarrhea     Elevated triglycerides with high cholesterol     Heartburn     Herniated cervical disc     Hiatal hernia     Hyperlipidemia     Hypertension     IBS (irritable bowel syndrome)     Kidney stones     Nausea     Pre-diabetes     Rapid heart beat     Seasonal allergies        Past Surgical History:   Procedure Laterality Date    COLONOSCOPY      CYSTOSCOPY, URETEROSCOPY, RETROGRADE PYELOGRAM, STENT INSERTION Left 07/16/2021    Procedure: CYSTOSCOPY URETEROSCOPY RETROGRADE PYELOGRAM HOLMIUM LASER BASKET STONE EXTRACTION STENT INSERTION;  Surgeon: Carmelo Nguyễn MD;  Location: [facility] MAIN OR;  Service: Urology;  Laterality: Left;    EAR TUBES      ENDOSCOPY N/A 2/27/2023    Procedure: ESOPHAGOGASTRODUODENOSCOPY with gastric antrum biopsy;  Surgeon: Otilia Capone MD;  Location: [facility] ENDOSCOPY;  Service: General;  Laterality: N/A;  Post: large hiatal hernia    GASTRIC BYPASS N/A 9/27/2023    Procedure: GASTRIC BYPASS AND HIATAL HERNIA REPAIR LAPAROSCOPIC WITH DAVINCI ROBOT;  Surgeon: Otilia Capone MD;  Location: [facility] MAIN OR;  Service: Robotics - DaVinci;  Laterality: N/A;    KIDNEY STONE SURGERY      WISDOM TOOTH EXTRACTION         Family History: family history  includes Asthma in her mother; Cancer in her paternal grandfather and paternal grandmother; Heart attack in her maternal grandfather; Hypertension in her maternal grandmother and mother; No Known Problems in her father. Otherwise pertinent FHx was reviewed and not pertinent to current issue.    Social History:  reports that she has never smoked. She has been exposed to tobacco smoke. She has never used smokeless tobacco. She reports current alcohol use. She reports that she does not use drugs.    Home Medications:  Prior to Admission Medications       Prescriptions Last Dose Informant Patient Reported? Taking?    cefdinir (OMNICEF) 300 MG capsule   No No    Take 1 capsule by mouth 2 (Two) Times a Day.    cholecalciferol (VITAMIN D3) 25 MCG (1000 UT) tablet   Yes No    Take 1 tablet by mouth Daily.    Patient not taking:  Reported on 1/23/2024    hydroCHLOROthiazide (MICROZIDE) 12.5 MG capsule   Yes No    Patient not taking:  Reported on 1/23/2024    HYDROmorphone (Dilaudid) 2 MG tablet   No No    Take 1 tablet by mouth Every 12 (Twelve) Hours As Needed for Severe Pain.    Patient not taking:  Reported on 1/23/2024    metoprolol tartrate (LOPRESSOR) 25 MG tablet   No No    Take 0.5 tablets by mouth 2 (Two) Times a Day.    omeprazole (priLOSEC) 40 MG capsule   No No    Take 1 capsule by mouth Daily.    ondansetron ODT (ZOFRAN-ODT) 8 MG disintegrating tablet   No No    Place 1 tablet on the tongue Every 8 (Eight) Hours As Needed for Nausea or Vomiting.    Patient not taking:  Reported on 1/23/2024    ondansetron ODT (ZOFRAN-ODT) 8 MG disintegrating tablet   No No    Place 1 tablet on the tongue Every 8 (Eight) Hours As Needed for Nausea or Vomiting.    phenazopyridine (PYRIDIUM) 200 MG tablet   No No    Take 1 tablet by mouth 3 (Three) Times a Day As Needed for Bladder Spasms or Dysuria.    potassium chloride (K-DUR,KLOR-CON) 10 MEQ CR tablet   No No    Take 1 tablet by mouth Daily.    Patient not taking:  Reported on  1/23/2024    potassium chloride 10 MEQ CR tablet   No No    Take 1 tablet by mouth Daily.    Patient not taking:  Reported on 1/23/2024    sertraline (ZOLOFT) 50 MG tablet   Yes No    Patient not taking:  Reported on 1/23/2024    ursodiol (ACTIGALL) 250 MG tablet   No No    Take 1 tablet by mouth 2 (Two) Times a Day for 30 days.    ursodiol (ACTIGALL) 250 MG tablet   Yes No    Take 1 tablet by mouth 2 (Two) Times a Day.              Allergies:  No Known Allergies    Objective      Vitals:   Temp:  [98.5 °F (36.9 °C)] 98.5 °F (36.9 °C)  Heart Rate:  [] 98  Resp:  [17-22] 22  BP: (121-136)/(66-84) 121/66  Body mass index is 34.76 kg/m².  Physical Exam  Vitals and nursing note reviewed.   Constitutional:       General: She is not in acute distress.     Appearance: She is well-developed. She is not diaphoretic.   HENT:      Head: Normocephalic and atraumatic.      Right Ear: External ear normal.      Left Ear: External ear normal.      Nose: Nose normal.      Mouth/Throat:      Mouth: Mucous membranes are moist.   Eyes:      Extraocular Movements: Extraocular movements intact.      Conjunctiva/sclera: Conjunctivae normal.      Pupils: Pupils are equal, round, and reactive to light.   Cardiovascular:      Rate and Rhythm: Normal rate and regular rhythm.      Pulses: Normal pulses.      Heart sounds: Normal heart sounds.      Comments: S1, S2 audible.  Pulmonary:      Effort: Pulmonary effort is normal. No respiratory distress.      Breath sounds: Normal breath sounds. No wheezing, rhonchi or rales.      Comments: On room air.  Abdominal:      General: Bowel sounds are normal. There is no distension.      Palpations: Abdomen is soft.      Tenderness: There is no abdominal tenderness. There is no guarding or rebound.   Musculoskeletal:         General: No tenderness or deformity. Normal range of motion.      Cervical back: Normal range of motion.   Skin:     General: Skin is warm.      Capillary Refill: Capillary  refill takes less than 2 seconds.      Findings: No erythema or rash.   Neurological:      General: No focal deficit present.      Mental Status: She is alert and oriented to person, place, and time.      Cranial Nerves: No cranial nerve deficit.      Sensory: No sensory deficit.      Motor: No weakness.   Psychiatric:         Mood and Affect: Mood normal.         Behavior: Behavior normal.         Diagnostic Data:  Lab Results (last 24 hours)       Procedure Component Value Units Date/Time    Lactic Acid, Plasma [[id number]]  (Normal) Collected: 03/12/24 2335    Specimen: Blood Updated: 03/13/24 0002     Lactate 1.2 mmol/L     Blood Culture - Blood, Hand, Left [[id number]] Collected: 03/12/24 2220    Specimen: Blood from Hand, Left Updated: 03/12/24 2225    Extra Tubes [[id number]] Collected: 03/12/24 2059    Specimen: Blood, Venous Line Updated: 03/12/24 2200    Narrative:      The following orders were created for panel order Extra Tubes.  Procedure                               Abnormality         Status                     ---------                               -----------         ------                     Light Blue Top[[id number]]                                   Final result                 Please view results for these tests on the individual orders.    Light Blue Top [[id number]] Collected: 03/12/24 2059    Specimen: Blood Updated: 03/12/24 2200     Extra Tube Hold for add-ons.     Comment: Auto resulted       hCG, Quantitative, Pregnancy [[id number]] Collected: 03/12/24 2059    Specimen: Blood Updated: 03/12/24 2131     HCG Quantitative <1.00 mIU/mL     Narrative:      HCG Ranges by Gestational Age    Females - non-pregnant premenopausal   </= 1mIU/mL HCG  Females - postmenopausal               </= 7mIU/mL HCG    3 Weeks       5.4   -      72 mIU/mL  4 Weeks      10.2   -     708 mIU/mL  5 Weeks       217   -   8,245 mIU/mL  6 Weeks       152   -  32,177 mIU/mL  7 Weeks     4,059   - 153,767 mIU/mL  8 Weeks     31,366   - 149,094 mIU/mL  9 Weeks    59,109   - 135,901 mIU/mL  10 Weeks   44,186   - 170,409 mIU/mL  12 Weeks   27,107   - 201,615 mIU/mL  14 Weeks   24,302   -  93,646 mIU/mL  15 Weeks   12,540   -  69,747 mIU/mL  16 Weeks    8,904   -  55,332 mIU/mL  17 Weeks    8,240   -  51,793 mIU/mL  18 Weeks    9,649   -  55,271 mIU/mL      Comprehensive Metabolic Panel [[id number]]  (Abnormal) Collected: 03/12/24 2059    Specimen: Blood Updated: 03/12/24 2129     Glucose 128 mg/dL      BUN 11 mg/dL      Creatinine 0.96 mg/dL      Sodium 139 mmol/L      Potassium 3.7 mmol/L      Comment: Slight hemolysis detected by analyzer. Result may be falsely elevated.        Chloride 105 mmol/L      CO2 19.0 mmol/L      Calcium 9.3 mg/dL      Total Protein 7.2 g/dL      Albumin 4.3 g/dL      ALT (SGPT) 22 U/L      AST (SGOT) 30 U/L      Comment: Slight hemolysis detected by analyzer. Result may be falsely elevated.        Alkaline Phosphatase 109 U/L      Total Bilirubin 0.7 mg/dL      Globulin 2.9 gm/dL      A/G Ratio 1.5 g/dL      BUN/Creatinine Ratio 11.5     Anion Gap 15.0 mmol/L      eGFR 77.8 mL/min/1.73     Narrative:      GFR Normal >60  Chronic Kidney Disease <60  Kidney Failure <15      Lipase [[id number]]  (Normal) Collected: 03/12/24 2059    Specimen: Blood Updated: 03/12/24 2128     Lipase 26 U/L     CBC & Differential [[id number]]  (Abnormal) Collected: 03/12/24 2059    Specimen: Blood Updated: 03/12/24 2110    Narrative:      The following orders were created for panel order CBC & Differential.  Procedure                               Abnormality         Status                     ---------                               -----------         ------                     CBC Auto Differential[[id number]]        Abnormal            Final result                 Please view results for these tests on the individual orders.    CBC Auto Differential [[id number]]  (Abnormal) Collected: 03/12/24 2059    Specimen: Blood Updated:  03/12/24 2110     WBC 16.00 10*3/mm3      RBC 4.01 10*6/mm3      Hemoglobin 11.5 g/dL      Hematocrit 36.0 %      MCV 89.7 fL      MCH 28.6 pg      MCHC 31.8 g/dL      RDW 16.0 %      RDW-SD 52.5 fl      MPV 8.2 fL      Platelets 293 10*3/mm3      Neutrophil % 73.0 %      Lymphocyte % 17.6 %      Monocyte % 7.4 %      Eosinophil % 0.7 %      Basophil % 1.3 %      Neutrophils, Absolute 11.70 10*3/mm3      Lymphocytes, Absolute 2.80 10*3/mm3      Monocytes, Absolute 1.20 10*3/mm3      Eosinophils, Absolute 0.10 10*3/mm3      Basophils, Absolute 0.20 10*3/mm3      nRBC 0.0 /100 WBC     Urinalysis, Microscopic Only - Urine, Clean Catch [[id number]]  (Abnormal) Collected: 03/12/24 2045    Specimen: Urine, Clean Catch Updated: 03/12/24 2107     RBC, UA 0-2 /HPF      WBC, UA 6-10 /HPF      Bacteria, UA Trace /HPF      Squamous Epithelial Cells, UA 3-6 /HPF      Hyaline Casts, UA 3-6 /LPF      Calcium Oxalate Crystals, UA Small/1+ /HPF      Methodology Manual Light Microscopy    Urine Culture - Urine, Urine, Clean Catch [[id number]] Collected: 03/12/24 2045    Specimen: Urine, Clean Catch Updated: 03/12/24 2107    Blood Culture - Blood, Arm, Left [[id number]] Collected: 03/12/24 2059    Specimen: Blood from Arm, Left Updated: 03/12/24 2107    Urinalysis With Culture If Indicated - Urine, Clean Catch [[id number]]  (Abnormal) Collected: 03/12/24 2045    Specimen: Urine, Clean Catch Updated: 03/12/24 2054     Color, UA Yellow     Appearance, UA Clear     pH, UA 6.0     Specific Gravity, UA 1.011     Glucose, UA Negative     Ketones, UA Negative     Bilirubin, UA Negative     Blood, UA Moderate (2+)     Protein, UA Negative     Leuk Esterase, UA Trace     Nitrite, UA Negative     Urobilinogen, UA 0.2 E.U./dL    Narrative:      In absence of clinical symptoms, the presence of pyuria, bacteria, and/or nitrites on the urinalysis result does not correlate with infection.             Imaging Results (Last 24 Hours)       Procedure  Component Value Units Date/Time    CT Abdomen Pelvis With Contrast [[id number]] Collected: 03/12/24 2216     Updated: 03/12/24 2231    Narrative:      CT ABDOMEN PELVIS W CONTRAST    Date of Exam: 3/12/2024 10:06 PM EDT    Indication: upper abd pain with increased WBC count/ fever.    Comparison: CT abdomen pelvis 8/16/2022.    Technique: Axial CT images were obtained of the abdomen and pelvis following the uneventful intravenous administration of iodinated contrast. Sagittal and coronal reconstructions were performed.  Automated exposure control and iterative reconstruction   methods were used.    Findings:    Lower thorax: No focal consolidation.     Liver: No focal hepatic lesion.    Gallbladder and bile ducts: Gallbladder is mildly distended but otherwise unremarkable. No biliary ductal dilatation.     Pancreas: No pancreatic duct dilation. No surrounding inflammation.     Spleen: Normal in size.     Adrenal glands: No adrenal nodule.     Kidneys: There are two adjacent stones in the proximal left ureter, each measuring 3-4 mm (coronal image 55, 56), with mild upstream hydroureteronephrosis and urothelial enhancement, as well as mild perirenal fat stranding. Additionally, there are   multiple small nonobstructing renal renal stones bilaterally. No right hydronephrosis. Right upper pole sinus cyst.    Urinary bladder: Unremarkable.    Reproductive organs: Intrauterine device in expected position.    Stomach and bowel: The appendix is dilated measuring 1.2 cm and there is periappendiceal fat stranding. Postsurgical changes of Nicho-en-Y gastric bypass. No evidence of bowel obstruction.    Lymph nodes: No pathologically enlarged lymph nodes.     Vessels: No abdominal aortic aneurysm. Major vasculature is patent. Atherosclerosis. Circumaortic left renal vein.    Peritoneum and retroperitoneum: No free air or free fluid.     Soft tissues: Unremarkable.    Osseous structures: No acute or suspicious osseous lesions.        Impression:      Impression:    Findings suspicious for acute appendicitis.    Two adjacent obstructing stones in the proximal left ureter, each measuring 3-4 mm, with mild upstream hydroureteronephrosis.      Electronically Signed: Mumtaz Quintero MD    3/12/2024 10:29 PM EDT    Workstation ID: [id number]              Assessment & Plan        This is a 38 y.o. female with abdominal pain    Active and Resolved Problems  Active Hospital Problems    Diagnosis  POA    **Appendicitis [K37]  Yes    Hypertension [I10]  Yes    Ureterolithiasis [N20.1]  Yes      Resolved Hospital Problems   No resolved problems to display.       Appendicitis  Simple sepsis  Abdominal pain, nausea and vomiting  -History of fever, 101 at home, leukocytosis and tachycardia  -CMP largely unremarkable for acute findings.  hCG quant negative, lipase normal.    -White blood cell count evaded at 16,000 which was previously normal about 1 month ago.   -UA shows 6-10 WBCs and trace bacteria.   - Blood cultures x 2 pending.  Urine culture pending.    -CT abdomen pelvis with contrast shows findings suspicious for acute appendicitis.     -Patient is afebrile, pulse initially 117, on room air oxygen 99% SpO2 and blood pressure normotensive.    -Patient given Dilaudid and Toradol for pain control.    -Patient started on Zosyn.  Patient given Zofran for nausea.  Patient given 1 L of LR.   -General surgery consult  -Continue Zosyn  -Normal saline 75 mL/h  -N.p.o.    Ureterolithiasis  -CT abdomen pelvis with contrast shows 2 adjacent obstructing stones in the proximal left ureter, each measuring 3 to 4 mm, with mild upstream hydroureteronephrosis.  -Likely also contributing to pain  -Urology consult, Dr. Nguyễn    Questionable UTI  -Continue antibiotics above  -UA shows 6-10 WBCs and trace bacteria.  Follow urine culture    Normocytic normochromic anemia  - Hemoglobin of 11.5, normocytic normochromic, similar to previous study about 1 month ago.     -Monitor    Essential hypertension, chronic, controlled  -Continue home metoprolol    Obesity, BMI 34  -Encourage lifestyle modifications      DVT prophylaxis:SCDs        CODE STATUS:    Code Status (Patient has no pulse and is not breathing): CPR (Attempt to Resuscitate)  Medical Interventions (Patient has pulse or is breathing): Full Support          Admission Status:  I believe this patient meets inpatient status.    Expected Length of Stay: 2 midnights    PDMP and Medication Dispenses via Sidebar reviewed and consistent with patient reported medications.    I discussed the patient's findings and my recommendations with patient.      Signature:     This document has been electronically signed by YOSELIN Blue on March 13, 2024 00:34 EDT   [facility] Hospitalist Team

## 2024-03-14 VITALS
RESPIRATION RATE: 18 BRPM | WEIGHT: 201.72 LBS | OXYGEN SATURATION: 95 % | HEART RATE: 92 BPM | BODY MASS INDEX: 35.74 KG/M2 | TEMPERATURE: 98.2 F | SYSTOLIC BLOOD PRESSURE: 139 MMHG | DIASTOLIC BLOOD PRESSURE: 79 MMHG | HEIGHT: 63 IN

## 2024-03-14 PROBLEM — N20.1 URETEROLITHIASIS: Status: RESOLVED | Noted: 2021-07-15 | Resolved: 2024-03-14

## 2024-03-14 PROBLEM — K37 APPENDICITIS: Status: RESOLVED | Noted: 2024-03-12 | Resolved: 2024-03-14

## 2024-03-14 LAB
ALBUMIN SERPL-MCNC: 3.6 G/DL (ref 3.5–5.2)
ALBUMIN/GLOB SERPL: 1.4 G/DL
ALP SERPL-CCNC: 95 U/L (ref 39–117)
ALT SERPL W P-5'-P-CCNC: 12 U/L (ref 1–33)
ANION GAP SERPL CALCULATED.3IONS-SCNC: 11 MMOL/L (ref 5–15)
AST SERPL-CCNC: 19 U/L (ref 1–32)
BASOPHILS # BLD AUTO: 0.04 10*3/MM3 (ref 0–0.2)
BASOPHILS NFR BLD AUTO: 0.3 % (ref 0–1.5)
BILIRUB SERPL-MCNC: 0.5 MG/DL (ref 0–1.2)
BUN SERPL-MCNC: 7 MG/DL (ref 6–20)
BUN/CREAT SERPL: 9.7 (ref 7–25)
CALCIUM SPEC-SCNC: 9 MG/DL (ref 8.6–10.5)
CHLORIDE SERPL-SCNC: 106 MMOL/L (ref 98–107)
CO2 SERPL-SCNC: 22 MMOL/L (ref 22–29)
CREAT SERPL-MCNC: 0.72 MG/DL (ref 0.57–1)
DEPRECATED RDW RBC AUTO: 50 FL (ref 37–54)
EGFRCR SERPLBLD CKD-EPI 2021: 109.9 ML/MIN/1.73
EOSINOPHIL # BLD AUTO: 0.04 10*3/MM3 (ref 0–0.4)
EOSINOPHIL NFR BLD AUTO: 0.3 % (ref 0.3–6.2)
ERYTHROCYTE [DISTWIDTH] IN BLOOD BY AUTOMATED COUNT: 14.9 % (ref 12.3–15.4)
GLOBULIN UR ELPH-MCNC: 2.6 GM/DL
GLUCOSE SERPL-MCNC: 103 MG/DL (ref 65–99)
HCT VFR BLD AUTO: 32.2 % (ref 34–46.6)
HGB BLD-MCNC: 10.1 G/DL (ref 12–15.9)
IMM GRANULOCYTES # BLD AUTO: 0.05 10*3/MM3 (ref 0–0.05)
IMM GRANULOCYTES NFR BLD AUTO: 0.4 % (ref 0–0.5)
LYMPHOCYTES # BLD AUTO: 1.71 10*3/MM3 (ref 0.7–3.1)
LYMPHOCYTES NFR BLD AUTO: 12.9 % (ref 19.6–45.3)
MAGNESIUM SERPL-MCNC: 1.9 MG/DL (ref 1.6–2.6)
MCH RBC QN AUTO: 28.9 PG (ref 26.6–33)
MCHC RBC AUTO-ENTMCNC: 31.4 G/DL (ref 31.5–35.7)
MCV RBC AUTO: 92 FL (ref 79–97)
MONOCYTES # BLD AUTO: 0.67 10*3/MM3 (ref 0.1–0.9)
MONOCYTES NFR BLD AUTO: 5.1 % (ref 5–12)
NEUTROPHILS NFR BLD AUTO: 10.74 10*3/MM3 (ref 1.7–7)
NEUTROPHILS NFR BLD AUTO: 81 % (ref 42.7–76)
NRBC BLD AUTO-RTO: 0 /100 WBC (ref 0–0.2)
PHOSPHATE SERPL-MCNC: 4 MG/DL (ref 2.5–4.5)
PLATELET # BLD AUTO: 314 10*3/MM3 (ref 140–450)
PMV BLD AUTO: 10.1 FL (ref 6–12)
POTASSIUM SERPL-SCNC: 4 MMOL/L (ref 3.5–5.2)
PROT SERPL-MCNC: 6.2 G/DL (ref 6–8.5)
RBC # BLD AUTO: 3.5 10*6/MM3 (ref 3.77–5.28)
SODIUM SERPL-SCNC: 139 MMOL/L (ref 136–145)
WBC NRBC COR # BLD AUTO: 13.25 10*3/MM3 (ref 3.4–10.8)

## 2024-03-14 PROCEDURE — 25010000002 DIPHENHYDRAMINE PER 50 MG: Performed by: STUDENT IN AN ORGANIZED HEALTH CARE EDUCATION/TRAINING PROGRAM

## 2024-03-14 PROCEDURE — 83735 ASSAY OF MAGNESIUM: CPT | Performed by: STUDENT IN AN ORGANIZED HEALTH CARE EDUCATION/TRAINING PROGRAM

## 2024-03-14 PROCEDURE — 25810000003 SODIUM CHLORIDE 0.9 % SOLUTION: Performed by: STUDENT IN AN ORGANIZED HEALTH CARE EDUCATION/TRAINING PROGRAM

## 2024-03-14 PROCEDURE — 85025 COMPLETE CBC W/AUTO DIFF WBC: CPT | Performed by: STUDENT IN AN ORGANIZED HEALTH CARE EDUCATION/TRAINING PROGRAM

## 2024-03-14 PROCEDURE — 99024 POSTOP FOLLOW-UP VISIT: CPT | Performed by: STUDENT IN AN ORGANIZED HEALTH CARE EDUCATION/TRAINING PROGRAM

## 2024-03-14 PROCEDURE — G0378 HOSPITAL OBSERVATION PER HR: HCPCS

## 2024-03-14 PROCEDURE — 80053 COMPREHEN METABOLIC PANEL: CPT | Performed by: STUDENT IN AN ORGANIZED HEALTH CARE EDUCATION/TRAINING PROGRAM

## 2024-03-14 PROCEDURE — 84100 ASSAY OF PHOSPHORUS: CPT | Performed by: STUDENT IN AN ORGANIZED HEALTH CARE EDUCATION/TRAINING PROGRAM

## 2024-03-14 RX ORDER — CHOLECALCIFEROL (VITAMIN D3) 125 MCG
5 CAPSULE ORAL NIGHTLY PRN
Start: 2024-03-14

## 2024-03-14 RX ORDER — DICYCLOMINE HYDROCHLORIDE 10 MG/1
10 CAPSULE ORAL 3 TIMES DAILY
Qty: 3 CAPSULE | Refills: 0 | Status: SHIPPED | OUTPATIENT
Start: 2024-03-14 | End: 2024-03-15

## 2024-03-14 RX ORDER — ACETAMINOPHEN 325 MG/1
650 TABLET ORAL EVERY 4 HOURS PRN
Start: 2024-03-14

## 2024-03-14 RX ORDER — DICYCLOMINE HYDROCHLORIDE 10 MG/1
10 CAPSULE ORAL 3 TIMES DAILY
Status: DISCONTINUED | OUTPATIENT
Start: 2024-03-14 | End: 2024-03-14 | Stop reason: HOSPADM

## 2024-03-14 RX ADMIN — SODIUM CHLORIDE 75 ML/HR: 9 INJECTION, SOLUTION INTRAVENOUS at 05:49

## 2024-03-14 RX ADMIN — HYDROCODONE BITARTRATE AND ACETAMINOPHEN 1 TABLET: 5; 325 TABLET ORAL at 14:01

## 2024-03-14 RX ADMIN — DIPHENHYDRAMINE HYDROCHLORIDE 25 MG: 50 INJECTION, SOLUTION INTRAMUSCULAR; INTRAVENOUS at 01:21

## 2024-03-14 RX ADMIN — DOCUSATE SODIUM AND SENNOSIDES 2 TABLET: 8.6; 5 TABLET, FILM COATED ORAL at 12:20

## 2024-03-14 RX ADMIN — HYDROCODONE BITARTRATE AND ACETAMINOPHEN 1 TABLET: 5; 325 TABLET ORAL at 00:54

## 2024-03-14 RX ADMIN — HYDROCODONE BITARTRATE AND ACETAMINOPHEN 1 TABLET: 5; 325 TABLET ORAL at 07:39

## 2024-03-14 NOTE — DISCHARGE SUMMARY
[facility] Medicine Services  Discharge Summary    Date of Service: 24    Patient Name: Ariadna Ambrocio  : 1985  MRN: [id number]    Date of Admission: 3/12/2024  Discharge Diagnosis: Acute appendicitis status post appendectomy ,Left-sided urolithiasis  s/p stent placement   Date of Discharge:  24    Primary Care Physician: Marifer Cooper MD      Presenting Problem:   Appendicitis [K37]  Fever and chills [R50.9]  Generalized abdominal pain [R10.84]  Ureteral stone with hydronephrosis [N13.2]  Acute appendicitis, unspecified acute appendicitis type [K35.80]    Active and Resolved Hospital Problems:  #Acute appendicitis status post appendectomy  #Abdominal pain from above  #Intractable nausea and vomiting  #SIRS POA from above  #Left-sided urolithiasis  #Status post left stent placement with cystoscopy and ureteroscopy  #Essential hypertension       Obesity with BMI 34    Hospital Course     HPI:  Per the H&P  see HPI      Hospital Course:  Patient presented with abdominal pain found to have acute appendicitis and underwent laparoscopic appendectomy.  Also noted to have left-sided urolithiasis and underwent stent placement.  Patient general condition improved and discharged to follow-up with surgery and urology as outpatient        DISCHARGE Follow Up Recommendations for labs and diagnostics:   Urologist for stent removal in 1 weeks for stent removal and post surgery follow-up with surgical team in 2 weeks time      Reasons For Change In Medications and Indications for New Medications:      Day of Discharge     Vital Signs:  Temp:  [97.8 °F (36.6 °C)-99.1 °F (37.3 °C)] 98.2 °F (36.8 °C)  Heart Rate:  [] 92  Resp:  [11-19] 18  BP: (109-141)/(54-86) 139/79  Flow (L/min):  [7] 7    Physical Exam:  Physical Exam  HENT:      Head: Normocephalic.      Mouth/Throat:      Mouth: Mucous membranes are moist.   Eyes:      Pupils: Pupils are equal, round, and reactive to light.    Cardiovascular:      Rate and Rhythm: Normal rate and regular rhythm.   Pulmonary:      Effort: Pulmonary effort is normal.   Abdominal:      General: Bowel sounds are normal.      Palpations: Abdomen is soft.   Musculoskeletal:         General: Normal range of motion.      Cervical back: Neck supple.   Skin:     General: Skin is warm.   Neurological:      General: No focal deficit present.      Mental Status: She is alert and oriented to person, place, and time.   Psychiatric:         Mood and Affect: Mood normal.              Pertinent  and/or Most Recent Results     LAB RESULTS:      Lab 03/14/24  0237 03/13/24 0509 03/12/24 2335 03/12/24 2059   WBC 13.25* 11.50*  --  16.00*   HEMOGLOBIN 10.1* 10.3*  --  11.5*   HEMATOCRIT 32.2* 31.5*  --  36.0   PLATELETS 314 296  --  293   NEUTROS ABS 10.74* 6.90  --  11.70*   IMMATURE GRANS (ABS) 0.05  --   --   --    LYMPHS ABS 1.71 3.60*  --  2.80   MONOS ABS 0.67 0.90  --  1.20*   EOS ABS 0.04 0.20  --  0.10   MCV 92.0 90.5  --  89.7   LACTATE  --   --  1.2  --          Lab 03/14/24 0237 03/13/24  0509 03/12/24 2059   SODIUM 139 141 139   POTASSIUM 4.0 3.5 3.7   CHLORIDE 106 107 105   CO2 22.0 24.0 19.0*   ANION GAP 11.0 10.0 15.0   BUN 7 9 11   CREATININE 0.72 0.82 0.96   EGFR 109.9 94.0 77.8   GLUCOSE 103* 106* 128*   CALCIUM 9.0 8.9 9.3   MAGNESIUM 1.9  --   --    PHOSPHORUS 4.0  --   --          Lab 03/14/24  0237 03/12/24 2059   TOTAL PROTEIN 6.2 7.2   ALBUMIN 3.6 4.3   GLOBULIN 2.6 2.9   ALT (SGPT) 12 22   AST (SGOT) 19 30   BILIRUBIN 0.5 0.7   ALK PHOS 95 109   LIPASE  --  26                     Brief Urine Lab Results  (Last result in the past 365 days)        Color   Clarity   Blood   Leuk Est   Nitrite   Protein   CREAT   Urine HCG        03/12/24 2045               Negative       03/12/24 2045 Yellow   Clear   Moderate (2+)   Trace   Negative   Negative                 Microbiology Results (last 10 days)       Procedure Component Value - Date/Time     Blood Culture - Blood, Hand, Left [[id number]]  (Normal) Collected: 03/12/24 2220    Lab Status: Preliminary result Specimen: Blood from Hand, Left Updated: 03/13/24 2231     Blood Culture No growth at 24 hours    Narrative:      Less than seven (7) mL's of blood was collected.  Insufficient quantity may yield false negative results.    Blood Culture - Blood, Arm, Left [[id number]]  (Normal) Collected: 03/12/24 2059    Lab Status: Preliminary result Specimen: Blood from Arm, Left Updated: 03/13/24 2116     Blood Culture No growth at 24 hours    Narrative:      Less than seven (7) mL's of blood was collected.  Insufficient quantity may yield false negative results.    Urine Culture - Urine, Urine, Clean Catch [[id number]]  (Normal) Collected: 03/12/24 2045    Lab Status: Final result Specimen: Urine, Clean Catch Updated: 03/13/24 2006     Urine Culture No growth            CT Abdomen Pelvis With Contrast    Result Date: 3/12/2024  Impression: Impression: Findings suspicious for acute appendicitis. Two adjacent obstructing stones in the proximal left ureter, each measuring 3-4 mm, with mild upstream hydroureteronephrosis. Electronically Signed: Mumtaz Quintero MD  3/12/2024 10:29 PM EDT  Workstation ID: [id number]             Results for orders placed during the hospital encounter of 09/25/21    Adult Transthoracic Echo Complete w/ Color, Spectral and Contrast if necessary per protocol    Interpretation Summary  · Left ventricular systolic function is normal.  · Left ventricle ejection fraction is 55%  · Left ventricular diastolic function was normal.      Labs Pending at Discharge:  Pending Labs       Order Current Status    Tissue Pathology Exam In process    Blood Culture - Blood, Arm, Left Preliminary result    Blood Culture - Blood, Hand, Left Preliminary result            Procedures Performed  Procedure(s):  CYSTOSCOPY URETEROSCOPY, STENT INSERTION, STONE EXTRACTION  APPENDECTOMY LAPAROSCOPIC         Consults:    Consults       Date and Time Order Name Status Description    3/13/2024 12:33 AM Inpatient Urology Consult Completed     3/13/2024 12:33 AM Inpatient General Surgery Consult Completed     3/12/2024 11:24 PM Surgery (on-call MD unless specified)      3/12/2024 11:24 PM Hospitalist (on-call MD unless specified)                Discharge Details        Discharge Medications        New Medications        Instructions Start Date   acetaminophen 325 MG tablet  Commonly known as: TYLENOL   650 mg, Oral, Every 4 Hours PRN      dicyclomine 10 MG capsule  Commonly known as: BENTYL   10 mg, Oral, 3 Times Daily      HYDROcodone-acetaminophen 5-325 MG per tablet  Commonly known as: Norco   1 tablet, Oral, Every 6 Hours PRN      melatonin 5 MG tablet tablet   5 mg, Oral, Nightly PRN             Continue These Medications        Instructions Start Date   ALBUTEROL IN   2 puffs, Inhalation, Every 6 Hours PRN      NON FORMULARY   Bariatric vitamin.  Take one tab po daily.      NON FORMULARY   Fiber gummy chewable. Take 4 chewables po daily.      ursodiol 250 MG tablet  Commonly known as: ACTIGALL   250 mg, Oral, 2 Times Daily               No Known Allergies      Discharge Disposition:   Home or Self Care    Diet:  Hospital:  Diet Order   Procedures    Diet: Regular/House; Fluid Consistency: Thin (IDDSI 0)         Discharge Activity:   Activity Instructions       Discharge Activity      1) No driving until no longer taking narcotics.   2) Return to school / work in 2 weeks.  3) May shower / sponge bathe tomorrow  4) Do not lift / push / pull more than 10 lbs.              CODE STATUS:  Code Status and Medical Interventions:   Ordered at: 03/13/24 0009     Code Status (Patient has no pulse and is not breathing):    CPR (Attempt to Resuscitate)     Medical Interventions (Patient has pulse or is breathing):    Full Support         Future Appointments   Date Time Provider Department Center   4/26/2024  1:30 PM Angie Carmona APRN [facility] None   1/23/2025  1:30 PM Austen Abarca MD [facility]       Additional Instructions for the Follow-ups that You Need to Schedule       Notify Physician or Go To The ED For the Following Conditions   As directed      Of greater than 101, uncontrolled abdominal pain, redness or drainage from incisions    Order Comments: Of greater than 101, uncontrolled abdominal pain, redness or drainage from incisions                 Time spent on Discharge including face to face service:  >30 minutes    Signature: Electronically signed by Darrell Burns MD, 03/14/24, 12:58 EDT.  [facility] Hospitalist Team

## 2024-03-14 NOTE — PROGRESS NOTES
"  [facility] DAILY PROGRESS NOTE    Patient Identification  Name: Ariadna Ambrocio  Age: 38 y.o.  Sex: female  :  1985  MRN: [id number]    Date: 3/14/2024             Subjective:  Interval History: Some stent discomfort    Objective:    Scheduled Meds:sodium chloride, 10 mL, Intravenous, Q12H      Continuous Infusions:Pharmacy to Dose Zosyn,   phenylephrine, 0.5-3 mcg/kg/min  sodium chloride, 75 mL/hr, Last Rate: 75 mL/hr (24 0549)      PRN Meds:  acetaminophen **OR** acetaminophen **OR** acetaminophen    aluminum-magnesium hydroxide-simethicone    senna-docusate sodium **AND** polyethylene glycol **AND** bisacodyl **AND** bisacodyl    Calcium Replacement - Follow Nurse / BPA Driven Protocol    diphenhydrAMINE    HYDROcodone-acetaminophen    Magnesium Standard Dose Replacement - Follow Nurse / BPA Driven Protocol    melatonin    nitroglycerin    ondansetron ODT **OR** ondansetron    Pharmacy to Dose Zosyn    phenylephrine    Phosphorus Replacement - Follow Nurse / BPA Driven Protocol    Potassium Replacement - Follow Nurse / BPA Driven Protocol    sodium chloride    sodium chloride    sodium chloride    Vital signs in last 24 hours:  Temp:  [97.8 °F (36.6 °C)-99.1 °F (37.3 °C)] 98.2 °F (36.8 °C)  Heart Rate:  [] 86  Resp:  [11-19] 19  BP: (105-141)/(54-86) 136/86    Intake/Output:    Intake/Output Summary (Last 24 hours) at 3/14/2024 0807  Last data filed at 3/14/2024 0034  Gross per 24 hour   Intake 1040 ml   Output 530 ml   Net 510 ml       Exam:  /86 (BP Location: Right arm, Patient Position: Lying)   Pulse 86   Temp 98.2 °F (36.8 °C) (Oral)   Resp 19   Ht 160 cm (63\")   Wt 91.5 kg (201 lb 11.5 oz)   SpO2 97%   BMI 35.73 kg/m²     General Appearance:    Alert, cooperative, NAD   Lungs:     Respirations unlabored, no audible wheezing    Heart:    No cyanosis   Abdomen:     Soft, ND    :    No suprapubic distention            Data Review:  All labs (24hrs):   Recent Results " (from the past 24 hour(s))   Comprehensive Metabolic Panel    Collection Time: 03/14/24  2:37 AM    Specimen: Arm, Right; Blood   Result Value Ref Range    Glucose 103 (H) 65 - 99 mg/dL    BUN 7 6 - 20 mg/dL    Creatinine 0.72 0.57 - 1.00 mg/dL    Sodium 139 136 - 145 mmol/L    Potassium 4.0 3.5 - 5.2 mmol/L    Chloride 106 98 - 107 mmol/L    CO2 22.0 22.0 - 29.0 mmol/L    Calcium 9.0 8.6 - 10.5 mg/dL    Total Protein 6.2 6.0 - 8.5 g/dL    Albumin 3.6 3.5 - 5.2 g/dL    ALT (SGPT) 12 1 - 33 U/L    AST (SGOT) 19 1 - 32 U/L    Alkaline Phosphatase 95 39 - 117 U/L    Total Bilirubin 0.5 0.0 - 1.2 mg/dL    Globulin 2.6 gm/dL    A/G Ratio 1.4 g/dL    BUN/Creatinine Ratio 9.7 7.0 - 25.0    Anion Gap 11.0 5.0 - 15.0 mmol/L    eGFR 109.9 >60.0 mL/min/1.73   Magnesium    Collection Time: 03/14/24  2:37 AM    Specimen: Arm, Right; Blood   Result Value Ref Range    Magnesium 1.9 1.6 - 2.6 mg/dL   Phosphorus    Collection Time: 03/14/24  2:37 AM    Specimen: Arm, Right; Blood   Result Value Ref Range    Phosphorus 4.0 2.5 - 4.5 mg/dL   CBC Auto Differential    Collection Time: 03/14/24  2:37 AM    Specimen: Arm, Right; Blood   Result Value Ref Range    WBC 13.25 (H) 3.40 - 10.80 10*3/mm3    RBC 3.50 (L) 3.77 - 5.28 10*6/mm3    Hemoglobin 10.1 (L) 12.0 - 15.9 g/dL    Hematocrit 32.2 (L) 34.0 - 46.6 %    MCV 92.0 79.0 - 97.0 fL    MCH 28.9 26.6 - 33.0 pg    MCHC 31.4 (L) 31.5 - 35.7 g/dL    RDW 14.9 12.3 - 15.4 %    RDW-SD 50.0 37.0 - 54.0 fl    MPV 10.1 6.0 - 12.0 fL    Platelets 314 140 - 450 10*3/mm3    Neutrophil % 81.0 (H) 42.7 - 76.0 %    Lymphocyte % 12.9 (L) 19.6 - 45.3 %    Monocyte % 5.1 5.0 - 12.0 %    Eosinophil % 0.3 0.3 - 6.2 %    Basophil % 0.3 0.0 - 1.5 %    Immature Grans % 0.4 0.0 - 0.5 %    Neutrophils, Absolute 10.74 (H) 1.70 - 7.00 10*3/mm3    Lymphocytes, Absolute 1.71 0.70 - 3.10 10*3/mm3    Monocytes, Absolute 0.67 0.10 - 0.90 10*3/mm3    Eosinophils, Absolute 0.04 0.00 - 0.40 10*3/mm3    Basophils,  Absolute 0.04 0.00 - 0.20 10*3/mm3    Immature Grans, Absolute 0.05 0.00 - 0.05 10*3/mm3    nRBC 0.0 0.0 - 0.2 /100 WBC      Imaging Results (Last 24 Hours)       ** No results found for the last 24 hours. **             Assessment:    Appendicitis    Ureterolithiasis    Hypertension      Left proximal and renal stones    Plan:    Recovering well, we will plan to remove stent early next week office will call to arrange follow-up    Carmeol Nguyễn MD  [facility]  [facility] [facility], [address]  [place] [address]  Office: [phone]  Available via [employer] Secure Chat  03/14/24  08:07 EDT

## 2024-03-14 NOTE — PROGRESS NOTES
General Surgery Progress Note    Name: Ariadna Ambrocio ADMIT: 3/12/2024   : 1985  PCP: Marifer Cooper MD    MRN: [id number] LOS: 0 days   AGE/SEX: 38 y.o. female  ROOM: [address] [place] [address]    Chief Complaint   Patient presents with    Abdominal Pain     Subjective     Doing well this morning.  Just having some left-sided pain.  Tolerating diet.    Objective     Scheduled Medications:   sodium chloride, 10 mL, Intravenous, Q12H        Active Infusions:  sodium chloride, 75 mL/hr, Last Rate: 75 mL/hr (24 0549)        As Needed Medications:    acetaminophen **OR** acetaminophen **OR** acetaminophen    aluminum-magnesium hydroxide-simethicone    senna-docusate sodium **AND** polyethylene glycol **AND** bisacodyl **AND** bisacodyl    Calcium Replacement - Follow Nurse / BPA Driven Protocol    diphenhydrAMINE    HYDROcodone-acetaminophen    Magnesium Standard Dose Replacement - Follow Nurse / BPA Driven Protocol    melatonin    nitroglycerin    ondansetron ODT **OR** ondansetron    Phosphorus Replacement - Follow Nurse / BPA Driven Protocol    Potassium Replacement - Follow Nurse / BPA Driven Protocol    sodium chloride    sodium chloride    sodium chloride    Vital Signs  Vital Signs Patient Vitals for the past 24 hrs:   BP Temp Temp src Pulse Resp SpO2   24 0805 136/86 98.2 °F (36.8 °C) Oral 86 19 97 %   24 0440 123/74 97.8 °F (36.6 °C) Oral 106 18 95 %   24 0034 124/77 98.1 °F (36.7 °C) Oral 102 17 95 %   24 2032 141/82 98.2 °F (36.8 °C) Oral 81 16 92 %   24 1636 136/82 98.6 °F (37 °C) Oral 80 14 95 %   24 1536 114/65 98.9 °F (37.2 °C) Oral 70 12 93 %   24 1526 117/71 -- -- 75 12 93 %   24 1520 116/64 -- -- 71 13 97 %   24 1515 109/59 -- -- 70 -- 97 %   24 1508 115/55 -- -- 79 13 97 %   24 1503 118/61 -- -- 80 14 97 %   24 1458 110/56 -- -- 77 13 96 %   24 1455 113/58 -- -- 79 13 97 %   24 1451 114/54 99.1 °F (37.3 °C)  Oral 79 11 99 %   03/13/24 1200 136/84 98.5 °F (36.9 °C) Oral 98 17 96 %     I/O:  I/O last 3 completed shifts:  In: 1040 [P.O.:240; I.V.:800]  Out: 530 [Urine:500; Blood:30]    Physical Exam:  Physical Exam  Cardiovascular:      Rate and Rhythm: Normal rate.   Pulmonary:      Effort: Pulmonary effort is normal.   Abdominal:      General: Abdomen is flat.      Palpations: Abdomen is soft.   Neurological:      Mental Status: She is alert.         Results Review:     CBC    Results from last 7 days   Lab Units 03/14/24 0237 03/13/24  0509 03/12/24 2059   WBC 10*3/mm3 13.25* 11.50* 16.00*   HEMOGLOBIN g/dL 10.1* 10.3* 11.5*   PLATELETS 10*3/mm3 314 296 293     BMP   Results from last 7 days   Lab Units 03/14/24 0237 03/13/24  0509 03/12/24 2059   SODIUM mmol/L 139 141 139   POTASSIUM mmol/L 4.0 3.5 3.7   CHLORIDE mmol/L 106 107 105   CO2 mmol/L 22.0 24.0 19.0*   BUN mg/dL 7 9 11   CREATININE mg/dL 0.72 0.82 0.96   GLUCOSE mg/dL 103* 106* 128*   MAGNESIUM mg/dL 1.9  --   --    PHOSPHORUS mg/dL 4.0  --   --        I reviewed the patient's new clinical results.  I reviewed the patient's other test results and agree with the interpretation    Assessment & Plan       Appendicitis    Ureterolithiasis    Hypertension      38 y.o. female status post lap appendectomy    Diet as tolerated.  Okay for DC from my perspective.  Follow-up with me in the office in 2 weeks.    Georges Chandler MD  03/14/24  11:19 EDT

## 2024-03-14 NOTE — CASE MANAGEMENT/SOCIAL WORK
Discharge Planning Assessment  [facility]     Patient Name: Ariadna Ambrocio  MRN: [id number]  Today's Date: 3/14/2024    Admit Date: 3/12/2024    Plan: Patient dc home before CM assessment.  No CM needs.   Discharge Needs Assessment    No documentation.                  Discharge Plan       Row Name 03/14/24 1547       Plan    Plan Patient dc home before CM assessment.  No CM needs.    Plan Comments Patient dc home before CM assessment.  No CM needs.    Final Discharge Disposition Code 01 - home or self-care    Final Note Home                 Expected Discharge Date and Time       Expected Discharge Date Expected Discharge Time    Mar 14, 2024    JUAN ANTONIO Clayton RN  SIPS/ICU   O: [phone]  C: [phone]  [email or web address]

## 2024-03-14 NOTE — CASE MANAGEMENT/SOCIAL WORK
Case Management Discharge Note      Final Note: Home         Selected Continued Care - Discharged on 3/14/2024 Admission date: 3/12/2024 - Discharge disposition: Home or Self Care       Transportation Services  Private: Car    Final Discharge Disposition Code: 01 - home or self-care    JUAN ANTONIO Clayton RN  SIPS/ICU   O: [phone]  C: [phone]  [email or web address]

## 2024-03-14 NOTE — PLAN OF CARE
Goal Outcome Evaluation: A&Ox4. Able to make needs known. Pt ambulating and tolerating well. Pain treated per MAR. Incisions clean dry and intact. Pt to discharge home with family.

## 2024-03-15 ENCOUNTER — TELEPHONE (OUTPATIENT)
Dept: SURGERY | Facility: CLINIC | Age: 39
End: 2024-03-15
Payer: MEDICAID

## 2024-03-15 LAB
LAB AP CASE REPORT: NORMAL
PATH REPORT.FINAL DX SPEC: NORMAL
PATH REPORT.GROSS SPEC: NORMAL

## 2024-03-15 NOTE — TELEPHONE ENCOUNTER
I called Ariadna Ambrocio to check on them post operatively. We discussed instructions and post op office visit. She is having a lot of pain and has already reached out to dr. Nguyễn office and he Rx'd more pain meds.Encouraged to call the office with any other questions.

## 2024-03-17 LAB
BACTERIA SPEC AEROBE CULT: NORMAL
BACTERIA SPEC AEROBE CULT: NORMAL

## 2024-03-18 ENCOUNTER — TELEPHONE (OUTPATIENT)
Dept: SURGERY | Facility: CLINIC | Age: 39
End: 2024-03-18
Payer: MEDICAID

## 2024-03-27 ENCOUNTER — OFFICE VISIT (OUTPATIENT)
Dept: SURGERY | Facility: CLINIC | Age: 39
End: 2024-03-27
Payer: MEDICAID

## 2024-03-27 VITALS
DIASTOLIC BLOOD PRESSURE: 82 MMHG | HEIGHT: 63 IN | OXYGEN SATURATION: 97 % | BODY MASS INDEX: 34.38 KG/M2 | HEART RATE: 88 BPM | WEIGHT: 194 LBS | TEMPERATURE: 98.7 F | SYSTOLIC BLOOD PRESSURE: 124 MMHG

## 2024-03-27 DIAGNOSIS — K35.80 ACUTE APPENDICITIS, UNSPECIFIED ACUTE APPENDICITIS TYPE: Primary | ICD-10-CM

## 2024-03-27 PROCEDURE — 99024 POSTOP FOLLOW-UP VISIT: CPT | Performed by: STUDENT IN AN ORGANIZED HEALTH CARE EDUCATION/TRAINING PROGRAM

## 2024-03-27 PROCEDURE — 1160F RVW MEDS BY RX/DR IN RCRD: CPT | Performed by: STUDENT IN AN ORGANIZED HEALTH CARE EDUCATION/TRAINING PROGRAM

## 2024-03-27 PROCEDURE — 1159F MED LIST DOCD IN RCRD: CPT | Performed by: STUDENT IN AN ORGANIZED HEALTH CARE EDUCATION/TRAINING PROGRAM

## 2024-03-27 PROCEDURE — 3079F DIAST BP 80-89 MM HG: CPT | Performed by: STUDENT IN AN ORGANIZED HEALTH CARE EDUCATION/TRAINING PROGRAM

## 2024-03-27 PROCEDURE — 3074F SYST BP LT 130 MM HG: CPT | Performed by: STUDENT IN AN ORGANIZED HEALTH CARE EDUCATION/TRAINING PROGRAM

## 2024-03-27 RX ORDER — ALBUTEROL SULFATE 90 UG/1
AEROSOL, METERED RESPIRATORY (INHALATION)
COMMUNITY
Start: 2024-03-24

## 2024-03-27 NOTE — PROGRESS NOTES
"Subjective   Ariadnajann Ambrocio is a 38 y.o. female.   Status post lap appendectomy as well as cystoscopy with ureteral stent placement on 3/13/2024.  Doing well from an abdominal standpoint.  Is having some pain with urination.    Path: Acute appendicitis    Objective   /82 (BP Location: Left arm, Patient Position: Sitting, Cuff Size: Adult)   Pulse 88   Temp 98.7 °F (37.1 °C) (Infrared)   Ht 160 cm (63\")   Wt 88 kg (194 lb)   SpO2 97%   BMI 34.37 kg/m²   Physical Exam  Cardiovascular:      Rate and Rhythm: Normal rate.   Pulmonary:      Effort: Pulmonary effort is normal.   Abdominal:      General: Abdomen is flat.      Palpations: Abdomen is soft.      Comments: Incisions well-healed   Neurological:      Mental Status: She is alert.         Assessment & Plan   Diagnoses and all orders for this visit:    1. Acute appendicitis, unspecified acute appendicitis type (Primary)    Doing well.  Path discussed.  No restrictions.  Recommend calling the urologist if she continues to have dysuria.  Follow-up with me as needed.    Georges Chandler MD  3/27/2024  11:02 AM EDT      "

## 2024-04-05 ENCOUNTER — APPOINTMENT (OUTPATIENT)
Dept: CT IMAGING | Facility: HOSPITAL | Age: 39
End: 2024-04-05
Payer: MEDICAID

## 2024-04-05 ENCOUNTER — HOSPITAL ENCOUNTER (EMERGENCY)
Facility: HOSPITAL | Age: 39
Discharge: HOME | End: 2024-04-05
Attending: EMERGENCY MEDICINE
Payer: MEDICAID

## 2024-04-05 ENCOUNTER — TELEPHONE (OUTPATIENT)
Dept: BARIATRICS/WEIGHT MGMT | Facility: CLINIC | Age: 39
End: 2024-04-05
Payer: MEDICAID

## 2024-04-05 VITALS
HEART RATE: 56 BPM | WEIGHT: 192 LBS | TEMPERATURE: 97.8 F | RESPIRATION RATE: 18 BRPM | SYSTOLIC BLOOD PRESSURE: 120 MMHG | DIASTOLIC BLOOD PRESSURE: 85 MMHG | OXYGEN SATURATION: 98 % | HEIGHT: 63 IN | BODY MASS INDEX: 34.02 KG/M2

## 2024-04-05 DIAGNOSIS — R42 DIZZINESS: ICD-10-CM

## 2024-04-05 DIAGNOSIS — R10.84 GENERALIZED ABDOMINAL PAIN: Primary | ICD-10-CM

## 2024-04-05 LAB
ANION GAP SERPL CALCULATED.3IONS-SCNC: 10 MMOL/L (ref 5–15)
BACTERIA UR QL AUTO: NORMAL /HPF
BASOPHILS # BLD AUTO: 0.04 10*3/MM3 (ref 0–0.2)
BASOPHILS NFR BLD AUTO: 0.5 % (ref 0–1.5)
BILIRUB UR QL STRIP: NEGATIVE
BUN SERPL-MCNC: 9 MG/DL (ref 6–20)
BUN/CREAT SERPL: 11.8 (ref 7–25)
CALCIUM SPEC-SCNC: 9.4 MG/DL (ref 8.6–10.5)
CHLORIDE SERPL-SCNC: 105 MMOL/L (ref 98–107)
CLARITY UR: ABNORMAL
CO2 SERPL-SCNC: 25 MMOL/L (ref 22–29)
COD CRY URNS QL: NORMAL /HPF
COLOR UR: YELLOW
CREAT SERPL-MCNC: 0.76 MG/DL (ref 0.57–1)
DEPRECATED RDW RBC AUTO: 49.9 FL (ref 37–54)
EGFRCR SERPLBLD CKD-EPI 2021: 103 ML/MIN/1.73
EOSINOPHIL # BLD AUTO: 0.32 10*3/MM3 (ref 0–0.4)
EOSINOPHIL NFR BLD AUTO: 4 % (ref 0.3–6.2)
ERYTHROCYTE [DISTWIDTH] IN BLOOD BY AUTOMATED COUNT: 14.4 % (ref 12.3–15.4)
GLUCOSE SERPL-MCNC: 99 MG/DL (ref 65–99)
GLUCOSE UR STRIP-MCNC: NEGATIVE MG/DL
HCT VFR BLD AUTO: 35.2 % (ref 34–46.6)
HGB BLD-MCNC: 11 G/DL (ref 12–15.9)
HGB UR QL STRIP.AUTO: ABNORMAL
HYALINE CASTS UR QL AUTO: NORMAL /LPF
IMM GRANULOCYTES # BLD AUTO: 0.02 10*3/MM3 (ref 0–0.05)
IMM GRANULOCYTES NFR BLD AUTO: 0.3 % (ref 0–0.5)
KETONES UR QL STRIP: ABNORMAL
LEUKOCYTE ESTERASE UR QL STRIP.AUTO: ABNORMAL
LYMPHOCYTES # BLD AUTO: 3.65 10*3/MM3 (ref 0.7–3.1)
LYMPHOCYTES NFR BLD AUTO: 46.1 % (ref 19.6–45.3)
MCH RBC QN AUTO: 29.4 PG (ref 26.6–33)
MCHC RBC AUTO-ENTMCNC: 31.3 G/DL (ref 31.5–35.7)
MCV RBC AUTO: 94.1 FL (ref 79–97)
MONOCYTES # BLD AUTO: 0.58 10*3/MM3 (ref 0.1–0.9)
MONOCYTES NFR BLD AUTO: 7.3 % (ref 5–12)
NEUTROPHILS NFR BLD AUTO: 3.31 10*3/MM3 (ref 1.7–7)
NEUTROPHILS NFR BLD AUTO: 41.8 % (ref 42.7–76)
NITRITE UR QL STRIP: NEGATIVE
NRBC BLD AUTO-RTO: 0 /100 WBC (ref 0–0.2)
PH UR STRIP.AUTO: 5.5 [PH] (ref 5–8)
PLATELET # BLD AUTO: 283 10*3/MM3 (ref 140–450)
PMV BLD AUTO: 9.8 FL (ref 6–12)
POTASSIUM SERPL-SCNC: 3.8 MMOL/L (ref 3.5–5.2)
PROT UR QL STRIP: ABNORMAL
RBC # BLD AUTO: 3.74 10*6/MM3 (ref 3.77–5.28)
RBC # UR STRIP: NORMAL /HPF
REF LAB TEST METHOD: NORMAL
SODIUM SERPL-SCNC: 140 MMOL/L (ref 136–145)
SP GR UR STRIP: 1.02 (ref 1–1.03)
SQUAMOUS #/AREA URNS HPF: NORMAL /HPF
UROBILINOGEN UR QL STRIP: ABNORMAL
WBC # UR STRIP: NORMAL /HPF
WBC NRBC COR # BLD AUTO: 7.92 10*3/MM3 (ref 3.4–10.8)

## 2024-04-05 PROCEDURE — 99284 EMERGENCY DEPT VISIT MOD MDM: CPT

## 2024-04-05 PROCEDURE — 81001 URINALYSIS AUTO W/SCOPE: CPT | Performed by: EMERGENCY MEDICINE

## 2024-04-05 PROCEDURE — 93005 ELECTROCARDIOGRAM TRACING: CPT

## 2024-04-05 PROCEDURE — 93005 ELECTROCARDIOGRAM TRACING: CPT | Performed by: EMERGENCY MEDICINE

## 2024-04-05 PROCEDURE — 80048 BASIC METABOLIC PNL TOTAL CA: CPT | Performed by: EMERGENCY MEDICINE

## 2024-04-05 PROCEDURE — 85025 COMPLETE CBC W/AUTO DIFF WBC: CPT | Performed by: EMERGENCY MEDICINE

## 2024-04-05 PROCEDURE — 25810000003 SODIUM CHLORIDE 0.9 % SOLUTION: Performed by: EMERGENCY MEDICINE

## 2024-04-05 PROCEDURE — 25010000002 MORPHINE PER 10 MG: Performed by: EMERGENCY MEDICINE

## 2024-04-05 PROCEDURE — 96374 THER/PROPH/DIAG INJ IV PUSH: CPT

## 2024-04-05 PROCEDURE — 74176 CT ABD & PELVIS W/O CONTRAST: CPT

## 2024-04-05 RX ORDER — SODIUM CHLORIDE 0.9 % (FLUSH) 0.9 %
10 SYRINGE (ML) INJECTION AS NEEDED
Status: DISCONTINUED | OUTPATIENT
Start: 2024-04-05 | End: 2024-04-05 | Stop reason: HOSPADM

## 2024-04-05 RX ORDER — MORPHINE SULFATE 2 MG/ML
2 INJECTION, SOLUTION INTRAMUSCULAR; INTRAVENOUS ONCE
Status: COMPLETED | OUTPATIENT
Start: 2024-04-05 | End: 2024-04-05

## 2024-04-05 RX ORDER — ONDANSETRON 4 MG/1
4 TABLET, ORALLY DISINTEGRATING ORAL EVERY 8 HOURS PRN
Qty: 12 TABLET | Refills: 0 | Status: SHIPPED | OUTPATIENT
Start: 2024-04-05

## 2024-04-05 RX ORDER — TRAMADOL HYDROCHLORIDE 50 MG/1
50 TABLET ORAL EVERY 6 HOURS PRN
Qty: 12 TABLET | Refills: 0 | Status: SHIPPED | OUTPATIENT
Start: 2024-04-05

## 2024-04-05 RX ADMIN — MORPHINE SULFATE 2 MG: 2 INJECTION, SOLUTION INTRAMUSCULAR; INTRAVENOUS at 18:31

## 2024-04-05 RX ADMIN — SODIUM CHLORIDE 500 ML: 9 INJECTION, SOLUTION INTRAVENOUS at 18:10

## 2024-04-05 NOTE — ED PROVIDER NOTES
Subjective   History of Present Illness  Patient is a 38-year-old female complaining of pain in her back and abdomen for the past several days.  States feels like UTI symptoms though she has no dysuria.  She is currently finishing antibiotics for her urinary tract infection.  She also states she had episode of dizziness and rapid heartbeat last night.  Denies cough fever chest pain shortness of breath vomiting diarrhea dysuria or other complaint.      Review of Systems    Past Medical History:   Diagnosis Date    Anxiety     Bulging lumbar disc     Constipation     Depression     Diarrhea     Elevated triglycerides with high cholesterol     Heartburn     Herniated cervical disc     Hiatal hernia     Hyperlipidemia     Hypertension     IBS (irritable bowel syndrome)     Kidney stones     Nausea     Pre-diabetes     Rapid heart beat     Seasonal allergies        No Known Allergies    Past Surgical History:   Procedure Laterality Date    APPENDECTOMY N/A 3/13/2024    Procedure: APPENDECTOMY LAPAROSCOPIC;  Surgeon: Georges Chandler MD;  Location: [facility] MAIN OR;  Service: General;  Laterality: N/A;    COLONOSCOPY      CYSTOSCOPY, URETEROSCOPY, RETROGRADE PYELOGRAM, STENT INSERTION Left 07/16/2021    Procedure: CYSTOSCOPY URETEROSCOPY RETROGRADE PYELOGRAM HOLMIUM LASER BASKET STONE EXTRACTION STENT INSERTION;  Surgeon: Carmelo Nguyễn MD;  Location: [facility] MAIN OR;  Service: Urology;  Laterality: Left;    CYSTOSCOPY, URETEROSCOPY, RETROGRADE PYELOGRAM, STENT INSERTION Left 3/13/2024    Procedure: CYSTOSCOPY URETEROSCOPY, STENT INSERTION, STONE EXTRACTION;  Surgeon: Carmelo Nguyễn MD;  Location: [facility] MAIN OR;  Service: Urology;  Laterality: Left;    EAR TUBES      ENDOSCOPY N/A 2/27/2023    Procedure: ESOPHAGOGASTRODUODENOSCOPY with gastric antrum biopsy;  Surgeon: Otilia Capone MD;  Location: [facility] ENDOSCOPY;  Service: General;  Laterality: N/A;  Post: large hiatal hernia    GASTRIC BYPASS N/A 9/27/2023    Procedure:  GASTRIC BYPASS AND HIATAL HERNIA REPAIR LAPAROSCOPIC WITH DAVINCI ROBOT;  Surgeon: Otilia Capone MD;  Location: [facility] MAIN OR;  Service: Robotics - DaVinci;  Laterality: N/A;    KIDNEY STONE SURGERY      WISDOM TOOTH EXTRACTION         Family History   Problem Relation Age of Onset    Asthma Mother     Hypertension Mother     No Known Problems Father     Hypertension Maternal Grandmother     Heart attack Maternal Grandfather     Cancer Paternal Grandmother     Cancer Paternal Grandfather     Breast cancer Neg Hx     Ovarian cancer Neg Hx     Uterine cancer Neg Hx     Colon cancer Neg Hx     Deep vein thrombosis Neg Hx     Pulmonary embolism Neg Hx        Social History     Socioeconomic History    Marital status:    Tobacco Use    Smoking status: Never     Passive exposure: Past    Smokeless tobacco: Never   Vaping Use    Vaping status: Never Used   Substance and Sexual Activity    Alcohol use: Yes     Comment: 2-3 drinks monthly    Drug use: Never    Sexual activity: Yes     Partners: Male     Birth control/protection: I.U.D., Same-sex partner           Objective   Physical Exam  Neck has no adenopathy JVD or bruits.  Lungs are clear.  Heart is regular rate rhythm without murmur.  Chest is nontender.  Abdomen is soft with minimal diffuse tenderness.  Patient has normal bowel sounds without rebound or guarding.  Back has no CVA tenderness.  Extremity exam is unremarkable.  Procedures  My EKG interpretation shows sinus bradycardia at a rate of 55 with no acute ST change         ED Course      Results for orders placed or performed during the hospital encounter of 04/05/24   Basic Metabolic Panel    Specimen: Blood   Result Value Ref Range    Glucose 99 65 - 99 mg/dL    BUN 9 6 - 20 mg/dL    Creatinine 0.76 0.57 - 1.00 mg/dL    Sodium 140 136 - 145 mmol/L    Potassium 3.8 3.5 - 5.2 mmol/L    Chloride 105 98 - 107 mmol/L    CO2 25.0 22.0 - 29.0 mmol/L    Calcium 9.4 8.6 - 10.5 mg/dL    BUN/Creatinine Ratio 11.8  7.0 - 25.0    Anion Gap 10.0 5.0 - 15.0 mmol/L    eGFR 103.0 >60.0 mL/min/1.73   Urinalysis With Microscopic If Indicated (No Culture) - Urine, Clean Catch    Specimen: Urine, Clean Catch   Result Value Ref Range    Color, UA Yellow Yellow, Straw    Appearance, UA Hazy (A) Clear    pH, UA 5.5 5.0 - 8.0    Specific Gravity, UA 1.024 1.005 - 1.030    Glucose, UA Negative Negative    Ketones, UA Trace (A) Negative    Bilirubin, UA Negative Negative    Blood, UA Large (3+) (A) Negative    Protein, UA Trace (A) Negative    Leuk Esterase, UA Trace (A) Negative    Nitrite, UA Negative Negative    Urobilinogen, UA 1.0 E.U./dL 0.2 - 1.0 E.U./dL   CBC Auto Differential    Specimen: Blood   Result Value Ref Range    WBC 7.92 3.40 - 10.80 10*3/mm3    RBC 3.74 (L) 3.77 - 5.28 10*6/mm3    Hemoglobin 11.0 (L) 12.0 - 15.9 g/dL    Hematocrit 35.2 34.0 - 46.6 %    MCV 94.1 79.0 - 97.0 fL    MCH 29.4 26.6 - 33.0 pg    MCHC 31.3 (L) 31.5 - 35.7 g/dL    RDW 14.4 12.3 - 15.4 %    RDW-SD 49.9 37.0 - 54.0 fl    MPV 9.8 6.0 - 12.0 fL    Platelets 283 140 - 450 10*3/mm3    Neutrophil % 41.8 (L) 42.7 - 76.0 %    Lymphocyte % 46.1 (H) 19.6 - 45.3 %    Monocyte % 7.3 5.0 - 12.0 %    Eosinophil % 4.0 0.3 - 6.2 %    Basophil % 0.5 0.0 - 1.5 %    Immature Grans % 0.3 0.0 - 0.5 %    Neutrophils, Absolute 3.31 1.70 - 7.00 10*3/mm3    Lymphocytes, Absolute 3.65 (H) 0.70 - 3.10 10*3/mm3    Monocytes, Absolute 0.58 0.10 - 0.90 10*3/mm3    Eosinophils, Absolute 0.32 0.00 - 0.40 10*3/mm3    Basophils, Absolute 0.04 0.00 - 0.20 10*3/mm3    Immature Grans, Absolute 0.02 0.00 - 0.05 10*3/mm3    nRBC 0.0 0.0 - 0.2 /100 WBC   Urinalysis, Microscopic Only - Urine, Clean Catch    Specimen: Urine, Clean Catch   Result Value Ref Range    RBC, UA None Seen None Seen, 0-2 /HPF    WBC, UA 0-2 None Seen, 0-2 /HPF    Bacteria, UA None Seen None Seen /HPF    Squamous Epithelial Cells, UA 0-2 None Seen, 0-2 /HPF    Hyaline Casts, UA 0-2 None Seen /LPF    Calcium Oxalate  Crystals, UA Moderate/2+ None Seen /HPF    Methodology Manual Light Microscopy    ECG 12 Lead Other; dizziness and weakness   Result Value Ref Range    QT Interval 422 ms    QTC Interval 405 ms     CT Abdomen Pelvis Without Contrast    Result Date: 4/5/2024  Nonobstructing bilateral renal calculi. Possible constipation in the appropriate clinical setting. Distended gallbladder without evidence of cholecystitis in the appropriate clinical setting. Electronically Signed: Terrance Ruiz MD  4/5/2024 7:44 PM EDT  Workstation ID: [id number]                                          Medical Decision Making  Interpretation patient CT scan abdomen pelvis without contrast shows no ureteral calculus or obstructive change.  She has no perforation.  BMP shows no renal insufficiency or electrolyte abnormality.  UA shows no evidence infectious process.  CBC has no leukocytosis no left shift and no anemia.  Patient will be discharged with nonspecific abdominal pain.  She will be placed on Ultram and Zofran.  She will see MD for recheck as needed.    Amount and/or Complexity of Data Reviewed  Labs: ordered. Decision-making details documented in ED Course.  Radiology: ordered and independent interpretation performed.  ECG/medicine tests: ordered and independent interpretation performed.    Risk  Prescription drug management.        Final diagnoses:   Generalized abdominal pain   Dizziness       ED Disposition  ED Disposition       ED Disposition   Discharge    Condition   Stable    Comment   --               No follow-up provider specified.       Medication List        New Prescriptions      ondansetron ODT 4 MG disintegrating tablet  Commonly known as: ZOFRAN-ODT  Place 1 tablet on the tongue Every 8 (Eight) Hours As Needed for Vomiting or Nausea.     traMADol 50 MG tablet  Commonly known as: ULTRAM  Take 1 tablet by mouth Every 6 (Six) Hours As Needed for Severe Pain.               Where to Get Your Medications        These medications  were sent to [facility]/pharmacy #[id number] - [place], [place] - [address] [facility] - [phone]  - [phone]   [address] [place] [address]      Phone: [phone]   ondansetron ODT 4 MG disintegrating tablet  traMADol 50 MG tablet            Jose Daniel Jorgensen MD  04/05/24 1959

## 2024-04-05 NOTE — TELEPHONE ENCOUNTER
Pt states she had a UTI about 2 weeks ago and abx made her vomit. Changed Rx and she was doing better. Now has nausea; headache; and tachycardia. No fever, no vomiting, no abdominal pain.     Advised 6 months post-op and just recovering from a known problem, she should contact the her PCP and/or Urologist.     She will contact recent treating providers and advised her to call back before 4 if she has not had a response.     Advised pt to report to ED if she has any signs of dehydration. Pt voiced understanding.     [facility]    DVT prophylaxis: on eliquis now

## 2024-04-07 LAB
QT INTERVAL: 422 MS
QTC INTERVAL: 405 MS

## 2024-04-26 ENCOUNTER — OFFICE VISIT (OUTPATIENT)
Dept: BARIATRICS/WEIGHT MGMT | Facility: CLINIC | Age: 39
End: 2024-04-26
Payer: MEDICAID

## 2024-04-26 ENCOUNTER — PREP FOR SURGERY (OUTPATIENT)
Dept: OTHER | Facility: HOSPITAL | Age: 39
End: 2024-04-26
Payer: MEDICAID

## 2024-04-26 VITALS
HEIGHT: 63 IN | OXYGEN SATURATION: 98 % | BODY MASS INDEX: 33.49 KG/M2 | DIASTOLIC BLOOD PRESSURE: 80 MMHG | SYSTOLIC BLOOD PRESSURE: 116 MMHG | WEIGHT: 189 LBS | HEART RATE: 112 BPM

## 2024-04-26 DIAGNOSIS — Z98.84 H/O GASTRIC BYPASS: Primary | ICD-10-CM

## 2024-04-26 DIAGNOSIS — K91.0 VOMITING (BILIOUS) FOLLOWING GASTROINTESTINAL SURGERY: ICD-10-CM

## 2024-04-26 DIAGNOSIS — R13.10 DYSPHAGIA, UNSPECIFIED TYPE: ICD-10-CM

## 2024-04-26 DIAGNOSIS — R10.13 EPIGASTRIC PAIN: ICD-10-CM

## 2024-04-26 DIAGNOSIS — R11.10 VOMITING: ICD-10-CM

## 2024-04-26 RX ORDER — SODIUM CHLORIDE 9 MG/ML
30 INJECTION, SOLUTION INTRAVENOUS CONTINUOUS PRN
OUTPATIENT
Start: 2024-04-26

## 2024-04-26 RX ORDER — SODIUM CHLORIDE 0.9 % (FLUSH) 0.9 %
10 SYRINGE (ML) INJECTION AS NEEDED
OUTPATIENT
Start: 2024-04-26

## 2024-04-26 NOTE — H&P (VIEW-ONLY)
"[facility]  [address] [place] [address]  [address] [place] [address]  Dept: [phone]  4/26/2024      Ariadna Ambrocio.  [id number]  [id number]  1985  female    Date of last surgery: 3/13/2024Cystoscopy Ureteroscopy, Stent Insertion, Stone Extraction - Left and Appendectomy Laparoscopic      Chief Complaint: BH Post-Op Bariatric Surgery:   Ariadna Ambrocio is status post procedure listed above  HPI:     Wt Readings from Last 10 Encounters:   04/26/24 85.7 kg (189 lb)   04/05/24 87.1 kg (192 lb)   03/27/24 88 kg (194 lb)   03/13/24 91.5 kg (201 lb 11.5 oz)   02/07/24 91.8 kg (202 lb 6.1 oz)   01/23/24 93.9 kg (207 lb)   01/05/24 95.5 kg (210 lb 9.6 oz)   11/06/23 102 kg (224 lb)   10/02/23 109 kg (240 lb)   09/27/23 110 kg (242 lb 11.2 oz)        Today's weight is 85.7 kg (189 lb) pounds,BMI  has a  loss of 21 pounds since the last visit andHIS@ weight loss since surgery is 53 pounds. The patient reports a decreased portion size and loss of appetite.      Ariadna Ambrocio reportsvomitingdaily, no nausea or GERD     Diet and Exercise: Diet history reviewed and discussed with the patient. Weight loss/gains to date discussed with the patient.     She reports eating 2 meals per day, a typical portion size of 1 cup, eating 1-2 snacks per day, drinking 8 or more 8-oz. glasses of water per day, no carbonated beverage consumption and exercising regularly.       The patient states they are eating 50-60 grams of protein per day.     Having Epigastric pain prn  Left upper quadrant pain that radiates to upper back  Had recent kidney stone removal and several UTI's  Bowel movements every few days to a week at a time - \" new normal for her\" - takes laxatives and this helps  Epigastric pain, 1 time a day vomiting, worse with denser foods       Breakfast: shake in coffee  Lunch: usually a meal of chicken   Dinner: minimal  Snacks: " piece of candy here or there, few chips prn   Drinks: water   Exercise: was going to the gym before appendectomy surgery ( treadmill, leg press, arm work out and )    Bariatric multi     Review of Systems   Constitutional:  Positive for activity change and appetite change.   Respiratory: Negative.     Cardiovascular: Negative.    Gastrointestinal:  Positive for abdominal pain and vomiting.         Patient Active Problem List   Diagnosis    Chest pain    Dyspnea on exertion    Paresthesia    Hypertension    Bradycardia, sinus    Paroxysmal SVT (supraventricular tachycardia)    Obesity, Class III, BMI 40-49.9 (morbid obesity)    Gastroesophageal reflux disease    At risk for dehydration due to poor fluid intake    H/O gastric sleeve    Obesity, Class II, BMI 35-39.9       Past Medical History:   Diagnosis Date    Anxiety     Bulging lumbar disc     Constipation     Depression     Diarrhea     Elevated triglycerides with high cholesterol     Heartburn     Herniated cervical disc     Hiatal hernia     Hyperlipidemia     Hypertension     IBS (irritable bowel syndrome)     Kidney stones     Nausea     Pre-diabetes     Rapid heart beat     Seasonal allergies      Past Surgical History:   Procedure Laterality Date    CYSTOSCOPY, URETEROSCOPY, RETROGRADE PYELOGRAM, STENT INSERTION Left 07/16/2021    Procedure: CYSTOSCOPY URETEROSCOPY RETROGRADE PYELOGRAM HOLMIUM LASER BASKET STONE EXTRACTION STENT INSERTION;  Surgeon: Carmelo Nguyễn MD;  Location: [facility] MAIN OR;  Service: Urology;  Laterality: Left;    ENDOSCOPY N/A 2/27/2023    Procedure: ESOPHAGOGASTRODUODENOSCOPY with gastric antrum biopsy;  Surgeon: Otilia Capone MD;  Location: [facility] ENDOSCOPY;  Service: General;  Laterality: N/A;  Post: large hiatal hernia    GASTRIC BYPASS N/A 9/27/2023    Procedure: GASTRIC BYPASS AND HIATAL HERNIA REPAIR LAPAROSCOPIC WITH DAVINCI ROBOT;  Surgeon: Otilia Capone MD;  Location: [facility] MAIN OR;  Service: Robotics - DaVinci;  Laterality:  N/A;    CYSTOSCOPY, URETEROSCOPY, RETROGRADE PYELOGRAM, STENT INSERTION Left 3/13/2024    Procedure: CYSTOSCOPY URETEROSCOPY, STENT INSERTION, STONE EXTRACTION;  Surgeon: Carmelo Nguyễn MD;  Location: [facility] MAIN OR;  Service: Urology;  Laterality: Left;    APPENDECTOMY N/A 3/13/2024    Procedure: APPENDECTOMY LAPAROSCOPIC;  Surgeon: Georges Chandler MD;  Location: [facility] MAIN OR;  Service: General;  Laterality: N/A;    COLONOSCOPY      EAR TUBES      KIDNEY STONE SURGERY      WISDOM TOOTH EXTRACTION        The following portions of the patient's history were reviewed and updated as appropriate: allergies, current medications, past medical history, past social history, past surgical history, and problem list.    Vitals:    04/26/24 1318   BP: 116/80   Pulse: 112   SpO2: 98%       Physical Exam  Constitutional:       Appearance: Normal appearance. She is obese.   Pulmonary:      Effort: Pulmonary effort is normal.   Abdominal:      General: Abdomen is flat.      Palpations: Abdomen is soft.      Comments: Non-tender with palpation   Skin:     General: Skin is warm and dry.   Neurological:      General: No focal deficit present.      Mental Status: She is alert and oriented to person, place, and time.   Psychiatric:         Mood and Affect: Mood normal.         Behavior: Behavior normal.         Thought Content: Thought content normal.         Judgment: Judgment normal.             Assessment:   BMI 33.48, class 1 obesity, 6 month gastric bypass, left upper quadrant abdominal pain/ epigastric pain, sludge in gallbladder found on recent ultrasound     Post-op, the patient is doing well with weight loss. However she did have an emergent kidney stone surgery and appendectomy a few months ago and is recovering from this. She did go to the ER recently with left upper quadrant pain and had a ct scan done which showed a distended gallbladder. Her pcp ordered and ultrasound for her gallbladder which showed sludge. Pt at first  "denied any nausea or vomiting post prandial but then stated she was having vomiting after eating certain foods at least 1 time a day ( but also reports this is improved post bypass as before surgery she was vomiting after every meal due to acid reflux). Pt denies reflux now. States food goes down \" stops\" then she has to vomit it back up. Also states she has some epigastric pain ( not associated with eating) prn. She was non-tender upon exam today of both left upper quadrant and epigastric region. Pt denies recent oral steroid usage but did report using NSAIDS 1 day after her recent appendectomy. Plan to start with EGD to rule out marginal ulcer/ stricture causing her symptoms. If normal, may consider gallbladder dysfunction as a differential diagnosis. Pt would like to follow up with DR. Capone for her gallbladder if needed instead of general surgery. Plan to follow up ASAP for EGD.     Plan:     Encouraged patient to be sure to get plenty of lean protein per day through small frequent meals all with a protein source.   Activity restrictions: none.   Recommended patient be sure to get at least 70 grams of protein per day by eating small, frequent meals all with high lean protein choices. Be sure to limit/cut back on daily carbohydrate intake. Discussed with the patient the recommended amount of water per day to intake- half of body weight in ounces. Reviewed vitamin requirements. Be sure to do routine exercise, 150 minutes per week minimum, including both cardio and strength training.     Instructions / Recommendations: dietary counseling recommended, recommended a daily protein intake of  grams, vitamin supplement(s) recommended, recommended exercising at least 150 minutes per week, behavior modifications recommended and instructed to call the office for concerns, questions, or problems.     The patient was instructed to follow up next week for EGD.    The patient was counseled regarding diet and exercise/ " gallbladder, vomiting, . Total time spent face to face was 30 minutes and 15 minutes was spent counseling.     SAM Islas  [facility]

## 2024-04-26 NOTE — PROGRESS NOTES
"MGK BAR SURG [facility] GROUP BARIATRIC SURGERY  [address] [place] [address]  [address] [place] [address]  Dept: [phone]  4/26/2024      Ariadna Ambrocio.  [id number]  [id number]  1985  female    Date of last surgery: 3/13/2024Cystoscopy Ureteroscopy, Stent Insertion, Stone Extraction - Left and Appendectomy Laparoscopic      Chief Complaint: BH Post-Op Bariatric Surgery:   Ariadna Ambrocio is status post procedure listed above  HPI:     Wt Readings from Last 10 Encounters:   04/26/24 85.7 kg (189 lb)   04/05/24 87.1 kg (192 lb)   03/27/24 88 kg (194 lb)   03/13/24 91.5 kg (201 lb 11.5 oz)   02/07/24 91.8 kg (202 lb 6.1 oz)   01/23/24 93.9 kg (207 lb)   01/05/24 95.5 kg (210 lb 9.6 oz)   11/06/23 102 kg (224 lb)   10/02/23 109 kg (240 lb)   09/27/23 110 kg (242 lb 11.2 oz)        Today's weight is 85.7 kg (189 lb) pounds,BMI  has a  loss of 21 pounds since the last visit andHIS@ weight loss since surgery is 53 pounds. The patient reports a decreased portion size and loss of appetite.      Ariadna Ambrocio reportsvomitingdaily, no nausea or GERD     Diet and Exercise: Diet history reviewed and discussed with the patient. Weight loss/gains to date discussed with the patient.     She reports eating 2 meals per day, a typical portion size of 1 cup, eating 1-2 snacks per day, drinking 8 or more 8-oz. glasses of water per day, no carbonated beverage consumption and exercising regularly.       The patient states they are eating 50-60 grams of protein per day.     Having Epigastric pain prn  Left upper quadrant pain that radiates to upper back  Had recent kidney stone removal and several UTI's  Bowel movements every few days to a week at a time - \" new normal for her\" - takes laxatives and this helps  Epigastric pain, 1 time a day vomiting, worse with denser foods       Breakfast: shake in coffee  Lunch: usually a meal of chicken   Dinner: minimal  Snacks: " piece of candy here or there, few chips prn   Drinks: water   Exercise: was going to the gym before appendectomy surgery ( treadmill, leg press, arm work out and )    Bariatric multi     Review of Systems   Constitutional:  Positive for activity change and appetite change.   Respiratory: Negative.     Cardiovascular: Negative.    Gastrointestinal:  Positive for abdominal pain and vomiting.         Patient Active Problem List   Diagnosis    Chest pain    Dyspnea on exertion    Paresthesia    Hypertension    Bradycardia, sinus    Paroxysmal SVT (supraventricular tachycardia)    Obesity, Class III, BMI 40-49.9 (morbid obesity)    Gastroesophageal reflux disease    At risk for dehydration due to poor fluid intake    H/O gastric sleeve    Obesity, Class II, BMI 35-39.9       Past Medical History:   Diagnosis Date    Anxiety     Bulging lumbar disc     Constipation     Depression     Diarrhea     Elevated triglycerides with high cholesterol     Heartburn     Herniated cervical disc     Hiatal hernia     Hyperlipidemia     Hypertension     IBS (irritable bowel syndrome)     Kidney stones     Nausea     Pre-diabetes     Rapid heart beat     Seasonal allergies      Past Surgical History:   Procedure Laterality Date    CYSTOSCOPY, URETEROSCOPY, RETROGRADE PYELOGRAM, STENT INSERTION Left 07/16/2021    Procedure: CYSTOSCOPY URETEROSCOPY RETROGRADE PYELOGRAM HOLMIUM LASER BASKET STONE EXTRACTION STENT INSERTION;  Surgeon: Carmelo Nguyễn MD;  Location: [facility] MAIN OR;  Service: Urology;  Laterality: Left;    ENDOSCOPY N/A 2/27/2023    Procedure: ESOPHAGOGASTRODUODENOSCOPY with gastric antrum biopsy;  Surgeon: Otilia Capone MD;  Location: [facility] ENDOSCOPY;  Service: General;  Laterality: N/A;  Post: large hiatal hernia    GASTRIC BYPASS N/A 9/27/2023    Procedure: GASTRIC BYPASS AND HIATAL HERNIA REPAIR LAPAROSCOPIC WITH DAVINCI ROBOT;  Surgeon: Otilia Capone MD;  Location: [facility] MAIN OR;  Service: Robotics - DaVinci;  Laterality:  N/A;    CYSTOSCOPY, URETEROSCOPY, RETROGRADE PYELOGRAM, STENT INSERTION Left 3/13/2024    Procedure: CYSTOSCOPY URETEROSCOPY, STENT INSERTION, STONE EXTRACTION;  Surgeon: Carmelo Nguyễn MD;  Location: [facility] MAIN OR;  Service: Urology;  Laterality: Left;    APPENDECTOMY N/A 3/13/2024    Procedure: APPENDECTOMY LAPAROSCOPIC;  Surgeon: Georges Chandler MD;  Location: [facility] MAIN OR;  Service: General;  Laterality: N/A;    COLONOSCOPY      EAR TUBES      KIDNEY STONE SURGERY      WISDOM TOOTH EXTRACTION        The following portions of the patient's history were reviewed and updated as appropriate: allergies, current medications, past medical history, past social history, past surgical history, and problem list.    Vitals:    04/26/24 1318   BP: 116/80   Pulse: 112   SpO2: 98%       Physical Exam  Constitutional:       Appearance: Normal appearance. She is obese.   Pulmonary:      Effort: Pulmonary effort is normal.   Abdominal:      General: Abdomen is flat.      Palpations: Abdomen is soft.      Comments: Non-tender with palpation   Skin:     General: Skin is warm and dry.   Neurological:      General: No focal deficit present.      Mental Status: She is alert and oriented to person, place, and time.   Psychiatric:         Mood and Affect: Mood normal.         Behavior: Behavior normal.         Thought Content: Thought content normal.         Judgment: Judgment normal.             Assessment:   BMI 33.48, class 1 obesity, 6 month gastric bypass, left upper quadrant abdominal pain/ epigastric pain, sludge in gallbladder found on recent ultrasound     Post-op, the patient is doing well with weight loss. However she did have an emergent kidney stone surgery and appendectomy a few months ago and is recovering from this. She did go to the ER recently with left upper quadrant pain and had a ct scan done which showed a distended gallbladder. Her pcp ordered and ultrasound for her gallbladder which showed sludge. Pt at first  "denied any nausea or vomiting post prandial but then stated she was having vomiting after eating certain foods at least 1 time a day ( but also reports this is improved post bypass as before surgery she was vomiting after every meal due to acid reflux). Pt denies reflux now. States food goes down \" stops\" then she has to vomit it back up. Also states she has some epigastric pain ( not associated with eating) prn. She was non-tender upon exam today of both left upper quadrant and epigastric region. Pt denies recent oral steroid usage but did report using NSAIDS 1 day after her recent appendectomy. Plan to start with EGD to rule out marginal ulcer/ stricture causing her symptoms. If normal, may consider gallbladder dysfunction as a differential diagnosis. Pt would like to follow up with DR. Capone for her gallbladder if needed instead of general surgery. Plan to follow up ASAP for EGD.     Plan:     Encouraged patient to be sure to get plenty of lean protein per day through small frequent meals all with a protein source.   Activity restrictions: none.   Recommended patient be sure to get at least 70 grams of protein per day by eating small, frequent meals all with high lean protein choices. Be sure to limit/cut back on daily carbohydrate intake. Discussed with the patient the recommended amount of water per day to intake- half of body weight in ounces. Reviewed vitamin requirements. Be sure to do routine exercise, 150 minutes per week minimum, including both cardio and strength training.     Instructions / Recommendations: dietary counseling recommended, recommended a daily protein intake of  grams, vitamin supplement(s) recommended, recommended exercising at least 150 minutes per week, behavior modifications recommended and instructed to call the office for concerns, questions, or problems.     The patient was instructed to follow up next week for EGD.    The patient was counseled regarding diet and exercise/ " gallbladder, vomiting, . Total time spent face to face was 30 minutes and 15 minutes was spent counseling.     SAM Islas  [facility] Bariatrics

## 2024-05-02 ENCOUNTER — ANESTHESIA (OUTPATIENT)
Dept: GASTROENTEROLOGY | Facility: HOSPITAL | Age: 39
End: 2024-05-02
Payer: MEDICAID

## 2024-05-02 ENCOUNTER — ANESTHESIA EVENT (OUTPATIENT)
Dept: GASTROENTEROLOGY | Facility: HOSPITAL | Age: 39
End: 2024-05-02
Payer: MEDICAID

## 2024-05-02 ENCOUNTER — HOSPITAL ENCOUNTER (OUTPATIENT)
Facility: HOSPITAL | Age: 39
Setting detail: HOSPITAL OUTPATIENT SURGERY
Discharge: HOME | End: 2024-05-02
Attending: SURGERY | Admitting: SURGERY
Payer: MEDICAID

## 2024-05-02 VITALS
DIASTOLIC BLOOD PRESSURE: 82 MMHG | RESPIRATION RATE: 16 BRPM | HEIGHT: 63 IN | OXYGEN SATURATION: 100 % | HEART RATE: 71 BPM | BODY MASS INDEX: 33.36 KG/M2 | SYSTOLIC BLOOD PRESSURE: 125 MMHG | WEIGHT: 188.27 LBS | TEMPERATURE: 98.9 F

## 2024-05-02 PROCEDURE — 25010000002 PROPOFOL 200 MG/20ML EMULSION: Performed by: NURSE ANESTHETIST, CERTIFIED REGISTERED

## 2024-05-02 PROCEDURE — 43235 EGD DIAGNOSTIC BRUSH WASH: CPT | Performed by: SURGERY

## 2024-05-02 RX ORDER — EPHEDRINE SULFATE 5 MG/ML
5 INJECTION INTRAVENOUS ONCE AS NEEDED
Status: DISCONTINUED | OUTPATIENT
Start: 2024-05-02 | End: 2024-05-02 | Stop reason: HOSPADM

## 2024-05-02 RX ORDER — LIDOCAINE HYDROCHLORIDE 20 MG/ML
INJECTION, SOLUTION EPIDURAL; INFILTRATION; INTRACAUDAL; PERINEURAL AS NEEDED
Status: DISCONTINUED | OUTPATIENT
Start: 2024-05-02 | End: 2024-05-02 | Stop reason: SURG

## 2024-05-02 RX ORDER — ONDANSETRON 2 MG/ML
4 INJECTION INTRAMUSCULAR; INTRAVENOUS ONCE AS NEEDED
Status: DISCONTINUED | OUTPATIENT
Start: 2024-05-02 | End: 2024-05-02 | Stop reason: HOSPADM

## 2024-05-02 RX ORDER — IPRATROPIUM BROMIDE AND ALBUTEROL SULFATE 2.5; .5 MG/3ML; MG/3ML
3 SOLUTION RESPIRATORY (INHALATION) ONCE AS NEEDED
Status: DISCONTINUED | OUTPATIENT
Start: 2024-05-02 | End: 2024-05-02 | Stop reason: HOSPADM

## 2024-05-02 RX ORDER — LABETALOL HYDROCHLORIDE 5 MG/ML
5 INJECTION, SOLUTION INTRAVENOUS
Status: DISCONTINUED | OUTPATIENT
Start: 2024-05-02 | End: 2024-05-02 | Stop reason: HOSPADM

## 2024-05-02 RX ORDER — HYDRALAZINE HYDROCHLORIDE 20 MG/ML
5 INJECTION INTRAMUSCULAR; INTRAVENOUS
Status: DISCONTINUED | OUTPATIENT
Start: 2024-05-02 | End: 2024-05-02 | Stop reason: HOSPADM

## 2024-05-02 RX ORDER — MEPERIDINE HYDROCHLORIDE 25 MG/ML
12.5 INJECTION INTRAMUSCULAR; INTRAVENOUS; SUBCUTANEOUS
Status: DISCONTINUED | OUTPATIENT
Start: 2024-05-02 | End: 2024-05-02 | Stop reason: HOSPADM

## 2024-05-02 RX ORDER — SODIUM CHLORIDE 9 MG/ML
INJECTION, SOLUTION INTRAVENOUS CONTINUOUS PRN
Status: DISCONTINUED | OUTPATIENT
Start: 2024-05-02 | End: 2024-05-02 | Stop reason: SURG

## 2024-05-02 RX ORDER — PROPOFOL 10 MG/ML
INJECTION, EMULSION INTRAVENOUS AS NEEDED
Status: DISCONTINUED | OUTPATIENT
Start: 2024-05-02 | End: 2024-05-02 | Stop reason: SURG

## 2024-05-02 RX ORDER — DIPHENHYDRAMINE HYDROCHLORIDE 50 MG/ML
12.5 INJECTION INTRAMUSCULAR; INTRAVENOUS
Status: DISCONTINUED | OUTPATIENT
Start: 2024-05-02 | End: 2024-05-02 | Stop reason: HOSPADM

## 2024-05-02 RX ADMIN — PROPOFOL 150 MG: 10 INJECTION, EMULSION INTRAVENOUS at 12:10

## 2024-05-02 RX ADMIN — LIDOCAINE HYDROCHLORIDE 100 MG: 20 INJECTION, SOLUTION EPIDURAL; INFILTRATION; INTRACAUDAL; PERINEURAL at 12:10

## 2024-05-02 RX ADMIN — SODIUM CHLORIDE: 9 INJECTION, SOLUTION INTRAVENOUS at 12:07

## 2024-05-02 RX ADMIN — PROPOFOL 30 MG: 10 INJECTION, EMULSION INTRAVENOUS at 12:13

## 2024-05-02 NOTE — ANESTHESIA PREPROCEDURE EVALUATION
Anesthesia Evaluation     Patient summary reviewed and Nursing notes reviewed   history of anesthetic complications:  PONV  NPO Solid Status: > 8 hours  NPO Liquid Status: > 8 hours           Airway   Mallampati: II  TM distance: >3 FB  Neck ROM: full  No difficulty expected  Dental          Pulmonary    Cardiovascular     (+) hypertension, hyperlipidemia      Neuro/Psych  (+) psychiatric history  GI/Hepatic/Renal/Endo    (+) morbid obesity, hiatal hernia, GERD, renal disease-    Musculoskeletal     Abdominal    Substance History      OB/GYN          Other                    Anesthesia Plan    ASA 3     general and MAC     intravenous induction     Anesthetic plan, risks, benefits, and alternatives have been provided, discussed and informed consent has been obtained with: patient.    Plan discussed with CAA.    CODE STATUS:

## 2024-05-02 NOTE — DISCHARGE INSTRUCTIONS
Try to limit portion size to 3-5 bites of food and eat every 3-5 hours. Increase fiber by increasing fruits and veggies. Increase stool softener and miralax to twice daily and try to have at least 1 bm per day.     Follow up with me in 2 weeks.     A responsible adult should stay with you and you should rest quietly for the rest of the day.    Do not drink alcohol, drive, operate any heavy machinery or power tools or make any legal/important decisions for the next 24 hours.     Progress your diet as tolerated.  If you begin to experience severe pain, increased shortness of breath, racing heartbeat or a fever above 101 F, seek immediate medical attention.     Follow up with MD as instructed. Call office for results in 3 to 5 days if needed.

## 2024-05-02 NOTE — OP NOTE
ESOPHAGOGASTRODUODENOSCOPY  Procedure Report    Patient Name:  Ariadna Ambrocio  YOB: 1985    Date of Surgery:  5/2/2024     Indications: 30-year-old lady with a history gastric bypass who is having some regurgitation after eating solid food.  Ultrasound of the gallbladder did demonstrate some sludge.  CT scan unremarkable.    Pre-op Diagnosis:   H/O gastric bypass [Z98.84]  Epigastric pain [R10.13]  Dysphagia, unspecified type [R13.10]  Vomiting (bilious) following gastrointestinal surgery [K91.0]       Post-Op Diagnosis Codes:     * H/O gastric bypass [Z98.84]     * Epigastric pain [R10.13]     * Dysphagia, unspecified type [R13.10]     * Vomiting (bilious) following gastrointestinal surgery [K91.0]    Procedure/CPT® Codes:      Procedure(s):  ESOPHAGOGASTRODUODENOSCOPY    Staff:  Surgeon(s):  Otilia Capone MD           was responsible for performing the following activities: Retraction, Suction, Irrigation, Suturing, Closing, and Placing Dressing and their skilled assistance was necessary for the success of this case.   Anesthesia: Monitored Anesthesia Care    Estimated Blood Loss: minimal    Implants:    Nothing was implanted during the procedure    Specimen:          None        Findings: Normal none    Complications: None    Description of Procedure: Sedation was administered and the scope was placed in the posterior pharynx and carefully advanced into the esophagus.  There was no esophagitis.  The scope was then passed into the gastric pouch and it was appropriate in size.  There was no stricture of the gastrojejunostomy and there was no marginal ulcer and no significant blind loop.  Upon retroflexion there did not appear to be a hiatal hernia.  The scope was advanced into the Nicho limb for several centimeters.  There was no other abnormality seen.  The scope was removed.      Otilia Capone MD     Date: 5/2/2024  Time: 12:18 EDT

## 2024-05-03 NOTE — ANESTHESIA POSTPROCEDURE EVALUATION
Patient: Ariadna Ambrocio    Procedure Summary       Date: 05/02/24 Room / Location: [facility] ENDOSCOPY 1 / [facility] ENDOSCOPY    Anesthesia Start: 1207 Anesthesia Stop: 1220    Procedure: ESOPHAGOGASTRODUODENOSCOPY Diagnosis:       H/O gastric bypass      Epigastric pain      Dysphagia, unspecified type      Vomiting (bilious) following gastrointestinal surgery      (H/O gastric bypass [Z98.84])      (Epigastric pain [R10.13])      (Dysphagia, unspecified type [R13.10])      (Vomiting (bilious) following gastrointestinal surgery [K91.0])    Surgeons: Otilia Capone MD Provider: Chris Jaimes MD    Anesthesia Type: general, MAC ASA Status: 3            Anesthesia Type: general, MAC    Vitals  Vitals Value Taken Time   /79 05/02/24 1240   Temp     Pulse 56 05/02/24 1245   Resp 16 05/02/24 1232   SpO2 100 % 05/02/24 1245   Vitals shown include unfiled device data.        Post Anesthesia Care and Evaluation    Patient location during evaluation: PACU  Patient participation: complete - patient participated  Level of consciousness: awake  Pain scale: See nurse's notes for pain score.  Pain management: adequate    Airway patency: patent  Anesthetic complications: No anesthetic complications  PONV Status: none  Cardiovascular status: acceptable  Respiratory status: acceptable and spontaneous ventilation  Hydration status: acceptable    Comments: Patient seen and examined postoperatively; vital signs stable; SpO2 greater than or equal to 90%; cardiopulmonary status stable; nausea/vomiting adequately controlled; pain adequately controlled; no apparent anesthesia complications; patient discharged from anesthesia care when discharge criteria were met

## 2024-05-10 ENCOUNTER — OFFICE VISIT (OUTPATIENT)
Dept: BARIATRICS/WEIGHT MGMT | Facility: CLINIC | Age: 39
End: 2024-05-10
Payer: MEDICAID

## 2024-05-10 ENCOUNTER — PREP FOR SURGERY (OUTPATIENT)
Dept: OTHER | Facility: HOSPITAL | Age: 39
End: 2024-05-10
Payer: MEDICAID

## 2024-05-10 VITALS
BODY MASS INDEX: 33.65 KG/M2 | SYSTOLIC BLOOD PRESSURE: 122 MMHG | DIASTOLIC BLOOD PRESSURE: 83 MMHG | OXYGEN SATURATION: 98 % | HEART RATE: 85 BPM | WEIGHT: 189.9 LBS | HEIGHT: 63 IN

## 2024-05-10 DIAGNOSIS — K80.20 CALCULUS OF GALLBLADDER WITHOUT CHOLECYSTITIS WITHOUT OBSTRUCTION: Primary | ICD-10-CM

## 2024-05-10 DIAGNOSIS — K80.20 CHOLELITHIASIS: Primary | ICD-10-CM

## 2024-05-10 PROCEDURE — 3079F DIAST BP 80-89 MM HG: CPT | Performed by: SURGERY

## 2024-05-10 PROCEDURE — 3074F SYST BP LT 130 MM HG: CPT | Performed by: SURGERY

## 2024-05-10 PROCEDURE — 99213 OFFICE O/P EST LOW 20 MIN: CPT | Performed by: SURGERY

## 2024-05-10 RX ORDER — SODIUM CHLORIDE 0.9 % (FLUSH) 0.9 %
3 SYRINGE (ML) INJECTION EVERY 12 HOURS SCHEDULED
OUTPATIENT
Start: 2024-05-10

## 2024-05-10 RX ORDER — ONDANSETRON 2 MG/ML
4 INJECTION INTRAMUSCULAR; INTRAVENOUS EVERY 6 HOURS PRN
OUTPATIENT
Start: 2024-05-10

## 2024-05-10 RX ORDER — SODIUM CHLORIDE 0.9 % (FLUSH) 0.9 %
3-10 SYRINGE (ML) INJECTION AS NEEDED
OUTPATIENT
Start: 2024-05-10

## 2024-05-10 RX ORDER — SODIUM CHLORIDE 9 MG/ML
100 INJECTION, SOLUTION INTRAVENOUS CONTINUOUS
OUTPATIENT
Start: 2024-05-10

## 2024-05-10 RX ORDER — SODIUM CHLORIDE 9 MG/ML
40 INJECTION, SOLUTION INTRAVENOUS AS NEEDED
OUTPATIENT
Start: 2024-05-10

## 2024-05-10 RX ORDER — SCOLOPAMINE TRANSDERMAL SYSTEM 1 MG/1
1 PATCH, EXTENDED RELEASE TRANSDERMAL CONTINUOUS
OUTPATIENT
Start: 2024-05-10 | End: 2024-05-13

## 2024-05-10 RX ORDER — INDOCYANINE GREEN AND WATER 25 MG
2.5 KIT INJECTION ONCE
OUTPATIENT
Start: 2024-05-10 | End: 2024-05-10

## 2024-05-21 NOTE — PROGRESS NOTES
HISTORY AND PHYSICAL      Patient Care Team:  Marifer Cooper MD as PCP - General (Internal Medicine)  Austen Abarca MD as Consulting Physician (Cardiology)  Yaneli Jacobson APRN as Nurse Practitioner (Cardiology)  Otilia Capone MD as Consulting Physician (Bariatrics)  Terrance Liu MD as Surgeon (General Surgery)  Georges Chandler MD as Surgeon (General Surgery)    Chief complaint left upper quadrant pain and vomiting    Subjective     Patient is a 38 y.o. female presents with left upper quadrant and epigastric pain and also nausea and vomiting after eating.  Recent ultrasound demonstrates sludge in the gallbladder.  Recent upper endoscopy was negative.  She also has a history of gastric sleeve with significant weight loss.    Review of Systems   Pertinent items are noted in HPI    History  Past Medical History:   Diagnosis Date    Anxiety     Bulging lumbar disc     Constipation     Depression     Diarrhea     Elevated triglycerides with high cholesterol     GERD (gastroesophageal reflux disease)     Heartburn     Herniated cervical disc     Hiatal hernia     Hyperlipidemia     Hypertension     no meds after weight    IBS (irritable bowel syndrome)     Kidney stones     Nausea     Pre-diabetes     Rapid heart beat     Seasonal allergies      Past Surgical History:   Procedure Laterality Date    APPENDECTOMY N/A 3/13/2024    Procedure: APPENDECTOMY LAPAROSCOPIC;  Surgeon: Georges Chandler MD;  Location: [facility] MAIN OR;  Service: General;  Laterality: N/A;    COLONOSCOPY      CYSTOSCOPY, URETEROSCOPY, RETROGRADE PYELOGRAM, STENT INSERTION Left 07/16/2021    Procedure: CYSTOSCOPY URETEROSCOPY RETROGRADE PYELOGRAM HOLMIUM LASER BASKET STONE EXTRACTION STENT INSERTION;  Surgeon: Carmelo Nguyễn MD;  Location: [facility] MAIN OR;  Service: Urology;  Laterality: Left;    CYSTOSCOPY, URETEROSCOPY, RETROGRADE PYELOGRAM, STENT INSERTION Left 3/13/2024    Procedure: CYSTOSCOPY URETEROSCOPY, STENT INSERTION, STONE  EXTRACTION;  Surgeon: Carmelo Nguyễn MD;  Location: [facility] MAIN OR;  Service: Urology;  Laterality: Left;    EAR TUBES      ENDOSCOPY N/A 2/27/2023    Procedure: ESOPHAGOGASTRODUODENOSCOPY with gastric antrum biopsy;  Surgeon: Otilia Capone MD;  Location: [facility] ENDOSCOPY;  Service: General;  Laterality: N/A;  Post: large hiatal hernia    ENDOSCOPY N/A 5/2/2024    Procedure: ESOPHAGOGASTRODUODENOSCOPY;  Surgeon: Otilia Capone MD;  Location: [facility] ENDOSCOPY;  Service: General;  Laterality: N/A;  normal    GASTRIC BYPASS N/A 9/27/2023    Procedure: GASTRIC BYPASS AND HIATAL HERNIA REPAIR LAPAROSCOPIC WITH DAVINCI ROBOT;  Surgeon: Otilia Capone MD;  Location: [facility] MAIN OR;  Service: Robotics - DaVinci;  Laterality: N/A;    KIDNEY STONE SURGERY      WISDOM TOOTH EXTRACTION       Family History   Problem Relation Age of Onset    Asthma Mother     Hypertension Mother     No Known Problems Father     Hypertension Maternal Grandmother     Heart attack Maternal Grandfather     Cancer Paternal Grandmother     Cancer Paternal Grandfather     Breast cancer Neg Hx     Ovarian cancer Neg Hx     Uterine cancer Neg Hx     Colon cancer Neg Hx     Deep vein thrombosis Neg Hx     Pulmonary embolism Neg Hx      Social History     Tobacco Use    Smoking status: Never     Passive exposure: Past    Smokeless tobacco: Never   Vaping Use    Vaping status: Never Used   Substance Use Topics    Alcohol use: Not Currently    Drug use: Never     (Not in a hospital admission)    Allergies:  Patient has no known allergies.    Objective     Vital Signs       Physical Exam:      General Appearance:    Alert, cooperative, in no acute distress   Head:    Normocephalic, without obvious abnormality, atraumatic   Eyes:            Lids and lashes normal, conjunctivae and sclerae normal, no   icterus, no pallor, corneas clear, PERRLA   Ears:    Ears appear intact with no abnormalities noted   Throat:   No oral lesions, no thrush, oral mucosa moist   Neck:    No adenopathy, supple, trachea midline, no thyromegaly, no   carotid bruit, no JVD   Back:     No kyphosis present, no scoliosis present, no skin lesions,      erythema or scars, no tenderness to percussion or                   palpation,   range of motion normal   Lungs:     Clear to auscultation,respirations regular, even and                  unlabored    Heart:    Regular rhythm and normal rate, normal S1 and S2, no            murmur, no gallop, no rub, no click   Chest Wall:    No abnormalities observed   Abdomen:     Normal bowel sounds, no masses, no organomegaly, soft        non-tender, non-distended, no guarding, no rebound                tenderness   Rectal:     Deferred   Extremities:   Moves all extremities well, no edema, no cyanosis, no             redness   Pulses:   Pulses palpable and equal bilaterally   Skin:   No bleeding, bruising or rash   Lymph nodes:   No palpable adenopathy   Neurologic:   Cranial nerves 2 - 12 grossly intact, sensation intact, DTR       present and equal bilaterally   Results Review:    I reviewed the patient's new clinical results.  I reviewed the patient's new imaging results and agree with the interpretation.    Assessment & Plan     Symptomatic cholelithiasis  I have explained the risks and benefits  of laparoscopic cholecystectomy including bleeding infection and common bile duct injury and the patient understands this and agrees to proceed        Otilia Capone MD  05/21/24  14:30 EDT

## 2024-05-21 NOTE — H&P (VIEW-ONLY)
HISTORY AND PHYSICAL      Patient Care Team:  Marifer Cooper MD as PCP - General (Internal Medicine)  Austen Abarca MD as Consulting Physician (Cardiology)  Yaneli Jacobson APRN as Nurse Practitioner (Cardiology)  Otilia Capone MD as Consulting Physician (Bariatrics)  Terrance Liu MD as Surgeon (General Surgery)  Georges Chandler MD as Surgeon (General Surgery)    Chief complaint left upper quadrant pain and vomiting    Subjective     Patient is a 38 y.o. female presents with left upper quadrant and epigastric pain and also nausea and vomiting after eating.  Recent ultrasound demonstrates sludge in the gallbladder.  Recent upper endoscopy was negative.  She also has a history of gastric sleeve with significant weight loss.    Review of Systems   Pertinent items are noted in HPI    History  Past Medical History:   Diagnosis Date    Anxiety     Bulging lumbar disc     Constipation     Depression     Diarrhea     Elevated triglycerides with high cholesterol     GERD (gastroesophageal reflux disease)     Heartburn     Herniated cervical disc     Hiatal hernia     Hyperlipidemia     Hypertension     no meds after weight    IBS (irritable bowel syndrome)     Kidney stones     Nausea     Pre-diabetes     Rapid heart beat     Seasonal allergies      Past Surgical History:   Procedure Laterality Date    APPENDECTOMY N/A 3/13/2024    Procedure: APPENDECTOMY LAPAROSCOPIC;  Surgeon: Georges Chandler MD;  Location: [facility] MAIN OR;  Service: General;  Laterality: N/A;    COLONOSCOPY      CYSTOSCOPY, URETEROSCOPY, RETROGRADE PYELOGRAM, STENT INSERTION Left 07/16/2021    Procedure: CYSTOSCOPY URETEROSCOPY RETROGRADE PYELOGRAM HOLMIUM LASER BASKET STONE EXTRACTION STENT INSERTION;  Surgeon: Carmelo Nguyễn MD;  Location: [facility] MAIN OR;  Service: Urology;  Laterality: Left;    CYSTOSCOPY, URETEROSCOPY, RETROGRADE PYELOGRAM, STENT INSERTION Left 3/13/2024    Procedure: CYSTOSCOPY URETEROSCOPY, STENT INSERTION, STONE  EXTRACTION;  Surgeon: Carmelo Nguyễn MD;  Location: [facility] MAIN OR;  Service: Urology;  Laterality: Left;    EAR TUBES      ENDOSCOPY N/A 2/27/2023    Procedure: ESOPHAGOGASTRODUODENOSCOPY with gastric antrum biopsy;  Surgeon: Otilia Capone MD;  Location: [facility] ENDOSCOPY;  Service: General;  Laterality: N/A;  Post: large hiatal hernia    ENDOSCOPY N/A 5/2/2024    Procedure: ESOPHAGOGASTRODUODENOSCOPY;  Surgeon: Otilia Capone MD;  Location: [facility] ENDOSCOPY;  Service: General;  Laterality: N/A;  normal    GASTRIC BYPASS N/A 9/27/2023    Procedure: GASTRIC BYPASS AND HIATAL HERNIA REPAIR LAPAROSCOPIC WITH DAVINCI ROBOT;  Surgeon: Otilia Capone MD;  Location: [facility] MAIN OR;  Service: Robotics - DaVinci;  Laterality: N/A;    KIDNEY STONE SURGERY      WISDOM TOOTH EXTRACTION       Family History   Problem Relation Age of Onset    Asthma Mother     Hypertension Mother     No Known Problems Father     Hypertension Maternal Grandmother     Heart attack Maternal Grandfather     Cancer Paternal Grandmother     Cancer Paternal Grandfather     Breast cancer Neg Hx     Ovarian cancer Neg Hx     Uterine cancer Neg Hx     Colon cancer Neg Hx     Deep vein thrombosis Neg Hx     Pulmonary embolism Neg Hx      Social History     Tobacco Use    Smoking status: Never     Passive exposure: Past    Smokeless tobacco: Never   Vaping Use    Vaping status: Never Used   Substance Use Topics    Alcohol use: Not Currently    Drug use: Never     (Not in a hospital admission)    Allergies:  Patient has no known allergies.    Objective     Vital Signs       Physical Exam:      General Appearance:    Alert, cooperative, in no acute distress   Head:    Normocephalic, without obvious abnormality, atraumatic   Eyes:            Lids and lashes normal, conjunctivae and sclerae normal, no   icterus, no pallor, corneas clear, PERRLA   Ears:    Ears appear intact with no abnormalities noted   Throat:   No oral lesions, no thrush, oral mucosa moist   Neck:    No adenopathy, supple, trachea midline, no thyromegaly, no   carotid bruit, no JVD   Back:     No kyphosis present, no scoliosis present, no skin lesions,      erythema or scars, no tenderness to percussion or                   palpation,   range of motion normal   Lungs:     Clear to auscultation,respirations regular, even and                  unlabored    Heart:    Regular rhythm and normal rate, normal S1 and S2, no            murmur, no gallop, no rub, no click   Chest Wall:    No abnormalities observed   Abdomen:     Normal bowel sounds, no masses, no organomegaly, soft        non-tender, non-distended, no guarding, no rebound                tenderness   Rectal:     Deferred   Extremities:   Moves all extremities well, no edema, no cyanosis, no             redness   Pulses:   Pulses palpable and equal bilaterally   Skin:   No bleeding, bruising or rash   Lymph nodes:   No palpable adenopathy   Neurologic:   Cranial nerves 2 - 12 grossly intact, sensation intact, DTR       present and equal bilaterally   Results Review:    I reviewed the patient's new clinical results.  I reviewed the patient's new imaging results and agree with the interpretation.    Assessment & Plan     Symptomatic cholelithiasis  I have explained the risks and benefits  of laparoscopic cholecystectomy including bleeding infection and common bile duct injury and the patient understands this and agrees to proceed        Otilia Capone MD  05/21/24  14:30 EDT

## 2024-05-24 ENCOUNTER — LAB (OUTPATIENT)
Dept: LAB | Facility: HOSPITAL | Age: 39
End: 2024-05-24
Payer: MEDICAID

## 2024-05-24 ENCOUNTER — APPOINTMENT (OUTPATIENT)
Dept: LAB | Facility: HOSPITAL | Age: 39
End: 2024-05-24
Payer: MEDICAID

## 2024-05-24 DIAGNOSIS — K81.9 GALL BLADDER INFLAMMATION: Primary | ICD-10-CM

## 2024-05-24 LAB
ANION GAP SERPL CALCULATED.3IONS-SCNC: 10 MMOL/L (ref 5–15)
BUN SERPL-MCNC: 10 MG/DL (ref 6–20)
BUN/CREAT SERPL: 15.6 (ref 7–25)
CALCIUM SPEC-SCNC: 9.2 MG/DL (ref 8.6–10.5)
CHLORIDE SERPL-SCNC: 106 MMOL/L (ref 98–107)
CO2 SERPL-SCNC: 23 MMOL/L (ref 22–29)
CREAT SERPL-MCNC: 0.64 MG/DL (ref 0.57–1)
DEPRECATED RDW RBC AUTO: 42.1 FL (ref 37–54)
EGFRCR SERPLBLD CKD-EPI 2021: 116.2 ML/MIN/1.73
ERYTHROCYTE [DISTWIDTH] IN BLOOD BY AUTOMATED COUNT: 12.8 % (ref 12.3–15.4)
GLUCOSE SERPL-MCNC: 82 MG/DL (ref 65–99)
HCT VFR BLD AUTO: 35 % (ref 34–46.6)
HGB BLD-MCNC: 11.1 G/DL (ref 12–15.9)
MCH RBC QN AUTO: 28.8 PG (ref 26.6–33)
MCHC RBC AUTO-ENTMCNC: 31.7 G/DL (ref 31.5–35.7)
MCV RBC AUTO: 90.7 FL (ref 79–97)
PLATELET # BLD AUTO: 316 10*3/MM3 (ref 140–450)
PMV BLD AUTO: 9.9 FL (ref 6–12)
POTASSIUM SERPL-SCNC: 3.6 MMOL/L (ref 3.5–5.2)
RBC # BLD AUTO: 3.86 10*6/MM3 (ref 3.77–5.28)
SODIUM SERPL-SCNC: 139 MMOL/L (ref 136–145)
WBC NRBC COR # BLD AUTO: 10.46 10*3/MM3 (ref 3.4–10.8)

## 2024-05-24 PROCEDURE — 36415 COLL VENOUS BLD VENIPUNCTURE: CPT

## 2024-05-24 PROCEDURE — 85027 COMPLETE CBC AUTOMATED: CPT

## 2024-05-24 PROCEDURE — 80048 BASIC METABOLIC PNL TOTAL CA: CPT

## 2024-06-03 ENCOUNTER — ANESTHESIA EVENT (OUTPATIENT)
Dept: PERIOP | Facility: HOSPITAL | Age: 39
End: 2024-06-03
Payer: MEDICAID

## 2024-06-03 NOTE — ANESTHESIA PREPROCEDURE EVALUATION
Anesthesia Evaluation     Patient summary reviewed and Nursing notes reviewed   history of anesthetic complications:  PONV  NPO Solid Status: > 8 hours  NPO Liquid Status: > 2 hours           Airway   Dental      Pulmonary    Cardiovascular     ECG reviewed    (+) hypertension, valvular problems/murmurs MR and TI, dysrhythmias Bradycardia, ARMENDARIZ, hyperlipidemia      Neuro/Psych  (+) psychiatric history Anxiety and Depression  GI/Hepatic/Renal/Endo    (+) obesity, hiatal hernia, GERD, renal disease- stones    Musculoskeletal     Abdominal    Substance History      OB/GYN          Other        ROS/Med Hx Other: Additional History:  Chest pain, cholelithiasis, IBS    Echo:    Echocardiogram Findings    Left Ventricle Left ventricular systolic function is normal.   Normal left ventricular cavity size and wall thickness noted. All left ventricular wall segments contract normally. Left ventricular diastolic function was normal. Normal left atrial pressure. No evidence of left ventricular thrombus or mass present.  Right Ventricle Normal right ventricular cavity size and systolic function noted.  Left Atrium Normal left atrial cavity size noted.  Right Atrium Normal right atrial cavity size noted.  Aortic Valve The aortic valve is structurally normal with no regurgitation or stenosis present.  Mitral Valve The mitral valve is structurally normal with no significant stenosis present. Mild mitral valve regurgitation is present.  Tricuspid Valve The tricuspid valve is structurally normal with no significant stenosis present. Mild tricuspid valve regurgitation is present.  Pulmonic Valve The pulmonic valve is not well visualized.  Greater Vessels No dilation of the aortic root is present.  Pericardium There is no evidence of pericardial effusion. .        Stress:  · Left ventricular ejection fraction is normal. (Calculated EF = 70%).  · Myocardial perfusion imaging indicates a normal myocardial perfusion study with no evidence  of ischemia.  · Impressions are consistent with a low risk study.  · Findings consistent with an equivocal ECG stress test.    PSH:  KIDNEY STONE SURGERY COLONOSCOPY  CYSTOSCOPY, URETEROSCOPY, RETROGRADE PYELOGRAM, STENT INSERTION EAR TUBES  WISDOM TOOTH EXTRACTION ENDOSCOPY  GASTRIC BYPASS CYSTOSCOPY, URETEROSCOPY, RETROGRADE PYELOGRAM, STENT INSERTION  APPENDECTOMY ENDOSCOPY                Anesthesia Plan    ASA 3     general     (Patient identified; pre-operative vital signs, all relevant labs/studies, complete medical/surgical/anesthetic history, full medication list, full allergy list, and NPO status obtained/reviewed; physical assessment performed; anesthetic options, side effects, potential complications, risks, and benefits discussed; questions answered; written anesthesia consent obtained; patient cleared for procedure; anesthesia machine and equipment checked and functioning)  intravenous induction     Anesthetic plan, risks, benefits, and alternatives have been provided, discussed and informed consent has been obtained with: patient.    Plan discussed with CRNA and CAA.    CODE STATUS:

## 2024-06-04 ENCOUNTER — HOSPITAL ENCOUNTER (OUTPATIENT)
Facility: HOSPITAL | Age: 39
Discharge: HOME | End: 2024-06-04
Attending: SURGERY | Admitting: SURGERY
Payer: MEDICAID

## 2024-06-04 ENCOUNTER — ANESTHESIA (OUTPATIENT)
Dept: PERIOP | Facility: HOSPITAL | Age: 39
End: 2024-06-04
Payer: MEDICAID

## 2024-06-04 VITALS
DIASTOLIC BLOOD PRESSURE: 79 MMHG | HEIGHT: 63 IN | BODY MASS INDEX: 32.74 KG/M2 | OXYGEN SATURATION: 97 % | RESPIRATION RATE: 14 BRPM | HEART RATE: 59 BPM | TEMPERATURE: 97.9 F | SYSTOLIC BLOOD PRESSURE: 121 MMHG | WEIGHT: 184.8 LBS

## 2024-06-04 DIAGNOSIS — K80.20 CHOLELITHIASIS: ICD-10-CM

## 2024-06-04 DIAGNOSIS — G89.18 POST-OP PAIN: Primary | ICD-10-CM

## 2024-06-04 LAB — B-HCG UR QL: NEGATIVE

## 2024-06-04 PROCEDURE — 25010000002 FENTANYL CITRATE (PF) 100 MCG/2ML SOLUTION: Performed by: NURSE ANESTHETIST, CERTIFIED REGISTERED

## 2024-06-04 PROCEDURE — 25010000002 CEFAZOLIN PER 500 MG: Performed by: SURGERY

## 2024-06-04 PROCEDURE — 25010000002 HYDROMORPHONE 1 MG/ML SOLUTION: Performed by: NURSE ANESTHETIST, CERTIFIED REGISTERED

## 2024-06-04 PROCEDURE — 47562 LAPAROSCOPIC CHOLECYSTECTOMY: CPT | Performed by: SURGERY

## 2024-06-04 PROCEDURE — 25010000002 PROPOFOL 200 MG/20ML EMULSION: Performed by: NURSE ANESTHETIST, CERTIFIED REGISTERED

## 2024-06-04 PROCEDURE — 25010000002 KETOROLAC TROMETHAMINE PER 15 MG: Performed by: SURGERY

## 2024-06-04 PROCEDURE — 88304 TISSUE EXAM BY PATHOLOGIST: CPT | Performed by: SURGERY

## 2024-06-04 PROCEDURE — G0378 HOSPITAL OBSERVATION PER HR: HCPCS

## 2024-06-04 PROCEDURE — 25010000002 ROPIVACAINE PER 1 MG: Performed by: SURGERY

## 2024-06-04 PROCEDURE — 81025 URINE PREGNANCY TEST: CPT | Performed by: SURGERY

## 2024-06-04 PROCEDURE — 25010000002 MAGNESIUM SULFATE PER 500 MG OF MAGNESIUM: Performed by: NURSE ANESTHETIST, CERTIFIED REGISTERED

## 2024-06-04 PROCEDURE — 25010000002 SUGAMMADEX 200 MG/2ML SOLUTION

## 2024-06-04 PROCEDURE — 25010000002 ONDANSETRON PER 1 MG: Performed by: NURSE ANESTHETIST, CERTIFIED REGISTERED

## 2024-06-04 PROCEDURE — 25010000002 CLONIDINE PER 1 MG: Performed by: SURGERY

## 2024-06-04 PROCEDURE — 25010000002 INDOCYANINE GREEN 25 MG RECONSTITUTED SOLUTION: Performed by: SURGERY

## 2024-06-04 PROCEDURE — 25810000003 LACTATED RINGERS PER 1000 ML: Performed by: NURSE ANESTHETIST, CERTIFIED REGISTERED

## 2024-06-04 PROCEDURE — 25810000003 LACTATED RINGERS PER 1000 ML: Performed by: ANESTHESIOLOGY

## 2024-06-04 PROCEDURE — 25010000002 SUCCINYLCHOLINE PER 20 MG: Performed by: NURSE ANESTHETIST, CERTIFIED REGISTERED

## 2024-06-04 PROCEDURE — 25010000002 DEXAMETHASONE PER 1 MG: Performed by: NURSE ANESTHETIST, CERTIFIED REGISTERED

## 2024-06-04 PROCEDURE — 25010000002 EPINEPHRINE 1 MG/ML SOLUTION: Performed by: SURGERY

## 2024-06-04 DEVICE — MEDIUM-LARGE LIGATION CLIPS 6 CLIPS/CART
Type: IMPLANTABLE DEVICE | Site: ABDOMEN | Status: FUNCTIONAL
Brand: VAS-Q-CLIP

## 2024-06-04 RX ORDER — SODIUM CHLORIDE 9 MG/ML
40 INJECTION, SOLUTION INTRAVENOUS AS NEEDED
Status: DISCONTINUED | OUTPATIENT
Start: 2024-06-04 | End: 2024-06-04 | Stop reason: HOSPADM

## 2024-06-04 RX ORDER — SCOLOPAMINE TRANSDERMAL SYSTEM 1 MG/1
1 PATCH, EXTENDED RELEASE TRANSDERMAL CONTINUOUS
Status: DISCONTINUED | OUTPATIENT
Start: 2024-06-04 | End: 2024-06-04 | Stop reason: HOSPADM

## 2024-06-04 RX ORDER — SUCCINYLCHOLINE CHLORIDE 20 MG/ML
INJECTION INTRAMUSCULAR; INTRAVENOUS AS NEEDED
Status: DISCONTINUED | OUTPATIENT
Start: 2024-06-04 | End: 2024-06-04 | Stop reason: SURG

## 2024-06-04 RX ORDER — DIPHENHYDRAMINE HYDROCHLORIDE 50 MG/ML
12.5 INJECTION INTRAMUSCULAR; INTRAVENOUS
Status: DISCONTINUED | OUTPATIENT
Start: 2024-06-04 | End: 2024-06-04 | Stop reason: HOSPADM

## 2024-06-04 RX ORDER — SENNOSIDES 8.6 MG
650 CAPSULE ORAL EVERY 8 HOURS PRN
COMMUNITY

## 2024-06-04 RX ORDER — MAGNESIUM SULFATE HEPTAHYDRATE 500 MG/ML
INJECTION, SOLUTION INTRAMUSCULAR; INTRAVENOUS AS NEEDED
Status: DISCONTINUED | OUTPATIENT
Start: 2024-06-04 | End: 2024-06-04 | Stop reason: SURG

## 2024-06-04 RX ORDER — SODIUM CHLORIDE 0.9 % (FLUSH) 0.9 %
10 SYRINGE (ML) INJECTION AS NEEDED
Status: DISCONTINUED | OUTPATIENT
Start: 2024-06-04 | End: 2024-06-04 | Stop reason: HOSPADM

## 2024-06-04 RX ORDER — PROPOFOL 10 MG/ML
INJECTION, EMULSION INTRAVENOUS AS NEEDED
Status: DISCONTINUED | OUTPATIENT
Start: 2024-06-04 | End: 2024-06-04 | Stop reason: SURG

## 2024-06-04 RX ORDER — LIDOCAINE HYDROCHLORIDE 10 MG/ML
INJECTION, SOLUTION EPIDURAL; INFILTRATION; INTRACAUDAL; PERINEURAL AS NEEDED
Status: DISCONTINUED | OUTPATIENT
Start: 2024-06-04 | End: 2024-06-04 | Stop reason: SURG

## 2024-06-04 RX ORDER — SODIUM CHLORIDE 9 MG/ML
100 INJECTION, SOLUTION INTRAVENOUS CONTINUOUS
Status: DISCONTINUED | OUTPATIENT
Start: 2024-06-04 | End: 2024-06-04 | Stop reason: HOSPADM

## 2024-06-04 RX ORDER — OXYCODONE HYDROCHLORIDE 5 MG/1
7.5 TABLET ORAL ONCE AS NEEDED
Status: COMPLETED | OUTPATIENT
Start: 2024-06-04 | End: 2024-06-04

## 2024-06-04 RX ORDER — DEXAMETHASONE SODIUM PHOSPHATE 4 MG/ML
INJECTION, SOLUTION INTRA-ARTICULAR; INTRALESIONAL; INTRAMUSCULAR; INTRAVENOUS; SOFT TISSUE AS NEEDED
Status: DISCONTINUED | OUTPATIENT
Start: 2024-06-04 | End: 2024-06-04 | Stop reason: SURG

## 2024-06-04 RX ORDER — FENTANYL CITRATE 50 UG/ML
50 INJECTION, SOLUTION INTRAMUSCULAR; INTRAVENOUS
Status: DISCONTINUED | OUTPATIENT
Start: 2024-06-04 | End: 2024-06-04 | Stop reason: HOSPADM

## 2024-06-04 RX ORDER — HYDRALAZINE HYDROCHLORIDE 20 MG/ML
5 INJECTION INTRAMUSCULAR; INTRAVENOUS
Status: DISCONTINUED | OUTPATIENT
Start: 2024-06-04 | End: 2024-06-04 | Stop reason: HOSPADM

## 2024-06-04 RX ORDER — FENTANYL CITRATE 50 UG/ML
25 INJECTION, SOLUTION INTRAMUSCULAR; INTRAVENOUS
Status: DISCONTINUED | OUTPATIENT
Start: 2024-06-04 | End: 2024-06-04 | Stop reason: HOSPADM

## 2024-06-04 RX ORDER — SODIUM CHLORIDE, SODIUM LACTATE, POTASSIUM CHLORIDE, CALCIUM CHLORIDE 600; 310; 30; 20 MG/100ML; MG/100ML; MG/100ML; MG/100ML
INJECTION, SOLUTION INTRAVENOUS CONTINUOUS PRN
Status: DISCONTINUED | OUTPATIENT
Start: 2024-06-04 | End: 2024-06-04 | Stop reason: SURG

## 2024-06-04 RX ORDER — NALOXONE HCL 0.4 MG/ML
0.4 VIAL (ML) INJECTION AS NEEDED
Status: DISCONTINUED | OUTPATIENT
Start: 2024-06-04 | End: 2024-06-04 | Stop reason: HOSPADM

## 2024-06-04 RX ORDER — ONDANSETRON 2 MG/ML
INJECTION INTRAMUSCULAR; INTRAVENOUS AS NEEDED
Status: DISCONTINUED | OUTPATIENT
Start: 2024-06-04 | End: 2024-06-04 | Stop reason: SURG

## 2024-06-04 RX ORDER — SODIUM CHLORIDE, SODIUM LACTATE, POTASSIUM CHLORIDE, CALCIUM CHLORIDE 600; 310; 30; 20 MG/100ML; MG/100ML; MG/100ML; MG/100ML
9 INJECTION, SOLUTION INTRAVENOUS CONTINUOUS PRN
Status: DISCONTINUED | OUTPATIENT
Start: 2024-06-04 | End: 2024-06-04 | Stop reason: HOSPADM

## 2024-06-04 RX ORDER — INDOCYANINE GREEN AND WATER 25 MG
2.5 KIT INJECTION ONCE
Status: COMPLETED | OUTPATIENT
Start: 2024-06-04 | End: 2024-06-04

## 2024-06-04 RX ORDER — ONDANSETRON 2 MG/ML
4 INJECTION INTRAMUSCULAR; INTRAVENOUS ONCE AS NEEDED
Status: COMPLETED | OUTPATIENT
Start: 2024-06-04 | End: 2024-06-04

## 2024-06-04 RX ORDER — ROCURONIUM BROMIDE 10 MG/ML
INJECTION, SOLUTION INTRAVENOUS AS NEEDED
Status: DISCONTINUED | OUTPATIENT
Start: 2024-06-04 | End: 2024-06-04 | Stop reason: SURG

## 2024-06-04 RX ORDER — SODIUM CHLORIDE 0.9 % (FLUSH) 0.9 %
10 SYRINGE (ML) INJECTION EVERY 12 HOURS SCHEDULED
Status: DISCONTINUED | OUTPATIENT
Start: 2024-06-04 | End: 2024-06-04 | Stop reason: HOSPADM

## 2024-06-04 RX ORDER — ACETAMINOPHEN 325 MG/1
650 TABLET ORAL ONCE AS NEEDED
Status: DISCONTINUED | OUTPATIENT
Start: 2024-06-04 | End: 2024-06-04 | Stop reason: HOSPADM

## 2024-06-04 RX ORDER — ONDANSETRON 8 MG/1
8 TABLET, ORALLY DISINTEGRATING ORAL EVERY 8 HOURS PRN
Qty: 30 TABLET | Refills: 12 | Status: SHIPPED | OUTPATIENT
Start: 2024-06-04 | End: 2024-07-04

## 2024-06-04 RX ORDER — LABETALOL HYDROCHLORIDE 5 MG/ML
5 INJECTION, SOLUTION INTRAVENOUS
Status: DISCONTINUED | OUTPATIENT
Start: 2024-06-04 | End: 2024-06-04 | Stop reason: HOSPADM

## 2024-06-04 RX ORDER — ACETAMINOPHEN 650 MG/1
650 SUPPOSITORY RECTAL EVERY 4 HOURS PRN
Status: DISCONTINUED | OUTPATIENT
Start: 2024-06-04 | End: 2024-06-04 | Stop reason: HOSPADM

## 2024-06-04 RX ORDER — FENTANYL CITRATE 50 UG/ML
INJECTION, SOLUTION INTRAMUSCULAR; INTRAVENOUS AS NEEDED
Status: DISCONTINUED | OUTPATIENT
Start: 2024-06-04 | End: 2024-06-04 | Stop reason: SURG

## 2024-06-04 RX ORDER — SODIUM CHLORIDE 0.9 % (FLUSH) 0.9 %
3-10 SYRINGE (ML) INJECTION AS NEEDED
Status: DISCONTINUED | OUTPATIENT
Start: 2024-06-04 | End: 2024-06-04 | Stop reason: HOSPADM

## 2024-06-04 RX ORDER — ALBUTEROL SULFATE 2.5 MG/3ML
2.5 SOLUTION RESPIRATORY (INHALATION) ONCE AS NEEDED
Status: DISCONTINUED | OUTPATIENT
Start: 2024-06-04 | End: 2024-06-04 | Stop reason: HOSPADM

## 2024-06-04 RX ORDER — HYDROCODONE BITARTRATE AND ACETAMINOPHEN 5; 325 MG/1; MG/1
1 TABLET ORAL EVERY 4 HOURS PRN
Qty: 10 TABLET | Refills: 0 | Status: SHIPPED | OUTPATIENT
Start: 2024-06-04

## 2024-06-04 RX ORDER — SODIUM CHLORIDE 0.9 % (FLUSH) 0.9 %
3 SYRINGE (ML) INJECTION EVERY 12 HOURS SCHEDULED
Status: DISCONTINUED | OUTPATIENT
Start: 2024-06-04 | End: 2024-06-04 | Stop reason: HOSPADM

## 2024-06-04 RX ADMIN — INDOCYANINE GREEN AND WATER 2.5 MG: KIT at 07:26

## 2024-06-04 RX ADMIN — FENTANYL CITRATE 50 MCG: 50 INJECTION, SOLUTION INTRAMUSCULAR; INTRAVENOUS at 09:19

## 2024-06-04 RX ADMIN — ONDANSETRON 4 MG: 2 INJECTION INTRAMUSCULAR; INTRAVENOUS at 11:39

## 2024-06-04 RX ADMIN — SODIUM CHLORIDE, SODIUM LACTATE, POTASSIUM CHLORIDE, AND CALCIUM CHLORIDE: .6; .31; .03; .02 INJECTION, SOLUTION INTRAVENOUS at 09:16

## 2024-06-04 RX ADMIN — SCOPALAMINE 1 PATCH: 1 PATCH, EXTENDED RELEASE TRANSDERMAL at 08:37

## 2024-06-04 RX ADMIN — ROCURONIUM BROMIDE 10 MG: 10 INJECTION, SOLUTION INTRAVENOUS at 09:21

## 2024-06-04 RX ADMIN — SODIUM CHLORIDE 2000 MG: 900 INJECTION INTRAVENOUS at 09:08

## 2024-06-04 RX ADMIN — DEXAMETHASONE SODIUM PHOSPHATE 4 MG: 4 INJECTION, SOLUTION INTRAMUSCULAR; INTRAVENOUS at 09:34

## 2024-06-04 RX ADMIN — OXYCODONE HYDROCHLORIDE 7.5 MG: 5 TABLET ORAL at 12:22

## 2024-06-04 RX ADMIN — FENTANYL CITRATE 50 MCG: 50 INJECTION, SOLUTION INTRAMUSCULAR; INTRAVENOUS at 09:24

## 2024-06-04 RX ADMIN — LIDOCAINE HYDROCHLORIDE 50 MG: 10 INJECTION, SOLUTION EPIDURAL; INFILTRATION; INTRACAUDAL; PERINEURAL at 09:21

## 2024-06-04 RX ADMIN — HYDROMORPHONE HYDROCHLORIDE 0.5 MG: 1 INJECTION, SOLUTION INTRAMUSCULAR; INTRAVENOUS; SUBCUTANEOUS at 10:02

## 2024-06-04 RX ADMIN — HYDROMORPHONE HYDROCHLORIDE 0.5 MG: 1 INJECTION, SOLUTION INTRAMUSCULAR; INTRAVENOUS; SUBCUTANEOUS at 09:48

## 2024-06-04 RX ADMIN — SUCCINYLCHOLINE CHLORIDE 180 MG: 20 INJECTION, SOLUTION INTRAMUSCULAR; INTRAVENOUS at 09:21

## 2024-06-04 RX ADMIN — SODIUM CHLORIDE, POTASSIUM CHLORIDE, SODIUM LACTATE AND CALCIUM CHLORIDE 9 ML/HR: 600; 310; 30; 20 INJECTION, SOLUTION INTRAVENOUS at 07:25

## 2024-06-04 RX ADMIN — ROCURONIUM BROMIDE 40 MG: 10 INJECTION, SOLUTION INTRAVENOUS at 09:27

## 2024-06-04 RX ADMIN — ONDANSETRON 4 MG: 2 INJECTION INTRAMUSCULAR; INTRAVENOUS at 10:05

## 2024-06-04 RX ADMIN — SUGAMMADEX 200 MG: 100 INJECTION, SOLUTION INTRAVENOUS at 10:19

## 2024-06-04 RX ADMIN — MAGNESIUM SULFATE HEPTAHYDRATE 1 G: 500 INJECTION, SOLUTION INTRAMUSCULAR; INTRAVENOUS at 09:34

## 2024-06-04 RX ADMIN — PROPOFOL 190 MG: 10 INJECTION, EMULSION INTRAVENOUS at 09:21

## 2024-06-04 NOTE — OP NOTE
Laparoscopic Cholecystectomy Procedure Note    Indications: This patient presents with symptomatic gallbladder disease and will undergo laparoscopic cholecystectomy.    Pre-operative Diagnosis: Calculus of gallbladder without mention of cholecystitis or obstruction    Post-operative Diagnosis: Calculus of gallbladder with other cholecystitis and obstruction    Surgeon: Otilia Capone MD     Assistants: Lia CHENEY  The assistant was required to operate the laparoscope and for retraction and to assist in closure.      Procedure Details   The patient was seen again in the Holding Room. The risks, benefits, complications, treatment options, and expected outcomes were discussed with the patient. The possibilities of reaction to medication, pulmonary aspiration, perforation of viscus, bleeding, recurrent infection, finding a normal gallbladder, the need for additional procedures, failure to diagnose a condition, the possible need to convert to an open procedure, and creating a complication requiring transfusion or operation were discussed with the patient. The patient and/or family concurred with the proposed plan, giving informed consent. The site of surgery properly noted/marked. The patient was taken to Operating Room, identified as Ariadna Ambrocio and the procedure verified as Laparoscopic Cholecystectomy. A Time Out was held and the above information confirmed.    Prior to the induction of general anesthesia, antibiotic prophylaxis was administered. General endotracheal anesthesia was then administered and tolerated well. After the induction, the abdomen was prepped in the usual sterile fashion. The patient was positioned in the supine position with the left arm comfortably tucked, along with some reverse Trendelenburg.    Local anesthetic agent was injected into the skin near the umbilicus and an incision made. The midline fascia was incised and a 10 mm port was inserted into the peritoneum under direct  vision.Pneumoperitoneum was then created with CO2 and tolerated well without any adverse changes in the patient's vital signs. Additional trocars were introduced under direct vision. All skin incisions were infiltrated with a local anesthetic agent before making the incision and placing the trocars.     The gallbladder was identified, the fundus grasped and retracted cephalad. Adhesions were lysed bluntly and with the electrocautery where indicated, taking care not to injure any adjacent organs or viscus. The infundibulum was grasped and retracted laterally, exposing the peritoneum overlying the triangle of Calot. This was then divided and exposed in a blunt fashion. The cystic duct was clearly identified and bluntly dissected circumferentially. The junctions of the gallbladder, cystic duct and common bile duct were clearly identified prior to the division of any linear structure and photo documented.       The cystic duct was then doubly ligated with surgical clips and/or Endoloop suture on the patient side and singly clipped on the gallbladder side and divided. The cystic artery was identified, dissected free, ligated with clips and divided as well.     The gallbladder was dissected from the liver bed in retrograde fashion with the electrocautery. The gallbladder was removed. The liver bed was irrigated and inspected. Hemostasis was achieved with the electrocautery. Copious irrigation was utilized and was repeatedly aspirated until clear all particulate matter.    The umbilical port site fascia was closed using a laparoscopic suture passer with 0 Vicryl suture; the skin was then closed with 4-0 monocryl and a sterile dressing was applied.    Instrument, sponge, and needle counts were correct at closure and at the conclusion of the case.     Findings:  Cholecystitis with Cholelithiasis, also no internal hernia seen    Estimated Blood Loss: Minimal           Drains: none                    Specimens: Gallbladder

## 2024-06-04 NOTE — ANESTHESIA POSTPROCEDURE EVALUATION
Patient: Ariadna Ambrocio    Procedure Summary       Date: 06/04/24 Room / Location: [facility] OR 04 / [facility] MAIN OR    Anesthesia Start: 0916 Anesthesia Stop: 1030    Procedure: CHOLECYSTECTOMY LAPAROSCOPIC WITH DAVINCI ROBOT (Abdomen) Diagnosis:       Cholelithiasis      (Cholelithiasis [K80.20])    Surgeons: Otilia Capone MD Provider: Janusz Saleh MD    Anesthesia Type: general ASA Status: 3            Anesthesia Type: general    Vitals  Vitals Value Taken Time   /66 06/04/24 1111   Temp 97.8 °F (36.6 °C) 06/04/24 1028   Pulse 64 06/04/24 1112   Resp 11 06/04/24 1058   SpO2 99 % 06/04/24 1112   Vitals shown include unfiled device data.        Post Anesthesia Care and Evaluation    Patient location during evaluation: PACU  Patient participation: complete - patient participated  Level of consciousness: awake  Pain scale: See nurse's notes for pain score.  Pain management: adequate    Airway patency: patent  Anesthetic complications: No anesthetic complications  PONV Status: none  Cardiovascular status: acceptable  Respiratory status: acceptable and spontaneous ventilation  Hydration status: acceptable    Comments: Patient seen and examined postoperatively; vital signs stable; SpO2 greater than or equal to 90%; cardiopulmonary status stable; nausea/vomiting adequately controlled; pain adequately controlled; no apparent anesthesia complications; patient discharged from anesthesia care when discharge criteria were met

## 2024-06-04 NOTE — DISCHARGE INSTRUCTIONS
Dr. Otilia Capone  [address]  [place] [address]    Discharge Instructions for Surgery      Go home, rest and take it easy today; however, you should get up and move about several times today to reduce the risk of developing a clot in your legs.      You may experience some dizziness or memory loss from the anesthesia.  This may last for the next 24 hours.  Someone should plan on staying with you for the first 24 hours for your safety.    Do not make any important legal decisions or sign any legal papers for the next 24 hours.      Eat and drink lightly today.  Start off with liquids, jello, soup, crackers or other bland foods at first. You may advance your diet tomorrow as tolerated as long as you do not experience any nausea or vomiting.     If skin glue (Dermabond) was used, your incisions are protected and covered.  The invisible glue will dissolve on its own as your incision heals. If you have dressings, you may remove your outer dressings in 3 days.  The white tapes called steri-strips should stay in place.  They will fall off on their own in 1-2 weeks.  Do not worry if they come off sooner.      If you have dressings, you may notice some bleeding/drainage on your outer dressings. A little bloody drainage is normal. If the bleeding/drainage is such that the bandage cannot absorb it, remove the dressing, apply clean gauze and apply firm pressure for a full 15 minutes.  If the bleeding continues, please contact the office.    You may shower tomorrow allowing water to run over the incisions; however, do not scrub the incisions.   No tub baths until your incisions are completely healed.         You have received a prescription for a narcotic pain medicine, as you will have some pain following surgery.   You will not be totally pain free, but your pain medicine should make the pain tolerable.  Please take your pain medicine as prescribed and always take your pills with food to prevent nausea. If you  are having severe pain that cannot be controlled by the pain medicine, please contact the office.      You have also received a prescription for an anti-nausea medicine.  Please take this as prescribed for any nausea or vomiting.  Nausea could be a result of the anesthesia or a result of the narcotic pain medicine.  If you experience severe nausea and vomiting that cannot be controlled by the nausea medicine, please contact the office.      It is not unusual to experience pain/discomfort in your shoulders or under your ribs after surgery.  It is from the gas used during the laparoscopic procedure and usually lasts 1-3 days.  The prescription pain medicine is used to treat the surgical pain and does not typically alleviate this “gassy” pain.     No driving for 24 hours and for as long as you are taking your prescription pain medicine.      You will need to call the office at [phone] to schedule a follow-up appointment in 6-10 days. This can be a telephone or video visit if desired.     Remember to contact the office for any of the following:    Fever > 101 degrees  Severe pain that cannot be controlled by taking your pain pills  Severe nausea or vomiting that cannot be controlled by taking your nausea pills  Significant bleeding of your incisions  Drainage that has a bad smell or is yellow or green in appearance  Any other questions or concerns

## 2024-06-04 NOTE — ANESTHESIA PROCEDURE NOTES
Airway  Urgency: elective    Date/Time: 6/4/2024 9:23 AM  Airway not difficult    General Information and Staff    Patient location during procedure: OR  CRNA/CAA: Megan Coates CRNA    Indications and Patient Condition  Indications for airway management: airway protection    Preoxygenated: yes  MILS maintained throughout  Mask difficulty assessment: 1 - vent by mask    Final Airway Details  Final airway type: endotracheal airway      Successful airway: ETT  Cuffed: yes   Facilitating devices/methods: intubating stylet  Endotracheal tube insertion site: oral  Blade: Fatemeh  Blade size: 3  ETT size (mm): 7.5  Cormack-Lehane Classification: grade I - full view of glottis  Placement verified by: chest auscultation   Measured from: lips  ETT/EBT  to lips (cm): 21  Number of attempts at approach: 1  Assessment: lips, teeth, and gum same as pre-op and atraumatic intubation

## 2024-06-06 LAB
LAB AP CASE REPORT: NORMAL
PATH REPORT.FINAL DX SPEC: NORMAL
PATH REPORT.GROSS SPEC: NORMAL

## 2024-06-10 ENCOUNTER — OFFICE VISIT (OUTPATIENT)
Dept: BARIATRICS/WEIGHT MGMT | Facility: CLINIC | Age: 39
End: 2024-06-10
Payer: MEDICAID

## 2024-06-10 VITALS
SYSTOLIC BLOOD PRESSURE: 109 MMHG | HEART RATE: 102 BPM | OXYGEN SATURATION: 97 % | TEMPERATURE: 98 F | BODY MASS INDEX: 32.62 KG/M2 | DIASTOLIC BLOOD PRESSURE: 78 MMHG | WEIGHT: 184.1 LBS | HEIGHT: 63 IN

## 2024-06-10 DIAGNOSIS — Z51.89 VISIT FOR WOUND CHECK: ICD-10-CM

## 2024-06-10 DIAGNOSIS — E66.9 OBESITY, CLASS I, BMI 30-34.9: Primary | ICD-10-CM

## 2024-06-10 DIAGNOSIS — Z90.49 S/P LAPAROSCOPIC CHOLECYSTECTOMY: ICD-10-CM

## 2024-06-10 PROCEDURE — 1160F RVW MEDS BY RX/DR IN RCRD: CPT | Performed by: NURSE PRACTITIONER

## 2024-06-10 PROCEDURE — 1159F MED LIST DOCD IN RCRD: CPT | Performed by: NURSE PRACTITIONER

## 2024-06-10 PROCEDURE — 3078F DIAST BP <80 MM HG: CPT | Performed by: NURSE PRACTITIONER

## 2024-06-10 PROCEDURE — 99024 POSTOP FOLLOW-UP VISIT: CPT | Performed by: NURSE PRACTITIONER

## 2024-06-10 PROCEDURE — 3074F SYST BP LT 130 MM HG: CPT | Performed by: NURSE PRACTITIONER

## 2024-06-10 NOTE — PROGRESS NOTES
"Subjective:    1 week post op lap choley . Hx of gastric bypass on 9/27/23. Pt was having a lot of nausea/ vomiting with eating before her lap cholecystectomy and since this has greatly improved. She did have some minor nausea yesterday but no vomiting. Today nausea has resolved. Reports some abdominal soreness but no pain. Worse with movement but getting better since surgery. Pt did have a small bowel movement yesterday as well.   Objective:      /78 (BP Location: Right arm, Patient Position: Sitting, Cuff Size: Adult)   Pulse 102   Temp 98 °F (36.7 °C)   Ht 160 cm (63\")   Wt 83.5 kg (184 lb 1.6 oz)   SpO2 97%   BMI 32.61 kg/m²     General:  alert, appears stated age, and cooperative   Abdomen: soft, bowel sounds active, appropriate tenderness   Incision:   healing well, no drainage, no erythema, no hernia, no seroma, no swelling, no dehiscence, incision well approximated   Heart: Regular rate   Lungs: Clear to auscultation bilaterally     I reviewed the patient's new clinical results. Path report from lap cholecystectomy reviewed       Some nausea, no vomiting     1/2 , 1/2 biscuit yesterday   Protein shake , water   Vomiting much better since gallbladder removal last week  Assessment/ plan:       38 year old female status post lap cholecystectomy  on 6/4/24. Post op pt is doing well. She was having a lot of nausea and vomiting before but since her lap cholecystectomy has only had some minor nausea. No vomiting. She reports some abdominal soreness when moving but little abdominal pain. She is back on solid/ regular foods and doing well with this. Tolerating po fluids well. Did have a bowel movement yesterday but did state she is suffering from some constipation but pt states this is something she was having prior to her lap cholecystectomy. She also reports seeing GI In the past and being prescribed linzess for her constipation. Pt states she is going to schedule a follow up visit with " them to discuss this in the future. Ok to resume solid/ regular foods but I did encouraged pt to avoid greasy/ fatty foods. Also encourage 64 oz of water/ fluids a day. 25 pound lifting restriction until 6/25/24. Ok to RTW on 6/26/24 without restrictions. Plan to follow up in Sept for 1 yr gastric bypass.

## 2024-09-09 ENCOUNTER — OFFICE VISIT (OUTPATIENT)
Dept: BARIATRICS/WEIGHT MGMT | Facility: CLINIC | Age: 39
End: 2024-09-09
Payer: MEDICAID

## 2024-09-09 VITALS — HEIGHT: 63 IN | WEIGHT: 182.2 LBS | BODY MASS INDEX: 32.28 KG/M2

## 2024-09-09 DIAGNOSIS — E66.9 OBESITY, CLASS I, BMI 30-34.9: Primary | ICD-10-CM

## 2024-09-09 PROBLEM — E66.01 OBESITY, CLASS III, BMI 40-49.9 (MORBID OBESITY): Status: RESOLVED | Noted: 2023-02-02 | Resolved: 2024-09-09

## 2024-09-09 PROBLEM — E66.813 OBESITY, CLASS III, BMI 40-49.9 (MORBID OBESITY): Status: RESOLVED | Noted: 2023-02-02 | Resolved: 2024-09-09

## 2024-09-09 PROBLEM — E66.812 OBESITY, CLASS II, BMI 35-39.9: Status: RESOLVED | Noted: 2023-11-06 | Resolved: 2024-09-09

## 2024-09-09 PROCEDURE — 1159F MED LIST DOCD IN RCRD: CPT | Performed by: DIETITIAN, REGISTERED

## 2024-09-09 PROCEDURE — 97803 MED NUTRITION INDIV SUBSEQ: CPT | Performed by: DIETITIAN, REGISTERED

## 2024-09-09 PROCEDURE — 1160F RVW MEDS BY RX/DR IN RCRD: CPT | Performed by: DIETITIAN, REGISTERED

## 2024-09-09 NOTE — PROGRESS NOTES
Nutrition Services    Patient Name: Ariadna Ambrocio  YOB: 1985  MRN: [id number]  Date of Service: 09/09/24      ICD-10-CM ICD-9-CM   1. Obesity, Class I, BMI 30-34.9  E66.9 278.00          NUTRITION ASSESSMENT - BARIATRIC SURGERY      Reason for Visit 1 year post op gastric bypass     H&P      Past Medical History:   Diagnosis Date    Anxiety     Bulging lumbar disc     Constipation     Depression     Diarrhea     Elevated triglycerides with high cholesterol     GERD (gastroesophageal reflux disease)     Heartburn     Herniated cervical disc     Hiatal hernia     Hyperlipidemia     Hypertension     no meds after weight    IBS (irritable bowel syndrome)     Kidney stones     Nausea     Pre-diabetes     Rapid heart beat     Seasonal allergies        Past Surgical History:   Procedure Laterality Date    APPENDECTOMY N/A 3/13/2024    Procedure: APPENDECTOMY LAPAROSCOPIC;  Surgeon: Georges Chandler MD;  Location: [facility] MAIN OR;  Service: General;  Laterality: N/A;    CHOLECYSTECTOMY N/A 6/4/2024    Procedure: CHOLECYSTECTOMY LAPAROSCOPIC WITH DAVINCI ROBOT;  Surgeon: Otilia Capone MD;  Location: [facility] MAIN OR;  Service: Robotics - DaVinci;  Laterality: N/A;    COLONOSCOPY      CYSTOSCOPY, URETEROSCOPY, RETROGRADE PYELOGRAM, STENT INSERTION Left 07/16/2021    Procedure: CYSTOSCOPY URETEROSCOPY RETROGRADE PYELOGRAM HOLMIUM LASER BASKET STONE EXTRACTION STENT INSERTION;  Surgeon: Carmelo Nguyễn MD;  Location: [facility] MAIN OR;  Service: Urology;  Laterality: Left;    CYSTOSCOPY, URETEROSCOPY, RETROGRADE PYELOGRAM, STENT INSERTION Left 3/13/2024    Procedure: CYSTOSCOPY URETEROSCOPY, STENT INSERTION, STONE EXTRACTION;  Surgeon: Carmelo Nguyễn MD;  Location: [facility] MAIN OR;  Service: Urology;  Laterality: Left;    EAR TUBES      ENDOSCOPY N/A 2/27/2023    Procedure: ESOPHAGOGASTRODUODENOSCOPY with gastric antrum biopsy;  Surgeon: Otilia Capone MD;  Location: [facility] ENDOSCOPY;  Service: General;  Laterality: N/A;  " Post: large hiatal hernia    ENDOSCOPY N/A 5/2/2024    Procedure: ESOPHAGOGASTRODUODENOSCOPY;  Surgeon: Otilia Capone MD;  Location: [facility] ENDOSCOPY;  Service: General;  Laterality: N/A;  normal    GASTRIC BYPASS N/A 9/27/2023    Procedure: GASTRIC BYPASS AND HIATAL HERNIA REPAIR LAPAROSCOPIC WITH DAVINCI ROBOT;  Surgeon: Otilia Capone MD;  Location: [facility] MAIN OR;  Service: Robotics - DaVinci;  Laterality: N/A;    KIDNEY STONE SURGERY      WISDOM TOOTH EXTRACTION          Previous Goals          Encounter Information        Visit Narrative     Patient reports she is only eating 1 meal each day. Patient has also been craving snacks more. Patient reports she has been constipated. Patient asking for rx of miralax. RDN to reach out to APRN. Encouraged patient to have more frequent intake and add in strength training to help with further weight loss.     Diet Recall:   Breakfast: protein shake in coffee  Lunch: skips usually or salads  Dinner: chicken  Snacks: chips sometimes   Beverages: water, coffee    Exercise: patient has been exercising with daughter. Patient has been walking daily for 30 minutes     Supplements: taking bariatric MV, fiber gummy     Self Monitoring:          Anthropometrics        Current Height, Weight Height: 160 cm (63\")  Weight: 82.6 kg (182 lb 3.2 oz) (09/09/24 1354)       Trending Weight Hx  25.7% weight loss in 1 year    Wt Readings from Last 30 Encounters:   09/09/24 1354 82.6 kg (182 lb 3.2 oz)   06/10/24 0858 83.5 kg (184 lb 1.6 oz)   06/04/24 0742 83.8 kg (184 lb 12.8 oz)   05/21/24 1004 85.7 kg (189 lb)   05/10/24 1142 86.1 kg (189 lb 14.4 oz)   05/02/24 1137 85.4 kg (188 lb 4.4 oz)   04/26/24 1528 85.7 kg (189 lb)   04/26/24 1318 85.7 kg (189 lb)   04/05/24 1639 87.1 kg (192 lb)   03/27/24 1101 88 kg (194 lb)   03/13/24 0208 91.5 kg (201 lb 11.5 oz)   03/12/24 2034 89 kg (196 lb 3.4 oz)   02/07/24 1144 91.8 kg (202 lb 6.1 oz)   01/23/24 1339 93.9 kg (207 lb)   01/05/24 1345 95.5 kg " (210 lb 9.6 oz)   11/06/23 1358 102 kg (224 lb)   10/02/23 0844 109 kg (240 lb)   09/27/23 1322 110 kg (242 lb 11.2 oz)   09/27/23 0629 110 kg (242 lb 11.2 oz)   09/12/23 1707 111 kg (245 lb)   09/01/23 0934 113 kg (249 lb 6.4 oz)   08/28/23 1151 112 kg (246 lb)   07/10/23 1106 109 kg (240 lb)   06/16/23 1004 108 kg (239 lb)   06/02/23 1444 108 kg (239 lb)   05/08/23 1524 107 kg (236 lb)   04/19/23 1459 108 kg (239 lb)   02/27/23 1228 108 kg (238 lb 8.6 oz)   02/21/23 1008 108 kg (239 lb)   01/23/23 1001 108 kg (238 lb)   01/19/23 1033 110 kg (242 lb 6.4 oz)   12/09/22 1347 108 kg (238 lb 5.1 oz)   08/16/22 0951 107 kg (236 lb 8.9 oz)   11/01/21 1534 102 kg (224 lb)   09/26/21 0759 96.6 kg (213 lb)   09/26/21 0150 96.8 kg (213 lb 6.5 oz)   09/25/21 2053 97.5 kg (215 lb)   09/23/21 1859 96.9 kg (213 lb 10 oz)   09/23/21 1340 97.4 kg (214 lb 11.7 oz)      BMI kg/m2 Body mass index is 32.28 kg/m².       Nutrition Diagnosis         Nutrition Dx Statement Overweight/obesity RT multifactorial biochemical, behavioral and environmental contributors to disease AEB BMI 32.28 kg/m^2         Nutrition Intervention         Nutrition Intervention Nutrition education related to diet modification and physical activity        Monitor/Evaluation        New Goals Patient to add in strength training to exercise routine  Patient to avoid skipping meals and aim to have more frequent intake       Total time spent with pt 30 minutes of which 30 minutes were spent on education.       Electronically signed by:  Pastora Hughes RD  09/09/24 13:58 EDT

## 2024-09-10 DIAGNOSIS — E66.9 OBESITY, CLASS I, BMI 30-34.9: Primary | ICD-10-CM

## 2024-09-10 DIAGNOSIS — Z98.84 H/O GASTRIC BYPASS: ICD-10-CM

## 2024-09-10 RX ORDER — POLYETHYLENE GLYCOL 3350 17 G/17G
17 POWDER, FOR SOLUTION ORAL DAILY
Qty: 578 G | Refills: 2 | Status: SHIPPED | OUTPATIENT
Start: 2024-09-10

## 2024-09-16 NOTE — ANESTHESIA POSTPROCEDURE EVALUATION
Patient: Ariadna Ambrocio    Procedure Summary     Date: 07/16/21 Room / Location: [facility] OR 01 / [facility] MAIN OR    Anesthesia Start: 1420 Anesthesia Stop: 1525    Procedure: CYSTOSCOPY URETEROSCOPY RETROGRADE PYELOGRAM HOLMIUM LASER BASKET STONE EXTRACTION STENT INSERTION (Left ) Diagnosis:       Ureterolithiasis      (Ureterolithiasis [N20.1])    Surgeons: Carmelo Nguyễn MD Provider: Chris Jaimes MD    Anesthesia Type: general ASA Status: 3          Anesthesia Type: general    Vitals  Vitals Value Taken Time   /71 07/16/21 1605   Temp 98.5 °F (36.9 °C) 07/16/21 1605   Pulse 115 07/16/21 1605   Resp 15 07/16/21 1605   SpO2 93 % 07/16/21 1605           Post Anesthesia Care and Evaluation    Patient location during evaluation: PACU  Patient participation: complete - patient participated  Level of consciousness: awake  Pain scale: See nurse's notes for pain score.  Pain management: adequate  Airway patency: patent  Anesthetic complications: No anesthetic complications  PONV Status: none  Cardiovascular status: acceptable  Respiratory status: acceptable  Hydration status: acceptable    Comments: Patient seen and examined postoperatively; vital signs stable; SpO2 greater than or equal to 90%; cardiopulmonary status stable; nausea/vomiting adequately controlled; pain adequately controlled; no apparent anesthesia complications; patient discharged from anesthesia care when discharge criteria were met      
none

## 2024-09-23 ENCOUNTER — LAB (OUTPATIENT)
Dept: LAB | Facility: HOSPITAL | Age: 39
End: 2024-09-23
Payer: MEDICAID

## 2024-09-23 DIAGNOSIS — E66.9 OBESITY, CLASS I, BMI 30-34.9: ICD-10-CM

## 2024-09-23 DIAGNOSIS — Z98.84 H/O GASTRIC BYPASS: ICD-10-CM

## 2024-09-23 LAB
25(OH)D3 SERPL-MCNC: 26.8 NG/ML (ref 30–100)
ALBUMIN SERPL-MCNC: 3.8 G/DL (ref 3.5–5.2)
ALBUMIN/GLOB SERPL: 1.3 G/DL
ALP SERPL-CCNC: 121 U/L (ref 39–117)
ALT SERPL W P-5'-P-CCNC: 16 U/L (ref 1–33)
ANION GAP SERPL CALCULATED.3IONS-SCNC: 10.2 MMOL/L (ref 5–15)
AST SERPL-CCNC: 18 U/L (ref 1–32)
BASOPHILS # BLD AUTO: 0.05 10*3/MM3 (ref 0–0.2)
BASOPHILS NFR BLD AUTO: 0.6 % (ref 0–1.5)
BILIRUB SERPL-MCNC: 0.5 MG/DL (ref 0–1.2)
BUN SERPL-MCNC: 13 MG/DL (ref 6–20)
BUN/CREAT SERPL: 17.8 (ref 7–25)
CALCIUM SPEC-SCNC: 9.5 MG/DL (ref 8.6–10.5)
CHLORIDE SERPL-SCNC: 103 MMOL/L (ref 98–107)
CO2 SERPL-SCNC: 23.8 MMOL/L (ref 22–29)
CREAT SERPL-MCNC: 0.73 MG/DL (ref 0.57–1)
DEPRECATED RDW RBC AUTO: 45.8 FL (ref 37–54)
EGFRCR SERPLBLD CKD-EPI 2021: 108.1 ML/MIN/1.73
EOSINOPHIL # BLD AUTO: 0.14 10*3/MM3 (ref 0–0.4)
EOSINOPHIL NFR BLD AUTO: 1.5 % (ref 0.3–6.2)
ERYTHROCYTE [DISTWIDTH] IN BLOOD BY AUTOMATED COUNT: 13.4 % (ref 12.3–15.4)
FERRITIN SERPL-MCNC: 11.4 NG/ML (ref 13–150)
FOLATE SERPL-MCNC: 9.7 NG/ML (ref 4.78–24.2)
GLOBULIN UR ELPH-MCNC: 3 GM/DL
GLUCOSE SERPL-MCNC: 97 MG/DL (ref 65–99)
HCT VFR BLD AUTO: 37.3 % (ref 34–46.6)
HGB BLD-MCNC: 11.7 G/DL (ref 12–15.9)
IMM GRANULOCYTES # BLD AUTO: 0.03 10*3/MM3 (ref 0–0.05)
IMM GRANULOCYTES NFR BLD AUTO: 0.3 % (ref 0–0.5)
IRON 24H UR-MRATE: 49 MCG/DL (ref 37–145)
IRON SATN MFR SERPL: 12 % (ref 20–50)
LYMPHOCYTES # BLD AUTO: 2.28 10*3/MM3 (ref 0.7–3.1)
LYMPHOCYTES NFR BLD AUTO: 25.1 % (ref 19.6–45.3)
MAGNESIUM SERPL-MCNC: 2.1 MG/DL (ref 1.6–2.6)
MCH RBC QN AUTO: 29.3 PG (ref 26.6–33)
MCHC RBC AUTO-ENTMCNC: 31.4 G/DL (ref 31.5–35.7)
MCV RBC AUTO: 93.3 FL (ref 79–97)
MONOCYTES # BLD AUTO: 0.87 10*3/MM3 (ref 0.1–0.9)
MONOCYTES NFR BLD AUTO: 9.6 % (ref 5–12)
NEUTROPHILS NFR BLD AUTO: 5.71 10*3/MM3 (ref 1.7–7)
NEUTROPHILS NFR BLD AUTO: 62.9 % (ref 42.7–76)
NRBC BLD AUTO-RTO: 0 /100 WBC (ref 0–0.2)
PHOSPHATE SERPL-MCNC: 2.9 MG/DL (ref 2.5–4.5)
PLATELET # BLD AUTO: 310 10*3/MM3 (ref 140–450)
PMV BLD AUTO: 10.3 FL (ref 6–12)
POTASSIUM SERPL-SCNC: 4 MMOL/L (ref 3.5–5.2)
PREALB SERPL-MCNC: 21.6 MG/DL (ref 20–40)
PROT SERPL-MCNC: 6.8 G/DL (ref 6–8.5)
PTH-INTACT SERPL-MCNC: 39.6 PG/ML (ref 15–65)
RBC # BLD AUTO: 4 10*6/MM3 (ref 3.77–5.28)
SODIUM SERPL-SCNC: 137 MMOL/L (ref 136–145)
TIBC SERPL-MCNC: 425 MCG/DL (ref 298–536)
TRANSFERRIN SERPL-MCNC: 285 MG/DL (ref 200–360)
WBC NRBC COR # BLD AUTO: 9.08 10*3/MM3 (ref 3.4–10.8)

## 2024-09-23 PROCEDURE — 84134 ASSAY OF PREALBUMIN: CPT

## 2024-09-23 PROCEDURE — 84590 ASSAY OF VITAMIN A: CPT

## 2024-09-23 PROCEDURE — 84597 ASSAY OF VITAMIN K: CPT

## 2024-09-23 PROCEDURE — 80053 COMPREHEN METABOLIC PANEL: CPT

## 2024-09-23 PROCEDURE — 85025 COMPLETE CBC W/AUTO DIFF WBC: CPT

## 2024-09-23 PROCEDURE — 82306 VITAMIN D 25 HYDROXY: CPT

## 2024-09-23 PROCEDURE — 82728 ASSAY OF FERRITIN: CPT

## 2024-09-23 PROCEDURE — 83970 ASSAY OF PARATHORMONE: CPT

## 2024-09-23 PROCEDURE — 83921 ORGANIC ACID SINGLE QUANT: CPT

## 2024-09-23 PROCEDURE — 36415 COLL VENOUS BLD VENIPUNCTURE: CPT

## 2024-09-23 PROCEDURE — 83735 ASSAY OF MAGNESIUM: CPT

## 2024-09-23 PROCEDURE — 84100 ASSAY OF PHOSPHORUS: CPT

## 2024-09-23 PROCEDURE — 84630 ASSAY OF ZINC: CPT

## 2024-09-23 PROCEDURE — 83540 ASSAY OF IRON: CPT

## 2024-09-23 PROCEDURE — 84446 ASSAY OF VITAMIN E: CPT

## 2024-09-23 PROCEDURE — 84466 ASSAY OF TRANSFERRIN: CPT

## 2024-09-23 PROCEDURE — 82746 ASSAY OF FOLIC ACID SERUM: CPT

## 2024-09-24 ENCOUNTER — PATIENT MESSAGE (OUTPATIENT)
Dept: BARIATRICS/WEIGHT MGMT | Facility: CLINIC | Age: 39
End: 2024-09-24
Payer: MEDICAID

## 2024-09-25 LAB — ZINC SERPL-MCNC: 56 UG/DL (ref 44–115)

## 2024-09-26 LAB
METHYLMALONATE SERPL-SCNC: 151 NMOL/L (ref 0–378)
PHYTONADIONE SERPL-MCNC: 0.22 NG/ML (ref 0.1–2.2)

## 2024-09-27 NOTE — DISCHARGE INSTR - DIET
Bariatric clear liquid diet  
[FreeTextEntry1] : Advised to ck US - r/o DVT in left leg and r/o PVD in both legs   HTN: Stable, advised strict low salt diet, daily regular exercise, to c/w meds, bmp to be monitored closely.  Hyperlipidemia: Advised on strict low-fat diet, daily regular exercise, to c/w meds, FLP/LFT to be monitored. declines any meds LDL goal < 100   Hyperuricemia: Advised to avoid alcohol, red meat and seafood. Denies any h/o gout or nephrolithiasis. To juana in 3 mo and reassess.  PreDM: advised on dietary modification, juana in 3 mo and reassess f/u after US is done

## 2024-10-02 LAB
A-TOCOPHEROL VIT E SERPL-MCNC: 7.4 MG/L (ref 5.9–19.4)
GAMMA TOCOPHEROL SERPL-MCNC: 0.8 MG/L (ref 0.7–4.9)
VIT A SERPL-MCNC: 45.8 UG/DL (ref 18.9–57.3)

## 2024-10-09 ENCOUNTER — HOSPITAL ENCOUNTER (EMERGENCY)
Facility: HOSPITAL | Age: 39
Discharge: HOME | End: 2024-10-10
Payer: MEDICAID

## 2024-10-09 ENCOUNTER — APPOINTMENT (OUTPATIENT)
Dept: CT IMAGING | Facility: HOSPITAL | Age: 39
End: 2024-10-09
Payer: MEDICAID

## 2024-10-09 DIAGNOSIS — R10.30 LOWER ABDOMINAL PAIN: Primary | ICD-10-CM

## 2024-10-09 LAB
ALBUMIN SERPL-MCNC: 4.3 G/DL (ref 3.5–5.2)
ALBUMIN/GLOB SERPL: 1.8 G/DL
ALP SERPL-CCNC: 117 U/L (ref 39–117)
ALT SERPL W P-5'-P-CCNC: 15 U/L (ref 1–33)
ANION GAP SERPL CALCULATED.3IONS-SCNC: 7.7 MMOL/L (ref 5–15)
AST SERPL-CCNC: 22 U/L (ref 1–32)
BACTERIA UR QL AUTO: NORMAL /HPF
BASOPHILS # BLD AUTO: 0.06 10*3/MM3 (ref 0–0.2)
BASOPHILS NFR BLD AUTO: 0.5 % (ref 0–1.5)
BILIRUB SERPL-MCNC: 0.5 MG/DL (ref 0–1.2)
BILIRUB UR QL STRIP: NEGATIVE
BUN SERPL-MCNC: 11 MG/DL (ref 6–20)
BUN/CREAT SERPL: 17.2 (ref 7–25)
CALCIUM SPEC-SCNC: 9.5 MG/DL (ref 8.6–10.5)
CHLORIDE SERPL-SCNC: 107 MMOL/L (ref 98–107)
CLARITY UR: CLEAR
CO2 SERPL-SCNC: 24.3 MMOL/L (ref 22–29)
COLOR UR: YELLOW
CREAT SERPL-MCNC: 0.64 MG/DL (ref 0.57–1)
DEPRECATED RDW RBC AUTO: 45 FL (ref 37–54)
EGFRCR SERPLBLD CKD-EPI 2021: 116.2 ML/MIN/1.73
EOSINOPHIL # BLD AUTO: 0.33 10*3/MM3 (ref 0–0.4)
EOSINOPHIL NFR BLD AUTO: 2.8 % (ref 0.3–6.2)
ERYTHROCYTE [DISTWIDTH] IN BLOOD BY AUTOMATED COUNT: 13.2 % (ref 12.3–15.4)
GLOBULIN UR ELPH-MCNC: 2.4 GM/DL
GLUCOSE SERPL-MCNC: 89 MG/DL (ref 65–99)
GLUCOSE UR STRIP-MCNC: NEGATIVE MG/DL
HCG SERPL QL: NEGATIVE
HCT VFR BLD AUTO: 35 % (ref 34–46.6)
HGB BLD-MCNC: 11.4 G/DL (ref 12–15.9)
HGB UR QL STRIP.AUTO: ABNORMAL
HOLD SPECIMEN: NORMAL
HYALINE CASTS UR QL AUTO: NORMAL /LPF
IMM GRANULOCYTES # BLD AUTO: 0.03 10*3/MM3 (ref 0–0.05)
IMM GRANULOCYTES NFR BLD AUTO: 0.3 % (ref 0–0.5)
KETONES UR QL STRIP: NEGATIVE
LEUKOCYTE ESTERASE UR QL STRIP.AUTO: NEGATIVE
LYMPHOCYTES # BLD AUTO: 4.25 10*3/MM3 (ref 0.7–3.1)
LYMPHOCYTES NFR BLD AUTO: 36 % (ref 19.6–45.3)
MCH RBC QN AUTO: 29.9 PG (ref 26.6–33)
MCHC RBC AUTO-ENTMCNC: 32.6 G/DL (ref 31.5–35.7)
MCV RBC AUTO: 91.9 FL (ref 79–97)
MONOCYTES # BLD AUTO: 1.04 10*3/MM3 (ref 0.1–0.9)
MONOCYTES NFR BLD AUTO: 8.8 % (ref 5–12)
NEUTROPHILS NFR BLD AUTO: 51.6 % (ref 42.7–76)
NEUTROPHILS NFR BLD AUTO: 6.1 10*3/MM3 (ref 1.7–7)
NITRITE UR QL STRIP: NEGATIVE
NRBC BLD AUTO-RTO: 0 /100 WBC (ref 0–0.2)
PH UR STRIP.AUTO: 6.5 [PH] (ref 5–8)
PLATELET # BLD AUTO: 309 10*3/MM3 (ref 140–450)
PMV BLD AUTO: 9.7 FL (ref 6–12)
POTASSIUM SERPL-SCNC: 3.9 MMOL/L (ref 3.5–5.2)
PROT SERPL-MCNC: 6.7 G/DL (ref 6–8.5)
PROT UR QL STRIP: NEGATIVE
RBC # BLD AUTO: 3.81 10*6/MM3 (ref 3.77–5.28)
RBC # UR STRIP: NORMAL /HPF
REF LAB TEST METHOD: NORMAL
SODIUM SERPL-SCNC: 139 MMOL/L (ref 136–145)
SP GR UR STRIP: <=1.005 (ref 1–1.03)
SQUAMOUS #/AREA URNS HPF: NORMAL /HPF
UROBILINOGEN UR QL STRIP: ABNORMAL
WBC # UR STRIP: NORMAL /HPF
WBC NRBC COR # BLD AUTO: 11.81 10*3/MM3 (ref 3.4–10.8)
WHOLE BLOOD HOLD COAG: NORMAL
WHOLE BLOOD HOLD SPECIMEN: NORMAL

## 2024-10-09 PROCEDURE — 96375 TX/PRO/DX INJ NEW DRUG ADDON: CPT

## 2024-10-09 PROCEDURE — 80053 COMPREHEN METABOLIC PANEL: CPT

## 2024-10-09 PROCEDURE — 74176 CT ABD & PELVIS W/O CONTRAST: CPT

## 2024-10-09 PROCEDURE — 85025 COMPLETE CBC W/AUTO DIFF WBC: CPT | Performed by: NURSE PRACTITIONER

## 2024-10-09 PROCEDURE — 99284 EMERGENCY DEPT VISIT MOD MDM: CPT

## 2024-10-09 PROCEDURE — 25010000002 ONDANSETRON PER 1 MG: Performed by: NURSE PRACTITIONER

## 2024-10-09 PROCEDURE — 96374 THER/PROPH/DIAG INJ IV PUSH: CPT

## 2024-10-09 PROCEDURE — 84703 CHORIONIC GONADOTROPIN ASSAY: CPT | Performed by: NURSE PRACTITIONER

## 2024-10-09 PROCEDURE — 25010000002 KETOROLAC TROMETHAMINE PER 15 MG: Performed by: NURSE PRACTITIONER

## 2024-10-09 PROCEDURE — 81001 URINALYSIS AUTO W/SCOPE: CPT

## 2024-10-09 RX ORDER — DICLOFENAC SODIUM 75 MG/1
75 TABLET, DELAYED RELEASE ORAL 2 TIMES DAILY PRN
Qty: 20 TABLET | Refills: 0 | Status: SHIPPED | OUTPATIENT
Start: 2024-10-09

## 2024-10-09 RX ORDER — ONDANSETRON 4 MG/1
4 TABLET, ORALLY DISINTEGRATING ORAL 4 TIMES DAILY PRN
Qty: 10 TABLET | Refills: 0 | Status: SHIPPED | OUTPATIENT
Start: 2024-10-09

## 2024-10-09 RX ORDER — KETOROLAC TROMETHAMINE 30 MG/ML
15 INJECTION, SOLUTION INTRAMUSCULAR; INTRAVENOUS ONCE
Status: COMPLETED | OUTPATIENT
Start: 2024-10-09 | End: 2024-10-09

## 2024-10-09 RX ORDER — ONDANSETRON 2 MG/ML
4 INJECTION INTRAMUSCULAR; INTRAVENOUS ONCE
Status: COMPLETED | OUTPATIENT
Start: 2024-10-09 | End: 2024-10-09

## 2024-10-09 RX ADMIN — KETOROLAC TROMETHAMINE 15 MG: 30 INJECTION, SOLUTION INTRAMUSCULAR at 23:00

## 2024-10-09 RX ADMIN — ONDANSETRON 4 MG: 2 INJECTION, SOLUTION INTRAMUSCULAR; INTRAVENOUS at 23:00

## 2024-10-09 NOTE — Clinical Note
Psychiatric EMERGENCY DEPARTMENT  [address] [place] [address]  Phone: [phone]    Ariadna Ambrocio was seen and treated in our emergency department on 10/9/2024.  She may return to work on 10/11/2024.         Thank you for choosing [facility].    Megan Vallecillo RN

## 2024-10-10 VITALS
SYSTOLIC BLOOD PRESSURE: 119 MMHG | BODY MASS INDEX: 32.97 KG/M2 | WEIGHT: 186.07 LBS | RESPIRATION RATE: 16 BRPM | OXYGEN SATURATION: 100 % | HEART RATE: 55 BPM | HEIGHT: 63 IN | DIASTOLIC BLOOD PRESSURE: 69 MMHG | TEMPERATURE: 97.3 F

## 2024-10-10 NOTE — ED PROVIDER NOTES
Subjective   History of Present Illness  Patient is a morbidly obese 38-year-old female who comes in with bilateral flank pain for the last week.  She has a history of kidney stones and states her last stones were in 2023 that had to be surgically removed.  She does have some lower abdominal discomfort and some dysuria associated with this which she states is different than her normal kidney stone pain.  She has had no fever no chills no nausea or vomiting      Review of Systems   Constitutional:  Negative for chills, fatigue and fever.   HENT:  Negative for congestion, tinnitus and trouble swallowing.    Eyes:  Negative for photophobia, discharge and redness.   Respiratory:  Negative for cough and shortness of breath.    Cardiovascular:  Negative for chest pain and palpitations.   Gastrointestinal:  Positive for abdominal pain. Negative for diarrhea, nausea and vomiting.   Genitourinary:  Positive for dysuria. Negative for frequency and urgency.   Musculoskeletal:  Negative for back pain, joint swelling and myalgias.   Skin:  Negative for rash.   Neurological:  Negative for dizziness and headaches.   Psychiatric/Behavioral:  Negative for confusion.    All other systems reviewed and are negative.      Past Medical History:   Diagnosis Date    Anxiety     Bulging lumbar disc     Constipation     Depression     Diarrhea     Elevated triglycerides with high cholesterol     GERD (gastroesophageal reflux disease)     Heartburn     Herniated cervical disc     Hiatal hernia     Hyperlipidemia     Hypertension     no meds after weight    IBS (irritable bowel syndrome)     Kidney stones     Nausea     Pre-diabetes     Rapid heart beat     Seasonal allergies        No Known Allergies    Past Surgical History:   Procedure Laterality Date    APPENDECTOMY N/A 3/13/2024    Procedure: APPENDECTOMY LAPAROSCOPIC;  Surgeon: Georges Chandler MD;  Location: [facility] MAIN OR;  Service: General;  Laterality: N/A;    CHOLECYSTECTOMY N/A  6/4/2024    Procedure: CHOLECYSTECTOMY LAPAROSCOPIC WITH DAVINCI ROBOT;  Surgeon: Otilia Capone MD;  Location: [facility] MAIN OR;  Service: Robotics - DaVinci;  Laterality: N/A;    COLONOSCOPY      CYSTOSCOPY, URETEROSCOPY, RETROGRADE PYELOGRAM, STENT INSERTION Left 07/16/2021    Procedure: CYSTOSCOPY URETEROSCOPY RETROGRADE PYELOGRAM HOLMIUM LASER BASKET STONE EXTRACTION STENT INSERTION;  Surgeon: Carmelo Nguyễn MD;  Location: [facility] MAIN OR;  Service: Urology;  Laterality: Left;    CYSTOSCOPY, URETEROSCOPY, RETROGRADE PYELOGRAM, STENT INSERTION Left 3/13/2024    Procedure: CYSTOSCOPY URETEROSCOPY, STENT INSERTION, STONE EXTRACTION;  Surgeon: Carmelo Nguyễn MD;  Location: [facility] MAIN OR;  Service: Urology;  Laterality: Left;    EAR TUBES      ENDOSCOPY N/A 2/27/2023    Procedure: ESOPHAGOGASTRODUODENOSCOPY with gastric antrum biopsy;  Surgeon: Otilia Capone MD;  Location: [facility] ENDOSCOPY;  Service: General;  Laterality: N/A;  Post: large hiatal hernia    ENDOSCOPY N/A 5/2/2024    Procedure: ESOPHAGOGASTRODUODENOSCOPY;  Surgeon: Otilia Capone MD;  Location: [facility] ENDOSCOPY;  Service: General;  Laterality: N/A;  normal    GASTRIC BYPASS N/A 9/27/2023    Procedure: GASTRIC BYPASS AND HIATAL HERNIA REPAIR LAPAROSCOPIC WITH DAVINCI ROBOT;  Surgeon: Otilia Capone MD;  Location: [facility] MAIN OR;  Service: Robotics - DaVinci;  Laterality: N/A;    KIDNEY STONE SURGERY      WISDOM TOOTH EXTRACTION         Family History   Problem Relation Age of Onset    Asthma Mother     Hypertension Mother     No Known Problems Father     Hypertension Maternal Grandmother     Heart attack Maternal Grandfather     Cancer Paternal Grandmother     Cancer Paternal Grandfather     Breast cancer Neg Hx     Ovarian cancer Neg Hx     Uterine cancer Neg Hx     Colon cancer Neg Hx     Deep vein thrombosis Neg Hx     Pulmonary embolism Neg Hx        Social History     Socioeconomic History    Marital status:    Tobacco Use    Smoking status: Never  "    Passive exposure: Past    Smokeless tobacco: Never   Vaping Use    Vaping status: Never Used   Substance and Sexual Activity    Alcohol use: Not Currently    Drug use: Never    Sexual activity: Defer           Objective   Physical Exam  Vitals reviewed.   Constitutional:       General: She is not in acute distress.     Appearance: Normal appearance. She is well-developed. She is obese. She is not ill-appearing, toxic-appearing or diaphoretic.   HENT:      Head: Normocephalic and atraumatic.      Right Ear: External ear normal.      Left Ear: External ear normal.      Nose: Nose normal.   Eyes:      Conjunctiva/sclera: Conjunctivae normal.      Pupils: Pupils are equal, round, and reactive to light.   Cardiovascular:      Rate and Rhythm: Normal rate and regular rhythm.      Heart sounds: Normal heart sounds.   Pulmonary:      Effort: Pulmonary effort is normal. No respiratory distress.      Breath sounds: Normal breath sounds. No wheezing.   Abdominal:      General: Abdomen is protuberant. Bowel sounds are normal.      Palpations: Abdomen is soft.      Tenderness: There is abdominal tenderness in the suprapubic area. There is right CVA tenderness and left CVA tenderness.   Musculoskeletal:         General: No deformity. Normal range of motion.      Cervical back: Normal range of motion and neck supple.   Skin:     General: Skin is warm and dry.      Capillary Refill: Capillary refill takes less than 2 seconds.   Neurological:      General: No focal deficit present.      Mental Status: She is alert and oriented to person, place, and time.      GCS: GCS eye subscore is 4. GCS verbal subscore is 5. GCS motor subscore is 6.      Motor: No weakness.   Psychiatric:         Mood and Affect: Mood normal.         Behavior: Behavior normal.         Procedures           ED Course                                   /91   Pulse 70   Temp 97.3 °F (36.3 °C) (Oral)   Resp 16   Ht 160 cm (63\")   Wt 84.4 kg (186 lb 1.1 " oz)   LMP 10/01/2024 (Approximate)   SpO2 99%   BMI 32.96 kg/m²   Labs Reviewed   URINALYSIS W/ MICROSCOPIC IF INDICATED (NO CULTURE) - Abnormal; Notable for the following components:       Result Value    Blood, UA Small (1+) (*)     All other components within normal limits   CBC WITH AUTO DIFFERENTIAL - Abnormal; Notable for the following components:    WBC 11.81 (*)     Hemoglobin 11.4 (*)     Lymphocytes, Absolute 4.25 (*)     Monocytes, Absolute 1.04 (*)     All other components within normal limits   HCG, SERUM, QUALITATIVE - Normal   RAINBOW DRAW    Narrative:     The following orders were created for panel order [place] Draw.  Procedure                               Abnormality         Status                     ---------                               -----------         ------                     Green Top (Gel)[[id number]]                                  Final result               Lavender Top[[id number]]                                     Final result               Gold Top - SST[[id number]]                                                              Light Blue Top[[id number]]                                   Final result                 Please view results for these tests on the individual orders.   COMPREHENSIVE METABOLIC PANEL    Narrative:     GFR Normal >60  Chronic Kidney Disease <60  Kidney Failure <15     URINALYSIS, MICROSCOPIC ONLY   GREEN TOP   LAVENDER TOP   LIGHT BLUE TOP   CBC AND DIFFERENTIAL    Narrative:     The following orders were created for panel order CBC & Differential.  Procedure                               Abnormality         Status                     ---------                               -----------         ------                     CBC Auto Differential[[id number]]        Abnormal            Final result                 Please view results for these tests on the individual orders.     Medications   ketorolac (TORADOL) injection 15 mg (15 mg Intravenous Given 10/9/24  2300)   ondansetron (ZOFRAN) injection 4 mg (4 mg Intravenous Given 10/9/24 2300)     CT Abdomen Pelvis Without Contrast    Result Date: 10/9/2024  Impression: 1.No acute intra-abdominal or intrapelvic process. 2.Nonobstructing renal calculi present bilaterally. No obstructing calculus or evidence of hydronephrosis. 3.Ancillary findings as described above. Electronically Signed: Jennifer Ramos MD  10/9/2024 11:26 PM EDT  Workstation ID: [id number]             Medical Decision Making  Amount and/or Complexity of Data Reviewed  Labs: ordered.  Radiology: ordered.    Risk  Prescription drug management.        Final diagnoses:   Lower abdominal pain       ED Disposition  ED Disposition       ED Disposition   Discharge    Condition   Stable    Comment   --               Marifer Cooper MD  [address]  [address] [place], [place] [id number] [phone]    In 3 days  If symptoms worsen, As needed    Carmelo Nguyễn MD  [address] [place] [address] [place] [id number] [phone]    In 3 days  If symptoms worsen, As needed         Medication List        New Prescriptions      diclofenac 75 MG EC tablet  Commonly known as: VOLTAREN  Take 1 tablet by mouth 2 (Two) Times a Day As Needed (pain).     ondansetron ODT 4 MG disintegrating tablet  Commonly known as: ZOFRAN-ODT  Take 1 tablet by mouth 4 (Four) Times a Day As Needed for Vomiting or Nausea.               Where to Get Your Medications        These medications were sent to [id number] - [facility] [place] - [address] [place] [address] - [phone] [place] [phone]   [address] [facility] [place] [address]      Phone: [phone]   diclofenac 75 MG EC tablet  ondansetron ODT 4 MG disintegrating tablet            Zarina Tay, APRN  10/09/24 1409

## 2024-10-10 NOTE — DISCHARGE INSTRUCTIONS
Use Voltaren as needed for discomfort use Zofran for nausea do not mix with Motrin ibuprofen Advil or Aleve.    Follow-up with urology and/or primary care for further evaluation as your labs and CT today were within normal limits.    Return if worse

## 2024-12-09 ENCOUNTER — LAB (OUTPATIENT)
Dept: FAMILY MEDICINE CLINIC | Facility: CLINIC | Age: 39
End: 2024-12-09
Payer: MEDICAID

## 2024-12-09 ENCOUNTER — OFFICE VISIT (OUTPATIENT)
Dept: FAMILY MEDICINE CLINIC | Facility: CLINIC | Age: 39
End: 2024-12-09
Payer: MEDICAID

## 2024-12-09 VITALS
WEIGHT: 183.9 LBS | HEART RATE: 74 BPM | TEMPERATURE: 97.7 F | RESPIRATION RATE: 18 BRPM | DIASTOLIC BLOOD PRESSURE: 90 MMHG | OXYGEN SATURATION: 99 % | HEIGHT: 63 IN | BODY MASS INDEX: 32.59 KG/M2 | SYSTOLIC BLOOD PRESSURE: 122 MMHG

## 2024-12-09 DIAGNOSIS — I10 HYPERTENSION, WELL CONTROLLED: ICD-10-CM

## 2024-12-09 DIAGNOSIS — Z12.4 PAPANICOLAOU SMEAR FOR CERVICAL CANCER SCREENING: ICD-10-CM

## 2024-12-09 DIAGNOSIS — Z87.442 HISTORY OF KIDNEY STONES: ICD-10-CM

## 2024-12-09 DIAGNOSIS — Z76.89 ENCOUNTER TO ESTABLISH CARE: Primary | ICD-10-CM

## 2024-12-09 PROBLEM — K91.0 VOMITING (BILIOUS) FOLLOWING GASTROINTESTINAL SURGERY: Status: RESOLVED | Noted: 2024-04-26 | Resolved: 2024-12-09

## 2024-12-09 PROBLEM — R07.9 CHEST PAIN: Status: RESOLVED | Noted: 2021-09-23 | Resolved: 2024-12-09

## 2024-12-09 PROBLEM — R13.10 DYSPHAGIA: Status: RESOLVED | Noted: 2024-04-26 | Resolved: 2024-12-09

## 2024-12-09 PROBLEM — K80.20 CALCULUS OF GALLBLADDER WITHOUT CHOLECYSTITIS WITHOUT OBSTRUCTION: Status: RESOLVED | Noted: 2024-05-10 | Resolved: 2024-12-09

## 2024-12-09 PROBLEM — K80.20 CHOLELITHIASIS: Status: RESOLVED | Noted: 2024-05-10 | Resolved: 2024-12-09

## 2024-12-09 LAB — HOLD SPECIMEN: NORMAL

## 2024-12-09 PROCEDURE — 1126F AMNT PAIN NOTED NONE PRSNT: CPT | Performed by: NURSE PRACTITIONER

## 2024-12-09 PROCEDURE — 1160F RVW MEDS BY RX/DR IN RCRD: CPT | Performed by: NURSE PRACTITIONER

## 2024-12-09 PROCEDURE — 81001 URINALYSIS AUTO W/SCOPE: CPT | Performed by: NURSE PRACTITIONER

## 2024-12-09 PROCEDURE — 1159F MED LIST DOCD IN RCRD: CPT | Performed by: NURSE PRACTITIONER

## 2024-12-09 PROCEDURE — 99213 OFFICE O/P EST LOW 20 MIN: CPT | Performed by: NURSE PRACTITIONER

## 2024-12-09 PROCEDURE — 3080F DIAST BP >= 90 MM HG: CPT | Performed by: NURSE PRACTITIONER

## 2024-12-09 PROCEDURE — 3074F SYST BP LT 130 MM HG: CPT | Performed by: NURSE PRACTITIONER

## 2024-12-09 RX ORDER — POTASSIUM CITRATE 5 MEQ/1
15 TABLET, EXTENDED RELEASE ORAL 2 TIMES DAILY WITH MEALS
COMMUNITY

## 2024-12-09 NOTE — PROGRESS NOTES
Chief Complaint  Urinary Tract Infection (HAS ISSUES WITH KIDNEY STONES. ) and Establish Care    Subjective        Ariadna Ambrocio presents to [facility]  History of Present Illness    Patient presents today to establish care.  Last PCP with [facility] provider Marifer Cooper.  There is documented history of depression (Zoloft in past), seasonal allergies (albuterol prn for several years), hypertension, SVT, GERD, hiatal hernia (repaired with gastric bypass), gastric bypass (9/2023), gallstones/lap cholecystectomy, kidney stones (hx lithotripsy), and pyelonephritis.       Specialty providers:  Neurology-follows with [facility] provider Dr. Shilo Benjamin for left facial muscle twitching. MRI/MRA completed 12/2/24.   Bariatric surgery-follows with Otilia Capone/Angie Carmona. Weight loss 70 pounds.   Cardiology-follows with Dr. Abarca. Previously taken beta blocker for tachycardia.   Urology-follows with Dr. Nguyễn. Scrip for potassium citrate 15 meq BID. 11/26/24- visit with NP- reported urine negative. Needs follow up CT- not scheduled to date. Recently completed cipro 500 BID x 7 days.  Feels like having urinary burning again.  Gynecology- last provider with [facility]. Last Pap < 2 years ago-normal.   Gastroenterology- colonoscopy completed at [facility] several years ago.       PHQ-9 Depression Screening  Little interest or pleasure in doing things? Several days   Feeling down, depressed, or hopeless? Several days   PHQ-2 Total Score 2   Trouble falling or staying asleep, or sleeping too much? Several days   Feeling tired or having little energy? Almost all   Poor appetite or overeating? Not at all   Feeling bad about yourself - or that you are a failure or have let yourself or your family down? Not at all   Trouble concentrating on things, such as reading the newspaper or watching television? Several days   Moving or speaking so slowly that other people could have noticed? Or the  "opposite - being so fidgety or restless that you have been moving around a lot more than usual? Not at all   Thoughts that you would be better off dead, or of hurting yourself in some way? Not at all   PHQ-9 Total Score 7   If you checked off any problems, how difficult have these problems made it for you to do your work, take care of things at home, or get along with other people? Somewhat difficult          Objective   Vital Signs:  /90 (BP Location: Left arm, Patient Position: Sitting, Cuff Size: Adult)   Pulse 74   Temp 97.7 °F (36.5 °C) (Temporal)   Resp 18   Ht 160 cm (63\")   Wt 83.4 kg (183 lb 14.4 oz)   SpO2 99%   BMI 32.58 kg/m²   Estimated body mass index is 32.58 kg/m² as calculated from the following:    Height as of this encounter: 160 cm (63\").    Weight as of this encounter: 83.4 kg (183 lb 14.4 oz).            Physical Exam  Constitutional:       General: She is not in acute distress.     Comments: Pleasant, converses appropriately    HENT:      Head: Normocephalic and atraumatic.      Right Ear: Tympanic membrane, ear canal and external ear normal.      Left Ear: Tympanic membrane, ear canal and external ear normal.      Nose: Nose normal.      Mouth/Throat:      Lips: Pink.      Mouth: Mucous membranes are moist.      Pharynx: Oropharynx is clear.   Eyes:      Conjunctiva/sclera: Conjunctivae normal.   Cardiovascular:      Rate and Rhythm: Normal rate and regular rhythm.      Pulses: Normal pulses.      Heart sounds: Normal heart sounds, S1 normal and S2 normal.   Pulmonary:      Effort: Pulmonary effort is normal.      Breath sounds: Normal breath sounds.   Abdominal:      General: Bowel sounds are normal.      Palpations: Abdomen is soft.      Tenderness: There is no abdominal tenderness. There is no right CVA tenderness or left CVA tenderness.   Musculoskeletal:         General: Normal range of motion.      Cervical back: Neck supple.      Right lower leg: No edema.      Left lower " leg: No edema.   Lymphadenopathy:      Cervical: No cervical adenopathy.      Upper Body:      Right upper body: No supraclavicular adenopathy.      Left upper body: No supraclavicular adenopathy.   Skin:     General: Skin is warm and dry.   Neurological:      Mental Status: She is alert and oriented to person, place, and time.      Gait: Gait is intact.   Psychiatric:         Mood and Affect: Mood and affect normal.         Thought Content: Thought content normal.         Judgment: Judgment normal.        Result Review :    CMP          5/24/2024    17:02 9/23/2024    11:06 10/9/2024    22:41   CMP   Glucose 82  97  89    BUN 10  13  11    Creatinine 0.64  0.73  0.64    EGFR 116.2  108.1  116.2    Sodium 139  137  139    Potassium 3.6  4.0  3.9    Chloride 106  103  107    Calcium 9.2  9.5  9.5    Total Protein  6.8  6.7    Albumin  3.8  4.3    Globulin  3.0  2.4    Total Bilirubin  0.5  0.5    Alkaline Phosphatase  121  117    AST (SGOT)  18  22    ALT (SGPT)  16  15    Albumin/Globulin Ratio  1.3  1.8    BUN/Creatinine Ratio 15.6  17.8  17.2    Anion Gap 10.0  10.2  7.7      CBC          5/24/2024    17:02 9/23/2024    11:06 10/9/2024    22:41   CBC   WBC 10.46  9.08  11.81    RBC 3.86  4.00  3.81    Hemoglobin 11.1  11.7  11.4    Hematocrit 35.0  37.3  35.0    MCV 90.7  93.3  91.9    MCH 28.8  29.3  29.9    MCHC 31.7  31.4  32.6    RDW 12.8  13.4  13.2    Platelets 316  310  309      CBC w/diff          5/24/2024    17:02 9/23/2024    11:06 10/9/2024    22:41   CBC w/Diff   WBC 10.46  9.08  11.81    RBC 3.86  4.00  3.81    Hemoglobin 11.1  11.7  11.4    Hematocrit 35.0  37.3  35.0    MCV 90.7  93.3  91.9    MCH 28.8  29.3  29.9    MCHC 31.7  31.4  32.6    RDW 12.8  13.4  13.2    Platelets 316  310  309    Neutrophil Rel %  62.9  51.6    Immature Granulocyte Rel %  0.3  0.3    Lymphocyte Rel %  25.1  36.0    Monocyte Rel %  9.6  8.8    Eosinophil Rel %  1.5  2.8    Basophil Rel %  0.6  0.5          BMP           5/24/2024    17:02 9/23/2024    11:06 10/9/2024    22:41   BMP   BUN 10  13  11    Creatinine 0.64  0.73  0.64    Sodium 139  137  139    Potassium 3.6  4.0  3.9    Chloride 106  103  107    CO2 23.0  23.8  24.3    Calcium 9.2  9.5  9.5      Most Recent A1C          1/18/2024    14:47   HGBA1C Most Recent   Hemoglobin A1C 6.4          Details          This result is from an external source.               UA          3/12/2024    20:45 4/5/2024    18:05 10/9/2024    22:42   Urinalysis   Squamous Epithelial Cells, UA 3-6  0-2  0-2    Specific Gravity, UA 1.011  1.024  <=1.005    Ketones, UA Negative  Trace  Negative    Blood, UA Moderate (2+)  Large (3+)  Small (1+)    Leukocytes, UA Trace  Trace  Negative    Nitrite, UA Negative  Negative  Negative    RBC, UA 0-2  None Seen  0-2    WBC, UA 6-10  0-2  0-2    Bacteria, UA Trace  None Seen  None Seen      Urine Culture          3/12/2024    20:45   Urine Culture   Urine Culture No growth                Assessment and Plan   Diagnoses and all orders for this visit:    1. Encounter to establish care (Primary)  -     Hemoglobin A1c; Future  -     Lipid Panel; Future  -     CBC & Differential; Future  -     Urinalysis With Culture If Indicated - Urine, Clean Catch  -     [place] Urine Culture Tube - Urine, Clean Catch    2. Hypertension, well controlled  -     Hemoglobin A1c; Future  -     Lipid Panel; Future  -     CBC & Differential; Future    3. Papanicolaou smear for cervical cancer screening  -     Ambulatory Referral to Gynecology    4. History of kidney stones  -     Urinalysis With Culture If Indicated - Urine, Clean Catch  -     [place] Urine Culture Tube - Urine, Clean Catch             Follow Up   Return in about 3 months (around 3/9/2025) for Annual physical.  Patient was given instructions and counseling regarding her condition or for health maintenance advice. Please see specific information pulled into the AVS if appropriate.

## 2024-12-10 LAB

## 2024-12-13 PROBLEM — Z90.3 H/O GASTRIC SLEEVE: Status: RESOLVED | Noted: 2023-10-19 | Resolved: 2024-12-13

## 2024-12-26 RX ORDER — POLYETHYLENE GLYCOL 3350 17 G/17G
POWDER ORAL
Qty: 510 G | Refills: 2 | Status: SHIPPED | OUTPATIENT
Start: 2024-12-26

## 2025-01-08 ENCOUNTER — APPOINTMENT (OUTPATIENT)
Dept: GENERAL RADIOLOGY | Facility: HOSPITAL | Age: 40
End: 2025-01-08
Payer: MEDICAID

## 2025-01-08 ENCOUNTER — HOSPITAL ENCOUNTER (EMERGENCY)
Facility: HOSPITAL | Age: 40
Discharge: HOME | End: 2025-01-08
Attending: EMERGENCY MEDICINE | Admitting: EMERGENCY MEDICINE
Payer: MEDICAID

## 2025-01-08 VITALS
HEIGHT: 63 IN | TEMPERATURE: 98.2 F | DIASTOLIC BLOOD PRESSURE: 86 MMHG | WEIGHT: 180.78 LBS | RESPIRATION RATE: 20 BRPM | OXYGEN SATURATION: 100 % | HEART RATE: 88 BPM | SYSTOLIC BLOOD PRESSURE: 137 MMHG | BODY MASS INDEX: 32.03 KG/M2

## 2025-01-08 DIAGNOSIS — K59.00 CONSTIPATION, UNSPECIFIED CONSTIPATION TYPE: Primary | ICD-10-CM

## 2025-01-08 PROCEDURE — 99283 EMERGENCY DEPT VISIT LOW MDM: CPT

## 2025-01-08 PROCEDURE — 74018 RADEX ABDOMEN 1 VIEW: CPT

## 2025-01-08 RX ORDER — MAGNESIUM HYDROXIDE 1200 MG/15ML
1 LIQUID ORAL ONCE
Qty: 1 EACH | Refills: 0 | Status: SHIPPED | OUTPATIENT
Start: 2025-01-08 | End: 2025-01-08

## 2025-01-08 NOTE — ED PROVIDER NOTES
Subjective   History of Present Illness  39-year-old female states has been on some hydrocodone for a kidney stone over the last few weeks and is constipated.  States she has not had a good bowel movement over the last 3 weeks.  She denies vomiting or fevers.  She reports a history of irritable bowel syndrome  Review of Systems    Past Medical History:   Diagnosis Date    Anxiety     Bulging lumbar disc     Constipation     Depression     Diarrhea     Elevated triglycerides with high cholesterol     GERD (gastroesophageal reflux disease)     Heartburn     Herniated cervical disc     Hiatal hernia     Hyperlipidemia     Hypertension     no meds after weight    IBS (irritable bowel syndrome)     Kidney stones     Nausea     Pre-diabetes     Rapid heart beat     Seasonal allergies        No Known Allergies    Past Surgical History:   Procedure Laterality Date    APPENDECTOMY N/A 3/13/2024    Procedure: APPENDECTOMY LAPAROSCOPIC;  Surgeon: Georges Chandler MD;  Location: [facility] MAIN OR;  Service: General;  Laterality: N/A;    CHOLECYSTECTOMY N/A 6/4/2024    Procedure: CHOLECYSTECTOMY LAPAROSCOPIC WITH DAVINCI ROBOT;  Surgeon: Otilia Capone MD;  Location: [facility] MAIN OR;  Service: Robotics - DaVinci;  Laterality: N/A;    COLONOSCOPY      CYSTOSCOPY, URETEROSCOPY, RETROGRADE PYELOGRAM, STENT INSERTION Left 07/16/2021    Procedure: CYSTOSCOPY URETEROSCOPY RETROGRADE PYELOGRAM HOLMIUM LASER BASKET STONE EXTRACTION STENT INSERTION;  Surgeon: Carmelo Nguyễn MD;  Location: [facility] MAIN OR;  Service: Urology;  Laterality: Left;    CYSTOSCOPY, URETEROSCOPY, RETROGRADE PYELOGRAM, STENT INSERTION Left 3/13/2024    Procedure: CYSTOSCOPY URETEROSCOPY, STENT INSERTION, STONE EXTRACTION;  Surgeon: Carmelo Nguyễn MD;  Location: [facility] MAIN OR;  Service: Urology;  Laterality: Left;    EAR TUBES      ENDOSCOPY N/A 2/27/2023    Procedure: ESOPHAGOGASTRODUODENOSCOPY with gastric antrum biopsy;  Surgeon: Otilia Capone MD;  Location: [facility]  ENDOSCOPY;  Service: General;  Laterality: N/A;  Post: large hiatal hernia    ENDOSCOPY N/A 5/2/2024    Procedure: ESOPHAGOGASTRODUODENOSCOPY;  Surgeon: Otilia Capone MD;  Location: [facility] ENDOSCOPY;  Service: General;  Laterality: N/A;  normal    GASTRIC BYPASS N/A 9/27/2023    Procedure: GASTRIC BYPASS AND HIATAL HERNIA REPAIR LAPAROSCOPIC WITH DAVINCI ROBOT;  Surgeon: Otilia Capone MD;  Location: [facility] MAIN OR;  Service: Robotics - DaVinci;  Laterality: N/A;    KIDNEY STONE SURGERY      WISDOM TOOTH EXTRACTION         Family History   Problem Relation Age of Onset    Asthma Mother     Hypertension Mother     No Known Problems Father     Hypertension Maternal Grandmother     Heart attack Maternal Grandfather     Cancer Paternal Grandmother     Cancer Paternal Grandfather     Breast cancer Neg Hx     Ovarian cancer Neg Hx     Uterine cancer Neg Hx     Colon cancer Neg Hx     Deep vein thrombosis Neg Hx     Pulmonary embolism Neg Hx        Social History     Socioeconomic History    Marital status:    Tobacco Use    Smoking status: Never     Passive exposure: Past    Smokeless tobacco: Never   Vaping Use    Vaping status: Never Used   Substance and Sexual Activity    Alcohol use: Not Currently    Drug use: Never    Sexual activity: Defer       Prior to Admission medications    Medication Sig Start Date End Date Taking? Authorizing Provider   acetaminophen (TYLENOL) 650 MG 8 hr tablet Take 1 tablet by mouth Every 8 (Eight) Hours As Needed for Mild Pain.    Provider, MD Ailyn   albuterol sulfate  (90 Base) MCG/ACT inhaler Inhale 2 puffs Every 4 (Four) Hours As Needed. 3/24/24   ProviderAilyn MD   magnesium citrate solution Take 296 mL by mouth 1 (One) Time for 1 dose. 1/8/25 1/8/25  Mina Solis MD   ondansetron ODT (ZOFRAN-ODT) 4 MG disintegrating tablet Take 1 tablet by mouth 4 (Four) Times a Day As Needed for Vomiting or Nausea. 10/9/24   Zarina Tay, SAM   PEG 3350 17  "GM/SCOOP powder DISSOLVE 17 GRAMS INTO WATER AND DRINK 12/26/24   Angie Carmona APRN   potassium citrate (UROCIT-K) 5 MEQ (540 MG) CR tablet Take 3 tablets by mouth 2 (Two) Times a Day With Meals.    Provider, MD Ailyn   sodium phosphate (FLEET) 7-19 GM/118ML ADULT enema Insert 1 enema into the rectum 1 (One) Time for 1 dose. 1/8/25 1/8/25  Mina Solis MD     /86 (BP Location: Left arm, Patient Position: Sitting)   Pulse 88   Temp 98.2 °F (36.8 °C) (Oral)   Resp 20   Ht 160 cm (63\")   Wt 82 kg (180 lb 12.4 oz)   SpO2 100%   BMI 32.02 kg/m²       Objective   Physical Exam  General: Well-developed well-appearing, no acute distress, alert and appropriate  Eyes:  sclera nonicteric  HEENT: Mucous membranes moist, no mucosal swelling  Neck: Supple, no nuchal rigidity,   Respirations: Respirations nonlabored, equal breath sounds bilaterally, clear lungs  Heart regular rate and rhythm, no murmurs rubs or gallops,   Abdomen soft nontender nondistended, no hepatosplenomegaly, no hernia, no mass, normal bowel sounds, no CVA tenderness  Neuro cranial nerves grossly intact, no focal limb deficits  Psych oriented, pleasant affect  Skin no rash, brisk cap refill  Procedures           ED Course      XR Abdomen KUB    Result Date: 1/8/2025  Impression: Marked fecal stasis to the level of the distal descending or proximal sigmoid colon, potentially upper sigmoid impaction. Electronically Signed: Olayinka Panchal MD  1/8/2025 6:05 PM EST  Workstation ID: [id number]                                                    Medical Decision Making  Patient has a benign abdominal examination with no signs of peritonitis or acute abdomen.  She has good bowel sounds.  She is having no vomiting.  Ischemic bowel felt to be unlikely based on exam and history.  She was prescribed mag citrate for constipation as well as Fleet enema.  Patient is agreeable plan and has made arrangements for gastroenterology follow-up.  She is given " warning signs for return    Problems Addressed:  Constipation, unspecified constipation type: complicated acute illness or injury    Amount and/or Complexity of Data Reviewed  Radiology: ordered and independent interpretation performed.     Details: My independent interpretation of x-ray image of the abdomen no bowel obstruction, consistent with constipation    Risk  OTC drugs.        Final diagnoses:   Constipation, unspecified constipation type       ED Disposition  ED Disposition       ED Disposition   Discharge    Condition   Stable    Comment   --               Paulette Gomes, APRN  [address] [place] [address]  [phone]    In 1 week      [facility]  [address] [facility] [address] [phone]  In 1 week           Medication List        New Prescriptions      magnesium citrate solution  Take 296 mL by mouth 1 (One) Time for 1 dose.     sodium phosphate 7-19 GM/118ML ADULT enema  Insert 1 enema into the rectum 1 (One) Time for 1 dose.               Where to Get Your Medications        These medications were sent to [facility]/pharmacy #[id number] - [facility] [place] - [address] [facility] [phone] [place] [id number]   [address] [facility] [place] [address]      Phone: [phone]   magnesium citrate solution  sodium phosphate 7-19 GM/118ML ADULT enema            Mina Solis MD  01/08/25 1280

## 2025-02-20 ENCOUNTER — OFFICE (AMBULATORY)
Dept: URBAN - METROPOLITAN AREA CLINIC 64 | Facility: CLINIC | Age: 40
End: 2025-02-20
Payer: COMMERCIAL

## 2025-02-20 VITALS
HEART RATE: 86 BPM | SYSTOLIC BLOOD PRESSURE: 133 MMHG | HEIGHT: 63 IN | DIASTOLIC BLOOD PRESSURE: 90 MMHG | WEIGHT: 179 LBS

## 2025-02-20 DIAGNOSIS — K59.00 CONSTIPATION, UNSPECIFIED: ICD-10-CM

## 2025-02-20 DIAGNOSIS — K62.5 HEMORRHAGE OF ANUS AND RECTUM: ICD-10-CM

## 2025-02-20 DIAGNOSIS — R10.84 GENERALIZED ABDOMINAL PAIN: ICD-10-CM

## 2025-02-20 PROCEDURE — 99204 OFFICE O/P NEW MOD 45 MIN: CPT

## 2025-02-20 RX ORDER — SORBITOL SOLUTION 70 %
SOLUTION, ORAL MISCELLANEOUS
Qty: 100 | Refills: 0 | Status: ACTIVE
Start: 2025-02-20

## 2025-02-20 RX ORDER — LINACLOTIDE 290 UG/1
290 CAPSULE, GELATIN COATED ORAL
Qty: 90 | Refills: 3 | Status: ACTIVE
Start: 2025-02-20

## 2025-03-18 ENCOUNTER — APPOINTMENT (OUTPATIENT)
Dept: CT IMAGING | Facility: HOSPITAL | Age: 40
End: 2025-03-18
Payer: MEDICAID

## 2025-03-18 ENCOUNTER — HOSPITAL ENCOUNTER (EMERGENCY)
Facility: HOSPITAL | Age: 40
Discharge: HOME | End: 2025-03-18
Attending: EMERGENCY MEDICINE | Admitting: EMERGENCY MEDICINE
Payer: MEDICAID

## 2025-03-18 ENCOUNTER — E-VISIT (OUTPATIENT)
Dept: ADMINISTRATIVE | Facility: OTHER | Age: 40
End: 2025-03-18
Payer: MEDICAID

## 2025-03-18 VITALS
OXYGEN SATURATION: 99 % | WEIGHT: 184.7 LBS | TEMPERATURE: 97.7 F | HEIGHT: 63 IN | BODY MASS INDEX: 32.73 KG/M2 | SYSTOLIC BLOOD PRESSURE: 136 MMHG | DIASTOLIC BLOOD PRESSURE: 82 MMHG | HEART RATE: 89 BPM | RESPIRATION RATE: 18 BRPM

## 2025-03-18 DIAGNOSIS — N12 PYELONEPHRITIS: Primary | ICD-10-CM

## 2025-03-18 LAB
B-HCG UR QL: NEGATIVE
BILIRUB UR QL STRIP: NEGATIVE
CLARITY UR: CLEAR
COLOR UR: ABNORMAL
GLUCOSE UR STRIP-MCNC: ABNORMAL MG/DL
HGB UR QL STRIP.AUTO: ABNORMAL
KETONES UR QL STRIP: NEGATIVE
LEUKOCYTE ESTERASE UR QL STRIP.AUTO: NEGATIVE
NITRITE UR QL STRIP: POSITIVE
PH UR STRIP.AUTO: 5.5 [PH] (ref 5–8)
PROT UR QL STRIP: NEGATIVE
SP GR UR STRIP: >=1.03 (ref 1–1.03)
UROBILINOGEN UR QL STRIP: ABNORMAL

## 2025-03-18 PROCEDURE — 74176 CT ABD & PELVIS W/O CONTRAST: CPT

## 2025-03-18 PROCEDURE — 81003 URINALYSIS AUTO W/O SCOPE: CPT

## 2025-03-18 PROCEDURE — 99284 EMERGENCY DEPT VISIT MOD MDM: CPT

## 2025-03-18 PROCEDURE — 81025 URINE PREGNANCY TEST: CPT

## 2025-03-18 RX ORDER — IBUPROFEN 600 MG/1
600 TABLET, FILM COATED ORAL EVERY 8 HOURS PRN
Qty: 15 TABLET | Refills: 0 | Status: SHIPPED | OUTPATIENT
Start: 2025-03-18

## 2025-03-18 RX ORDER — ONDANSETRON 4 MG/1
4 TABLET, ORALLY DISINTEGRATING ORAL EVERY 8 HOURS PRN
Qty: 12 TABLET | Refills: 0 | Status: SHIPPED | OUTPATIENT
Start: 2025-03-18

## 2025-03-18 RX ORDER — CEPHALEXIN 500 MG/1
500 CAPSULE ORAL ONCE
Status: COMPLETED | OUTPATIENT
Start: 2025-03-18 | End: 2025-03-18

## 2025-03-18 RX ORDER — CEPHALEXIN 500 MG/1
500 CAPSULE ORAL 3 TIMES DAILY
Qty: 30 CAPSULE | Refills: 0 | Status: SHIPPED | OUTPATIENT
Start: 2025-03-18 | End: 2025-03-28

## 2025-03-18 RX ADMIN — CEPHALEXIN 500 MG: 500 CAPSULE ORAL at 22:09

## 2025-03-18 NOTE — E-VISIT ESCALATED
Status: Referred Out  Date: 2025 15:24:51  Acuity Level: Within 8 hours  Referral message:  We're sorry you are not feeling well. Your safety is important to us. Because the back pain in the area you reported, along with the other symptoms, is a common symptom of a kidney infection, it is possible the infection spread to the   kidneys. For this reason, we would like to see you in person to determine the cause of your symptoms and the most effective treatment for you.  For the most appropriate care, please be seen:   At a clinic or urgent care  Within 8 hours   You won't be   charged for this visit. We hope you feel better soon!   Patient: Ariadna Ambrocio  Patient : 1985  Patient Address[facility] [place] [address]  Patient Phone: [phone]  Clinician Response: Unavailable  Diagnosis: Unavailable  Diagnosis ICD: Unavailable     Patient Interview Questions and Responses:  Clinical Protocol: UTI  Please select the reason for your visit today.: Urinary tract infection (UTI)  When did your symptoms start?: 1-3 days ago  Do you have any of the following symptoms? Pain or burning when urinating: No  Do you have any of the following symptoms? Urinating more often than usual: Yes  Do you have any of the following symptoms? A sudden urge to urinate: Yes  Do you have any of the following symptoms? Unable to hold urine: No  Do you have any of the following symptoms? Feeling like the bladder is never empty: Yes  Do you have any of the following symptoms? Foul-smelling urine: Yes  What color is your urine?: Yellow  Do you have any of the following vaginal symptoms? Discharge: No  Do you have any of the following vaginal symptoms? Itching: No  Do you have any of the following symptoms? Nausea: Yes  Do you have any of the following symptoms? Abdominal pain: Yes  Do you have any of the following symptoms? Vomiting: Yes  In the past 24 hours, have you vomited more than once?: No  Please rate the  severity of your abdominal pain on a pain scale, with 0 being no pain and 10 being the worst pain imaginable.: 4-6 = Moderate pain (interferes with normal daily activities)  A bladder infection can spread to the kidneys, which often results in pain or tenderness felt usually on one (and rarely both) sides of the mid-back. The pain is usually located on either side (between the ribs and hips) rather than the center of the   back. This kind of pain near your kidney can indicate a severe kidney infection.Since the symptoms started, have you had new pain in the flank or back area just beneath the ribs where the kidneys are located?: Yes, on both sides

## 2025-03-18 NOTE — Clinical Note
[facility] [place] [id number] [place] [address] [place] [address]  Phone: [phone]    Ariadna Ambrocio was seen and treated in our emergency department on 3/18/2025.  She may return to work on 03/20/2025.         Thank you for choosing [facility].    Allison Hendricks MD

## 2025-03-19 NOTE — FSED PROVIDER NOTE
Subjective   History of Present Illness  39 yof complains of flank pain an dysuria. The patient has had multiple kidney stones in the past and she notes it is hard for her to know the difference between a stone and a UTI. She denies fever, chills, nausea or vomiting.       Review of Systems   Constitutional: Negative.    Gastrointestinal: Negative.    Genitourinary:  Positive for dysuria, flank pain, frequency, hematuria and urgency.   Musculoskeletal:  Positive for back pain.   All other systems reviewed and are negative.      Past Medical History:   Diagnosis Date    Anxiety     Bulging lumbar disc     Constipation     Depression     Diarrhea     Elevated triglycerides with high cholesterol     GERD (gastroesophageal reflux disease)     Heartburn     Herniated cervical disc     Hiatal hernia     Hyperlipidemia     Hypertension     no meds after weight    IBS (irritable bowel syndrome)     Kidney stones     Nausea     Pre-diabetes     Rapid heart beat     Seasonal allergies        No Known Allergies    Past Surgical History:   Procedure Laterality Date    APPENDECTOMY N/A 3/13/2024    Procedure: APPENDECTOMY LAPAROSCOPIC;  Surgeon: Georges Chandler MD;  Location: [facility];  Service: General;  Laterality: N/A;    CHOLECYSTECTOMY N/A 6/4/2024    Procedure: CHOLECYSTECTOMY LAPAROSCOPIC WITH DAVINCI ROBOT;  Surgeon: Otilia Capone MD;  Location: [facility] OR;  Service: Robotics - DaVinci;  Laterality: N/A;    COLONOSCOPY      CYSTOSCOPY, URETEROSCOPY, RETROGRADE PYELOGRAM, STENT INSERTION Left 07/16/2021    Procedure: CYSTOSCOPY URETEROSCOPY RETROGRADE PYELOGRAM HOLMIUM LASER BASKET STONE EXTRACTION STENT INSERTION;  Surgeon: Carmelo Nguyễn MD;  Location: [facility] OR;  Service: Urology;  Laterality: Left;    CYSTOSCOPY, URETEROSCOPY, RETROGRADE PYELOGRAM, STENT INSERTION Left 3/13/2024    Procedure: CYSTOSCOPY URETEROSCOPY, STENT INSERTION, STONE EXTRACTION;  Surgeon: Carmelo Nguyễn MD;  Location: [facility]  OR;  Service: Urology;  Laterality: Left;    EAR TUBES      ENDOSCOPY N/A 2/27/2023    Procedure: ESOPHAGOGASTRODUODENOSCOPY with gastric antrum biopsy;  Surgeon: Otilia Capone MD;  Location: [facility] ENDOSCOPY;  Service: General;  Laterality: N/A;  Post: large hiatal hernia    ENDOSCOPY N/A 5/2/2024    Procedure: ESOPHAGOGASTRODUODENOSCOPY;  Surgeon: Otilia Capone MD;  Location: [facility] ENDOSCOPY;  Service: General;  Laterality: N/A;  normal    GASTRIC BYPASS N/A 9/27/2023    Procedure: GASTRIC BYPASS AND HIATAL HERNIA REPAIR LAPAROSCOPIC WITH DAVINCI ROBOT;  Surgeon: Otilia Capone MD;  Location: [facility] MAIN OR;  Service: Robotics - DaVinci;  Laterality: N/A;    KIDNEY STONE SURGERY      WISDOM TOOTH EXTRACTION         Family History   Problem Relation Age of Onset    Asthma Mother     Hypertension Mother     No Known Problems Father     Hypertension Maternal Grandmother     Heart attack Maternal Grandfather     Cancer Paternal Grandmother     Cancer Paternal Grandfather     Breast cancer Neg Hx     Ovarian cancer Neg Hx     Uterine cancer Neg Hx     Colon cancer Neg Hx     Deep vein thrombosis Neg Hx     Pulmonary embolism Neg Hx        Social History     Socioeconomic History    Marital status:    Tobacco Use    Smoking status: Never     Passive exposure: Past    Smokeless tobacco: Never   Vaping Use    Vaping status: Never Used   Substance and Sexual Activity    Alcohol use: Not Currently    Drug use: Never    Sexual activity: Defer           Objective   Physical Exam  Vitals reviewed.   Constitutional:       Appearance: Normal appearance.   HENT:      Head: Normocephalic and atraumatic.      Mouth/Throat:      Mouth: Mucous membranes are moist.      Pharynx: Oropharynx is clear.   Eyes:      Extraocular Movements: Extraocular movements intact.      Pupils: Pupils are equal, round, and reactive to light.   Cardiovascular:      Rate and Rhythm: Normal rate and regular rhythm.      Pulses: Normal pulses.       Heart sounds: Normal heart sounds.   Pulmonary:      Effort: Pulmonary effort is normal.      Breath sounds: Normal breath sounds.   Abdominal:      Tenderness: There is right CVA tenderness and left CVA tenderness.   Skin:     General: Skin is warm and dry.   Neurological:      Mental Status: She is alert.         Procedures           ED Course                                           Medical Decision Making  Patient presenting with flank/back pain and fever. Differential included UTI, pyelonephritis, diverticulitis, nephrolithiasis, appendicitis, cholangitis_ Also considered but less likely given history and physical exam included constipation, bowel perforation, gastritis, pancreatitis, mesenteric ischemia, genital torsion. Prescribed Keflex. Follow up with PMD this week. Return precautions given.      Problems Addressed:  Pyelonephritis: complicated acute illness or injury    Amount and/or Complexity of Data Reviewed  Radiology: ordered.    Risk  Prescription drug management.        Final diagnoses:   Pyelonephritis       ED Disposition  ED Disposition       ED Disposition   Discharge    Condition   Stable    Comment   --               Marifer Cooper MD  [address]  [address] [place] [address]  [phone]    Schedule an appointment as soon as possible for a visit on 3/21/2025           Medication List        New Prescriptions      cephalexin 500 MG capsule  Commonly known as: KEFLEX  Take 1 capsule by mouth 3 (Three) Times a Day for 10 days.     ibuprofen 600 MG tablet  Commonly known as: ADVIL,MOTRIN  Take 1 tablet by mouth Every 8 (Eight) Hours As Needed (pain).     ondansetron ODT 4 MG disintegrating tablet  Commonly known as: ZOFRAN-ODT  Place 1 tablet on the tongue Every 8 (Eight) Hours As Needed for Nausea.               Where to Get Your Medications        These medications were sent to [facility]/pharmacy #[id number] - [place], [place] - [address] [facility] [phone] [facility] [phone]   [address] [facility] [place] [address]      Phone: [phone]   cephalexin 500 MG capsule  ibuprofen 600 MG tablet  ondansetron ODT 4 MG disintegrating tablet

## 2025-03-19 NOTE — DISCHARGE INSTRUCTIONS
Drink plenty of fluids. Take medication as prescribed. Follow-up with your Doctor this week. Return if problems.

## 2025-04-14 ENCOUNTER — APPOINTMENT (OUTPATIENT)
Dept: CT IMAGING | Facility: HOSPITAL | Age: 40
End: 2025-04-14
Payer: MEDICAID

## 2025-04-14 ENCOUNTER — HOSPITAL ENCOUNTER (EMERGENCY)
Facility: HOSPITAL | Age: 40
Discharge: HOME | End: 2025-04-14
Admitting: EMERGENCY MEDICINE
Payer: MEDICAID

## 2025-04-14 VITALS
DIASTOLIC BLOOD PRESSURE: 73 MMHG | RESPIRATION RATE: 18 BRPM | OXYGEN SATURATION: 94 % | HEART RATE: 72 BPM | WEIGHT: 184.3 LBS | TEMPERATURE: 98.2 F | HEIGHT: 63 IN | BODY MASS INDEX: 32.66 KG/M2 | SYSTOLIC BLOOD PRESSURE: 120 MMHG

## 2025-04-14 DIAGNOSIS — R10.9 FLANK PAIN: ICD-10-CM

## 2025-04-14 DIAGNOSIS — R11.0 NAUSEA WITHOUT VOMITING: ICD-10-CM

## 2025-04-14 DIAGNOSIS — R10.10 UPPER ABDOMINAL PAIN: Primary | ICD-10-CM

## 2025-04-14 DIAGNOSIS — R31.9 HEMATURIA, UNSPECIFIED TYPE: ICD-10-CM

## 2025-04-14 LAB
ALBUMIN SERPL-MCNC: 4.3 G/DL (ref 3.5–5.2)
ALBUMIN/GLOB SERPL: 2 G/DL
ALP SERPL-CCNC: 104 U/L (ref 39–117)
ALT SERPL W P-5'-P-CCNC: 22 U/L (ref 1–33)
ANION GAP SERPL CALCULATED.3IONS-SCNC: 10.3 MMOL/L (ref 5–15)
AST SERPL-CCNC: 25 U/L (ref 1–32)
B-HCG UR QL: NEGATIVE
BACTERIA UR QL AUTO: ABNORMAL /HPF
BASOPHILS # BLD AUTO: 0.04 10*3/MM3 (ref 0–0.2)
BASOPHILS NFR BLD AUTO: 0.4 % (ref 0–1.5)
BILIRUB SERPL-MCNC: 1 MG/DL (ref 0–1.2)
BILIRUB UR QL STRIP: NEGATIVE
BUN SERPL-MCNC: 9 MG/DL (ref 6–20)
BUN/CREAT SERPL: 12.2 (ref 7–25)
CALCIUM SPEC-SCNC: 9.4 MG/DL (ref 8.6–10.5)
CHLORIDE SERPL-SCNC: 108 MMOL/L (ref 98–107)
CLARITY UR: CLEAR
CO2 SERPL-SCNC: 22.7 MMOL/L (ref 22–29)
COLOR UR: ABNORMAL
CREAT SERPL-MCNC: 0.74 MG/DL (ref 0.57–1)
DEPRECATED RDW RBC AUTO: 46.9 FL (ref 37–54)
EGFRCR SERPLBLD CKD-EPI 2021: 105.7 ML/MIN/1.73
EOSINOPHIL # BLD AUTO: 0.1 10*3/MM3 (ref 0–0.4)
EOSINOPHIL NFR BLD AUTO: 1.1 % (ref 0.3–6.2)
ERYTHROCYTE [DISTWIDTH] IN BLOOD BY AUTOMATED COUNT: 13.6 % (ref 12.3–15.4)
GLOBULIN UR ELPH-MCNC: 2.2 GM/DL
GLUCOSE SERPL-MCNC: 101 MG/DL (ref 65–99)
GLUCOSE UR STRIP-MCNC: NEGATIVE MG/DL
HCT VFR BLD AUTO: 34.7 % (ref 34–46.6)
HGB BLD-MCNC: 11 G/DL (ref 12–15.9)
HGB UR QL STRIP.AUTO: ABNORMAL
HYALINE CASTS UR QL AUTO: ABNORMAL /LPF
IMM GRANULOCYTES # BLD AUTO: 0.04 10*3/MM3 (ref 0–0.05)
IMM GRANULOCYTES NFR BLD AUTO: 0.4 % (ref 0–0.5)
KETONES UR QL STRIP: NEGATIVE
LEUKOCYTE ESTERASE UR QL STRIP.AUTO: ABNORMAL
LIPASE SERPL-CCNC: 33 U/L (ref 13–60)
LYMPHOCYTES # BLD AUTO: 2.96 10*3/MM3 (ref 0.7–3.1)
LYMPHOCYTES NFR BLD AUTO: 31.8 % (ref 19.6–45.3)
MCH RBC QN AUTO: 30 PG (ref 26.6–33)
MCHC RBC AUTO-ENTMCNC: 31.7 G/DL (ref 31.5–35.7)
MCV RBC AUTO: 94.6 FL (ref 79–97)
MONOCYTES # BLD AUTO: 0.72 10*3/MM3 (ref 0.1–0.9)
MONOCYTES NFR BLD AUTO: 7.7 % (ref 5–12)
NEUTROPHILS NFR BLD AUTO: 5.45 10*3/MM3 (ref 1.7–7)
NEUTROPHILS NFR BLD AUTO: 58.6 % (ref 42.7–76)
NITRITE UR QL STRIP: POSITIVE
NRBC BLD AUTO-RTO: 0 /100 WBC (ref 0–0.2)
PH UR STRIP.AUTO: 5.5 [PH] (ref 5–8)
PLATELET # BLD AUTO: 306 10*3/MM3 (ref 140–450)
PMV BLD AUTO: 9.6 FL (ref 6–12)
POTASSIUM SERPL-SCNC: 4.3 MMOL/L (ref 3.5–5.2)
PROT SERPL-MCNC: 6.5 G/DL (ref 6–8.5)
PROT UR QL STRIP: NEGATIVE
RBC # BLD AUTO: 3.67 10*6/MM3 (ref 3.77–5.28)
RBC # UR STRIP: ABNORMAL /HPF
REF LAB TEST METHOD: ABNORMAL
SODIUM SERPL-SCNC: 141 MMOL/L (ref 136–145)
SP GR UR STRIP: 1.01 (ref 1–1.03)
SQUAMOUS #/AREA URNS HPF: ABNORMAL /HPF
UROBILINOGEN UR QL STRIP: ABNORMAL
WBC # UR STRIP: ABNORMAL /HPF
WBC NRBC COR # BLD AUTO: 9.31 10*3/MM3 (ref 3.4–10.8)

## 2025-04-14 PROCEDURE — 25810000003 SODIUM CHLORIDE 0.9 % SOLUTION

## 2025-04-14 PROCEDURE — 81025 URINE PREGNANCY TEST: CPT

## 2025-04-14 PROCEDURE — 25010000002 MORPHINE PER 10 MG

## 2025-04-14 PROCEDURE — 74177 CT ABD & PELVIS W/CONTRAST: CPT

## 2025-04-14 PROCEDURE — 99285 EMERGENCY DEPT VISIT HI MDM: CPT

## 2025-04-14 PROCEDURE — 83690 ASSAY OF LIPASE: CPT

## 2025-04-14 PROCEDURE — 25010000002 ONDANSETRON PER 1 MG

## 2025-04-14 PROCEDURE — 80053 COMPREHEN METABOLIC PANEL: CPT

## 2025-04-14 PROCEDURE — 96374 THER/PROPH/DIAG INJ IV PUSH: CPT

## 2025-04-14 PROCEDURE — 85025 COMPLETE CBC W/AUTO DIFF WBC: CPT

## 2025-04-14 PROCEDURE — 96375 TX/PRO/DX INJ NEW DRUG ADDON: CPT

## 2025-04-14 PROCEDURE — 25510000001 IOPAMIDOL PER 1 ML

## 2025-04-14 PROCEDURE — 81001 URINALYSIS AUTO W/SCOPE: CPT

## 2025-04-14 RX ORDER — ONDANSETRON 4 MG/1
4 TABLET, ORALLY DISINTEGRATING ORAL EVERY 8 HOURS PRN
Qty: 15 TABLET | Refills: 0 | Status: SHIPPED | OUTPATIENT
Start: 2025-04-14 | End: 2025-04-19

## 2025-04-14 RX ORDER — MORPHINE SULFATE 2 MG/ML
2 INJECTION, SOLUTION INTRAMUSCULAR; INTRAVENOUS ONCE
Status: COMPLETED | OUTPATIENT
Start: 2025-04-14 | End: 2025-04-14

## 2025-04-14 RX ORDER — ONDANSETRON 2 MG/ML
4 INJECTION INTRAMUSCULAR; INTRAVENOUS ONCE
Status: COMPLETED | OUTPATIENT
Start: 2025-04-14 | End: 2025-04-14

## 2025-04-14 RX ORDER — SODIUM CHLORIDE 0.9 % (FLUSH) 0.9 %
10 SYRINGE (ML) INJECTION AS NEEDED
Status: DISCONTINUED | OUTPATIENT
Start: 2025-04-14 | End: 2025-04-14 | Stop reason: HOSPADM

## 2025-04-14 RX ORDER — IOPAMIDOL 755 MG/ML
100 INJECTION, SOLUTION INTRAVASCULAR
Status: COMPLETED | OUTPATIENT
Start: 2025-04-14 | End: 2025-04-14

## 2025-04-14 RX ORDER — HYDROCODONE BITARTRATE AND ACETAMINOPHEN 5; 325 MG/1; MG/1
1 TABLET ORAL EVERY 6 HOURS PRN
Qty: 12 TABLET | Refills: 0 | Status: SHIPPED | OUTPATIENT
Start: 2025-04-14 | End: 2025-04-17

## 2025-04-14 RX ADMIN — ONDANSETRON 4 MG: 2 INJECTION, SOLUTION INTRAMUSCULAR; INTRAVENOUS at 15:03

## 2025-04-14 RX ADMIN — SODIUM CHLORIDE 500 ML: 9 INJECTION, SOLUTION INTRAVENOUS at 15:02

## 2025-04-14 RX ADMIN — IOPAMIDOL 100 ML: 755 INJECTION, SOLUTION INTRAVENOUS at 15:37

## 2025-04-14 RX ADMIN — MORPHINE SULFATE 2 MG: 2 INJECTION, SOLUTION INTRAMUSCULAR; INTRAVENOUS at 15:03

## 2025-04-14 NOTE — ED PROVIDER NOTES
Subjective   History of Present Illness  39-year-old female with history of kidney stones and hematuria presents the ED today with complaints of worsening hematuria since 3/16/2025 along with associated bilateral upper abdominal pain, bilateral flank pain, intermittent dysuria, nausea without vomiting and intermittent chills.  Patient was started on Keflex on 3/18/2025 and finished this but was still having symptoms.  She was then started on ciprofloxacin on 4/2/2025 and finished these antibiotics.  She continued to have symptoms and then was started on Augmentin, still has a few doses left of this medication.  She thought today she had a follow-up with urology however it is next Monday so she came to the ER for ongoing symptoms.  Denies vomiting, vaginal complaints, fever, dizziness or syncope, numbness or tingling.  Reports she has ongoing hematuria that was microscopic but now has become large amounts of blood whenever she urinates.  She has a scheduled appointment with Dr. Nguyễn 4/21/25.  Reports she cannot use NSAIDs due to history of gastric sleeve in 2023, has been using Tylenol with very little relief    PCP: Kenneth        Review of Systems   Constitutional:  Positive for chills. Negative for fever.   Respiratory:  Negative for shortness of breath.    Cardiovascular:  Negative for chest pain.   Gastrointestinal:  Positive for abdominal pain and nausea. Negative for blood in stool and vomiting.   Genitourinary:  Positive for dysuria, flank pain and hematuria. Negative for frequency.       Past Medical History:   Diagnosis Date    Anxiety     Bulging lumbar disc     Constipation     Depression     Diarrhea     Elevated triglycerides with high cholesterol     GERD (gastroesophageal reflux disease)     Heartburn     Herniated cervical disc     Hiatal hernia     Hyperlipidemia     Hypertension     no meds after weight    IBS (irritable bowel syndrome)     Kidney stones     Nausea     Pre-diabetes     Rapid heart  beat     Seasonal allergies        No Known Allergies    Past Surgical History:   Procedure Laterality Date    APPENDECTOMY N/A 3/13/2024    Procedure: APPENDECTOMY LAPAROSCOPIC;  Surgeon: Georges Cahndler MD;  Location: [facility] MAIN OR;  Service: General;  Laterality: N/A;    CHOLECYSTECTOMY N/A 6/4/2024    Procedure: CHOLECYSTECTOMY LAPAROSCOPIC WITH DAVINCI ROBOT;  Surgeon: Otilia Capone MD;  Location: [facility] MAIN OR;  Service: Robotics - DaVinci;  Laterality: N/A;    COLONOSCOPY      CYSTOSCOPY, URETEROSCOPY, RETROGRADE PYELOGRAM, STENT INSERTION Left 07/16/2021    Procedure: CYSTOSCOPY URETEROSCOPY RETROGRADE PYELOGRAM HOLMIUM LASER BASKET STONE EXTRACTION STENT INSERTION;  Surgeon: Carmelo Nguyễn MD;  Location: [facility] MAIN OR;  Service: Urology;  Laterality: Left;    CYSTOSCOPY, URETEROSCOPY, RETROGRADE PYELOGRAM, STENT INSERTION Left 3/13/2024    Procedure: CYSTOSCOPY URETEROSCOPY, STENT INSERTION, STONE EXTRACTION;  Surgeon: Carmelo Nguyễn MD;  Location: [facility] MAIN OR;  Service: Urology;  Laterality: Left;    EAR TUBES      ENDOSCOPY N/A 2/27/2023    Procedure: ESOPHAGOGASTRODUODENOSCOPY with gastric antrum biopsy;  Surgeon: Otilia Capone MD;  Location: [facility] ENDOSCOPY;  Service: General;  Laterality: N/A;  Post: large hiatal hernia    ENDOSCOPY N/A 5/2/2024    Procedure: ESOPHAGOGASTRODUODENOSCOPY;  Surgeon: Otilia Capone MD;  Location: [facility] ENDOSCOPY;  Service: General;  Laterality: N/A;  normal    GASTRIC BYPASS N/A 9/27/2023    Procedure: GASTRIC BYPASS AND HIATAL HERNIA REPAIR LAPAROSCOPIC WITH DAVINCI ROBOT;  Surgeon: Otilia Capone MD;  Location: [facility] MAIN OR;  Service: Robotics - DaVinci;  Laterality: N/A;    KIDNEY STONE SURGERY      WISDOM TOOTH EXTRACTION         Family History   Problem Relation Age of Onset    Asthma Mother     Hypertension Mother     No Known Problems Father     Hypertension Maternal Grandmother     Heart attack Maternal Grandfather     Cancer Paternal Grandmother     Cancer  "Paternal Grandfather     Breast cancer Neg Hx     Ovarian cancer Neg Hx     Uterine cancer Neg Hx     Colon cancer Neg Hx     Deep vein thrombosis Neg Hx     Pulmonary embolism Neg Hx        Social History     Socioeconomic History    Marital status:    Tobacco Use    Smoking status: Never     Passive exposure: Past    Smokeless tobacco: Never   Vaping Use    Vaping status: Never Used   Substance and Sexual Activity    Alcohol use: Not Currently    Drug use: Never    Sexual activity: Defer           Objective   Physical Exam  Vitals reviewed.   HENT:      Head: Normocephalic.   Eyes:      Extraocular Movements: Extraocular movements intact.      Conjunctiva/sclera: Conjunctivae normal.   Cardiovascular:      Rate and Rhythm: Normal rate and regular rhythm.      Pulses: Normal pulses.      Heart sounds: Normal heart sounds.   Pulmonary:      Effort: Pulmonary effort is normal.      Breath sounds: Normal breath sounds.   Abdominal:      General: Bowel sounds are normal.      Palpations: Abdomen is soft.      Tenderness: There is abdominal tenderness. There is right CVA tenderness and left CVA tenderness.      Comments: Bilateral upper quadrant abdominal tenderness with palpation but no peritonitis or rigidity.  Mild bilateral CVA tenderness   Musculoskeletal:         General: Normal range of motion.   Skin:     General: Skin is warm.   Neurological:      Mental Status: She is alert and oriented to person, place, and time.   Psychiatric:         Mood and Affect: Mood normal.         Behavior: Behavior normal.         Procedures           ED Course  ED Course as of 04/14/25 1830   Mon Apr 14, 2025   1829 Inspect reviewed.  Patient had Percocet 10 mg tablets filled on 12/30/2024 at a quantity for 20 for 6 days [KB]      ED Course User Index  [KB] Heather Kelly, APRN      /73   Pulse 72   Temp 98.2 °F (36.8 °C) (Oral)   Resp 18   Ht 160 cm (63\")   Wt 83.6 kg (184 lb 4.9 oz)   SpO2 94%   BMI 32.65 " kg/m²   Labs Reviewed   COMPREHENSIVE METABOLIC PANEL - Abnormal; Notable for the following components:       Result Value    Glucose 101 (*)     Chloride 108 (*)     All other components within normal limits    Narrative:     GFR Categories in Chronic Kidney Disease (CKD)      GFR Category          GFR (mL/min/1.73)    Interpretation  G1                     90 or greater         Normal or high (1)  G2                      60-89                Mild decrease (1)  G3a                   45-59                Mild to moderate decrease  G3b                   30-44                Moderate to severe decrease  G4                    15-29                Severe decrease  G5                    14 or less           Kidney failure          (1)In the absence of evidence of kidney disease, neither GFR category G1 or G2 fulfill the criteria for CKD.    eGFR calculation 2021 CKD-EPI creatinine equation, which does not include race as a factor   URINALYSIS W/ CULTURE IF INDICATED - Abnormal; Notable for the following components:    Color, UA Orange (*)     Blood, UA Large (3+) (*)     Leuk Esterase, UA Trace (*)     Nitrite, UA Positive (*)     All other components within normal limits    Narrative:     In absence of clinical symptoms, the presence of pyuria, bacteria, and/or nitrites on the urinalysis result does not correlate with infection.   CBC WITH AUTO DIFFERENTIAL - Abnormal; Notable for the following components:    RBC 3.67 (*)     Hemoglobin 11.0 (*)     All other components within normal limits   URINALYSIS, MICROSCOPIC ONLY - Abnormal; Notable for the following components:    RBC, UA Too Numerous to Count (*)     WBC, UA 3-5 (*)     Squamous Epithelial Cells, UA 3-6 (*)     All other components within normal limits   PREGNANCY, URINE - Normal   LIPASE - Normal   CBC AND DIFFERENTIAL    Narrative:     The following orders were created for panel order CBC & Differential.  Procedure                               Abnormality          Status                     ---------                               -----------         ------                     CBC Auto Differential[[id number]]        Abnormal            Final result                 Please view results for these tests on the individual orders.     Medications   sodium chloride 0.9 % flush 10 mL (has no administration in time range)   ondansetron (ZOFRAN) injection 4 mg (4 mg Intravenous Given 4/14/25 1503)   morphine injection 2 mg (2 mg Intravenous Given 4/14/25 1503)   sodium chloride 0.9 % bolus 500 mL (0 mL Intravenous Stopped 4/14/25 1724)   iopamidol (ISOVUE-370) 76 % injection 100 mL (100 mL Intravenous Given 4/14/25 1537)     CT Abdomen Pelvis With Contrast  Result Date: 4/14/2025  Impression: 1.No acute abnormality identified in the abdomen or pelvis. 2.Gastric postoperative changes. 3.Intrauterine device which appears to be appropriately positioned. Electronically Signed: Martín Dhillon MD  4/14/2025 3:44 PM EDT  Workstation ID: [id number]                                                     Medical Decision Making  Patient was seen for above complaints.  IV was established and blood work was obtained to assess for electrolyte abnormalities, hemoglobin, infection.  White blood cell count 9.31, hemoglobin 11 around baseline, electrolytes fairly within normal limits, lipase 33.  Urinalysis shows a large amount of blood and nitrite positive however no bacteria seen, patient is currently on Augmentin at this time.  Abdominal CT was independently interpreted by the radiologist as:  1.No acute abnormality identified in the abdomen or pelvis.   2.Gastric postoperative changes.   3.Intrauterine device which appears to be appropriately positioned.     Patient is given morphine, Zofran and IV fluids during ER course.  On reassessment reports she feels moderately better at this time.  Do not see signs of pyelonephritis as there is no bacteria in the urine, no leukocytosis or tachycardia  present.  Patient discharged with Zofran as needed for nausea vomiting and quantity of Norco as needed for pain as she is unable to take NSAIDs due to history of gastric sleeve surgery.  Instructed to follow-up with urology as scheduled and primary care as soon as possible for recheck.  Also instructed to continue Augmentin until gone and push clear fluids.  Discussed return precautions such as fever or difficulty urinating.  Patient verbalized understanding and was agreeable plan of care at this time.  Discussed with Dr. Jorgensen who agrees with plan of care    I discussed findings with patient who voices understanding of discharge instructions, signs and symptoms requiring return to ED; discharged improved and in stable condition with follow up for re-evaluation.  This document is intended for medical expert use only. Reading of this document by patients and/or patient's family without participating medical staff guidance may result in misinterpretation and unintended morbidity.  Any interpretation of such data is the responsibility of the patient and/or family member responsible for the patient in concert with their primary or specialist providers, not to be left for sources of online searches such as "[employer] or similar queries. Relying on these approaches to knowledge may result in misinterpretation, misguided goals of care and even death should patients or family members try recommendations outside of the realm of professional medical care in a supervised inpatient environment.       Problems Addressed:  Flank pain: complicated acute illness or injury  Hematuria, unspecified type: complicated acute illness or injury  Nausea without vomiting: complicated acute illness or injury  Upper abdominal pain: complicated acute illness or injury    Amount and/or Complexity of Data Reviewed  Labs: ordered. Decision-making details documented in ED Course.  Radiology: ordered and independent interpretation performed.  Decision-making details documented in ED Course.    Risk  Prescription drug management.        Final diagnoses:   Upper abdominal pain   Flank pain   Hematuria, unspecified type   Nausea without vomiting       ED Disposition  ED Disposition       ED Disposition   Discharge    Condition   Stable    Comment   --               Marifer Cooper MD  [address]  [address] [place], [place] [address] [phone]    Schedule an appointment as soon as possible for a visit       Carmelo Nguyễn MD  [address] [place] [address] [place] [address] [phone]    Schedule an appointment as soon as possible for a visit            Medication List        New Prescriptions      HYDROcodone-acetaminophen 5-325 MG per tablet  Commonly known as: NORCO  Take 1 tablet by mouth Every 6 (Six) Hours As Needed for Severe Pain for up to 3 days.            Changed      ondansetron ODT 4 MG disintegrating tablet  Commonly known as: ZOFRAN-ODT  Place 1 tablet on the tongue Every 8 (Eight) Hours As Needed for Nausea or Vomiting for up to 5 days.  What changed: reasons to take this            Stop      azithromycin 250 MG tablet  Commonly known as: Zithromax               Where to Get Your Medications        These medications were sent to [facility]/pharmacy #[id number] - [place], [place] - [address] [facility] [phone] [facility] [phone]   [address] [facility] [place] [address]      Phone: [phone]   HYDROcodone-acetaminophen 5-325 MG per tablet  ondansetron ODT 4 MG disintegrating tablet            Heather Kelly, APRN  04/14/25 1729       Heather Kelly, APRMARTHA  04/14/25 183

## 2025-04-14 NOTE — DISCHARGE INSTRUCTIONS
Push clear fluids for hydration.  May use Norco as needed for pain, Zofran as needed for nausea and vomiting.  Finish your Augmentin that was previously prescribed    Follow-up with urology as scheduled.  Also follow-up with primary care as soon as possible for recheck    Return with any new or worsening symptoms

## 2025-04-30 ENCOUNTER — OFFICE VISIT (OUTPATIENT)
Dept: CARDIOLOGY | Facility: CLINIC | Age: 40
End: 2025-04-30
Payer: MEDICAID

## 2025-04-30 VITALS
SYSTOLIC BLOOD PRESSURE: 113 MMHG | HEIGHT: 63 IN | OXYGEN SATURATION: 99 % | BODY MASS INDEX: 33.84 KG/M2 | WEIGHT: 191 LBS | HEART RATE: 70 BPM | DIASTOLIC BLOOD PRESSURE: 77 MMHG

## 2025-04-30 DIAGNOSIS — I47.10 PAROXYSMAL SVT (SUPRAVENTRICULAR TACHYCARDIA): Primary | ICD-10-CM

## 2025-04-30 DIAGNOSIS — I10 PRIMARY HYPERTENSION: ICD-10-CM

## 2025-04-30 PROCEDURE — 3078F DIAST BP <80 MM HG: CPT | Performed by: INTERNAL MEDICINE

## 2025-04-30 PROCEDURE — 99213 OFFICE O/P EST LOW 20 MIN: CPT | Performed by: INTERNAL MEDICINE

## 2025-04-30 PROCEDURE — 3074F SYST BP LT 130 MM HG: CPT | Performed by: INTERNAL MEDICINE

## 2025-04-30 PROCEDURE — 1159F MED LIST DOCD IN RCRD: CPT | Performed by: INTERNAL MEDICINE

## 2025-04-30 PROCEDURE — 1160F RVW MEDS BY RX/DR IN RCRD: CPT | Performed by: INTERNAL MEDICINE

## 2025-04-30 RX ORDER — LINACLOTIDE 290 UG/1
290 CAPSULE, GELATIN COATED ORAL
COMMUNITY
Start: 2025-02-20

## 2025-04-30 RX ORDER — NITROFURANTOIN MACROCRYSTALS 50 MG/1
1 CAPSULE ORAL DAILY
COMMUNITY
Start: 2025-04-16

## 2025-04-30 NOTE — PROGRESS NOTES
Subjective:     Encounter Date:04/30/2025      Patient ID: Ariadna Ambrocio is a 39 y.o. female.    Chief Complaint:  History of Present Illness 39-year-old white female with history of paroxysmal supraventricular tachycardia in the past and hypertension presents to the office for follow-up.  Patient is currently stable without any symptoms of chest pain or shortness of breath at rest or exertion.  No complaint of any PND or orthopnea.  No palpitations dizziness syncope or swelling of the feet.  Patient is taking all the medicines regularly.  Patient does not smoke.    The following portions of the patient's history were reviewed and updated as appropriate: allergies, current medications, past family history, past medical history, past social history, past surgical history, and problem list.  Past Medical History:   Diagnosis Date    Anemia 09/27/2023    Anxiety     Bulging lumbar disc     Constipation     Depression     Diarrhea     Elevated triglycerides with high cholesterol     GERD (gastroesophageal reflux disease)     Heartburn     Herniated cervical disc     Hiatal hernia     Hyperlipidemia     Hypertension     no meds after weight    IBS (irritable bowel syndrome)     Kidney stones     Nausea     Pre-diabetes     Rapid heart beat     Seasonal allergies      Past Surgical History:   Procedure Laterality Date    APPENDECTOMY N/A 3/13/2024    Procedure: APPENDECTOMY LAPAROSCOPIC;  Surgeon: Georges Chandler MD;  Location: [facility] OR;  Service: General;  Laterality: N/A;    CHOLECYSTECTOMY N/A 6/4/2024    Procedure: CHOLECYSTECTOMY LAPAROSCOPIC WITH DAVINCI ROBOT;  Surgeon: Otilia Capone MD;  Location: [facility] MAIN OR;  Service: Robotics - DaVinci;  Laterality: N/A;    COLONOSCOPY      CYSTOSCOPY, URETEROSCOPY, RETROGRADE PYELOGRAM, STENT INSERTION Left 07/16/2021    Procedure: CYSTOSCOPY URETEROSCOPY RETROGRADE PYELOGRAM HOLMIUM LASER BASKET STONE EXTRACTION STENT INSERTION;  Surgeon: Carmelo Nguyễn MD;   "Location: [facility] MAIN OR;  Service: Urology;  Laterality: Left;    CYSTOSCOPY, URETEROSCOPY, RETROGRADE PYELOGRAM, STENT INSERTION Left 3/13/2024    Procedure: CYSTOSCOPY URETEROSCOPY, STENT INSERTION, STONE EXTRACTION;  Surgeon: Carmelo Nguyễn MD;  Location: [facility] MAIN OR;  Service: Urology;  Laterality: Left;    EAR TUBES      ENDOSCOPY N/A 2/27/2023    Procedure: ESOPHAGOGASTRODUODENOSCOPY with gastric antrum biopsy;  Surgeon: Otilia Capone MD;  Location: [facility] ENDOSCOPY;  Service: General;  Laterality: N/A;  Post: large hiatal hernia    ENDOSCOPY N/A 5/2/2024    Procedure: ESOPHAGOGASTRODUODENOSCOPY;  Surgeon: Otilia Capone MD;  Location: [facility] ENDOSCOPY;  Service: General;  Laterality: N/A;  normal    GASTRIC BYPASS N/A 9/27/2023    Procedure: GASTRIC BYPASS AND HIATAL HERNIA REPAIR LAPAROSCOPIC WITH DAVINCI ROBOT;  Surgeon: Otilia Capone MD;  Location: [facility] MAIN OR;  Service: Robotics - DaVinci;  Laterality: N/A;    KIDNEY STONE SURGERY      WISDOM TOOTH EXTRACTION       /77   Pulse 70   Ht 160 cm (63\")   Wt 86.6 kg (191 lb)   SpO2 99%   BMI 33.83 kg/m²   Family History   Problem Relation Age of Onset    Asthma Mother     Hypertension Mother     No Known Problems Father     Hypertension Maternal Grandmother     Heart attack Maternal Grandfather     Cancer Maternal Grandfather     Cancer Paternal Grandmother     Cancer Paternal Grandfather     Breast cancer Neg Hx     Ovarian cancer Neg Hx     Uterine cancer Neg Hx     Colon cancer Neg Hx     Deep vein thrombosis Neg Hx     Pulmonary embolism Neg Hx        Current Outpatient Medications:     albuterol sulfate  (90 Base) MCG/ACT inhaler, Inhale 2 puffs Every 4 (Four) Hours As Needed., Disp: , Rfl:     Linzess 290 MCG capsule capsule, 1 capsule Every Morning Before Breakfast., Disp: , Rfl:     nitrofurantoin (MACRODANTIN) 50 MG capsule, Take 1 capsule by mouth Daily. Frequent UTI's, Disp: , Rfl:     Potassium Citrate ER 15 MEQ (1620 MG) tablet " controlled-release, , Disp: , Rfl:   No Known Allergies  Social History     Socioeconomic History    Marital status:    Tobacco Use    Smoking status: Never     Passive exposure: Past    Smokeless tobacco: Never   Vaping Use    Vaping status: Never Used   Substance and Sexual Activity    Alcohol use: Not Currently    Drug use: Never    Sexual activity: Defer     Review of Systems   Constitutional: Negative for malaise/fatigue.   Cardiovascular:  Negative for chest pain, leg swelling and palpitations.   Respiratory:  Negative for shortness of breath.    Skin:  Negative for rash.   Neurological:  Negative for dizziness, light-headedness and numbness.              Objective:     Constitutional:       Appearance: Well-developed.   Eyes:      General: No scleral icterus.     Conjunctiva/sclera: Conjunctivae normal.   HENT:      Head: Normocephalic and atraumatic.   Neck:      Vascular: No carotid bruit or JVD.   Pulmonary:      Effort: Pulmonary effort is normal.      Breath sounds: Normal breath sounds. No wheezing. No rales.   Cardiovascular:      Normal rate. Regular rhythm.   Pulses:     Intact distal pulses.   Abdominal:      General: Bowel sounds are normal.      Palpations: Abdomen is soft.   Musculoskeletal:      Cervical back: Normal range of motion and neck supple. Skin:     General: Skin is warm and dry.      Findings: No rash.   Neurological:      Mental Status: Alert.       Procedures    Lab Review:         MDM    #1 paroxysmal SVT  Patient has paroxysmal SVT and is currently stable.  Patient was on beta-blockers in the past but she does not take anymore because of low blood pressure and is not having any arrhythmias at this time.    2.  Hypertension  Patient has history of hypertension but is not on any medicines because her blood pressure is stable now without any medicines.    Patient's previous medical records, labs, and EKG were reviewed and discussed with the patient at today's visit.

## 2025-07-14 ENCOUNTER — OFFICE VISIT (OUTPATIENT)
Dept: BARIATRICS/WEIGHT MGMT | Facility: CLINIC | Age: 40
End: 2025-07-14
Payer: OTHER GOVERNMENT

## 2025-07-14 VITALS
BODY MASS INDEX: 33.13 KG/M2 | WEIGHT: 187 LBS | OXYGEN SATURATION: 99 % | SYSTOLIC BLOOD PRESSURE: 110 MMHG | HEART RATE: 78 BPM | DIASTOLIC BLOOD PRESSURE: 74 MMHG | HEIGHT: 63 IN

## 2025-07-14 DIAGNOSIS — T73.0XXA HUNGER PAIN, INITIAL ENCOUNTER: ICD-10-CM

## 2025-07-14 DIAGNOSIS — T73.0XXS HUNGER PAIN, SEQUELA: ICD-10-CM

## 2025-07-14 DIAGNOSIS — E66.811 OBESITY, CLASS I, BMI 30-34.9: ICD-10-CM

## 2025-07-14 DIAGNOSIS — Z98.84 H/O GASTRIC BYPASS: Primary | ICD-10-CM

## 2025-07-14 RX ORDER — PHENTERMINE HYDROCHLORIDE 37.5 MG/1
37.5 TABLET ORAL
Qty: 60 TABLET | Refills: 0 | Status: SHIPPED | OUTPATIENT
Start: 2025-07-14

## 2025-07-14 RX ORDER — ONDANSETRON 8 MG/1
8 TABLET, FILM COATED ORAL EVERY 8 HOURS PRN
Qty: 30 TABLET | Refills: 2 | Status: SHIPPED | OUTPATIENT
Start: 2025-07-14

## 2025-07-14 RX ORDER — ONDANSETRON 4 MG/1
4 TABLET, FILM COATED ORAL EVERY 8 HOURS PRN
COMMUNITY
Start: 2025-07-09 | End: 2025-07-14

## 2025-07-14 NOTE — PROGRESS NOTES
[facility]  [address] [place] [address]  [address] [place] [address]  Dept: [phone]  7/14/2025      Ariadna Ambrocio.  [id number]  [id number]  1985  female    Date of last surgery: 6/4/2024Cholecystectomy Laparoscopic With Davinci Robot      Chief Complaint: BH Post-Op Bariatric Surgery:   Ariadna Ambrocio is status post procedure listed above  HPI:     Wt Readings from Last 10 Encounters:   07/14/25 84.8 kg (187 lb)   04/30/25 86.6 kg (191 lb)   04/14/25 83.6 kg (184 lb 4.9 oz)   03/18/25 83.8 kg (184 lb 11.2 oz)   02/11/25 78.5 kg (173 lb)   01/08/25 82 kg (180 lb 12.4 oz)   12/09/24 83.4 kg (183 lb 14.4 oz)   10/09/24 84.4 kg (186 lb 1.1 oz)   09/09/24 82.6 kg (182 lb 3.2 oz)   06/10/24 83.5 kg (184 lb 1.6 oz)        Today's weight is 84.8 kg (187 lb) pounds,BMI 33.13 has a  gain of 5 pounds since the last visit and  weight loss since surgery is 55 pounds. The patient reports a decreased portion size and loss of appetite.      Ariadna Ambrocio reportsnausea but thinks she is passing a kidney stone ,      Diet and Exercise: Diet history reviewed and discussed with the patient. Weight loss/gains to date discussed with the patient.     She reports eating 3 meals per day, a typical portion size of 1/2-1 cup, eating 1-2 snacks per day, drinking 8 or more 8-oz. glasses of water per day, some carbonated beverage consumption and exercising minimally recently.       The patient states they are eating 60 grams of protein per day.     Protein shake in coffee - premier   Lunch: eating meat first   Dinner: chicken, red meat   Drinking : real sodas , water   Snacks: chips, slider foods   Exercise: treadmill, has not been exercising much recently     No multi vitamin     Review of Systems   Constitutional:  Positive for activity change and appetite change.   Respiratory: Negative.     Cardiovascular: Negative.     Gastrointestinal:  Positive for nausea.        Related to kidney stone    Genitourinary:         Kidney stone hx , thinks she has a kidney stone now    Musculoskeletal: Negative.          Patient Active Problem List   Diagnosis    Dyspnea on exertion    Paresthesia    Hypertension    Bradycardia, sinus    Paroxysmal SVT (supraventricular tachycardia)    Gastroesophageal reflux disease    At risk for dehydration due to poor fluid intake    H/O gastric bypass    Epigastric pain       Past Medical History:   Diagnosis Date    Anemia 09/27/2023    Anxiety     Bulging lumbar disc     Constipation     Depression     Diarrhea     Elevated triglycerides with high cholesterol     GERD (gastroesophageal reflux disease)     Heartburn     Herniated cervical disc     Hiatal hernia     Hyperlipidemia     Hypertension     no meds after weight    IBS (irritable bowel syndrome)     Kidney stones     Nausea     Pre-diabetes     Rapid heart beat     Seasonal allergies      Past Surgical History:   Procedure Laterality Date    CYSTOSCOPY, URETEROSCOPY, RETROGRADE PYELOGRAM, STENT INSERTION Left 07/16/2021    Procedure: CYSTOSCOPY URETEROSCOPY RETROGRADE PYELOGRAM HOLMIUM LASER BASKET STONE EXTRACTION STENT INSERTION;  Surgeon: Carmelo Nguyễn MD;  Location: [facility] OR;  Service: Urology;  Laterality: Left;    ENDOSCOPY N/A 2/27/2023    Procedure: ESOPHAGOGASTRODUODENOSCOPY with gastric antrum biopsy;  Surgeon: Otilia Capone MD;  Location: [facility] ENDOSCOPY;  Service: General;  Laterality: N/A;  Post: large hiatal hernia    GASTRIC BYPASS N/A 9/27/2023    Procedure: GASTRIC BYPASS AND HIATAL HERNIA REPAIR LAPAROSCOPIC WITH DAVINCI ROBOT;  Surgeon: Otilia Capone MD;  Location: [facility] MAIN OR;  Service: Robotics - DaVinci;  Laterality: N/A;    CYSTOSCOPY, URETEROSCOPY, RETROGRADE PYELOGRAM, STENT INSERTION Left 3/13/2024    Procedure: CYSTOSCOPY URETEROSCOPY, STENT INSERTION, STONE EXTRACTION;  Surgeon: Carmelo Nguyễn MD;  Location:   [facility] MAIN OR;  Service: Urology;  Laterality: Left;    APPENDECTOMY N/A 3/13/2024    Procedure: APPENDECTOMY LAPAROSCOPIC;  Surgeon: Georges Chandler MD;  Location: [facility] MAIN OR;  Service: General;  Laterality: N/A;    ENDOSCOPY N/A 5/2/2024    Procedure: ESOPHAGOGASTRODUODENOSCOPY;  Surgeon: Otilia Capone MD;  Location: [facility] ENDOSCOPY;  Service: General;  Laterality: N/A;  normal    CHOLECYSTECTOMY N/A 6/4/2024    Procedure: CHOLECYSTECTOMY LAPAROSCOPIC WITH DAVINCI ROBOT;  Surgeon: Otilia Capone MD;  Location: [facility] MAIN OR;  Service: Robotics - DaVinci;  Laterality: N/A;    COLONOSCOPY      EAR TUBES      KIDNEY STONE SURGERY      WISDOM TOOTH EXTRACTION        The following portions of the patient's history were reviewed and updated as appropriate: allergies, current medications, past medical history, past social history, past surgical history, and problem list.    Vitals:    07/14/25 1447   BP: 110/74   Pulse: 78   SpO2: 99%       Physical Exam  Constitutional:       Appearance: Normal appearance. She is obese.   Pulmonary:      Effort: Pulmonary effort is normal.   Abdominal:      General: Abdomen is flat.   Neurological:      General: No focal deficit present.      Mental Status: She is alert and oriented to person, place, and time.   Psychiatric:         Mood and Affect: Mood normal.         Behavior: Behavior normal.         Thought Content: Thought content normal.         Judgment: Judgment normal.             Assessment:   BMI 33.13, class 1 obesity, discuss weight loss medication options     Post-op, the patient is struggling with weight regain. She is here today to discuss weight loss medication options. Her insurance does not unfortunately cover any weight loss medications and she is not able to afford GLP-1 medications out of pocket. She has taken phentermine in the past and did well with it. Denies underlying cardiac hx including murmur, heart attack, stroke, hyperthyroidism, glaucoma hx . She does  have SVT but thinks this was more anxiety related. EKG last year was ok overall. Blood pressure and heart rate stable/ normal today. Will prescribe phentermine 37.5 mg daily tablets for pt today. Plan to follow up in 2 months for med check at already scheduled apt. Monitor for chest pain , shortness of breath, uncontrolled HTN, tachycardia, seizures , trouble sleeping.     Encourage 60+ grams of protein in her diet a day and 20-30 minutes of exercise 2-3 days a week including strength training     Will check yearly labs before next visit.   Plan:     Encouraged patient to be sure to get plenty of lean protein per day through small frequent meals all with a protein source.   Activity restrictions: none.   Recommended patient be sure to get at least 70 grams of protein per day by eating small, frequent meals all with high lean protein choices. Be sure to limit/cut back on daily carbohydrate intake. Discussed with the patient the recommended amount of water per day to intake- half of body weight in ounces. Reviewed vitamin requirements. Be sure to do routine exercise, 150 minutes per week minimum, including both cardio and strength training.     Instructions / Recommendations: dietary counseling recommended, recommended a daily protein intake of  grams, vitamin supplement(s) recommended, recommended exercising at least 150 minutes per week, behavior modifications recommended and instructed to call the office for concerns, questions, or problems.     The patient was instructed to follow up in 2 months.     The patient was counseled regarding diet and exercise/ phentermine . Total time spent face to face was 30 minutes and 15 minutes was spent counseling.     SAM Islas  [facility]

## 2025-07-25 ENCOUNTER — OFFICE (AMBULATORY)
Dept: URBAN - METROPOLITAN AREA CLINIC 64 | Facility: CLINIC | Age: 40
End: 2025-07-25
Payer: COMMERCIAL

## 2025-07-25 VITALS
HEART RATE: 74 BPM | HEIGHT: 63 IN | WEIGHT: 175 LBS | SYSTOLIC BLOOD PRESSURE: 115 MMHG | DIASTOLIC BLOOD PRESSURE: 69 MMHG

## 2025-07-25 DIAGNOSIS — K59.00 CONSTIPATION, UNSPECIFIED: ICD-10-CM

## 2025-07-25 PROCEDURE — 99212 OFFICE O/P EST SF 10 MIN: CPT | Performed by: NURSE PRACTITIONER

## (undated) DEVICE — SUT MNCRYL 4/0 PS2 27IN UD MCP426H

## (undated) DEVICE — ERBE NESSY®PLATE 170 SPLIT; 168CM²; CABLE 3M: Brand: ERBE

## (undated) DEVICE — GOWN,REINFORCE,POLY,SIRUS,BREATH SLV,XLG: Brand: MEDLINE

## (undated) DEVICE — ADHS SKIN PREMIERPRO EXOFIN TOPICAL HI/VISC .5ML

## (undated) DEVICE — ORG INST STRIP/TS ADHS 2X10IN YEL STRL

## (undated) DEVICE — SOLUTION,WATER,IRRIGATION,1000ML,STERILE: Brand: MEDLINE

## (undated) DEVICE — THE ECHELON FLEX POWERED PLUS ARTICULATING ENDOSCOPIC LINEAR CUTTERS ARE STERILE, SINGLE PATIENT USE INSTRUMENTS THAT SIMULTANEOUSLYCUT AND STAPLE TISSUE. THERE ARE SIX STAGGERED ROWS OF STAPLES, THREE ON EITHER SIDE OF THE CUT LINE. THE ECHELON FLEX 45 POWERED PLUSINSTRUMENTS HAVE A STAPLE LINE THAT IS APPROXIMATELY 45 MM LONG AND A CUT LINE THAT IS APPROXIMATELY 42 MM LONG. THE SHAFT CAN ROTATE FREELYIN BOTH DIRECTIONS AND AN ARTICULATION MECHANISM ENABLES THE DISTAL PORTION OF THE SHAFT TO PIVOT TO FACILITATE LATERAL ACCESS TO THE OPERATIVESITE.THE INSTRUMENTS ARE PACKAGED WITH A PRIMARY LITHIUM BATTERY PACK THAT MUST BE INSTALLED PRIOR TO USE. THERE ARE SPECIFIC REQUIREMENTS FORDISPOSING OF THE BATTERY PACK. REFER TO THE BATTERY PACK DISPOSAL SECTION.THE INSTRUMENTS ARE PACKAGED WITHOUT A RELOAD AND MUST BE LOADED PRIOR TO USE. A STAPLE RETAINING CAP ON THE RELOAD PROTECTS THE STAPLE LEGPOINTS DURING SHIPPING AND TRANSPORTATION. THE INSTRUMENTS’ LOCK-OUT FEATURE IS DESIGNED TO PREVENT A USED OR IMPROPERLY INSTALLED RELOADFROM BEING REFIRED OR AN INSTRUMENT FROM BEING FIRED WITHOUT A RELOAD.: Brand: ECHELON FLEX

## (undated) DEVICE — 2, DISPOSABLE SUCTION/IRRIGATOR WITH DISPOSABLE TIP: Brand: STRYKEFLOW

## (undated) DEVICE — SUT PDS 3/0 SH 27IN DYED Z316H

## (undated) DEVICE — STAPLER 60: Brand: SUREFORM

## (undated) DEVICE — CANNULA SEAL

## (undated) DEVICE — COLUMN DRAPE

## (undated) DEVICE — UNDRGLV SURG BIOGEL PIMICROINDICATOR SYNTH SZ8 LF STRL

## (undated) DEVICE — BITEBLOCK ENDO W/STRAP 60F A/ LF DISP

## (undated) DEVICE — DUAL LUMEN URETERAL CATHETER

## (undated) DEVICE — BLANKT WARM UPPR/BDY ARM/OUT 57X196CM

## (undated) DEVICE — PK ENDO GI 50

## (undated) DEVICE — GAUZE,SPONGE,4"X4",16PLY,XRAY,STRL,LF: Brand: MEDLINE

## (undated) DEVICE — PK BARIATRIC 50

## (undated) DEVICE — ENDOPATH XCEL WITH OPTIVIEW TECHNOLOGY BLADELESS TROCARS WITH STABILITY SLEEVES: Brand: ENDOPATH XCEL OPTIVIEW

## (undated) DEVICE — BLADELESS OBTURATOR: Brand: WECK VISTA

## (undated) DEVICE — TIP COVER ACCESSORY

## (undated) DEVICE — KT SURG TURNOVER 050

## (undated) DEVICE — SYR LUERLOK 50ML

## (undated) DEVICE — SLV SCD CALF HEMOFORCE DVT THERP REPROC MD

## (undated) DEVICE — ST TBG AIRSEAL BIF FLTR W/ACT/CHARCOAL/FLTR

## (undated) DEVICE — ANTIBACTERIAL UNDYED BRAIDED (POLYGLACTIN 910), SYNTHETIC ABSORBABLE SUTURE: Brand: COATED VICRYL

## (undated) DEVICE — PRT BIOP SEALS

## (undated) DEVICE — SUT VIC 0 UR6 27IN VCP603H

## (undated) DEVICE — APL DUPLOSPRAYER MIS 40CM

## (undated) DEVICE — VESSEL SEALER EXTEND: Brand: ENDOWRIST

## (undated) DEVICE — BLUNT TIP LAPAROSCOPIC SEALER/DIVIDER NANO-COATED: Brand: LIGASURE

## (undated) DEVICE — SEAL

## (undated) DEVICE — PASS SUT PRO BARIATRIC XL W/TROC SWABS

## (undated) DEVICE — NITINOL STONE RETRIEVAL BASKET: Brand: ZERO TIP

## (undated) DEVICE — SOL IRRIG NACL 1000ML

## (undated) DEVICE — FIBR LASR HOLMIUM 200 MICRON DISP

## (undated) DEVICE — BG RETRV TISS SUPERBAG INTRO RIP/STOP NLY 10MM 240ML MD

## (undated) DEVICE — NITINOL WIRE WITH HYDROPHILIC TIP: Brand: SENSOR

## (undated) DEVICE — APPL HEMOS FOR DELIVERY FLOSEAL

## (undated) DEVICE — ARM DRAPE

## (undated) DEVICE — CVR HNDL LT SURG ACCSSRY BLU STRL

## (undated) DEVICE — GENERAL LAPAROSCOPY CDS: Brand: MEDLINE INDUSTRIES, INC.

## (undated) DEVICE — TROC BLADLES AIRSEAL/OPTI THRD 8X120MM 1P/U

## (undated) DEVICE — 3M™ IOBAN™ 2 ANTIMICROBIAL INCISE DRAPE 6650EZ: Brand: IOBAN™ 2

## (undated) DEVICE — LAPAROSCOPIC SMOKE FILTRATION SYSTEM: Brand: PALL LAPAROSHIELD® PLUS LAPAROSCOPIC SMOKE FILTRATION SYSTEM

## (undated) DEVICE — MAT PREVALON MOBL TRANSFR AIR W/PAD REPROC 39X81IN

## (undated) DEVICE — SUT ETHLN 2/0 PS 18IN 585H

## (undated) DEVICE — CFF SCD HEMFRCE SEQ CLF STD XL

## (undated) DEVICE — SEALANT WND FIBRIN TISSEEL PREFIL/SYR/PRIMAFZ 4ML

## (undated) DEVICE — MAT PREVALON MOBL TRANSFR AIR W/PAD 39X81IN

## (undated) DEVICE — REDUCER: Brand: ENDOWRIST

## (undated) DEVICE — PAD GRND E/S W/CORD SPLT A/

## (undated) DEVICE — SOL IRRG H2O BG 3000ML STRL

## (undated) DEVICE — GLV SURG SIGNATURE ESSENTIAL PF LTX SZ7.5

## (undated) DEVICE — TOTAL TRAY, DB, 100% SILI FOLEY, 16FR 10: Brand: MEDLINE

## (undated) DEVICE — UNDRGLV SURG BIOGEL PIMICROINDICATOR SYNTH SZ7.5PF STRL PR/2

## (undated) DEVICE — LAPAROVUE VISIBILITY SYSTEM LAPAROSCOPIC SOLUTIONS: Brand: LAPAROVUE

## (undated) DEVICE — UMBILICAL TAPE: Brand: DEROYAL

## (undated) DEVICE — INTENDED FOR TISSUE SEPARATION, AND OTHER PROCEDURES THAT REQUIRE A SHARP SURGICAL BLADE TO PUNCTURE OR CUT.: Brand: BARD-PARKER ® CARBON RIB-BACK BLADES

## (undated) DEVICE — SYS IRR PUMP SGL ACTN VAC SYR 10CC

## (undated) DEVICE — SINGLE-USE BIOPSY FORCEPS: Brand: RADIAL JAW 4

## (undated) DEVICE — MARYLAND JAW LAPAROSCOPIC SEALER/DIVIDER COATED: Brand: LIGASURE

## (undated) DEVICE — SUT SILK 2/0 SH 30IN K833H

## (undated) DEVICE — SUT PDS 3/0 PLS MONO 1X27IN SH VIL PDP316H

## (undated) DEVICE — GLV SURG BIOGEL LTX PF 7 1/2

## (undated) DEVICE — PK CYSTO 50

## (undated) DEVICE — THE STERILE LIGHT HANDLE COVER IS USED WITH STERIS SURGICAL LIGHTING AND VISUALIZATION SYSTEMS.

## (undated) DEVICE — THE STERILE CAMERA HANDLE COVER IS FOR USE WITH THE STERIS SURGICAL LIGHTING AND VISUALIZATION SYSTEMS.

## (undated) DEVICE — VISIGI 3D®  CALIBRATION SYSTEM  SIZE 40FR STD W/ BULB: Brand: BOEHRINGER® VISIGI 3D™ SLEEVE GASTRECTOMY CALIBRATION SYSTEM, SIZE 40FR W/BULB

## (undated) DEVICE — PREP SPRY PVPI 10P 2OZ

## (undated) DEVICE — 40580 - THE PINK PAD - ADVANCED TRENDELENBURG POSITIONING KIT: Brand: 40580 - THE PINK PAD - ADVANCED TRENDELENBURG POSITIONING KIT

## (undated) DEVICE — CATH 2L URETRL HC 6F 50CM

## (undated) DEVICE — INSUFFLATION TUBING SET, ENDOFLATOR 50: Brand: N.A.

## (undated) DEVICE — ENDOPATH XCEL WITH OPTIVIEW TECHNOLOGY UNIVERSAL TROCAR STABILITY SLEEVES: Brand: ENDOPATH XCEL OPTIVIEW

## (undated) DEVICE — NEEDLE, QUINCKE, 20GX3.5": Brand: MEDLINE

## (undated) DEVICE — SUT SILK 2/0 FS BLK 18IN 685G

## (undated) DEVICE — SYRINGE,TOOMEY,IRRIGATION,70CC,STERILE: Brand: MEDLINE

## (undated) DEVICE — GLV SURG SIGNATURE ESSENTIAL PF LTX SZ7

## (undated) DEVICE — SYR LUERLOK 30CC

## (undated) DEVICE — SOL IRR H2O BTL 1000ML STRL

## (undated) DEVICE — DRP SURG UNIV BASIC BILAMINATE 53X77IN DISP

## (undated) DEVICE — ENDOPATH XCEL BLADELESS TROCARS WITH STABILITY SLEEVES: Brand: ENDOPATH XCEL